# Patient Record
Sex: FEMALE | Race: WHITE | NOT HISPANIC OR LATINO | Employment: OTHER | URBAN - METROPOLITAN AREA
[De-identification: names, ages, dates, MRNs, and addresses within clinical notes are randomized per-mention and may not be internally consistent; named-entity substitution may affect disease eponyms.]

---

## 2017-01-27 PROCEDURE — 99283 EMERGENCY DEPT VISIT LOW MDM: CPT

## 2017-01-27 ASSESSMENT — PAIN SCALES - GENERAL: PAINLEVEL_OUTOF10: 4

## 2017-01-28 ENCOUNTER — HOSPITAL ENCOUNTER (OUTPATIENT)
Dept: RADIOLOGY | Facility: MEDICAL CENTER | Age: 37
End: 2017-01-28
Attending: EMERGENCY MEDICINE
Payer: MEDICARE

## 2017-01-28 ENCOUNTER — HOSPITAL ENCOUNTER (EMERGENCY)
Facility: MEDICAL CENTER | Age: 37
End: 2017-01-28
Attending: EMERGENCY MEDICINE
Payer: MEDICARE

## 2017-01-28 VITALS
TEMPERATURE: 97.8 F | RESPIRATION RATE: 18 BRPM | HEIGHT: 66 IN | SYSTOLIC BLOOD PRESSURE: 128 MMHG | HEART RATE: 98 BPM | BODY MASS INDEX: 46.84 KG/M2 | OXYGEN SATURATION: 95 % | WEIGHT: 291.45 LBS | DIASTOLIC BLOOD PRESSURE: 91 MMHG

## 2017-01-28 DIAGNOSIS — S93.601A FOOT SPRAIN, RIGHT, INITIAL ENCOUNTER: ICD-10-CM

## 2017-01-28 PROCEDURE — 73610 X-RAY EXAM OF ANKLE: CPT | Mod: RT

## 2017-01-28 PROCEDURE — 73620 X-RAY EXAM OF FOOT: CPT | Mod: RT

## 2017-01-28 NOTE — DISCHARGE INSTRUCTIONS
Foot Sprain  You have a sprained foot. When you twist your foot, the ligaments that hold the joints together are injured. This may cause pain, swelling, bruising, and difficulty walking. Proper treatment will shorten your disability and help you prevent re-injury. To treat a sprained foot you should:  · Elevate your foot for the next 2-3 days to reduce swelling.   · Apply ice packs to the foot for 20-30 minutes every 2-3 hours.   · Wrap your foot with a compression bandage as long as it is swollen or tender.   · Do not walk on your foot if it still hurts a lot.  Use crutches or a cane until weight bearing becomes painless.   · Special podiatric shoes or shoes with rigid soles may be useful in allowing earlier walking.   Only take over-the-counter or prescription medicines for pain, discomfort, or fever as directed by your caregiver. Most foot sprains will heal completely in 3-6 weeks with proper rest.  If you still have pain or swelling after 2-3 weeks, or if your pain worsens, you should see your doctor for further evaluation.  Document Released: 01/25/2006 Document Revised: 03/11/2013 Document Reviewed: 12/19/2009  [employer]® Patient Information ©2013 [employer].

## 2017-01-28 NOTE — ED NOTES
Pt to [address] bed, XR at bedside. Pt able to ambulate to room w/o any difficulty or assistance.

## 2017-01-28 NOTE — ED AVS SNAPSHOT
After Visit Summary                                                                                                                Yamila Mendoza   MRN: [id number]    Department:  [facility], Emergency Dept   Date of Visit:  1/27/2017            [facility], Emergency Dept    [address]    [place] [address]    Phone:  [phone]      You were seen by     Mane Scherer M.D.      Your Diagnosis Was     Foot sprain, right, initial encounter     S93.601A       Follow-up Information     1. Follow up with [facility] In 1 week.    Why:  As needed    Contact information    [place] [address]  [phone]        Medication Information     Review all of your home medications and newly ordered medications with your primary doctor and/or pharmacist as soon as possible. Follow medication instructions as directed by your doctor and/or pharmacist.     Please keep your complete medication list with you and share with your physician. Update the information when medications are discontinued, doses are changed, or new medications (including over-the-counter products) are added; and carry medication information at all times in the event of emergency situations.               Medication List      ASK your doctor about these medications        Instructions    amitriptyline 75 MG Tabs   Commonly known as:  ELAVIL    Take 1 Tab by mouth every evening.   Dose:  75 mg       bisacodyl 5 MG EC tablet   Commonly known as:  DULCOLAX    Take 2 Tabs by mouth 1 time daily as needed for Constipation.   Dose:  10 mg       carbamazepine 200 MG Tabs   Commonly known as:  TEGRETOL    Take 1 Tab by mouth 3 times a day.   Dose:  200 mg       cyclobenzaprine 10 MG Tabs   Commonly known as:  FLEXERIL    Take 25 mg by mouth 2 times a day as needed.   Dose:  25 mg       DEPAKOTE 250 MG Tbec   Generic drug:  divalproex    Take 250 mg by mouth 3 times a day. 2 tabs every morning, 2 tabs every  afternoon, 3 tabs at bedtime   Dose:  250 mg       * hydrocodone-acetaminophen 5-325 MG Tabs per tablet   Commonly known as:  NORCO    Take 1-2 Tabs by mouth every 6 hours as needed (severe pain).   Dose:  1-2 Tab       * hydrocodone-acetaminophen 5-325 MG Tabs per tablet   Commonly known as:  NORCO    Take 1 Tab by mouth every four hours as needed (pain).   Dose:  1 Tab       * ibuprofen 600 MG Tabs   Commonly known as:  MOTRIN    Take 1 Tab by mouth every 6 hours as needed.   Dose:  600 mg       * ibuprofen 600 MG Tabs   Commonly known as:  MOTRIN    Take 600 mg by mouth every 6 hours as needed.   Dose:  600 mg       lactobacillus granules Pack    Take 1 Packet by mouth 3 times a day, with meals.   Dose:  1 Packet       lorazepam 1 MG Tabs   Commonly known as:  ATIVAN    Take 1 mg by mouth as needed. Indications: seizures   Dose:  1 mg       nitrofurantoin monohydr macro 100 MG Caps   Commonly known as:  MACROBID    Take 1 Cap by mouth 2 times a day.   Dose:  100 mg       potassium chloride SA 20 MEQ Tbcr   Commonly known as:  Kdur    Take 1 Tab by mouth every day.   Dose:  20 mEq       * pseudoephedrine 30 MG Tabs   Commonly known as:  SUDAFED    Take 2 Tabs by mouth every four hours as needed for Congestion.   Dose:  60 mg       * pseudoephedrine 30 MG Tabs   Commonly known as:  SUDAFED    Take 2 Tabs by mouth every four hours as needed for Congestion.   Dose:  60 mg       topiramate 100 MG Tabs   Commonly known as:  TOPAMAX    Take 1 Tab by mouth 2 times a day.   Dose:  100 mg       * Notice:  This list has 6 medication(s) that are the same as other medications prescribed for you. Read the directions carefully, and ask your doctor or other care provider to review them with you.            Procedures and tests performed during your visit     DX-ANKLE 3+ VIEWS RIGHT    DX-FOOT-2- RIGHT        Discharge Instructions       Foot Sprain  You have a sprained foot. When you twist your foot, the ligaments that hold the  joints together are injured. This may cause pain, swelling, bruising, and difficulty walking. Proper treatment will shorten your disability and help you prevent re-injury. To treat a sprained foot you should:  · Elevate your foot for the next 2-3 days to reduce swelling.   · Apply ice packs to the foot for 20-30 minutes every 2-3 hours.   · Wrap your foot with a compression bandage as long as it is swollen or tender.   · Do not walk on your foot if it still hurts a lot.  Use crutches or a cane until weight bearing becomes painless.   · Special podiatric shoes or shoes with rigid soles may be useful in allowing earlier walking.   Only take over-the-counter or prescription medicines for pain, discomfort, or fever as directed by your caregiver. Most foot sprains will heal completely in 3-6 weeks with proper rest.  If you still have pain or swelling after 2-3 weeks, or if your pain worsens, you should see your doctor for further evaluation.  Document Released: 01/25/2006 Document Revised: 03/11/2013 Document Reviewed: 12/19/2009  [employer]® Patient Information ©2013 [employer].          Patient Information     Patient Information    Following emergency treatment: all patient requiring follow-up care must return either to a private physician or a clinic if your condition worsens before you are able to obtain further medical attention, please return to the emergency room.     Billing Information    At [facility], we work to make the billing process streamlined for our patients.  Our Representatives are here to answer any questions you may have regarding your hospital bill.  If you have insurance coverage and have supplied your insurance information to us, we will submit a claim to your insurer on your behalf.  Should you have any questions regarding your bill, we can be reached online or by phone as follows:  Online: You are able pay your bills online or live chat with our representatives about any billing questions  you may have. We are here to help Monday - Friday from 8:00am to 7:30pm and 9:00am - 12:00pm on Saturdays.  Please visit [email or web address]  for more information.   Phone:  [phone] or [phone]    Please note that your emergency physician, surgeon, pathologist, radiologist, anesthesiologist, and other specialists are not employed by [facility] and will therefore bill separately for their services.  Please contact them directly for any questions concerning their bills at the numbers below:     Emergency Physician Services:  [phone]  [facility]:  [phone]  [facility]:  [phone]  [facility]:  [phone]    1. Your final bill may vary from the amount quoted upon discharge if all procedures are not complete at that time, or if your doctor has additional procedures of which we are not aware. You will receive an additional bill if you return to the Emergency Department at [facility]placed by [facility] for suture removal regardless of the facility of which the sutures were placed.     2. Please arrange for settlement of this account at the emergency registration.    3. All self-pay accounts are due in full at the time of treatment.  If you are unable to meet this obligation then payment is expected within 4-5 days.     4. If you have had radiology studies (CT, X-ray, Ultrasound, MRI), you have received a preliminary result during your emergency department visit. Please contact the radiology department [phone] to receive a copy of your final result. Please discuss the Final result with your primary physician or with the follow up physician provided.     Crisis Hotline:  National Crisis Hotline:  0-793-FFQPFOT or 1-899.569.8241  [place] Crisis Hotline:    1-835.798.9326 or 652-772-9258         ED Discharge Follow Up Questions    1. In order to provide you with very good care, we would like to follow up with a phone call in the  next few days.  May we have your permission to contact you?     YES /  NO    2. What is the best phone number to call you? (       )_____-__________    3. What is the best time to call you?      Morning  /  Afternoon  /  Evening                   Patient Signature:  ____________________________________________________________    Date:  ____________________________________________________________

## 2017-01-28 NOTE — ED AVS SNAPSHOT
1/28/2017          Yamila Mendoza  [address]  [place] [address]    Dear Yamila:    [facility] wants to ensure your discharge home is safe and you or your loved ones have had all your questions answered regarding your care after you leave the hospital.    You may receive a telephone call within two days of your discharge.  This call is to make certain you understand your discharge instructions as well as ensure we provided you with the best care possible during your stay with us.     The call will only last approximately 3-5 minutes and will be done by a nurse.    Once again, we want to ensure your discharge home is safe and that you have a clear understanding of any next steps in your care.  If you have any questions or concerns, please do not hesitate to contact us, we are here for you.  Thank you for choosing [facility] for your healthcare needs.    Sincerely,    Rehan Reyes    [facility]

## 2017-01-28 NOTE — ED NOTES
Patient fell two weeks ago and hit the grass.  During the fall she states she twisted her ankle.  Pain to the foot.  No major deformity.  Able to a ambulate.

## 2017-01-28 NOTE — ED AVS SNAPSHOT
TrueAccord Access Code: [email or web address]  Expires: 2/27/2017  5:16 AM    TrueAccord  A secure, online tool to manage your health information     [employer]’s TrueAccord® is a secure, online tool that connects you to your personalized health information from the privacy of your home -- day or night - making it very easy for you to manage your healthcare. Once the activation process is completed, you can even access your medical information using the TrueAccord sami, which is available for free in the Apple Sami store or Google Play store.     TrueAccord provides the following levels of access (as shown below):   My Chart Features   [facility] Primary Care Doctor [facility]  Specialists [facility]  Urgent  Care Non-[facility]  Primary Care  Doctor   Email your healthcare team securely and privately 24/7 X X X X   Manage appointments: schedule your next appointment; view details of past/upcoming appointments X      Request prescription refills. X      View recent personal medical records, including lab and immunizations X X X X   View health record, including health history, allergies, medications X X X X   Read reports about your outpatient visits, procedures, consult and ER notes X X X X   See your discharge summary, which is a recap of your hospital and/or ER visit that includes your diagnosis, lab results, and care plan. X X       How to register for TrueAccord:  1. Go to  [email or web address].  2. Click on the Sign Up Now box, which takes you to the New Member Sign Up page. You will need to provide the following information:  a. Enter your TrueAccord Access Code exactly as it appears at the top of this page. (You will not need to use this code after you’ve completed the sign-up process. If you do not sign up before the expiration date, you must request a new code.)   b. Enter your date of birth.   c. Enter your home email address.   d. Click Submit, and follow the next screen’s instructions.  3. Create a TrueAccord ID. This will be your TrueAccord  login ID and cannot be changed, so think of one that is secure and easy to remember.  4. Create a [employer] password. You can change your password at any time.  5. Enter your Password Reset Question and Answer. This can be used at a later time if you forget your password.   6. Enter your e-mail address. This allows you to receive e-mail notifications when new information is available in [employer].  7. Click Sign Up. You can now view your health information.    For assistance activating your [employer] account, call [phone]

## 2017-01-28 NOTE — ED PROVIDER NOTES
"ED Provider Note    CHIEF COMPLAINT  Chief Complaint   Patient presents with   • Foot Pain     right        HPI  Yamila Mendoza is a 36 y.o. female who presents to the emergency department with right foot discomfort. The patient states she is walking and she slipped and hurt her right mid foot and right lateral ankle. She says the pain is worse with ambulation. She states this happened approximately 2 weeks ago. She has not been imaged. She does not have any pain proximal to the ankle.    REVIEW OF SYSTEMS  No other musculoskeletal complaints    PHYSICAL EXAM  VITAL SIGNS: /81 mmHg  Pulse 102  Temp(Src) 36.6 °C (97.8 °F)  Resp 16  Ht 1.676 m (5' 6\")  Wt 132.2 kg (291 lb 7.2 oz)  BMI 47.06 kg/m2  SpO2 98%  In general the patient does not appear toxic  Extremities patient has tenderness to the lateral aspect of the right foot. She does not have any obvious deformities. She has a normal ankle and proximal leg examination.  Skin no erythema nor induration  Neurovascular examination is grossly intact in the right lower extremity    RADIOLOGY/PROCEDURES  DX-ANKLE 3+ VIEWS RIGHT   Final Result      No fracture or dislocation of RIGHT ankle.      DX-FOOT-2- RIGHT   Final Result      1.  No fracture or dislocation of RIGHT foot.   2.  Dorsal soft tissue swelling of the forefoot.            COURSE & MEDICAL DECISION MAKING  Pertinent Labs & Imaging studies reviewed. (See chart for details)  This 36-year-old female who presents to emergency with right foot discomfort. The x-ray does not support a fracture or dislocation. The patient will be discharged instructions to wear a good supportive shoe. She will take Motrin for inflammation. She'll follow up with orthopedics if she is not better in 5-7 days.    FINAL IMPRESSION  1. Right mid foot strain      Disposition  The patient will be discharged in stable condition.      Electronically signed by: Mane Scherer, 1/28/2017 4:16 AM        "

## 2017-07-15 ENCOUNTER — HOSPITAL ENCOUNTER (EMERGENCY)
Facility: MEDICAL CENTER | Age: 37
End: 2017-07-15
Attending: EMERGENCY MEDICINE
Payer: MEDICARE

## 2017-07-15 VITALS
HEIGHT: 66 IN | BODY MASS INDEX: 47.09 KG/M2 | OXYGEN SATURATION: 97 % | RESPIRATION RATE: 18 BRPM | TEMPERATURE: 97.2 F | DIASTOLIC BLOOD PRESSURE: 78 MMHG | SYSTOLIC BLOOD PRESSURE: 135 MMHG | WEIGHT: 293 LBS | HEART RATE: 91 BPM

## 2017-07-15 DIAGNOSIS — J02.0 STREP THROAT: ICD-10-CM

## 2017-07-15 LAB
DEPRECATED S PYO AG THROAT QL EIA: ABNORMAL
SIGNIFICANT IND 70042: ABNORMAL
SITE SITE: ABNORMAL
SOURCE SOURCE: ABNORMAL

## 2017-07-15 PROCEDURE — 99284 EMERGENCY DEPT VISIT MOD MDM: CPT

## 2017-07-15 PROCEDURE — 87880 STREP A ASSAY W/OPTIC: CPT

## 2017-07-15 RX ORDER — CLINDAMYCIN HYDROCHLORIDE 300 MG/1
300 CAPSULE ORAL 4 TIMES DAILY
Qty: 40 CAP | Refills: 0 | Status: SHIPPED | OUTPATIENT
Start: 2017-07-15 | End: 2017-07-25

## 2017-07-15 ASSESSMENT — PAIN SCALES - GENERAL: PAINLEVEL_OUTOF10: 10

## 2017-07-15 NOTE — ED AVS SNAPSHOT
Home Care Instructions                                                                                                                Yamila Mendoza   MRN: [id number]    Department:  [facility], Emergency Dept   Date of Visit:  7/15/2017            [facility], Emergency Dept    [address] [place] [id number]    Phone:  [phone]      You were seen by     Caleb Abad D.O.      Your Diagnosis Was     Strep throat     J02.0       Follow-up Information     1. Follow up with Please follow up with your doctor. Call in 2 days.        2. Follow up with [facility], Emergency Dept.    Specialty:  Emergency Medicine    Why:  If symptoms worsen    Contact information    [address] [place] [id number] [phone]      Medication Information     Review all of your home medications and newly ordered medications with your primary doctor and/or pharmacist as soon as possible. Follow medication instructions as directed by your doctor and/or pharmacist.     Please keep your complete medication list with you and share with your physician. Update the information when medications are discontinued, doses are changed, or new medications (including over-the-counter products) are added; and carry medication information at all times in the event of emergency situations.               Medication List      START taking these medications        Instructions    Morning Afternoon Evening Bedtime    clindamycin 300 MG Caps   Commonly known as:  CLEOCIN        Take 1 Cap by mouth 4 times a day for 10 days.   Dose:  300 mg                          ASK your doctor about these medications        Instructions    Morning Afternoon Evening Bedtime    amitriptyline 75 MG Tabs   Commonly known as:  ELAVIL        Take 1 Tab by mouth every evening.   Dose:  75 mg                        bisacodyl 5 MG EC tablet   Commonly known as:  DULCOLAX        Take 2 Tabs by mouth 1  time daily as needed for Constipation.   Dose:  10 mg                        carbamazepine 200 MG Tabs   Commonly known as:  TEGRETOL        Take 1 Tab by mouth 3 times a day.   Dose:  200 mg                        cyclobenzaprine 10 MG Tabs   Commonly known as:  FLEXERIL        Take 25 mg by mouth 2 times a day as needed.   Dose:  25 mg                        DEPAKOTE 250 MG Tbec   Generic drug:  divalproex        Take 250 mg by mouth 3 times a day. 2 tabs every morning, 2 tabs every afternoon, 3 tabs at bedtime   Dose:  250 mg                        * hydrocodone-acetaminophen 5-325 MG Tabs per tablet   Commonly known as:  NORCO        Take 1-2 Tabs by mouth every 6 hours as needed (severe pain).   Dose:  1-2 Tab                        * hydrocodone-acetaminophen 5-325 MG Tabs per tablet   Commonly known as:  NORCO        Take 1 Tab by mouth every four hours as needed (pain).   Dose:  1 Tab                        * ibuprofen 600 MG Tabs   Commonly known as:  MOTRIN        Take 1 Tab by mouth every 6 hours as needed.   Dose:  600 mg                        * ibuprofen 600 MG Tabs   Commonly known as:  MOTRIN        Take 600 mg by mouth every 6 hours as needed.   Dose:  600 mg                        lactobacillus granules Pack        Take 1 Packet by mouth 3 times a day, with meals.   Dose:  1 Packet                        lorazepam 1 MG Tabs   Commonly known as:  ATIVAN        Take 1 mg by mouth as needed. Indications: seizures   Dose:  1 mg                        nitrofurantoin monohydr macro 100 MG Caps   Commonly known as:  MACROBID        Take 1 Cap by mouth 2 times a day.   Dose:  100 mg                        potassium chloride SA 20 MEQ Tbcr   Commonly known as:  Kdur        Take 1 Tab by mouth every day.   Dose:  20 mEq                        * pseudoephedrine 30 MG Tabs   Commonly known as:  SUDAFED        Take 2 Tabs by mouth every four hours as needed for Congestion.   Dose:  60 mg                         "* pseudoephedrine 30 MG Tabs   Commonly known as:  SUDAFED        Take 2 Tabs by mouth every four hours as needed for Congestion.   Dose:  60 mg                        topiramate 100 MG Tabs   Commonly known as:  TOPAMAX        Take 1 Tab by mouth 2 times a day.   Dose:  100 mg                        * Notice:  This list has 6 medication(s) that are the same as other medications prescribed for you. Read the directions carefully, and ask your doctor or other care provider to review them with you.         Where to Get Your Medications      You can get these medications from any pharmacy     Bring a paper prescription for each of these medications    - clindamycin 300 MG Caps            Procedures and tests performed during your visit     RAPID STREP, CULT IF INDICATED (CULTURE IF NEGATIVE)        Discharge Instructions       Salt Water Gargle  This solution will help make your mouth and throat feel better.  HOME CARE INSTRUCTIONS   · Mix 1 teaspoon of salt in 8 ounces of warm water.  · Gargle with this solution as much or often as you need or as directed. Swish and gargle gently if you have any sores or wounds in your mouth.  · Do not swallow this mixture.     This information is not intended to replace advice given to you by your health care provider. Make sure you discuss any questions you have with your health care provider.     Document Released: 09/21/2005 Document Revised: 03/11/2013 Document Reviewed: 02/12/2010  [employer] Interactive Patient Education ©2016 [employer]    Strep Throat  Strep throat is an infection of the throat caused by a bacteria named Streptococcus pyogenes. Your health care provider may call the infection streptococcal \"tonsillitis\" or \"pharyngitis\" depending on whether there are signs of inflammation in the tonsils or back of the throat. Strep throat is most common in children aged 5-15 years during the cold months of the year, but it can occur in people of any age during any season. " "This infection is spread from person to person (contagious) through coughing, sneezing, or other close contact.  SIGNS AND SYMPTOMS   · Fever or chills.  · Painful, swollen, red tonsils or throat.  · Pain or difficulty when swallowing.  · White or yellow spots on the tonsils or throat.  · Swollen, tender lymph nodes or \"glands\" of the neck or under the jaw.  · Red rash all over the body (rare).  DIAGNOSIS   Many different infections can cause the same symptoms. A test must be done to confirm the diagnosis so the right treatment can be given. A \"rapid strep test\" can help your health care provider make the diagnosis in a few minutes. If this test is not available, a light swab of the infected area can be used for a throat culture test. If a throat culture test is done, results are usually available in a day or two.  TREATMENT   Strep throat is treated with antibiotic medicine.  HOME CARE INSTRUCTIONS   · Gargle with 1 tsp of salt in 1 cup of warm water, 3-4 times per day or as needed for comfort.  · Family members who also have a sore throat or fever should be tested for strep throat and treated with antibiotics if they have the strep infection.  · Make sure everyone in your household washes their hands well.  · Do not share food, drinking cups, or personal items that could cause the infection to spread to others.  · You may need to eat a soft food diet until your sore throat gets better.  · Drink enough water and fluids to keep your urine clear or pale yellow. This will help prevent dehydration.  · Get plenty of rest.  · Stay home from school, day care, or work until you have been on antibiotics for 24 hours.  · Take medicines only as directed by your health care provider.  · Take your antibiotic medicine as directed by your health care provider. Finish it even if you start to feel better.  SEEK MEDICAL CARE IF:   · The glands in your neck continue to enlarge.  · You develop a rash, cough, or earache.  · You cough " up green, yellow-brown, or bloody sputum.  · You have pain or discomfort not controlled by medicines.  · Your problems seem to be getting worse rather than better.  · You have a fever.  SEEK IMMEDIATE MEDICAL CARE IF:   · You develop any new symptoms such as vomiting, severe headache, stiff or painful neck, chest pain, shortness of breath, or trouble swallowing.  · You develop severe throat pain, drooling, or changes in your voice.  · You develop swelling of the neck, or the skin on the neck becomes red and tender.  · You develop signs of dehydration, such as fatigue, dry mouth, and decreased urination.  · You become increasingly sleepy, or you cannot wake up completely.  MAKE SURE YOU:  · Understand these instructions.  · Will watch your condition.  · Will get help right away if you are not doing well or get worse.     This information is not intended to replace advice given to you by your health care provider. Make sure you discuss any questions you have with your health care provider.     Document Released: 12/15/2001 Document Revised: 01/08/2016 Document Reviewed: 04/11/2016  [employer] Interactive Patient Education ©2016 [employer]            Patient Information     Patient Information    Following emergency treatment: all patient requiring follow-up care must return either to a private physician or a clinic if your condition worsens before you are able to obtain further medical attention, please return to the emergency room.     Billing Information    At [facility], we work to make the billing process streamlined for our patients.  Our Representatives are here to answer any questions you may have regarding your hospital bill.  If you have insurance coverage and have supplied your insurance information to us, we will submit a claim to your insurer on your behalf.  Should you have any questions regarding your bill, we can be reached online or by phone as follows:  Online: You are able pay your bills online  or live chat with our representatives about any billing questions you may have. We are here to help Monday - Friday from 8:00am to 7:30pm and 9:00am - 12:00pm on Saturdays.  Please visit [email or web address]  for more information.   Phone:  [phone] or [phone]    Please note that your emergency physician, surgeon, pathologist, radiologist, anesthesiologist, and other specialists are not employed by [facility] and will therefore bill separately for their services.  Please contact them directly for any questions concerning their bills at the numbers below:     Emergency Physician Services:  [phone]  [facility]:  [phone]  [facility]:  [phone]  [facility]:  [phone]    1. Your final bill may vary from the amount quoted upon discharge if all procedures are not complete at that time, or if your doctor has additional procedures of which we are not aware. You will receive an additional bill if you return to the Emergency Department at [facility] for suture removal regardless of the facility of which the sutures were placed.     2. Please arrange for settlement of this account at the emergency registration.    3. All self-pay accounts are due in full at the time of treatment.  If you are unable to meet this obligation then payment is expected within 4-5 days.     4. If you have had radiology studies (CT, X-ray, Ultrasound, MRI), you have received a preliminary result during your emergency department visit. Please contact the radiology department [phone] to receive a copy of your final result. Please discuss the Final result with your primary physician or with the follow up physician provided.     Crisis Hotline:  National Crisis Hotline:  6-581-NDVLXXX or 1-653.198.7906  [place] Crisis Hotline:    1-255.718.6338 or 421-421-6074         ED Discharge Follow Up Questions    1. In order to provide you with very  good care, we would like to follow up with a phone call in the next few days.  May we have your permission to contact you?     YES /  NO    2. What is the best phone number to call you? (       )_____-__________    3. What is the best time to call you?      Morning  /  Afternoon  /  Evening                   Patient Signature:  ____________________________________________________________    Date:  ____________________________________________________________

## 2017-07-15 NOTE — ED AVS SNAPSHOT
Mobiquity Access Code: [email or web address]  Expires: 8/14/2017  9:38 PM    Mobiquity  A secure, online tool to manage your health information     [employer]’s Mobiquity® is a secure, online tool that connects you to your personalized health information from the privacy of your home -- day or night - making it very easy for you to manage your healthcare. Once the activation process is completed, you can even access your medical information using the Mobiquity sami, which is available for free in the Apple Sami store or Google Play store.     Mobiquity provides the following levels of access (as shown below):   My Chart Features   [facility] Primary Care Doctor [facility]  Specialists [facility]  Urgent  Care Non-[facility]  Primary Care  Doctor   Email your healthcare team securely and privately 24/7 X X X X   Manage appointments: schedule your next appointment; view details of past/upcoming appointments X      Request prescription refills. X      View recent personal medical records, including lab and immunizations X X X X   View health record, including health history, allergies, medications X X X X   Read reports about your outpatient visits, procedures, consult and ER notes X X X X   See your discharge summary, which is a recap of your hospital and/or ER visit that includes your diagnosis, lab results, and care plan. X X       How to register for Mobiquity:  1. Go to  [email or web address].  2. Click on the Sign Up Now box, which takes you to the New Member Sign Up page. You will need to provide the following information:  a. Enter your Mobiquity Access Code exactly as it appears at the top of this page. (You will not need to use this code after you’ve completed the sign-up process. If you do not sign up before the expiration date, you must request a new code.)   b. Enter your date of birth.   c. Enter your home email address.   d. Click Submit, and follow the next screen’s instructions.  3. Create a Mobiquity ID. This will be your Mobiquity  login ID and cannot be changed, so think of one that is secure and easy to remember.  4. Create a [employer] password. You can change your password at any time.  5. Enter your Password Reset Question and Answer. This can be used at a later time if you forget your password.   6. Enter your e-mail address. This allows you to receive e-mail notifications when new information is available in CloudPrime.  7. Click Sign Up. You can now view your health information.    For assistance activating your CloudPrime account, call [phone]

## 2017-07-15 NOTE — ED AVS SNAPSHOT
7/15/2017    Yamila Mendoza  [address]  [place] [address]    Dear Yamila:    [facility] wants to ensure your discharge home is safe and you or your loved ones have had all of your questions answered regarding your care after you leave the hospital.    Below is a list of resources and contact information should you have any questions regarding your hospital stay, follow-up instructions, or active medical symptoms.    Questions or Concerns Regarding… Contact   Medical Questions Related to Your Discharge  (7 days a week, 8am-5pm) Contact a Nurse Care Coordinator   [phone]   Medical Questions Not Related to Your Discharge  (24 hours a day / 7 days a week)  Contact the Nurse Health Line   [phone]    Medications or Discharge Instructions Refer to your discharge packet   or contact your [facility] Primary Care Provider   [phone]   Follow-up Appointment(s) Schedule your appointment via [employer]   or contact Scheduling [phone]   Billing Review your statement via [employer]  or contact Billing [phone]   Medical Records Review your records via [employer]   or contact Medical Records [phone]     You may receive a telephone call within two days of discharge. This call is to make certain you understand your discharge instructions and have the opportunity to have any questions answered. You can also easily access your medical information, test results and upcoming appointments via the [employer] free online health management tool. You can learn more and sign up at [employer]. For assistance setting up your [employer] account, please call [phone].    Once again, we want to ensure your discharge home is safe and that you have a clear understanding of any next steps in your care. If you have any questions or concerns, please do not hesitate to contact us, we are here for you. Thank you for choosing [facility] for your healthcare needs.    Sincerely,    Your [facility] Healthcare Team

## 2017-07-16 NOTE — ED NOTES
Discharge instructions received and reviewed with pt and , pt and  verbalized understanding of medication and follow up care.

## 2017-07-16 NOTE — ED NOTES
Yamila Mendoza  36 y.o.  female  Chief Complaint   Patient presents with   • Sore Throat     Present to triage c/o sore throat x 2 weeks. Pain with swallowing. + cough. Afebrile.

## 2017-07-16 NOTE — DISCHARGE INSTRUCTIONS
"Salt Water Gargle  This solution will help make your mouth and throat feel better.  HOME CARE INSTRUCTIONS   · Mix 1 teaspoon of salt in 8 ounces of warm water.  · Gargle with this solution as much or often as you need or as directed. Swish and gargle gently if you have any sores or wounds in your mouth.  · Do not swallow this mixture.     This information is not intended to replace advice given to you by your health care provider. Make sure you discuss any questions you have with your health care provider.     Document Released: 09/21/2005 Document Revised: 03/11/2013 Document Reviewed: 02/12/2010  [employer] Interactive Patient Education ©2016 [employer] Inc.    Strep Throat  Strep throat is an infection of the throat caused by a bacteria named Streptococcus pyogenes. Your health care provider may call the infection streptococcal \"tonsillitis\" or \"pharyngitis\" depending on whether there are signs of inflammation in the tonsils or back of the throat. Strep throat is most common in children aged 5-15 years during the cold months of the year, but it can occur in people of any age during any season. This infection is spread from person to person (contagious) through coughing, sneezing, or other close contact.  SIGNS AND SYMPTOMS   · Fever or chills.  · Painful, swollen, red tonsils or throat.  · Pain or difficulty when swallowing.  · White or yellow spots on the tonsils or throat.  · Swollen, tender lymph nodes or \"glands\" of the neck or under the jaw.  · Red rash all over the body (rare).  DIAGNOSIS   Many different infections can cause the same symptoms. A test must be done to confirm the diagnosis so the right treatment can be given. A \"rapid strep test\" can help your health care provider make the diagnosis in a few minutes. If this test is not available, a light swab of the infected area can be used for a throat culture test. If a throat culture test is done, results are usually available in a day or two.  TREATMENT "   Strep throat is treated with antibiotic medicine.  HOME CARE INSTRUCTIONS   · Gargle with 1 tsp of salt in 1 cup of warm water, 3-4 times per day or as needed for comfort.  · Family members who also have a sore throat or fever should be tested for strep throat and treated with antibiotics if they have the strep infection.  · Make sure everyone in your household washes their hands well.  · Do not share food, drinking cups, or personal items that could cause the infection to spread to others.  · You may need to eat a soft food diet until your sore throat gets better.  · Drink enough water and fluids to keep your urine clear or pale yellow. This will help prevent dehydration.  · Get plenty of rest.  · Stay home from school, day care, or work until you have been on antibiotics for 24 hours.  · Take medicines only as directed by your health care provider.  · Take your antibiotic medicine as directed by your health care provider. Finish it even if you start to feel better.  SEEK MEDICAL CARE IF:   · The glands in your neck continue to enlarge.  · You develop a rash, cough, or earache.  · You cough up green, yellow-brown, or bloody sputum.  · You have pain or discomfort not controlled by medicines.  · Your problems seem to be getting worse rather than better.  · You have a fever.  SEEK IMMEDIATE MEDICAL CARE IF:   · You develop any new symptoms such as vomiting, severe headache, stiff or painful neck, chest pain, shortness of breath, or trouble swallowing.  · You develop severe throat pain, drooling, or changes in your voice.  · You develop swelling of the neck, or the skin on the neck becomes red and tender.  · You develop signs of dehydration, such as fatigue, dry mouth, and decreased urination.  · You become increasingly sleepy, or you cannot wake up completely.  MAKE SURE YOU:  · Understand these instructions.  · Will watch your condition.  · Will get help right away if you are not doing well or get worse.     This  information is not intended to replace advice given to you by your health care provider. Make sure you discuss any questions you have with your health care provider.     Document Released: 12/15/2001 Document Revised: 01/08/2016 Document Reviewed: 04/11/2016  [employer] Interactive Patient Education ©2016 Elsevier Inc.

## 2017-07-16 NOTE — ED PROVIDER NOTES
ED Provider Note    Scribed for Caleb Abad D.O. by Britton Patricio. 7/15/2017  7:45 PM    History obtained from: Patient and her fiancé  History limited by: None     CHIEF COMPLAINT  Chief Complaint   Patient presents with   • Sore Throat        HPI    Yamila Mendoza is a 36 y.o. female who presents to the ED complaining of a sore throat onset two weeks ago. Her fiance states that the patient was coughed on while riding the bus prior to onset of the symptoms.The patient has an associated cough, bilateral ear drainage, sneezing, rhinorrhea, congestion in the early morning, and fever of 102 °F to 103 °F. She reports that the fever is severe enough to keep her up at night. There is no possibility that the patient is pregnant.    Patient reports that her swallow. She denies any difficulty breathing. She has been using over-the-counter medicine for her symptoms without much improvement.    REVIEW OF SYSTEMS  Please see HPI for pertinent positives/negatives.  All other systems reviewed and are negative.     PAST MEDICAL HISTORY  Past Medical History   Diagnosis Date   • Psychiatric disorder      bipolar   • Psychiatric disorder      depression   • Seizure disorder (CMS-HCC)    • ASTHMA    • Bipolar disorder (CMS-HCC)    • Chronic back pain         SURGICAL HISTORY  Past Surgical History   Procedure Laterality Date   • Other neurological surg     • Other orthopedic surgery       2 R knee surgeries   • Other abdominal surgery     • Ercp in or  9/19/2009     Performed by LOU WEBB at [facility] ORS   • Marilu by laparoscopy  9/22/2009     Performed by JUNE WAGGONER at [facility]S        SOCIAL HISTORY  Social History     Social History Main Topics   • Smoking status: Never Smoker         • Alcohol Use: No   • Drug Use: No             FAMILY HISTORY  No pertinent family history reported.    CURRENT MEDICATIONS  No current facility-administered medications on file prior to encounter.  "    Current Outpatient Prescriptions on File Prior to Encounter   Medication Sig Dispense Refill   • pseudoephedrine (SUDAFED) 30 MG Tab Take 2 Tabs by mouth every four hours as needed for Congestion. 120 Tab 6   • pseudoephedrine (SUDAFED) 30 MG Tab Take 2 Tabs by mouth every four hours as needed for Congestion. 20 Tab 0   • hydrocodone-acetaminophen (NORCO) 5-325 MG Tab per tablet Take 1 Tab by mouth every four hours as needed (pain). 10 Tab 0   • nitrofurantoin monohydr macro (MACROBID) 100 MG CAPS Take 1 Cap by mouth 2 times a day. 10 Cap 0   • ibuprofen (MOTRIN) 600 MG TABS Take 1 Tab by mouth every 6 hours as needed. 30 Tab 3   • bisacodyl EC (DULCOLAX) 5 MG EC tablet Take 2 Tabs by mouth 1 time daily as needed for Constipation. 30 Tab 3   • lactobacillus granules (LACTINEX/FLORANEX) PACK Take 1 Packet by mouth 3 times a day, with meals.     • hydrocodone-acetaminophen (NORCO) 5-325 MG TABS per tablet Take 1-2 Tabs by mouth every 6 hours as needed (severe pain). 1 Tab 0   • ibuprofen (MOTRIN) 600 MG TABS Take 600 mg by mouth every 6 hours as needed.     • lorazepam (ATIVAN) 1 MG TABS Take 1 mg by mouth as needed. Indications: seizures     • cyclobenzaprine (FLEXERIL) 10 MG TABS Take 25 mg by mouth 2 times a day as needed.     • topiramate (TOPAMAX) 100 MG TABS Take 1 Tab by mouth 2 times a day. 60 Each 0   • carbamazepine (TEGRETOL) 200 MG TABS Take 1 Tab by mouth 3 times a day. 90 Each 0   • divalproex EC (DEPAKOTE) 250 MG TBEC Take 250 mg by mouth 3 times a day. 2 tabs every morning, 2 tabs every afternoon, 3 tabs at bedtime     • potassium chloride SA (K-DUR) 20 MEQ TBCR Take 1 Tab by mouth every day. 10 Each 11   • amitriptyline (ELAVIL) 75 MG TABS Take 1 Tab by mouth every evening. 14 Each 0        ALLERGIES  Allergies   Allergen Reactions   • Bactrim Unspecified     \"I get the shakes\"    • Iron Unspecified     \"see red dots\" vision, not rash    • Penicillins Unspecified     \"makes me dizzy with my " "medicine\"    • Phenobarbital Unspecified     \"makes me dizzy\"         PHYSICAL EXAM  VITAL SIGNS: /81 mmHg  Pulse 93  Temp(Src) 36.2 °C (97.2 °F)  Resp 17  Ht 1.676 m (5' 6\")  Wt 133.2 kg (293 lb 10.4 oz)  BMI 47.42 kg/m2  SpO2 97% @MONTY[517006::@     Pulse ox interpretation: 97% I interpret this pulse ox as normal     Constitutional: Well developed, well nourished, alert in no apparent distress, nontoxic appearance    HENT: No external signs of trauma, normocephalic, bilateral external ears normal, TMs are clear bilaterally, oropharynx moist with mild posterior erythema, airway is patent without swelling/edema, uvula is midline, patient speaking without difficulty with normal voice, nose normal    Eyes: PERRL, conjunctiva without erythema, no discharge, no icterus    Neck: Soft and supple, trachea midline, no stridor, no tenderness, no LAD, no JVD, good ROM    Cardiovascular: Regular rate and rhythm, no murmurs/rubs/gallops, strong distal pulses and good perfusion    Thorax & Lungs: No respiratory distress, CTAB, no chest tenderness    Abdomen: Soft, nontender, nondistended, no guarding, no rebound, normal BS    Back: No CVAT    Extremities: No clubbing, no cyanosis, no edema, no gross deformity, good ROM, no tenderness, intact distal pulses with brisk cap refill    Skin: Warm, dry, no pallor/cyanosis, no rash noted    Lymphatic: No lymphadenopathy noted    Neuro: A/O times 3, no focal deficits noted    Psychiatric: Cooperative        DIAGNOSTIC STUDIES / PROCEDURES        LABS  All labs reviewed by me.     Results for orders placed or performed during the hospital encounter of 07/15/17   RAPID STREP, CULT IF INDICATED (CULTURE IF NEGATIVE)   Result Value Ref Range    Significant Indicator POS (POS)     Source THRT     Site THROAT     Rapid Strep Screen Positive for Group A streptococcus. (A)         RADIOLOGY  The radiologist's interpretation of all radiological studies have been reviewed by me.     No " "orders to display          COURSE & MEDICAL DECISION MAKING  Nursing notes, VS, PMSFHx reviewed in chart.     Differential diagnoses considered include but are not limited to: URI, pneumonia, bronchitis, influenza, laryngitis, pharyngitis/tonsillitis, epiglottitis, peritonsillar abscess, retropharyngeal abscess, sinusitis, mononucleosis, viral syndrome, allergic rhinitis, otitis media     7:49 PM Rapid strep, culture if indicated was ordered    8:53 PM Review of laboratory results reveal that the patient is positive for Strep.    8:53 PM I informed the patient of her lab results. Patient states that she would prefer a capsule antibiotics treatment as opposed to an injection.    9:00 PM - Re-examined; The patient is resting in bed comfortably. I discussed her above findings were overall unremarkable and plans for discharge with a prescription for Cleocin. She was given a referral to her PCP and instructed to return to the ED if her symptoms worsen. Patient understands and agrees. Her vitals prior to discharge are: /81 mmHg  Pulse 93  Temp(Src) 36.2 °C (97.2 °F)  Resp 17  Ht 1.676 m (5' 6\")  Wt 133.2 kg (293 lb 10.4 oz)  BMI 47.42 kg/m2  SpO2 97%    The patient is referred to a primary physician for blood pressure management, diabetic screening, and for all other preventative health concerns.       FINAL IMPRESSION  1. Strep throat           DISPOSITION  Patient will be discharged home in stable condition.       FOLLOW UP  Please follow up with your doctor    Call in 2 days      [facility], Emergency Dept  [address] [address] [phone]    If symptoms worsen         OUTPATIENT MEDICATIONS  New Prescriptions    CLINDAMYCIN (CLEOCIN) 300 MG CAP    Take 1 Cap by mouth 4 times a day for 10 days.           IBritton (Scribe), am scribing for, and in the presence of, Caleb Abad D.O..    Electronically signed by: Britton Patricio (Scribe), " 7/15/2017    Caleb BELTRAN D.O. personally performed the services described in this documentation, as scribed by Britton Patricio in my presence, and it is both accurate and complete.      Portions of this record were made with voice recognition software and by scribes.  Despite my review, spelling/grammar/context errors may still remain.  Interpretation of this chart should be taken in this context.

## 2017-08-15 ENCOUNTER — HOSPITAL ENCOUNTER (EMERGENCY)
Facility: MEDICAL CENTER | Age: 37
End: 2017-08-16
Attending: EMERGENCY MEDICINE
Payer: MEDICARE

## 2017-08-15 DIAGNOSIS — B37.9 YEAST INFECTION: ICD-10-CM

## 2017-08-15 LAB
APPEARANCE UR: ABNORMAL
BACTERIA #/AREA URNS HPF: ABNORMAL /HPF
BILIRUB UR QL STRIP.AUTO: NEGATIVE
COLOR UR: YELLOW
CULTURE IF INDICATED INDCX: YES UA CULTURE
EPI CELLS #/AREA URNS HPF: ABNORMAL /HPF
GLUCOSE UR STRIP.AUTO-MCNC: NEGATIVE MG/DL
HCG UR QL: NEGATIVE
KETONES UR STRIP.AUTO-MCNC: NEGATIVE MG/DL
LEUKOCYTE ESTERASE UR QL STRIP.AUTO: ABNORMAL
MICRO URNS: ABNORMAL
MUCOUS THREADS #/AREA URNS HPF: ABNORMAL /HPF
NITRITE UR QL STRIP.AUTO: NEGATIVE
PH UR STRIP.AUTO: 5.5 [PH]
PROT UR QL STRIP: NEGATIVE MG/DL
RBC # URNS HPF: ABNORMAL /HPF
RBC UR QL AUTO: ABNORMAL
SP GR UR REFRACTOMETRY: 1.03
SP GR UR STRIP.AUTO: 1.03
UROBILINOGEN UR STRIP.AUTO-MCNC: 1 MG/DL
WBC #/AREA URNS HPF: ABNORMAL /HPF

## 2017-08-15 PROCEDURE — 87591 N.GONORRHOEAE DNA AMP PROB: CPT

## 2017-08-15 PROCEDURE — 81025 URINE PREGNANCY TEST: CPT

## 2017-08-15 PROCEDURE — 99285 EMERGENCY DEPT VISIT HI MDM: CPT

## 2017-08-15 PROCEDURE — 87086 URINE CULTURE/COLONY COUNT: CPT

## 2017-08-15 PROCEDURE — 81001 URINALYSIS AUTO W/SCOPE: CPT

## 2017-08-15 PROCEDURE — 87491 CHLMYD TRACH DNA AMP PROBE: CPT

## 2017-08-15 NOTE — ED AVS SNAPSHOT
8/16/2017    Yamila Mendoza  [address]  [place] [address]    Dear Yamila:    [facility] wants to ensure your discharge home is safe and you or your loved ones have had all of your questions answered regarding your care after you leave the hospital.    Below is a list of resources and contact information should you have any questions regarding your hospital stay, follow-up instructions, or active medical symptoms.    Questions or Concerns Regarding… Contact   Medical Questions Related to Your Discharge  (7 days a week, 8am-5pm) Contact a Nurse Care Coordinator   [phone]   Medical Questions Not Related to Your Discharge  (24 hours a day / 7 days a week)  Contact the Nurse Health Line   [phone]    Medications or Discharge Instructions Refer to your discharge packet   or contact your [facility] Primary Care Provider   [phone]   Follow-up Appointment(s) Schedule your appointment via [employer]   or contact Scheduling [phone]   Billing Review your statement via [employer]  or contact Billing [phone]   Medical Records Review your records via [employer]   or contact Medical Records [phone]     You may receive a telephone call within two days of discharge. This call is to make certain you understand your discharge instructions and have the opportunity to have any questions answered. You can also easily access your medical information, test results and upcoming appointments via the [employer] free online health management tool. You can learn more and sign up at [employer]. For assistance setting up your [employer] account, please call [phone].    Once again, we want to ensure your discharge home is safe and that you have a clear understanding of any next steps in your care. If you have any questions or concerns, please do not hesitate to contact us, we are here for you. Thank you for choosing [facility] for your healthcare needs.    Sincerely,    Your [facility] Healthcare Team

## 2017-08-15 NOTE — ED AVS SNAPSHOT
[employer] Access Code: [email or web address]  Expires: 9/15/2017  1:43 AM    [employer]ills [employer]  A secure, online tool to manage your health information     [employer]® is a secure, online tool that connects you to your personalized health information from the privacy of your home -- day or night - making it very easy for you to manage your healthcare. Once the activation process is completed, you can even access your medical information using the Bills [employer]akis sami, which is available for free in the Apple Sami store or Google Play store.     [employer] provides the following levels of access (as shown below):   My Chart Features   [facility] Primary Care Doctor [facility]  Specialists [facility]  Urgent  Care Non-[facility]  Primary Care  Doctor   Email your healthcare team securely and privately 24/7 X X X X   Manage appointments: schedule your next appointment; view details of past/upcoming appointments X      Request prescription refills. X      View recent personal medical records, including lab and immunizations X X X X   View health record, including health history, allergies, medications X X X X   Read reports about your outpatient visits, procedures, consult and ER notes X X X X   See your discharge summary, which is a recap of your hospital and/or ER visit that includes your diagnosis, lab results, and care plan. X X       How to register for Bills [employer]akis:  1. Go to  [email or web address].  2. Click on the Sign Up Now box, which takes you to the New Member Sign Up page. You will need to provide the following information:  a. Enter your [employer] Access Code exactly as it appears at the top of this page. (You will not need to use this code after you’ve completed the sign-up process. If you do not sign up before the expiration date, you must request a new code.)   b. Enter your date of birth.   c. Enter your home email address.   d. Click Submit, and follow the next screen’s instructions.  3. Create a [employer] ID. This will be your [employer]  login ID and cannot be changed, so think of one that is secure and easy to remember.  4. Create a [employer] password. You can change your password at any time.  5. Enter your Password Reset Question and Answer. This can be used at a later time if you forget your password.   6. Enter your e-mail address. This allows you to receive e-mail notifications when new information is available in [employer].  7. Click Sign Up. You can now view your health information.    For assistance activating your [employer] account, call [phone]

## 2017-08-15 NOTE — ED AVS SNAPSHOT
Home Care Instructions                                                                                                                Yamila Mendoza   MRN: [id number]    Department:  [facility], Emergency Dept   Date of Visit:  8/15/2017            [facility], Emergency Dept    [address]    [place] [address]    Phone:  [phone]      You were seen by     Mandeep Ocampo M.D.      Your Diagnosis Was     Yeast infection     B37.9       These are the medications you received during your hospitalization from 08/15/2017 2224 to 08/16/2017 0143     Date/Time Order Dose Route Action    08/16/2017 0145 fluconazole (DIFLUCAN) tablet 200 mg 200 mg Oral Given      Follow-up Information     1. Schedule an appointment as soon as possible for a visit with Your primary care doctor.      Medication Information     Review all of your home medications and newly ordered medications with your primary doctor and/or pharmacist as soon as possible. Follow medication instructions as directed by your doctor and/or pharmacist.     Please keep your complete medication list with you and share with your physician. Update the information when medications are discontinued, doses are changed, or new medications (including over-the-counter products) are added; and carry medication information at all times in the event of emergency situations.               Medication List      START taking these medications        Instructions    Morning Afternoon Evening Bedtime    nystatin 057590 UNIT/GM Crea topical cream   Commonly known as:  MYCOSTATIN        Doctor's comments:  Apply to affected area   Apply 1 g to affected area(s) 2 times a day.   Dose:  1 g                          ASK your doctor about these medications        Instructions    Morning Afternoon Evening Bedtime    amitriptyline 75 MG Tabs   Commonly known as:  ELAVIL        Take 1 Tab by mouth every evening.   Dose:  75 mg                           bisacodyl 5 MG EC tablet   Commonly known as:  DULCOLAX        Take 2 Tabs by mouth 1 time daily as needed for Constipation.   Dose:  10 mg                        carbamazepine 200 MG Tabs   Commonly known as:  TEGRETOL        Take 1 Tab by mouth 3 times a day.   Dose:  200 mg                        cyclobenzaprine 10 MG Tabs   Commonly known as:  FLEXERIL        Take 25 mg by mouth 2 times a day as needed.   Dose:  25 mg                        DEPAKOTE 250 MG Tbec   Generic drug:  divalproex        Take 250 mg by mouth 3 times a day. 2 tabs every morning, 2 tabs every afternoon, 3 tabs at bedtime   Dose:  250 mg                        * hydrocodone-acetaminophen 5-325 MG Tabs per tablet   Commonly known as:  NORCO        Take 1-2 Tabs by mouth every 6 hours as needed (severe pain).   Dose:  1-2 Tab                        * hydrocodone-acetaminophen 5-325 MG Tabs per tablet   Commonly known as:  NORCO        Take 1 Tab by mouth every four hours as needed (pain).   Dose:  1 Tab                        * ibuprofen 600 MG Tabs   Commonly known as:  MOTRIN        Take 1 Tab by mouth every 6 hours as needed.   Dose:  600 mg                        * ibuprofen 600 MG Tabs   Commonly known as:  MOTRIN        Take 600 mg by mouth every 6 hours as needed.   Dose:  600 mg                        lactobacillus granules Pack        Take 1 Packet by mouth 3 times a day, with meals.   Dose:  1 Packet                        lorazepam 1 MG Tabs   Commonly known as:  ATIVAN        Take 1 mg by mouth as needed. Indications: seizures   Dose:  1 mg                        nitrofurantoin monohydr macro 100 MG Caps   Commonly known as:  MACROBID        Take 1 Cap by mouth 2 times a day.   Dose:  100 mg                        potassium chloride SA 20 MEQ Tbcr   Commonly known as:  Kdur        Take 1 Tab by mouth every day.   Dose:  20 mEq                        * pseudoephedrine 30 MG Tabs   Commonly known as:  SUDAFED        Take 2  Tabs by mouth every four hours as needed for Congestion.   Dose:  60 mg                        * pseudoephedrine 30 MG Tabs   Commonly known as:  SUDAFED        Take 2 Tabs by mouth every four hours as needed for Congestion.   Dose:  60 mg                        topiramate 100 MG Tabs   Commonly known as:  TOPAMAX        Take 1 Tab by mouth 2 times a day.   Dose:  100 mg                        * Notice:  This list has 6 medication(s) that are the same as other medications prescribed for you. Read the directions carefully, and ask your doctor or other care provider to review them with you.         Where to Get Your Medications      You can get these medications from any pharmacy     Bring a paper prescription for each of these medications    - nystatin 557866 UNIT/GM Crea topical cream            Procedures and tests performed during your visit     BETA-HCG QUALITATIVE URINE    CHLAMYDIA/GC PCR URINE OR SWAB    REFRACTOMETER SG    URINALYSIS,CULTURE IF INDICATED    URINE MICROSCOPIC (W/UA)    WET PREP        Discharge Instructions       Monilial Vaginitis  Vaginitis in a soreness, swelling and redness (inflammation) of the vagina and vulva. Monilial vaginitis is not a sexually transmitted infection.  CAUSES   Yeast vaginitis is caused by yeast (candida) that is normally found in your vagina. With a yeast infection, the candida has overgrown in number to a point that upsets the chemical balance.  SYMPTOMS   · White, thick vaginal discharge.  · Swelling, itching, redness and irritation of the vagina and possibly the lips of the vagina (vulva).  · Burning or painful urination.  · Painful intercourse.  DIAGNOSIS   Things that may contribute to monilial vaginitis are:  · Postmenopausal and virginal states.  · Pregnancy.  · Infections.  · Being tired, sick or stressed, especially if you had monilial vaginitis in the past.  · Diabetes. Good control will help lower the chance.  · Birth control pills.  · Tight fitting  garments.  · Using bubble bath, feminine sprays, douches or deodorant tampons.  · Taking certain medications that kill germs (antibiotics).  · Sporadic recurrence can occur if you become ill.  TREATMENT   Your caregiver will give you medication.  · There are several kinds of anti monilial vaginal creams and suppositories specific for monilial vaginitis. For recurrent yeast infections, use a suppository or cream in the vagina 2 times a week, or as directed.  · Anti-monilial or steroid cream for the itching or irritation of the vulva may also be used. Get your caregiver's permission.  · Painting the vagina with methylene blue solution may help if the monilial cream does not work.  · Eating yogurt may help prevent monilial vaginitis.  HOME CARE INSTRUCTIONS   · Finish all medication as prescribed.  · Do not have sex until treatment is completed or after your caregiver tells you it is okay.  · Take warm sitz baths.  · Do not douche.  · Do not use tampons, especially scented ones.  · Wear cotton underwear.  · Avoid tight pants and panty hose.  · Tell your sexual partner that you have a yeast infection. They should go to their caregiver if they have symptoms such as mild rash or itching.  · Your sexual partner should be treated as well if your infection is difficult to eliminate.  · Practice safer sex. Use condoms.  · Some vaginal medications cause latex condoms to fail. Vaginal medications that harm condoms are:  ¨ Cleocin cream.  ¨ Butoconazole (Femstat®).  ¨ Terconazole (Terazol®) vaginal suppository.  ¨ Miconazole (Monistat®) (may be purchased over the counter).  SEEK MEDICAL CARE IF:   · You have a temperature by mouth above 102° F (38.9° C).  · The infection is getting worse after 2 days of treatment.  · The infection is not getting better after 3 days of treatment.  · You develop blisters in or around your vagina.  · You develop vaginal bleeding, and it is not your menstrual period.  · You have pain when you  urinate.  · You develop intestinal problems.  · You have pain with sexual intercourse.     This information is not intended to replace advice given to you by your health care provider. Make sure you discuss any questions you have with your health care provider.     Document Released: 09/27/2006 Document Revised: 03/11/2013 Document Reviewed: 06/11/2010  "[employer] Interactive Patient Education ©2016 "[employer] Inc.            Patient Information     Patient Information    Following emergency treatment: all patient requiring follow-up care must return either to a private physician or a clinic if your condition worsens before you are able to obtain further medical attention, please return to the emergency room.     Billing Information    At [facility], we work to make the billing process streamlined for our patients.  Our Representatives are here to answer any questions you may have regarding your hospital bill.  If you have insurance coverage and have supplied your insurance information to us, we will submit a claim to your insurer on your behalf.  Should you have any questions regarding your bill, we can be reached online or by phone as follows:  Online: You are able pay your bills online or live chat with our representatives about any billing questions you may have. We are here to help Monday - Friday from 8:00am to 7:30pm and 9:00am - 12:00pm on Saturdays.  Please visit [email or web address]  for more information.   Phone:  [phone] or [phone]    Please note that your emergency physician, surgeon, pathologist, radiologist, anesthesiologist, and other specialists are not employed by [facility] and will therefore bill separately for their services.  Please contact them directly for any questions concerning their bills at the numbers below:     Emergency Physician Services:  [phone]  [facility]:  [phone]  [facility]:   [phone]  [facility]:  [phone]    1. Your final bill may vary from the amount quoted upon discharge if all procedures are not complete at that time, or if your doctor has additional procedures of which we are not aware. You will receive an additional bill if you return to the Emergency Department at [facility] for suture removal regardless of the facility of which the sutures were placed.     2. Please arrange for settlement of this account at the emergency registration.    3. All self-pay accounts are due in full at the time of treatment.  If you are unable to meet this obligation then payment is expected within 4-5 days.     4. If you have had radiology studies (CT, X-ray, Ultrasound, MRI), you have received a preliminary result during your emergency department visit. Please contact the radiology department [phone] to receive a copy of your final result. Please discuss the Final result with your primary physician or with the follow up physician provided.     Crisis Hotline:  [place] Crisis Hotline:  7-189-PQRYMFT or 1-139.348.5201  [place] Crisis Hotline:    1-954.806.9237 or 348-344-5710         ED Discharge Follow Up Questions    1. In order to provide you with very good care, we would like to follow up with a phone call in the next few days.  May we have your permission to contact you?     YES /  NO    2. What is the best phone number to call you? (       )_____-__________    3. What is the best time to call you?      Morning  /  Afternoon  /  Evening                   Patient Signature:  ____________________________________________________________    Date:  ____________________________________________________________

## 2017-08-16 VITALS
HEIGHT: 66 IN | OXYGEN SATURATION: 98 % | WEIGHT: 293 LBS | TEMPERATURE: 96.8 F | BODY MASS INDEX: 47.09 KG/M2 | DIASTOLIC BLOOD PRESSURE: 76 MMHG | RESPIRATION RATE: 18 BRPM | SYSTOLIC BLOOD PRESSURE: 121 MMHG | HEART RATE: 90 BPM

## 2017-08-16 LAB
BACTERIA GENITAL QL WET PREP: NORMAL
C TRACH DNA SPEC QL NAA+PROBE: NEGATIVE
N GONORRHOEA DNA SPEC QL NAA+PROBE: NEGATIVE
SIGNIFICANT IND 70042: NORMAL
SITE SITE: NORMAL
SOURCE SOURCE: NORMAL
SPECIMEN SOURCE: NORMAL

## 2017-08-16 PROCEDURE — 700102 HCHG RX REV CODE 250 W/ 637 OVERRIDE(OP): Performed by: EMERGENCY MEDICINE

## 2017-08-16 PROCEDURE — 99285 EMERGENCY DEPT VISIT HI MDM: CPT

## 2017-08-16 PROCEDURE — A9270 NON-COVERED ITEM OR SERVICE: HCPCS | Performed by: EMERGENCY MEDICINE

## 2017-08-16 RX ORDER — FLUCONAZOLE 100 MG/1
TABLET ORAL
Status: DISCONTINUED
Start: 2017-08-16 | End: 2017-08-16 | Stop reason: HOSPADM

## 2017-08-16 RX ORDER — NYSTATIN 100000 U/G
1 CREAM TOPICAL 2 TIMES DAILY
Qty: 1 TUBE | Refills: 0 | Status: SHIPPED | OUTPATIENT
Start: 2017-08-16 | End: 2019-01-14

## 2017-08-16 RX ORDER — FLUCONAZOLE 100 MG/1
200 TABLET ORAL ONCE
Status: COMPLETED | OUTPATIENT
Start: 2017-08-16 | End: 2017-08-16

## 2017-08-16 RX ADMIN — FLUCONAZOLE 200 MG: 100 TABLET ORAL at 01:45

## 2017-08-16 ASSESSMENT — ENCOUNTER SYMPTOMS
GASTROINTESTINAL NEGATIVE: 1
RESPIRATORY NEGATIVE: 1
CARDIOVASCULAR NEGATIVE: 1
CONSTITUTIONAL NEGATIVE: 1

## 2017-08-16 NOTE — ED NOTES
.Discharge instructions given to pt. Pt verbalized understanding. Questions answered. Pt given prescription. Ambulatory to home.      Called SW, pt educated on voucher for cab in [facility]

## 2017-08-16 NOTE — ED PROVIDER NOTES
"ED Provider Note    CHIEF COMPLAINT  Chief Complaint   Patient presents with   • Painful Urination     X 2 weeks.    • Low Back Pain     X 2 weeks. 9/10 bilat low back nonradiating \"ache\"       HPI  Yamila Mendoza is a 36 y.o. female who presents with mild dysuria, dark yellow urine, and vaginal spotting outside cycle. Patient reports that occasionally she has mild flank pain when she urinates however does not have flank pain otherwise. She denies any associated fever or constitutional symptoms. There is no vomiting. Patient reports that she is not pregnant. Patient denies any vaginal discharge or dyspareunia. Patient does report vaginal itching.    REVIEW OF SYSTEMS  Review of Systems   Constitutional: Negative.    HENT: Negative.    Respiratory: Negative.    Cardiovascular: Negative.    Gastrointestinal: Negative.    Genitourinary:        See above   Skin: Negative.      See HPI for further details. All other systems are negative.     PAST MEDICAL HISTORY   has a past medical history of Psychiatric disorder; Psychiatric disorder; Seizure disorder ([facility]); ASTHMA; Bipolar disorder ([facility]); and Chronic back pain.    SOCIAL HISTORY  Social History     Social History Main Topics   • Smoking status: Never Smoker    • Smokeless tobacco: Not on file   • Alcohol Use: No   • Drug Use: No   • Sexual Activity: Not on file       SURGICAL HISTORY   has past surgical history that includes other neurological surg; other orthopedic surgery; other abdominal surgery; ercp in or (9/19/2009); and joy by laparoscopy (9/22/2009).    CURRENT MEDICATIONS  Home Medications     Reviewed by Keira Galvan R.N. (Registered Nurse) on 08/15/17 at 2257  Med List Status: Partial    Medication Last Dose Status    amitriptyline (ELAVIL) 75 MG TABS 7/26/2015 Active    bisacodyl EC (DULCOLAX) 5 MG EC tablet  Active    carbamazepine (TEGRETOL) 200 MG TABS 7/27/2015 Active    cyclobenzaprine (FLEXERIL) 10 MG TABS TAKING Active    " "divalproex EC (DEPAKOTE) 250 MG TBEC TAKING Active    hydrocodone-acetaminophen (NORCO) 5-325 MG Tab per tablet  Active    hydrocodone-acetaminophen (NORCO) 5-325 MG TABS per tablet NOT TAKING Active    ibuprofen (MOTRIN) 600 MG TABS TAKING Active    ibuprofen (MOTRIN) 600 MG TABS  Active    lactobacillus granules (LACTINEX/FLORANEX) PACK NOT TAKING Active    lorazepam (ATIVAN) 1 MG TABS NOT TAKING Active    nitrofurantoin monohydr macro (MACROBID) 100 MG CAPS  Active    potassium chloride SA (K-DUR) 20 MEQ TBCR TAKING Active    pseudoephedrine (SUDAFED) 30 MG Tab  Active    pseudoephedrine (SUDAFED) 30 MG Tab  Active    topiramate (TOPAMAX) 100 MG TABS TAKING Active                ALLERGIES  Allergies   Allergen Reactions   • Bactrim Unspecified     \"I get the shakes\"    • Iron Unspecified     \"see red dots\" vision, not rash    • Penicillins Unspecified     \"makes me dizzy with my medicine\"    • Phenobarbital Unspecified     \"makes me dizzy\"        PHYSICAL EXAM  Physical Exam   Constitutional: She is oriented to person, place, and time. She appears well-developed and well-nourished.   HENT:   Head: Normocephalic and atraumatic.   Eyes: Pupils are equal, round, and reactive to light.   Neck: Normal range of motion. Neck supple.   Cardiovascular: Normal rate and regular rhythm.    Pulmonary/Chest: Effort normal and breath sounds normal.   Abdominal: Soft. Bowel sounds are normal. She exhibits no distension. There is no tenderness. There is no rebound.   Genitourinary:   External genitalia notable for beefy erythematous rash surrounding labia consistent with candidiasis. There are notable excoriations over this. There is no erythema surrounding the fine margins or induration or areas of fluctuance. The cervix is unremarkable. There is scant discharge in the vault. There is no CMT or mass   Neurological: She is alert and oriented to person, place, and time.   Skin: Skin is dry. No rash noted.   Psychiatric: She has a " normal mood and affect. Her behavior is normal.         COURSE & MEDICAL DECISION MAKING  Pertinent Labs & Imaging studies reviewed. (See chart for details)  Patient here with mild dysuria and vaginal itching. She has a yeast infection on exam. We'll give single dose of fluconazole and sent home with daily Cozaar cream. UA isn't consistent with urinary tract infection will send for culture and hold empiric antibiotics.   The patient will not drink alcohol nor drive with prescribed medications. The patient will return for worsening symptoms and is stable at the time of discharge. The patient verbalizes understanding and will comply.    FINAL IMPRESSION  1. Yeast infection.        Electronically signed by: Mandeep Ocampo, 8/15/2017 11:56 PM

## 2017-08-16 NOTE — DISCHARGE INSTRUCTIONS
Monilial Vaginitis  Vaginitis in a soreness, swelling and redness (inflammation) of the vagina and vulva. Monilial vaginitis is not a sexually transmitted infection.  CAUSES   Yeast vaginitis is caused by yeast (candida) that is normally found in your vagina. With a yeast infection, the candida has overgrown in number to a point that upsets the chemical balance.  SYMPTOMS   · White, thick vaginal discharge.  · Swelling, itching, redness and irritation of the vagina and possibly the lips of the vagina (vulva).  · Burning or painful urination.  · Painful intercourse.  DIAGNOSIS   Things that may contribute to monilial vaginitis are:  · Postmenopausal and virginal states.  · Pregnancy.  · Infections.  · Being tired, sick or stressed, especially if you had monilial vaginitis in the past.  · Diabetes. Good control will help lower the chance.  · Birth control pills.  · Tight fitting garments.  · Using bubble bath, feminine sprays, douches or deodorant tampons.  · Taking certain medications that kill germs (antibiotics).  · Sporadic recurrence can occur if you become ill.  TREATMENT   Your caregiver will give you medication.  · There are several kinds of anti monilial vaginal creams and suppositories specific for monilial vaginitis. For recurrent yeast infections, use a suppository or cream in the vagina 2 times a week, or as directed.  · Anti-monilial or steroid cream for the itching or irritation of the vulva may also be used. Get your caregiver's permission.  · Painting the vagina with methylene blue solution may help if the monilial cream does not work.  · Eating yogurt may help prevent monilial vaginitis.  HOME CARE INSTRUCTIONS   · Finish all medication as prescribed.  · Do not have sex until treatment is completed or after your caregiver tells you it is okay.  · Take warm sitz baths.  · Do not douche.  · Do not use tampons, especially scented ones.  · Wear cotton underwear.  · Avoid tight pants and panty  hose.  · Tell your sexual partner that you have a yeast infection. They should go to their caregiver if they have symptoms such as mild rash or itching.  · Your sexual partner should be treated as well if your infection is difficult to eliminate.  · Practice safer sex. Use condoms.  · Some vaginal medications cause latex condoms to fail. Vaginal medications that harm condoms are:  ¨ Cleocin cream.  ¨ Butoconazole (Femstat®).  ¨ Terconazole (Terazol®) vaginal suppository.  ¨ Miconazole (Monistat®) (may be purchased over the counter).  SEEK MEDICAL CARE IF:   · You have a temperature by mouth above 102° F (38.9° C).  · The infection is getting worse after 2 days of treatment.  · The infection is not getting better after 3 days of treatment.  · You develop blisters in or around your vagina.  · You develop vaginal bleeding, and it is not your menstrual period.  · You have pain when you urinate.  · You develop intestinal problems.  · You have pain with sexual intercourse.     This information is not intended to replace advice given to you by your health care provider. Make sure you discuss any questions you have with your health care provider.     Document Released: 09/27/2006 Document Revised: 03/11/2013 Document Reviewed: 06/11/2010  [employer] Interactive Patient Education ©2016 [employer]

## 2017-08-16 NOTE — DISCHARGE PLANNING
Medical Social Work    This worker received a call from bedside RN that pt is requesting a voucher to return home.  MSW reviewed pt's chart and pt has [place] Medicaid.  RN was advised that pt can go out to the ER Lobby and request an [facility] flyer to call for a ride home.      Plan: Nothing further.

## 2017-08-16 NOTE — ED NOTES
"Yamila Moses Mendoza  36 y.o. female  Chief Complaint   Patient presents with   • Painful Urination     X 2 weeks.    • Low Back Pain     X 2 weeks. 9/10 bilat low back nonradiating \"ache\"       Pt amb to triage with steady gait for above complaint. Pt states, \"When I go number one it's a dark yellow and as I'm wiping down it turns all bloody in the front. I don't know if its a yeast, kidney, or urine infection.\"  Pt also c/o of associated vaginal discharge.  Pt is alert and oriented, speaking in full sentences, follows commands and responds appropriately to questions. NAD. Resp are even and unlabored.  Pt placed in lobby. Pt educated on triage process. Pt encouraged to alert staff for any changes.    "

## 2017-08-18 LAB
BACTERIA UR CULT: NORMAL
SIGNIFICANT IND 70042: NORMAL
SITE SITE: NORMAL
SOURCE SOURCE: NORMAL

## 2017-09-26 ENCOUNTER — APPOINTMENT (OUTPATIENT)
Dept: RADIOLOGY | Facility: MEDICAL CENTER | Age: 37
End: 2017-09-26
Attending: EMERGENCY MEDICINE
Payer: MEDICARE

## 2017-09-26 ENCOUNTER — HOSPITAL ENCOUNTER (EMERGENCY)
Facility: MEDICAL CENTER | Age: 37
End: 2017-09-26
Attending: EMERGENCY MEDICINE
Payer: MEDICARE

## 2017-09-26 VITALS
DIASTOLIC BLOOD PRESSURE: 84 MMHG | SYSTOLIC BLOOD PRESSURE: 122 MMHG | RESPIRATION RATE: 16 BRPM | HEART RATE: 83 BPM | WEIGHT: 293 LBS | BODY MASS INDEX: 48.26 KG/M2 | TEMPERATURE: 96.9 F | OXYGEN SATURATION: 99 %

## 2017-09-26 DIAGNOSIS — F31.9 BIPOLAR AFFECTIVE DISORDER, REMISSION STATUS UNSPECIFIED (HCC): ICD-10-CM

## 2017-09-26 DIAGNOSIS — S90.31XA CONTUSION OF RIGHT FOOT, INITIAL ENCOUNTER: ICD-10-CM

## 2017-09-26 DIAGNOSIS — J40 BRONCHITIS: ICD-10-CM

## 2017-09-26 PROCEDURE — 99283 EMERGENCY DEPT VISIT LOW MDM: CPT

## 2017-09-26 PROCEDURE — 71020 DX-CHEST-2 VIEWS: CPT

## 2017-09-26 PROCEDURE — 73630 X-RAY EXAM OF FOOT: CPT | Mod: RT

## 2017-09-26 RX ORDER — AZITHROMYCIN 500 MG/1
500 TABLET, FILM COATED ORAL DAILY
Qty: 7 TAB | Refills: 0 | Status: SHIPPED | OUTPATIENT
Start: 2017-09-26 | End: 2017-10-01

## 2017-09-26 RX ORDER — AMITRIPTYLINE HYDROCHLORIDE 25 MG/1
75 TABLET, FILM COATED ORAL
Qty: 60 TAB | Refills: 0 | Status: SHIPPED | OUTPATIENT
Start: 2017-09-26 | End: 2019-01-14

## 2017-09-26 RX ORDER — NAPROXEN 500 MG/1
500 TABLET ORAL 2 TIMES DAILY WITH MEALS
Qty: 60 TAB | Refills: 0 | Status: SHIPPED | OUTPATIENT
Start: 2017-09-26 | End: 2019-01-14

## 2017-09-26 RX ORDER — BENZONATATE 200 MG/1
200 CAPSULE ORAL 3 TIMES DAILY PRN
Qty: 60 CAP | Refills: 0 | Status: SHIPPED | OUTPATIENT
Start: 2017-09-26 | End: 2019-01-14

## 2017-09-26 RX ORDER — DEXTROMETHORPHAN HBR 15 MG/1
1 CAPSULE, LIQUID FILLED ORAL EVERY 8 HOURS PRN
Qty: 42 CAP | Refills: 0 | Status: SHIPPED | OUTPATIENT
Start: 2017-09-26 | End: 2017-09-26

## 2017-09-26 ASSESSMENT — PAIN SCALES - GENERAL: PAINLEVEL_OUTOF10: 10

## 2017-09-26 ASSESSMENT — ENCOUNTER SYMPTOMS
CARDIOVASCULAR NEGATIVE: 1
GASTROINTESTINAL NEGATIVE: 1
HEADACHES: 1
COUGH: 1
SORE THROAT: 1
WHEEZING: 0
EYES NEGATIVE: 1
SPUTUM PRODUCTION: 1
SHORTNESS OF BREATH: 0

## 2017-09-27 ENCOUNTER — PATIENT OUTREACH (OUTPATIENT)
Dept: HEALTH INFORMATION MANAGEMENT | Facility: OTHER | Age: 37
End: 2017-09-27

## 2017-09-27 NOTE — ED PROVIDER NOTES
ED Provider Note    CHIEF COMPLAINT  Chief Complaint   Patient presents with   • Foot Pain   • Cough       HPI  Yamila Mendoza is a 36 y.o. female With history of bipolar disorder, seizure disorder who presents with 2 complaints.  Patient reports she's had around 2-1/2 weeks of nasal congestion and mild sore throat and mild ear pain was associated productive cough of white sputum.  She denies any associated chest pain or decreased exertional capacity.  She reports the cough is mildly worse at night.  Patient reports that a few days ago she felt febrile however this is some subsided and she currently denies any fever or constitutional symptoms.  Patient also reports a mechanical ground-level fall around 2 days ago.  Patient slipped on a wet floor at [facility].  She has been ambulatory since.  She reports mild pain at the 1st 2nd and 3rd digit of her right foot.  She was not evaluated after the injury.  Since the pain has not entirely resolved she presented here for an x-ray.  She denies any abdominal pain,  symptoms or joint.  Otherwise    REVIEW OF SYSTEMS  Review of Systems   HENT: Positive for congestion, ear pain and sore throat.    Eyes: Negative.    Respiratory: Positive for cough and sputum production. Negative for shortness of breath and wheezing.    Cardiovascular: Negative.    Gastrointestinal: Negative.    Genitourinary: Negative.    Musculoskeletal:        Foot pain   Neurological: Positive for headaches.   All other systems reviewed and are negative.    See HPI for further details. All other systems are negative.     PAST MEDICAL HISTORY   has a past medical history of ASTHMA; Bipolar disorder (CMS-HCC); Chronic back pain; Psychiatric disorder; Psychiatric disorder; and Seizure disorder (CMS-HCC).    SOCIAL HISTORY  Social History     Social History Main Topics   • Smoking status: Never Smoker   • Smokeless tobacco: Not on file   • Alcohol use No   • Drug use: No   • Sexual activity: Not on file  "      SURGICAL HISTORY   has a past surgical history that includes other neurological surg; other orthopedic surgery; other abdominal surgery; ercp in or (9/19/2009); and joy by laparoscopy (9/22/2009).    CURRENT MEDICATIONS  Home Medications     Reviewed by Ladonna English R.N. (Registered Nurse) on 09/26/17 at 1943  Med List Status: <None>   Medication Last Dose Status   amitriptyline (ELAVIL) 75 MG TABS 7/26/2015 Active   bisacodyl EC (DULCOLAX) 5 MG EC tablet  Active   carbamazepine (TEGRETOL) 200 MG TABS 7/27/2015 Active   cyclobenzaprine (FLEXERIL) 10 MG TABS TAKING Active   divalproex EC (DEPAKOTE) 250 MG TBEC TAKING Active   hydrocodone-acetaminophen (NORCO) 5-325 MG Tab per tablet  Active   hydrocodone-acetaminophen (NORCO) 5-325 MG TABS per tablet NOT TAKING Active   ibuprofen (MOTRIN) 600 MG TABS TAKING Active   ibuprofen (MOTRIN) 600 MG TABS  Active   lactobacillus granules (LACTINEX/FLORANEX) PACK NOT TAKING Active   lorazepam (ATIVAN) 1 MG TABS NOT TAKING Active   nitrofurantoin monohydr macro (MACROBID) 100 MG CAPS  Active   nystatin (MYCOSTATIN) 635385 UNIT/GM Cream topical cream  Active   potassium chloride SA (K-DUR) 20 MEQ TBCR TAKING Active   pseudoephedrine (SUDAFED) 30 MG Tab  Active   pseudoephedrine (SUDAFED) 30 MG Tab  Active   topiramate (TOPAMAX) 100 MG TABS TAKING Active                ALLERGIES  Allergies   Allergen Reactions   • Bactrim Unspecified     \"I get the shakes\"    • Iron Unspecified     \"see red dots\" vision, not rash    • Penicillins Unspecified     \"makes me dizzy with my medicine\"    • Phenobarbital Unspecified     \"makes me dizzy\"        PHYSICAL EXAM  Physical Exam   Constitutional: She is oriented to person, place, and time. She appears well-developed and well-nourished.   HENT:   Head: Normocephalic and atraumatic.   Right Ear: External ear normal.   Bilateral hyperemia of nares  Oropharynx with minimal erythema without any exudate.  No uvular deviation, no " pharyngeal asymmetry   Eyes: Conjunctivae are normal. Pupils are equal, round, and reactive to light. Right eye exhibits no discharge. Left eye exhibits no discharge.   Cardiovascular: Normal rate, regular rhythm and normal heart sounds.    Pulmonary/Chest: Effort normal and breath sounds normal. No respiratory distress. She has no wheezes. She has no rales.   Abdominal: Soft. Bowel sounds are normal. She exhibits no distension. There is no tenderness. There is no rebound.   Musculoskeletal:   Right foot with 2+ distal pulses.  Refill instantaneous.  Sensation intact throughout.  Mild pain on range of motion of 1st 2nd and 3rd digits without any underlying bony abnormality.  Ankle and knee with full range of motion and no bony tenderness   Neurological: She is alert and oriented to person, place, and time.   Skin: Skin is warm and dry.         COURSE & MEDICAL DECISION MAKING  Pertinent Labs & Imaging studies reviewed. (See chart for details)  Well.  Patient here with symptoms consistent with likely viral URI and possible postnasal drip.  Check chest x-ray for possible focal pneumonia.  Given the duration of symptoms patient would likely benefit from antibiotics. We'll check x-ray of foot.  Foot x-ray is negative for any traumatic pathology. X-ray is negative for any focal pneumonia. Patient requesting multiple medication refills. We'll refill. Homeless supportive care and empiric antibiotics.  The patient will not drink alcohol nor drive with prescribed medications. The patient will return for worsening symptoms and is stable at the time of discharge. The patient verbalizes understanding and will comply.    FINAL IMPRESSION  1.  URI           Electronically signed by: Mandeep Ocampo, 9/26/2017 8:02 PM

## 2017-09-27 NOTE — ED NOTES
37 y/o female ambulatory to triage with c/o pain in her right foot. Pt also mentions that she has had a cough for aprox one month.

## 2017-09-27 NOTE — DISCHARGE INSTRUCTIONS

## 2018-04-01 ENCOUNTER — APPOINTMENT (OUTPATIENT)
Dept: RADIOLOGY | Facility: MEDICAL CENTER | Age: 38
End: 2018-04-01
Attending: EMERGENCY MEDICINE
Payer: MEDICARE

## 2018-04-01 ENCOUNTER — HOSPITAL ENCOUNTER (EMERGENCY)
Facility: MEDICAL CENTER | Age: 38
End: 2018-04-02
Attending: EMERGENCY MEDICINE
Payer: MEDICARE

## 2018-04-01 DIAGNOSIS — B34.9 VIRAL SYNDROME: ICD-10-CM

## 2018-04-01 DIAGNOSIS — L73.1 INGROWN HAIR: ICD-10-CM

## 2018-04-01 PROCEDURE — 87081 CULTURE SCREEN ONLY: CPT

## 2018-04-01 PROCEDURE — 87880 STREP A ASSAY W/OPTIC: CPT

## 2018-04-01 PROCEDURE — 99284 EMERGENCY DEPT VISIT MOD MDM: CPT

## 2018-04-01 PROCEDURE — 87502 INFLUENZA DNA AMP PROBE: CPT

## 2018-04-01 ASSESSMENT — ENCOUNTER SYMPTOMS
SORE THROAT: 1
COUGH: 1

## 2018-04-02 VITALS
HEART RATE: 87 BPM | WEIGHT: 293 LBS | RESPIRATION RATE: 16 BRPM | BODY MASS INDEX: 51.7 KG/M2 | TEMPERATURE: 97.5 F | DIASTOLIC BLOOD PRESSURE: 97 MMHG | OXYGEN SATURATION: 96 % | SYSTOLIC BLOOD PRESSURE: 129 MMHG

## 2018-04-02 LAB
FLUAV RNA SPEC QL NAA+PROBE: NEGATIVE
FLUBV RNA SPEC QL NAA+PROBE: NEGATIVE
S PYO AG THROAT QL: NORMAL
SIGNIFICANT IND 70042: NORMAL
SITE SITE: NORMAL
SOURCE SOURCE: NORMAL

## 2018-04-02 PROCEDURE — 71046 X-RAY EXAM CHEST 2 VIEWS: CPT

## 2018-04-02 RX ORDER — ALBUTEROL SULFATE 90 UG/1
2 AEROSOL, METERED RESPIRATORY (INHALATION) EVERY 6 HOURS PRN
Qty: 8.5 G | Refills: 0 | Status: SHIPPED | OUTPATIENT
Start: 2018-04-02 | End: 2019-01-14

## 2018-04-02 ASSESSMENT — ENCOUNTER SYMPTOMS
VOMITING: 0
FEVER: 0
ABDOMINAL PAIN: 0
NAUSEA: 0

## 2018-04-02 NOTE — DISCHARGE INSTRUCTIONS
"For your ingrown hair please do warm compresses and follow-up with a gynecologist    Viral Syndrome  You or your child has Viral Syndrome. It is the most common infection causing \"colds\" and infections in the nose, throat, sinuses, and breathing tubes. Sometimes the infection causes nausea, vomiting, or diarrhea. The germ that causes the infection is a virus. No antibiotic or other medicine will kill it. There are medicines that you can take to make you or your child more comfortable.   HOME CARE INSTRUCTIONS   · Rest in bed until you start to feel better.   · If you have diarrhea or vomiting, eat small amounts of crackers and toast. Soup is helpful.   · Do not give aspirin or medicine that contains aspirin to children.   · Only take over-the-counter or prescription medicines for pain, discomfort, or fever as directed by your caregiver.   SEEK IMMEDIATE MEDICAL CARE IF:   · You or your child has not improved within one week.   · You or your child has pain that is not at least partially relieved by over-the-counter medicine.   · Thick, colored mucus or blood is coughed up.   · Discharge from the nose becomes thick yellow or green.   · Diarrhea or vomiting gets worse.   · There is any major change in your or your child's condition.   · You or your child develops a skin rash, stiff neck, severe headache, or are unable to hold down food or fluid.   · You or your child has an oral temperature above 102° F (38.9° C), not controlled by medicine.   · Your baby is older than 3 months with a rectal temperature of 102° F (38.9° C) or higher.   · Your baby is 3 months old or younger with a rectal temperature of 100.4° F (38° C) or higher.   Document Released: 12/03/2007 Document Revised: 03/11/2013 Document Reviewed: 12/03/2008  [employer]® Patient Information ©2013 [employer].  "

## 2018-04-02 NOTE — ED TRIAGE NOTES
"Yamila Mendoza  Chief Complaint   Patient presents with   • Cough     x 3 months,  worse over last week, productive green sputum   • Sore Throat   • Vaginal Pain     has possible cyst \"in front\",  \"small amt of blood after wiping\" x couple months     Pt ambulatory to triage with above complaint. VSS.  Pt returned to lobby, educated on triage process, and to inform staff of any changes or concerns.     "

## 2018-04-02 NOTE — ED PROVIDER NOTES
"ED Provider Note    Scribed for Kemi Adhikari M.D. by James Smith. 4/1/2018, 11:39 PM.    Means of arrival: Walk-in  History obtained from: Patient  History limited by: None    CHIEF COMPLAINT  Chief Complaint   Patient presents with   • Cough     x 3 months,  worse over last week, productive green sputum   • Sore Throat   • Vaginal Pain     has possible cyst \"in front\",  \"small amt of blood after wiping\" x couple months       HPI  Yamila Mendoza is a 37 y.o. female who presents to the Emergency Department with complaints of a cough onset three months ago. The patient reports worsening cough over the last week and is now productive of sputum. She has an associated sore throat that is scratchy and mild in severity. Patient denies chest pain and fever. There are no sick contacts around her. Patient has still been able to tolerate oral intake without difficulty.    The patient also presents complaints of vaginal pain onset three months ago. She states she has a \"vaginal lump\". The patient does not know what the lump is. Patient has noticed a small amount of blood after wiping intermittently. She does not have a regular gynecologist. However she reports that she can follow up with her PCP who will refer her to a gynecologist if needed. She denies abnormal vaginal bleeding or discharge. She is not having any abdominal pain.    REVIEW OF SYSTEMS  Review of Systems   Constitutional: Negative for fever.   HENT: Positive for sore throat.    Respiratory: Positive for cough (with productive sputum).    Cardiovascular: Negative for chest pain.   Gastrointestinal: Negative for abdominal pain, nausea and vomiting.   Genitourinary: Negative for dysuria and urgency.        Positive for vaginal lump   All other systems reviewed and are negative.    C.    PAST MEDICAL HISTORY   has a past medical history of ASTHMA; Bipolar disorder (CMS-HCC); Chronic back pain; Psychiatric disorder; Psychiatric disorder; and Seizure " "disorder (CMS-HCC).    SURGICAL HISTORY   has a past surgical history that includes other neurological surg; other orthopedic surgery; other abdominal surgery; ercp in or (9/19/2009); and joy by laparoscopy (9/22/2009).    SOCIAL HISTORY  Social History   Substance Use Topics   • Smoking status: Never Smoker   • Alcohol use No      History   Drug Use No       FAMILY HISTORY  No pertinent family history    CURRENT MEDICATIONS  Home Medications     Reviewed by Ladonna English R.N. (Registered Nurse) on 04/01/18 at 2336  Med List Status: <None>   Medication Last Dose Status   amitriptyline (ELAVIL) 25 MG Tab  Active   benzonatate (TESSALON) 200 MG capsule  Active   bisacodyl EC (DULCOLAX) 5 MG EC tablet  Active   calcium citrate 315 mg-vitamin D 200 mg (CITRACAL+D) 315-200 MG-UNIT per tablet  Active   carbamazepine (TEGRETOL) 200 MG TABS 7/27/2015 Active   cyclobenzaprine (FLEXERIL) 10 MG TABS TAKING Active   divalproex EC (DEPAKOTE) 250 MG TBEC TAKING Active   hydrocodone-acetaminophen (NORCO) 5-325 MG Tab per tablet  Active   hydrocodone-acetaminophen (NORCO) 5-325 MG TABS per tablet NOT TAKING Active   ibuprofen (MOTRIN) 600 MG TABS TAKING Active   ibuprofen (MOTRIN) 600 MG TABS  Active   lactobacillus granules (LACTINEX/FLORANEX) PACK NOT TAKING Active   lorazepam (ATIVAN) 1 MG TABS NOT TAKING Active   naproxen (NAPROSYN) 500 MG Tab  Active   nitrofurantoin monohydr macro (MACROBID) 100 MG CAPS  Active   nystatin (MYCOSTATIN) 460436 UNIT/GM Cream topical cream  Active   potassium chloride SA (K-DUR) 20 MEQ TBCR TAKING Active   pseudoephedrine (SUDAFED) 30 MG Tab  Active   pseudoephedrine (SUDAFED) 30 MG Tab  Active   topiramate (TOPAMAX) 100 MG TABS TAKING Active                ALLERGIES  Allergies   Allergen Reactions   • Bactrim Unspecified     \"I get the shakes\"    • Iron Unspecified     \"see red dots\" vision, not rash    • Penicillins Unspecified     \"makes me dizzy with my medicine\"    • Phenobarbital " "Unspecified     \"makes me dizzy\"        PHYSICAL EXAM  VITAL SIGNS: /81   Pulse 78   Temp 36.4 °C (97.5 °F)   Resp 19   Wt (!) 145.3 kg (320 lb 5.3 oz)   SpO2 98%   BMI 51.70 kg/m²   Vitals reviewed by myself.  Physical Exam  Nursing note and vitals reviewed.  Constitutional: Well-developed and well-nourished. No acute distress.   HENT: Head is normocephalic and atraumatic. Posterior oropharynx is pink and moist without exudates  Eyes: extra-ocular movements intact  Cardiovascular: Regular rate and  regular rhythm. No murmur heard.  Pulmonary/Chest: Breath sounds normal. No wheezes or rales.   Abdominal: Soft and non-tender. No distention.    Musculoskeletal: Extremities exhibit normal range of motion without edema or tenderness.   Neurological: Awake and alert  Skin: Skin is warm and dry. No rash.   Pelvic: Normal external genitalia with a small lump on the external vulva. There is no fluctuance, no erythema or purulent discharge. The lump appears to be consistent with likely ingrown hair      DIAGNOSTIC STUDIES /  LABS  Labs Reviewed   INFLUENZA A/B BY PCR   RAPID STREP,CULT IF INDICATED   BETA STREP SCREEN (GP. A)      All labs reviewed by me.      RADIOLOGY  DX-CHEST-2 VIEWS   Final Result         1.  No acute cardiopulmonary disease.        The radiologist's interpretation of all radiological studies have been reviewed by me.      REASSESSMENT    11:59 PM The patient was evaluated at bedside. We discussed ordering lab work and imaging to further evaluate patients symptoms. The patient gave consent and agrees to plan of care.    12:25 AM I preformed a pelvic exam chaperoned by another female nurse.    1:30 AM Recheck: Patient re-evaluated at beside. Patient reports feeling improved. Discussed patient's condition and treatment plan. Patient's lab and radiology results discussed. The patient understood and is in agreement. I explained that she is now stable for discharge with a prescription for " Albuterol. I advised her to follow up with [facility] and to return to the ED for new or worsening symptoms. She understands and will comply.       COURSE & MEDICAL DECISION MAKING  Nursing notes, VS, PMSFHx reviewed in chart.    Patient is a 37-year-old female who comes in for sore throat, cough, vaginal lump. For her vaginal lump on physical exam patient appears to have an ingrown hair. She is advised on symptomatic management of this with warm compresses. I advised her to follow-up with a gynecologist if this persists as she may require removal of the surrounding cysts sac. Patient is agreeable to this plan. Differential diagnosis for sore throat and cough includes upper respiratory infection, viral syndrome, pneumonia, strep throat, influenza. Diagnostic workup included influenza swab, strep swab, and chest x-ray.    Patient's initial vitals are within normal limits. Lungs are clear to auscultation. Rapid strep and influenza returned and are negative. Chest x-ray demonstrates no evidence of pneumonia. Therefore patient is reassured, advised on symptomatic management of viral illness, given strict return precautions. She is then discharged home in stable condition with vitals in normal limits.    The patient will return for new or worsening symptoms and is stable at the time of discharge.    The patient is referred to a primary physician for blood pressure management, diabetic screening, and for all other preventative health concerns.      DISPOSITION:  Patient will be discharged home in stable condition.    FOLLOW UP:  [address] [address] [phone]          OUTPATIENT MEDICATIONS:  Discharge Medication List as of 4/2/2018  1:40 AM      START taking these medications    Details   albuterol 108 (90 Base) MCG/ACT Aero Soln inhalation aerosol Inhale 2 Puffs by mouth every 6 hours as needed for Shortness of Breath., Disp-8.5 g, R-0, Print Rx  Paper                 FINAL IMPRESSION  1. Viral syndrome    2. Ingrown hair          I, James Smith (Scribe), am scribing for, and in the presence of, Kemi Adhikari M.D..    Electronically signed by: James Smith (Scribe), 4/1/2018    Kemi BELTRAN M.D. personally performed the services described in this documentation, as scribed by James Smith in my presence, and it is both accurate and complete.    The note accurately reflects work and decisions made by me.  Kemi Adhikari  4/2/2018  4:07 AM

## 2018-04-04 LAB
S PYO SPEC QL CULT: NORMAL
SIGNIFICANT IND 70042: NORMAL
SITE SITE: NORMAL
SOURCE SOURCE: NORMAL

## 2018-09-17 ENCOUNTER — HOSPITAL ENCOUNTER (EMERGENCY)
Facility: MEDICAL CENTER | Age: 38
End: 2018-09-17
Attending: EMERGENCY MEDICINE
Payer: MEDICARE

## 2018-09-17 VITALS
HEIGHT: 66 IN | BODY MASS INDEX: 47.09 KG/M2 | RESPIRATION RATE: 16 BRPM | HEART RATE: 88 BPM | WEIGHT: 293 LBS | SYSTOLIC BLOOD PRESSURE: 127 MMHG | OXYGEN SATURATION: 96 % | DIASTOLIC BLOOD PRESSURE: 87 MMHG | TEMPERATURE: 96.7 F

## 2018-09-17 DIAGNOSIS — G56.02 CARPAL TUNNEL SYNDROME OF LEFT WRIST: ICD-10-CM

## 2018-09-17 DIAGNOSIS — J02.9 VIRAL PHARYNGITIS: ICD-10-CM

## 2018-09-17 LAB
S PYO AG THROAT QL: NORMAL
SIGNIFICANT IND 70042: NORMAL
SITE SITE: NORMAL
SOURCE SOURCE: NORMAL

## 2018-09-17 PROCEDURE — 87081 CULTURE SCREEN ONLY: CPT

## 2018-09-17 PROCEDURE — 99284 EMERGENCY DEPT VISIT MOD MDM: CPT

## 2018-09-17 PROCEDURE — 87880 STREP A ASSAY W/OPTIC: CPT

## 2018-09-17 PROCEDURE — 700102 HCHG RX REV CODE 250 W/ 637 OVERRIDE(OP): Performed by: EMERGENCY MEDICINE

## 2018-09-17 PROCEDURE — A9270 NON-COVERED ITEM OR SERVICE: HCPCS | Performed by: EMERGENCY MEDICINE

## 2018-09-17 RX ORDER — IBUPROFEN 600 MG/1
600 TABLET ORAL EVERY 6 HOURS PRN
Qty: 30 TAB | Refills: 0 | Status: SHIPPED | OUTPATIENT
Start: 2018-09-17 | End: 2019-01-14

## 2018-09-17 RX ORDER — IBUPROFEN 600 MG/1
600 TABLET ORAL ONCE
Status: COMPLETED | OUTPATIENT
Start: 2018-09-17 | End: 2018-09-17

## 2018-09-17 RX ADMIN — IBUPROFEN 600 MG: 600 TABLET, FILM COATED ORAL at 22:00

## 2018-09-17 ASSESSMENT — PAIN SCALES - GENERAL: PAINLEVEL_OUTOF10: 10

## 2018-09-18 NOTE — ED NOTES
Chief Complaint   Patient presents with   • Digit Pain     left thumb pain and numbness x2 months. Denies injury.     Agree with triage rn.  Pt chart up for ERP.

## 2018-09-18 NOTE — DISCHARGE INSTRUCTIONS
Wear the splint even when you sleep. Return if you have increaisng pain, numbness, cold blue fingers, difficulty breathing, difficulty swallowing, or fever that will not go down with tylenol or Ibuprofen.     Carpal Tunnel Syndrome  Introduction  Carpal tunnel syndrome is a condition that causes pain in your hand and arm. The carpal tunnel is a narrow area that is on the palm side of your wrist. Repeated wrist motion or certain diseases may cause swelling in the tunnel. This swelling can pinch the main nerve in the wrist (median nerve).  Follow these instructions at home:  If you have a splint:  · Wear it as told by your doctor. Remove it only as told by your doctor.  · Loosen the splint if your fingers:  ¨ Become numb and tingle.  ¨ Turn blue and cold.  · Keep the splint clean and dry.  General instructions  · Take over-the-counter and prescription medicines only as told by your doctor.  · Rest your wrist from any activity that may be causing your pain. If needed, talk to your employer about changes that can be made in your work, such as getting a wrist pad to use while typing.  · If directed, apply ice to the painful area:  ¨ Put ice in a plastic bag.  ¨ Place a towel between your skin and the bag.  ¨ Leave the ice on for 20 minutes, 2-3 times per day.  · Keep all follow-up visits as told by your doctor. This is important.  · Do any exercises as told by your doctor, physical therapist, or occupational therapist.  Contact a doctor if:  · You have new symptoms.  · Medicine does not help your pain.  · Your symptoms get worse.  This information is not intended to replace advice given to you by your health care provider. Make sure you discuss any questions you have with your health care provider.  Document Released: 12/06/2012 Document Revised: 05/25/2017 Document Reviewed: 05/04/2016  © 2017 [employer]          Viral Pharyngitis  Viral pharyngitis is a viral infection that produces redness, pain, and swelling  (inflammation) of the throat. It can spread from person to person (contagious).   CAUSES  Viral pharyngitis is caused by inhaling a large amount of certain germs called viruses. Many different viruses cause viral pharyngitis.  SYMPTOMS  Symptoms of viral pharyngitis include:  · Sore throat.  · Tiredness.  · Stuffy nose.  · Low-grade fever.  · Congestion.  · Cough.  TREATMENT  Treatment includes rest, drinking plenty of fluids, and the use of over-the-counter medication (approved by your caregiver).  HOME CARE INSTRUCTIONS   · Drink enough fluids to keep your urine clear or pale yellow.  · Eat soft, cold foods such as ice cream, frozen ice pops, or gelatin dessert.  · Gargle with warm salt water (1 tsp salt per 1 qt of water).  · If over age 7, throat lozenges may be used safely.  · Only take over-the-counter or prescription medicines for pain, discomfort, or fever as directed by your caregiver. Do not take aspirin.  To help prevent spreading viral pharyngitis to others, avoid:  · Mouth-to-mouth contact with others.  · Sharing utensils for eating and drinking.  · Coughing around others.  SEEK MEDICAL CARE IF:   · You are better in a few days, then become worse.  · You have a fever or pain not helped by pain medicines.  · There are any other changes that concern you.  Document Released: 09/27/2006 Document Revised: 03/11/2013 Document Reviewed: 02/23/2012  [employer]® Patient Information ©2014 [employer].        Salt Water Gargle  This solution will help make your mouth and throat feel better.  HOME CARE INSTRUCTIONS   · Mix 1 teaspoon of salt in 8 ounces of warm water.  · Gargle with this solution as much or often as you need or as directed. Swish and gargle gently if you have any sores or wounds in your mouth.  · Do not swallow this mixture.     This information is not intended to replace advice given to you by your health care provider. Make sure you discuss any questions you have with your health care provider.      Document Released: 09/21/2005 Document Revised: 03/11/2013 Document Reviewed: 02/12/2010  Elsevier Interactive Patient Education ©2016 [employer]

## 2018-09-18 NOTE — ED PROVIDER NOTES
ED Provider Note    Scribed for Chip Rapp M.D. by Samantha Woodruff. 9/17/2018, 9:07 PM.    Primary care provider: Pcp Not In Computer  Means of arrival: walk in   History obtained from: patient   History limited by: none     CHIEF COMPLAINT  Chief Complaint   Patient presents with   • Digit Pain     left thumb pain and numbness x2 months. Denies injury.       HPI  Yamila Mendoza is a 37 y.o. female with a history of asthma, bipolar disorder, chronic back pain, and seizure disorder who presents to the Emergency Department for evaluation of left thumb pain and numbness which began 2 months ago. Patient reports her symptoms radiate throughout her left hand, and to her left elbow. She denies known injury or known trauma, but thinks she may have slept on it wrong. Patient does do a lot of crocheting and reports her symptoms are exacerbated after doing this activity.    Patient is also reporting a sore throat with associated cough with sputum, congestion, and tactile fevers. She does believe her symptoms are secondary to seasonal allergies. No complaints of nausea and vomiting.     REVIEW OF SYSTEMS  Pertinent positives include left thumb pain and numbness, left hand pain and numbness, left elbow pain, sore throat, cough with sputum, congestion, tactile fevers. Pertinent negatives include nausea, vomiting.     PAST MEDICAL HISTORY   has a past medical history of ASTHMA; Bipolar disorder (HCC); Chronic back pain; Psychiatric disorder; Psychiatric disorder; and Seizure disorder (HCC).    SURGICAL HISTORY   has a past surgical history that includes other neurological surg; other orthopedic surgery; other abdominal surgery; ercp in or (9/19/2009); and joy by laparoscopy (9/22/2009).    SOCIAL HISTORY  Social History   Substance Use Topics   • Smoking status: Never Smoker   • Smokeless tobacco: Never Used   • Alcohol use No      History   Drug Use No       FAMILY HISTORY  History reviewed. No pertinent family  "history.    CURRENT MEDICATIONS  Home Medications     Reviewed by Slava Ortiz R.N. (Registered Nurse) on 09/17/18 at 2059  Med List Status: Not Addressed   Medication Last Dose Status   albuterol 108 (90 Base) MCG/ACT Aero Soln inhalation aerosol  Active   amitriptyline (ELAVIL) 25 MG Tab  Active   benzonatate (TESSALON) 200 MG capsule  Active   bisacodyl EC (DULCOLAX) 5 MG EC tablet  Active   calcium citrate 315 mg-vitamin D 200 mg (CITRACAL+D) 315-200 MG-UNIT per tablet  Active   carbamazepine (TEGRETOL) 200 MG TABS 7/27/2015 Active   cyclobenzaprine (FLEXERIL) 10 MG TABS TAKING Active   divalproex EC (DEPAKOTE) 250 MG TBEC TAKING Active   hydrocodone-acetaminophen (NORCO) 5-325 MG Tab per tablet  Active   hydrocodone-acetaminophen (NORCO) 5-325 MG TABS per tablet NOT TAKING Active   ibuprofen (MOTRIN) 600 MG TABS TAKING Active   ibuprofen (MOTRIN) 600 MG TABS  Active   lactobacillus granules (LACTINEX/FLORANEX) PACK NOT TAKING Active   lorazepam (ATIVAN) 1 MG TABS NOT TAKING Active   naproxen (NAPROSYN) 500 MG Tab  Active   nitrofurantoin monohydr macro (MACROBID) 100 MG CAPS  Active   nystatin (MYCOSTATIN) 599711 UNIT/GM Cream topical cream  Active   potassium chloride SA (K-DUR) 20 MEQ TBCR TAKING Active   pseudoephedrine (SUDAFED) 30 MG Tab  Active   pseudoephedrine (SUDAFED) 30 MG Tab  Active   topiramate (TOPAMAX) 100 MG TABS TAKING Active                ALLERGIES  Allergies   Allergen Reactions   • Bactrim Unspecified     \"I get the shakes\"    • Iron Unspecified     \"see red dots\" vision, not rash    • Penicillins Unspecified     \"makes me dizzy with my medicine\"    • Phenobarbital Unspecified     \"makes me dizzy\"        PHYSICAL EXAM  VITAL SIGNS: /85   Pulse 85   Temp 35.9 °C (96.7 °F)   Resp 16   Ht 1.676 m (5' 6\")   Wt (!) 139.7 kg (307 lb 15.7 oz)   SpO2 97%   BMI 49.71 kg/m²     Pulse ox interpretation: I interpret this pulse ox as normal.  Constitutional: Alert in mild " distress.  HENT: Normocephalic, Atraumatic, Bilateral external ears normal. Nose normal. Posterior oropharynx is slightly erythematous with post nasal discharge. No tonsillar exudates no tonsillar enlargement.   Eyes: Pupils are equal and reactive. Conjunctiva normal, non-icteric.   Lymphatics: No lymphadenopathy noted.   Heart: Regular rate and rythm, no murmurs.    Lungs: Clear to auscultation bilaterally.  Skin: Warm, Dry, No erythema, No rash.   Musculoskeletal: Left hand with normal capillary refill in all 5 fingers, no bony tenderness, normal radial, ulnar, and medial pulses, Positive Phalen's test, positive tinel's sign. No pain in the left shoulder or left neck.   Neurologic: Alert, Grossly non-focal.   Psychiatric: Affect normal, Judgment normal, Mood normal, Appears appropriate and not intoxicated.     LABS  Results for orders placed or performed during the hospital encounter of 09/17/18   RAPID STREP, CULT IF INDICATED (CULTURE IF NEGATIVE)   Result Value Ref Range    Significant Indicator NEG     Source THRT     Site THROAT     Rapid Strep Screen       Negative for Group A streptococcus.  A negative result may be obtained if the specimen is  inadequate or antigen concentration is below the  sensitivity of the test. This negative test will be followed  up with a culture as requested.       All labs reviewed by me.    COURSE & MEDICAL DECISION MAKING  Pertinent Labs & Imaging studies reviewed. (See chart for details)    9:07 PM - Patient seen and examined at bedside. Patient will be treated with Motrin 600 mg. Ordered rapid strep to evaluate her symptoms. The differential diagnoses include but are not limited to: carpal tunnel syndrome, strep throat, viral URI. Informed patient her symptoms are likely secondary to carpal tunnel syndrome. I would like for her to wear a wrist brace at night and to take anti inflammatory medications to help with her symptoms. She understands. Her cough and congestion are  likely secondary to a viral URI, however, I ordered labs to rule out strep throat.    10:18 PM- Reviewed the patient's lab results which were negative for strep throat.     10:23 PM- Patient rechecked at bedside. The patient was updated on diagnostic test results as seen above. The patient will be discharged, status improved, with instructions regarding supportive care and with a prescription for Ibuprofen. Instructions were given for follow-up. Discussed indications for seeking immediate medical attention. Patient was given the opportunity for questions. The patient understands and agrees.     Medical Decision Making: At this point time I think the patient's left hand pain is likely secondary to carpal tunnel syndrome.  Patient does a lot of crocheting which may be exacerbating this.  I will go ahead and start conservative measures with splint.  She has normal capillary refill time normal movement in the hand with no trauma.  I do not think x-rays are warranted.  As far as the patient's sore throat I think this is likely secondary to postnasal drip or a viral upper respiratory tract infection patient's tonsil not enlarged there is no tonsillar exudates and strep is negative.  We will treat the patient's pain with ibuprofen.      The patient will return for new or worsening symptoms and is stable at the time of discharge.    DISPOSITION:  Patient will be discharged home in stable condition.    FOLLOW UP:  Your doctor     Schedule an appointment as soon as possible for a visit in 1 week      [address] [id number] [phone]    If you need a doctor    [address] [address]  [phone]    If you need a doctor    Kade Ye M.D.  [address] [facility] [address]  [phone]      If your wrist pain is not improving in a couple weeks    OUTPATIENT MEDICATIONS:  Discharge Medication List as of 9/17/2018 10:24 PM       START taking these medications    Details   !! ibuprofen (MOTRIN) 600 MG Tab Take 1 Tab by mouth every 6 hours as needed., Disp-30 Tab, R-0, Print Rx Paper       !! - Potential duplicate medications found. Please discuss with provider.        FINAL IMPRESSION  1. Carpal tunnel syndrome of left wrist    2. Viral pharyngitis        Samantha BELTRAN (Linda), am scribing for, and in the presence of, Chip Rapp M.D.    Electronically signed by: Samantha Woodruff (Linda), 9/17/2018    Chip BELTRAN M.D. personally performed the services described in this documentation, as scribed by Samantha Woodruff in my presence, and it is both accurate and complete. E.     The note accurately reflects work and decisions made by me.  Chip Rapp  9/18/2018  1:26 AM

## 2018-09-18 NOTE — ED TRIAGE NOTES
Chief Complaint   Patient presents with   • Digit Pain     left thumb pain and numbness x2 months. Denies injury.     Ambulatory to triage for above. VSS. Explained triage process, to waiting room. Asked to inform RN if questions or concerns arise.

## 2018-09-19 LAB
S PYO SPEC QL CULT: NORMAL
SIGNIFICANT IND 70042: NORMAL
SITE SITE: NORMAL
SOURCE SOURCE: NORMAL

## 2019-01-14 ENCOUNTER — HOSPITAL ENCOUNTER (INPATIENT)
Facility: MEDICAL CENTER | Age: 39
LOS: 3 days | DRG: 603 | End: 2019-01-18
Attending: EMERGENCY MEDICINE | Admitting: INTERNAL MEDICINE
Payer: MEDICARE

## 2019-01-14 ENCOUNTER — APPOINTMENT (OUTPATIENT)
Dept: RADIOLOGY | Facility: MEDICAL CENTER | Age: 39
DRG: 603 | End: 2019-01-14
Attending: EMERGENCY MEDICINE
Payer: MEDICARE

## 2019-01-14 DIAGNOSIS — L03.116 CELLULITIS OF LEFT LOWER EXTREMITY: ICD-10-CM

## 2019-01-14 DIAGNOSIS — L03.90 CELLULITIS, UNSPECIFIED CELLULITIS SITE: ICD-10-CM

## 2019-01-14 LAB
ANISOCYTOSIS BLD QL SMEAR: ABNORMAL
BASOPHILS # BLD AUTO: 0.9 % (ref 0–1.8)
BASOPHILS # BLD: 0.09 K/UL (ref 0–0.12)
EOSINOPHIL # BLD AUTO: 0.27 K/UL (ref 0–0.51)
EOSINOPHIL NFR BLD: 2.6 % (ref 0–6.9)
ERYTHROCYTE [DISTWIDTH] IN BLOOD BY AUTOMATED COUNT: 46 FL (ref 35.9–50)
HCT VFR BLD AUTO: 38.5 % (ref 37–47)
HGB BLD-MCNC: 12.5 G/DL (ref 12–16)
LACTATE BLD-SCNC: 1.5 MMOL/L (ref 0.5–2)
LACTATE BLD-SCNC: 1.9 MMOL/L (ref 0.5–2)
LYMPHOCYTES # BLD AUTO: 3.13 K/UL (ref 1–4.8)
LYMPHOCYTES NFR BLD: 29.8 % (ref 22–41)
MACROCYTES BLD QL SMEAR: ABNORMAL
MANUAL DIFF BLD: NORMAL
MCH RBC QN AUTO: 29 PG (ref 27–33)
MCHC RBC AUTO-ENTMCNC: 32.5 G/DL (ref 33.6–35)
MCV RBC AUTO: 89.3 FL (ref 81.4–97.8)
MONOCYTES # BLD AUTO: 0.74 K/UL (ref 0–0.85)
MONOCYTES NFR BLD AUTO: 7 % (ref 0–13.4)
MORPHOLOGY BLD-IMP: NORMAL
MYELOCYTES NFR BLD MANUAL: 1.8 %
NEUTROPHILS # BLD AUTO: 5.99 K/UL (ref 2–7.15)
NEUTROPHILS NFR BLD: 55.3 % (ref 44–72)
NEUTS BAND NFR BLD MANUAL: 1.7 % (ref 0–10)
NRBC # BLD AUTO: 0.03 K/UL
NRBC BLD-RTO: 0.3 /100 WBC
OVALOCYTES BLD QL SMEAR: NORMAL
PLATELET # BLD AUTO: 365 K/UL (ref 164–446)
PLATELET BLD QL SMEAR: NORMAL
PMV BLD AUTO: 9.1 FL (ref 9–12.9)
POIKILOCYTOSIS BLD QL SMEAR: NORMAL
PROMYELOCYTES NFR BLD MANUAL: 0.9 %
RBC # BLD AUTO: 4.31 M/UL (ref 4.2–5.4)
RBC BLD AUTO: PRESENT
WBC # BLD AUTO: 10.5 K/UL (ref 4.8–10.8)

## 2019-01-14 PROCEDURE — 96365 THER/PROPH/DIAG IV INF INIT: CPT

## 2019-01-14 PROCEDURE — 85027 COMPLETE CBC AUTOMATED: CPT

## 2019-01-14 PROCEDURE — 85007 BL SMEAR W/DIFF WBC COUNT: CPT

## 2019-01-14 PROCEDURE — 96375 TX/PRO/DX INJ NEW DRUG ADDON: CPT

## 2019-01-14 PROCEDURE — 87040 BLOOD CULTURE FOR BACTERIA: CPT

## 2019-01-14 PROCEDURE — 99285 EMERGENCY DEPT VISIT HI MDM: CPT

## 2019-01-14 PROCEDURE — 700111 HCHG RX REV CODE 636 W/ 250 OVERRIDE (IP): Performed by: EMERGENCY MEDICINE

## 2019-01-14 PROCEDURE — G0378 HOSPITAL OBSERVATION PER HR: HCPCS

## 2019-01-14 PROCEDURE — 700105 HCHG RX REV CODE 258: Performed by: EMERGENCY MEDICINE

## 2019-01-14 PROCEDURE — 94760 N-INVAS EAR/PLS OXIMETRY 1: CPT

## 2019-01-14 PROCEDURE — 83605 ASSAY OF LACTIC ACID: CPT

## 2019-01-14 PROCEDURE — 71045 X-RAY EXAM CHEST 1 VIEW: CPT

## 2019-01-14 PROCEDURE — 700101 HCHG RX REV CODE 250: Performed by: EMERGENCY MEDICINE

## 2019-01-14 RX ORDER — MORPHINE SULFATE 4 MG/ML
4 INJECTION, SOLUTION INTRAMUSCULAR; INTRAVENOUS ONCE
Status: COMPLETED | OUTPATIENT
Start: 2019-01-14 | End: 2019-01-14

## 2019-01-14 RX ORDER — SODIUM CHLORIDE 9 MG/ML
1000 INJECTION, SOLUTION INTRAVENOUS
Status: COMPLETED | OUTPATIENT
Start: 2019-01-14 | End: 2019-01-14

## 2019-01-14 RX ORDER — ONDANSETRON 2 MG/ML
4 INJECTION INTRAMUSCULAR; INTRAVENOUS ONCE
Status: COMPLETED | OUTPATIENT
Start: 2019-01-14 | End: 2019-01-14

## 2019-01-14 RX ORDER — CLINDAMYCIN PHOSPHATE 600 MG/50ML
600 INJECTION, SOLUTION INTRAVENOUS ONCE
Status: COMPLETED | OUTPATIENT
Start: 2019-01-14 | End: 2019-01-14

## 2019-01-14 RX ADMIN — CLINDAMYCIN IN 5 PERCENT DEXTROSE 600 MG: 12 INJECTION, SOLUTION INTRAVENOUS at 21:22

## 2019-01-14 RX ADMIN — SODIUM CHLORIDE 1000 ML: 9 INJECTION, SOLUTION INTRAVENOUS at 21:23

## 2019-01-14 RX ADMIN — MORPHINE SULFATE 4 MG: 4 INJECTION INTRAVENOUS at 21:43

## 2019-01-14 RX ADMIN — ONDANSETRON HYDROCHLORIDE 4 MG: 2 INJECTION, SOLUTION INTRAMUSCULAR; INTRAVENOUS at 21:53

## 2019-01-14 ASSESSMENT — PAIN SCALES - GENERAL
PAINLEVEL_OUTOF10: 10
PAINLEVEL_OUTOF10: 5

## 2019-01-15 ENCOUNTER — APPOINTMENT (OUTPATIENT)
Dept: RADIOLOGY | Facility: MEDICAL CENTER | Age: 39
DRG: 603 | End: 2019-01-15
Attending: INTERNAL MEDICINE
Payer: MEDICARE

## 2019-01-15 PROBLEM — F32.A ANXIETY AND DEPRESSION: Status: ACTIVE | Noted: 2019-01-15

## 2019-01-15 PROBLEM — E66.9 OBESITY: Status: ACTIVE | Noted: 2019-01-15

## 2019-01-15 PROBLEM — L03.116 CELLULITIS OF LEFT LOWER EXTREMITY: Status: ACTIVE | Noted: 2019-01-15

## 2019-01-15 PROBLEM — F41.9 ANXIETY AND DEPRESSION: Status: ACTIVE | Noted: 2019-01-15

## 2019-01-15 LAB
APTT PPP: 30.3 SEC (ref 24.7–36)
GRAM STN SPEC: NORMAL
INR PPP: 1.06 (ref 0.87–1.13)
LACTATE BLD-SCNC: 1.8 MMOL/L (ref 0.5–2)
LACTATE BLD-SCNC: 1.9 MMOL/L (ref 0.5–2)
PROCALCITONIN SERPL-MCNC: <0.05 NG/ML
PROTHROMBIN TIME: 13.9 SEC (ref 12–14.6)
SIGNIFICANT IND 70042: NORMAL
SITE SITE: NORMAL
SOURCE SOURCE: NORMAL

## 2019-01-15 PROCEDURE — A9270 NON-COVERED ITEM OR SERVICE: HCPCS | Performed by: INTERNAL MEDICINE

## 2019-01-15 PROCEDURE — 700101 HCHG RX REV CODE 250: Performed by: INTERNAL MEDICINE

## 2019-01-15 PROCEDURE — 87077 CULTURE AEROBIC IDENTIFY: CPT

## 2019-01-15 PROCEDURE — 700111 HCHG RX REV CODE 636 W/ 250 OVERRIDE (IP): Performed by: INTERNAL MEDICINE

## 2019-01-15 PROCEDURE — 97161 PT EVAL LOW COMPLEX 20 MIN: CPT

## 2019-01-15 PROCEDURE — 700105 HCHG RX REV CODE 258: Performed by: INTERNAL MEDICINE

## 2019-01-15 PROCEDURE — 87186 SC STD MICRODIL/AGAR DIL: CPT

## 2019-01-15 PROCEDURE — 97165 OT EVAL LOW COMPLEX 30 MIN: CPT

## 2019-01-15 PROCEDURE — 96367 TX/PROPH/DG ADDL SEQ IV INF: CPT

## 2019-01-15 PROCEDURE — 83605 ASSAY OF LACTIC ACID: CPT | Mod: 91

## 2019-01-15 PROCEDURE — 84145 PROCALCITONIN (PCT): CPT

## 2019-01-15 PROCEDURE — 85610 PROTHROMBIN TIME: CPT

## 2019-01-15 PROCEDURE — 87070 CULTURE OTHR SPECIMN AEROBIC: CPT

## 2019-01-15 PROCEDURE — 85730 THROMBOPLASTIN TIME PARTIAL: CPT

## 2019-01-15 PROCEDURE — 700102 HCHG RX REV CODE 250 W/ 637 OVERRIDE(OP): Performed by: INTERNAL MEDICINE

## 2019-01-15 PROCEDURE — 770006 HCHG ROOM/CARE - MED/SURG/GYN SEMI*

## 2019-01-15 PROCEDURE — 87205 SMEAR GRAM STAIN: CPT

## 2019-01-15 PROCEDURE — 99223 1ST HOSP IP/OBS HIGH 75: CPT | Mod: AI | Performed by: INTERNAL MEDICINE

## 2019-01-15 PROCEDURE — 36415 COLL VENOUS BLD VENIPUNCTURE: CPT

## 2019-01-15 PROCEDURE — 93971 EXTREMITY STUDY: CPT | Mod: LT

## 2019-01-15 RX ORDER — AMITRIPTYLINE HYDROCHLORIDE 25 MG/1
25 TABLET, FILM COATED ORAL EVERY EVENING
Status: DISCONTINUED | OUTPATIENT
Start: 2019-01-15 | End: 2019-01-18 | Stop reason: HOSPADM

## 2019-01-15 RX ORDER — ONDANSETRON 4 MG/1
4 TABLET, ORALLY DISINTEGRATING ORAL EVERY 4 HOURS PRN
Status: DISCONTINUED | OUTPATIENT
Start: 2019-01-15 | End: 2019-01-18 | Stop reason: HOSPADM

## 2019-01-15 RX ORDER — PROMETHAZINE HYDROCHLORIDE 25 MG/1
12.5-25 TABLET ORAL EVERY 4 HOURS PRN
Status: DISCONTINUED | OUTPATIENT
Start: 2019-01-15 | End: 2019-01-18 | Stop reason: HOSPADM

## 2019-01-15 RX ORDER — ACETAMINOPHEN 325 MG/1
650 TABLET ORAL EVERY 6 HOURS PRN
Status: DISCONTINUED | OUTPATIENT
Start: 2019-01-15 | End: 2019-01-18 | Stop reason: HOSPADM

## 2019-01-15 RX ORDER — SODIUM CHLORIDE 9 MG/ML
500 INJECTION, SOLUTION INTRAVENOUS
Status: DISCONTINUED | OUTPATIENT
Start: 2019-01-15 | End: 2019-01-18 | Stop reason: HOSPADM

## 2019-01-15 RX ORDER — DIVALPROEX SODIUM 125 MG/1
500 CAPSULE, COATED PELLETS ORAL EVERY 8 HOURS
Status: DISCONTINUED | OUTPATIENT
Start: 2019-01-15 | End: 2019-01-18 | Stop reason: HOSPADM

## 2019-01-15 RX ORDER — ONDANSETRON 2 MG/ML
4 INJECTION INTRAMUSCULAR; INTRAVENOUS EVERY 4 HOURS PRN
Status: DISCONTINUED | OUTPATIENT
Start: 2019-01-15 | End: 2019-01-18 | Stop reason: HOSPADM

## 2019-01-15 RX ORDER — DIVALPROEX SODIUM 125 MG/1
250 CAPSULE, COATED PELLETS ORAL
Status: DISCONTINUED | OUTPATIENT
Start: 2019-01-15 | End: 2019-01-15

## 2019-01-15 RX ORDER — PROMETHAZINE HYDROCHLORIDE 25 MG/1
12.5-25 SUPPOSITORY RECTAL EVERY 4 HOURS PRN
Status: DISCONTINUED | OUTPATIENT
Start: 2019-01-15 | End: 2019-01-18 | Stop reason: HOSPADM

## 2019-01-15 RX ORDER — CARBAMAZEPINE 200 MG/1
200 TABLET ORAL 3 TIMES DAILY
Status: DISCONTINUED | OUTPATIENT
Start: 2019-01-15 | End: 2019-01-18 | Stop reason: HOSPADM

## 2019-01-15 RX ORDER — BISACODYL 10 MG
10 SUPPOSITORY, RECTAL RECTAL
Status: DISCONTINUED | OUTPATIENT
Start: 2019-01-15 | End: 2019-01-18 | Stop reason: HOSPADM

## 2019-01-15 RX ORDER — NAPROXEN 500 MG/1
500 TABLET ORAL 2 TIMES DAILY
Status: DISCONTINUED | OUTPATIENT
Start: 2019-01-15 | End: 2019-01-15

## 2019-01-15 RX ORDER — DIVALPROEX SODIUM 125 MG/1
250 CAPSULE, COATED PELLETS ORAL
Status: DISCONTINUED | OUTPATIENT
Start: 2019-01-15 | End: 2019-01-18 | Stop reason: HOSPADM

## 2019-01-15 RX ORDER — NAPROXEN 500 MG/1
500 TABLET ORAL 2 TIMES DAILY PRN
Status: DISCONTINUED | OUTPATIENT
Start: 2019-01-15 | End: 2019-01-16

## 2019-01-15 RX ORDER — DOXYCYCLINE 100 MG/1
100 TABLET ORAL EVERY 12 HOURS
Status: DISCONTINUED | OUTPATIENT
Start: 2019-01-15 | End: 2019-01-18 | Stop reason: HOSPADM

## 2019-01-15 RX ORDER — DIVALPROEX SODIUM 125 MG/1
500 CAPSULE, COATED PELLETS ORAL EVERY 12 HOURS
Status: DISCONTINUED | OUTPATIENT
Start: 2019-01-15 | End: 2019-01-15

## 2019-01-15 RX ORDER — LORATADINE 10 MG/1
10 TABLET ORAL DAILY
Status: DISCONTINUED | OUTPATIENT
Start: 2019-01-15 | End: 2019-01-18 | Stop reason: HOSPADM

## 2019-01-15 RX ORDER — TOPIRAMATE 100 MG/1
100 TABLET, FILM COATED ORAL EVERY 12 HOURS
Status: DISCONTINUED | OUTPATIENT
Start: 2019-01-15 | End: 2019-01-18 | Stop reason: HOSPADM

## 2019-01-15 RX ORDER — HYDROXYZINE HYDROCHLORIDE 25 MG/1
25 TABLET, FILM COATED ORAL 3 TIMES DAILY PRN
Status: DISCONTINUED | OUTPATIENT
Start: 2019-01-15 | End: 2019-01-18 | Stop reason: HOSPADM

## 2019-01-15 RX ORDER — POLYETHYLENE GLYCOL 3350 17 G/17G
1 POWDER, FOR SOLUTION ORAL
Status: DISCONTINUED | OUTPATIENT
Start: 2019-01-15 | End: 2019-01-18 | Stop reason: HOSPADM

## 2019-01-15 RX ORDER — AMOXICILLIN 250 MG
2 CAPSULE ORAL 2 TIMES DAILY
Status: DISCONTINUED | OUTPATIENT
Start: 2019-01-15 | End: 2019-01-18 | Stop reason: HOSPADM

## 2019-01-15 RX ADMIN — CARBAMAZEPINE 200 MG: 200 TABLET ORAL at 05:20

## 2019-01-15 RX ADMIN — STANDARDIZED SENNA CONCENTRATE AND DOCUSATE SODIUM 2 TABLET: 8.6; 5 TABLET, FILM COATED ORAL at 05:19

## 2019-01-15 RX ADMIN — DIVALPROEX SODIUM 500 MG: 125 CAPSULE, COATED PELLETS ORAL at 05:19

## 2019-01-15 RX ADMIN — STANDARDIZED SENNA CONCENTRATE AND DOCUSATE SODIUM 2 TABLET: 8.6; 5 TABLET, FILM COATED ORAL at 16:23

## 2019-01-15 RX ADMIN — LORATADINE 10 MG: 10 TABLET ORAL at 05:19

## 2019-01-15 RX ADMIN — CARBAMAZEPINE 200 MG: 200 TABLET ORAL at 12:22

## 2019-01-15 RX ADMIN — HYDROXYZINE HYDROCHLORIDE 25 MG: 25 TABLET, FILM COATED ORAL at 22:27

## 2019-01-15 RX ADMIN — CEFTRIAXONE SODIUM 2 G: 2 INJECTION, POWDER, FOR SOLUTION INTRAMUSCULAR; INTRAVENOUS at 12:15

## 2019-01-15 RX ADMIN — DIVALPROEX SODIUM 500 MG: 125 CAPSULE, COATED PELLETS ORAL at 22:25

## 2019-01-15 RX ADMIN — TOPIRAMATE 100 MG: 100 TABLET, FILM COATED ORAL at 05:19

## 2019-01-15 RX ADMIN — DIVALPROEX SODIUM 250 MG: 125 CAPSULE, COATED PELLETS ORAL at 02:03

## 2019-01-15 RX ADMIN — TOPIRAMATE 100 MG: 100 TABLET, FILM COATED ORAL at 16:25

## 2019-01-15 RX ADMIN — ACETAMINOPHEN 650 MG: 325 TABLET, FILM COATED ORAL at 08:18

## 2019-01-15 RX ADMIN — ACETAMINOPHEN 650 MG: 325 TABLET, FILM COATED ORAL at 02:02

## 2019-01-15 RX ADMIN — ACETAMINOPHEN 650 MG: 325 TABLET, FILM COATED ORAL at 16:24

## 2019-01-15 RX ADMIN — DIVALPROEX SODIUM 250 MG: 125 CAPSULE, COATED PELLETS ORAL at 22:25

## 2019-01-15 RX ADMIN — DIVALPROEX SODIUM 500 MG: 125 CAPSULE, COATED PELLETS ORAL at 14:00

## 2019-01-15 RX ADMIN — HYDROXYZINE HYDROCHLORIDE 25 MG: 25 TABLET, FILM COATED ORAL at 16:56

## 2019-01-15 RX ADMIN — AMITRIPTYLINE HYDROCHLORIDE 25 MG: 25 TABLET, FILM COATED ORAL at 16:25

## 2019-01-15 RX ADMIN — CARBAMAZEPINE 200 MG: 200 TABLET ORAL at 16:24

## 2019-01-15 RX ADMIN — DOXYCYCLINE 100 MG: 100 TABLET ORAL at 13:03

## 2019-01-15 ASSESSMENT — COGNITIVE AND FUNCTIONAL STATUS - GENERAL
CLIMB 3 TO 5 STEPS WITH RAILING: A LITTLE
DAILY ACTIVITIY SCORE: 24
SUGGESTED CMS G CODE MODIFIER MOBILITY: CI
SUGGESTED CMS G CODE MODIFIER DAILY ACTIVITY: CI
SUGGESTED CMS G CODE MODIFIER MOBILITY: CH
SUGGESTED CMS G CODE MODIFIER DAILY ACTIVITY: CH
MOBILITY SCORE: 23
MOBILITY SCORE: 24
DAILY ACTIVITIY SCORE: 23
HELP NEEDED FOR BATHING: A LITTLE

## 2019-01-15 ASSESSMENT — ENCOUNTER SYMPTOMS
COUGH: 0
BLURRED VISION: 0
DIARRHEA: 0
DIZZINESS: 0
MYALGIAS: 0
BACK PAIN: 0
FEVER: 0
CHILLS: 0
NAUSEA: 0
NECK PAIN: 0
SHORTNESS OF BREATH: 0
ABDOMINAL PAIN: 0
HEADACHES: 0
WHEEZING: 0
PALPITATIONS: 0
SEIZURES: 0
VOMITING: 0
SORE THROAT: 0
BRUISES/BLEEDS EASILY: 0
BLOOD IN STOOL: 0
SPUTUM PRODUCTION: 0
FLANK PAIN: 0
DIAPHORESIS: 0
FOCAL WEAKNESS: 0

## 2019-01-15 ASSESSMENT — PATIENT HEALTH QUESTIONNAIRE - PHQ9
8. MOVING OR SPEAKING SO SLOWLY THAT OTHER PEOPLE COULD HAVE NOTICED. OR THE OPPOSITE, BEING SO FIGETY OR RESTLESS THAT YOU HAVE BEEN MOVING AROUND A LOT MORE THAN USUAL: NOT AT ALL
6. FEELING BAD ABOUT YOURSELF - OR THAT YOU ARE A FAILURE OR HAVE LET YOURSELF OR YOUR FAMILY DOWN: NOT AL ALL
5. POOR APPETITE OR OVEREATING: NOT AT ALL
2. FEELING DOWN, DEPRESSED, IRRITABLE, OR HOPELESS: NEARLY EVERY DAY
SUM OF ALL RESPONSES TO PHQ QUESTIONS 1-9: 5
9. THOUGHTS THAT YOU WOULD BE BETTER OFF DEAD, OR OF HURTING YOURSELF: NOT AT ALL
SUM OF ALL RESPONSES TO PHQ9 QUESTIONS 1 AND 2: 3
4. FEELING TIRED OR HAVING LITTLE ENERGY: NOT AT ALL
3. TROUBLE FALLING OR STAYING ASLEEP OR SLEEPING TOO MUCH: SEVERAL DAYS
7. TROUBLE CONCENTRATING ON THINGS, SUCH AS READING THE NEWSPAPER OR WATCHING TELEVISION: SEVERAL DAYS
1. LITTLE INTEREST OR PLEASURE IN DOING THINGS: NOT AT ALL

## 2019-01-15 ASSESSMENT — GAIT ASSESSMENTS
GAIT LEVEL OF ASSIST: SUPERVISED
ASSISTIVE DEVICE: QUAD CANE
DISTANCE (FEET): 200

## 2019-01-15 ASSESSMENT — PAIN SCALES - GENERAL
PAINLEVEL_OUTOF10: 3
PAINLEVEL_OUTOF10: 2
PAINLEVEL_OUTOF10: 5
PAINLEVEL_OUTOF10: 5

## 2019-01-15 ASSESSMENT — ACTIVITIES OF DAILY LIVING (ADL): TOILETING: INDEPENDENT

## 2019-01-15 ASSESSMENT — LIFESTYLE VARIABLES
EVER_SMOKED: NEVER
ALCOHOL_USE: NO
EVER_SMOKED: NEVER

## 2019-01-15 ASSESSMENT — COPD QUESTIONNAIRES
HAVE YOU SMOKED AT LEAST 100 CIGARETTES IN YOUR ENTIRE LIFE: NO/DON'T KNOW
DURING THE PAST 4 WEEKS HOW MUCH DID YOU FEEL SHORT OF BREATH: SOME OF THE TIME
DO YOU EVER COUGH UP ANY MUCUS OR PHLEGM?: YES, A FEW DAYS A WEEK OR MONTH
COPD SCREENING SCORE: 2
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
## 2019-01-15 NOTE — H&P
"Hospital Medicine History & Physical Note    Date of Service  1/14/2019    Primary Care Physician  Pcp Not In Computer    Consultants  ***    Code Status  ***    Chief Complaint  ***    History of Presenting Illness  38 y.o. female who presented 1/14/2019 with ***    Review of Systems  ROS    Past Medical History   has a past medical history of ASTHMA; Bipolar disorder (HCC); Chronic back pain; Psychiatric disorder; Psychiatric disorder; and Seizure disorder (HCC).    Surgical History   has a past surgical history that includes other neurological surg; other orthopedic surgery; other abdominal surgery; ercp in or (9/19/2009); and joy by laparoscopy (9/22/2009).     Family History  family history is not on file.     Social History   reports that she has never smoked. She has never used smokeless tobacco. She reports that she does not drink alcohol or use drugs.    Allergies  Allergies   Allergen Reactions   • Bactrim Unspecified     \"I get the shakes\"    • Iron Unspecified     \"see red dots\" vision, not rash    • Penicillins Unspecified     \"makes me dizzy with my medicine\"    • Phenobarbital Unspecified     \"makes me dizzy\"        Medications  None       Physical Exam  Temp:  [35.9 °C (96.7 °F)] 35.9 °C (96.7 °F)  Pulse:  [] 91  Resp:  [16-18] 16  BP: (115-146)/() 115/109    Physical Exam    Laboratory:  Recent Labs      01/14/19   2120   WBC  10.5   RBC  4.31   HEMOGLOBIN  12.5   HEMATOCRIT  38.5   MCV  89.3   MCH  29.0   MCHC  32.5*   RDW  46.0   PLATELETCT  365   MPV  9.1         No results for input(s): ALTSGPT, ASTSGOT, ALKPHOSPHAT, TBILIRUBIN, DBILIRUBIN, GAMMAGT, AMYLASE, LIPASE, ALB, PREALBUMIN, GLUCOSE in the last 72 hours.              No results for input(s): TROPONINI in the last 72 hours.    Urinalysis:    No results found     Imaging:  {Wetread or Wildcard:[id number]}      Assessment/Plan:  I anticipate this patient {Anticipated Status:02972}    No new Assessment & Plan notes have been filed " under this hospital service since the last note was generated.  Service: Hospital Medicine      VTE prophylaxis: ***
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
## 2019-01-15 NOTE — DIETARY
NUTRITION SERVICES: BMI - Pt with BMI >40 (=49.06). Weight loss counseling not appropriate in acute care setting.     RECOMMEND - Referral to outpatient nutrition services for weight management after D/C.

## 2019-01-15 NOTE — CARE PLAN
Problem: Safety  Goal: Will remain free from falls    Intervention: Implement fall precautions  Fall precautions in place, bed alarm on. Call light and personal belongings within reach.       Problem: Pain Management  Goal: Pain level will decrease to patient's comfort goal    Intervention: Follow pain managment plan developed in collaboration with patient and Interdisciplinary Team  Patient medicated for pain per MAR.
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
## 2019-01-15 NOTE — ASSESSMENT & PLAN NOTE
Body mass index is 49.03 kg/m².  Patient has been counseled on diet and lifestyle modifications  Recommended outpatient weight management program and bariatric surgery evaluation  Pt educated on the increase of morbidity and mortality associated with excess weight including DM, Heart Disease, HTN, stroke, and sleep apnea.  Pt advised weight loss of 5% through reduced calorie, low carb diet and 150 mins of exercise a week

## 2019-01-15 NOTE — THERAPY
"Physical Therapy Evaluation completed.   Bed Mobility: Minimal Assist (HHA for trunk to upright; pt reporting fiance assists with this at home)     Transfers: Supervised with Quad Cane   Gait: Supervised with Quad Cane       Plan of Care: Patient with no further skilled PT needs in the acute care setting at this time  Discharge Recommendations: Equipment: No Equipment Needed. Post-acute therapy Currently anticipate no further skilled therapy needs once patient is discharged from the inpatient setting.    Pt admitted for cellulitis of L LE and presents without gross mobility deficit. She was able to complete x210' of gait with quad cane and has been educated on appropriate hand to hold cane in. Pt reports she also has 4WW for longer distances if she prefers. At this time, pt appears very near or at her functional baseline and does not require further acute PT services.     See \"Rehab Therapy-Acute\" Patient Summary Report for complete documentation.     "

## 2019-01-15 NOTE — ASSESSMENT & PLAN NOTE
I ordered antibiotics. Penicillin allergy noted.  I ordered wound culture  On 1/16 mild leukocytosis and lactic acidosis  Continue IV antibiotics  Trend WBC  Normalized  Multifloral wound culture, follow sensitivities  Switch to PO antibiotics in the AM or at discharge  Wound team following  Recommending at least outpt wound though patient requesting skilled
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
## 2019-01-15 NOTE — CARE PLAN
Problem: Communication  Goal: The ability to communicate needs accurately and effectively will improve  Outcome: PROGRESSING AS EXPECTED  Pt uses call light appropriately to communicate needs.    Problem: Safety  Goal: Will remain free from injury  Outcome: PROGRESSING AS EXPECTED  Fall and safety precautions in place.
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
## 2019-01-15 NOTE — PROGRESS NOTES
Assumed care of pt at 0700, received report from night shift RN. Pt complaining of LLE pain, medicated per MAR. Pt updated on POC, all questions answered at this time.

## 2019-01-15 NOTE — THERAPY
"Occupational Therapy Evaluation completed.   Functional Status:  Pt presents to [facility] following LLE cellulitis. Pt performed basic self care tasks, functional mobility and t/f's with supervision with increased time, required assist to come upright in bed, reports fiance assist at home. Pt reports fiance assists with IADLs as needed and pt is I with ADLs, at times slow to transition between topics but anticipate this is baseline for pt. Pt appears to be close to her baseline function and does not appear to have any concerns regarding returning home with support from fiance. Pt reports was having difficulties with getting in/out of shower at home, recommended tub t/f bench for ease of transfer and safety; aware how to obtain it. No further acute OT indicated at this time.   Plan of Care: Patient with no further skilled OT needs in the acute care setting at this time  Discharge Recommendations:  Equipment: tub t/f bench Post-acute therapy Recommend home health or outpatient transitional care services for continued occupational therapy services, Anticipate that the patient will have no further occupational therapy needs after discharge from the hospital.         See \"Rehab Therapy-Acute\" Patient Summary Report for complete documentation.    "

## 2019-01-15 NOTE — ED NOTES
Med rec updated and complete.  Allergies reviewed.  All medications removed from med rec  As pt is not taking them.

## 2019-01-16 PROBLEM — J06.9 URI (UPPER RESPIRATORY INFECTION): Status: ACTIVE | Noted: 2019-01-16

## 2019-01-16 LAB
ANION GAP SERPL CALC-SCNC: 10 MMOL/L (ref 0–11.9)
BASOPHILS # BLD AUTO: 0.5 % (ref 0–1.8)
BASOPHILS # BLD: 0.06 K/UL (ref 0–0.12)
BUN SERPL-MCNC: 3 MG/DL (ref 8–22)
CALCIUM SERPL-MCNC: 8.6 MG/DL (ref 8.5–10.5)
CHLORIDE SERPL-SCNC: 105 MMOL/L (ref 96–112)
CO2 SERPL-SCNC: 21 MMOL/L (ref 20–33)
CREAT SERPL-MCNC: 0.48 MG/DL (ref 0.5–1.4)
EOSINOPHIL # BLD AUTO: 0.26 K/UL (ref 0–0.51)
EOSINOPHIL NFR BLD: 2.3 % (ref 0–6.9)
ERYTHROCYTE [DISTWIDTH] IN BLOOD BY AUTOMATED COUNT: 46.1 FL (ref 35.9–50)
GLUCOSE SERPL-MCNC: 112 MG/DL (ref 65–99)
HCT VFR BLD AUTO: 36 % (ref 37–47)
HGB BLD-MCNC: 11.4 G/DL (ref 12–16)
IMM GRANULOCYTES # BLD AUTO: 0.43 K/UL (ref 0–0.11)
IMM GRANULOCYTES NFR BLD AUTO: 3.8 % (ref 0–0.9)
LACTATE BLD-SCNC: 2.1 MMOL/L (ref 0.5–2)
LACTATE BLD-SCNC: 2.6 MMOL/L (ref 0.5–2)
LYMPHOCYTES # BLD AUTO: 3.49 K/UL (ref 1–4.8)
LYMPHOCYTES NFR BLD: 30.6 % (ref 22–41)
MCH RBC QN AUTO: 28.4 PG (ref 27–33)
MCHC RBC AUTO-ENTMCNC: 31.7 G/DL (ref 33.6–35)
MCV RBC AUTO: 89.6 FL (ref 81.4–97.8)
MONOCYTES # BLD AUTO: 1.46 K/UL (ref 0–0.85)
MONOCYTES NFR BLD AUTO: 12.8 % (ref 0–13.4)
NEUTROPHILS # BLD AUTO: 5.7 K/UL (ref 2–7.15)
NEUTROPHILS NFR BLD: 50 % (ref 44–72)
NRBC # BLD AUTO: 0.02 K/UL
NRBC BLD-RTO: 0.2 /100 WBC
PLATELET # BLD AUTO: 256 K/UL (ref 164–446)
PMV BLD AUTO: 9.4 FL (ref 9–12.9)
POTASSIUM SERPL-SCNC: 3.9 MMOL/L (ref 3.6–5.5)
RBC # BLD AUTO: 4.02 M/UL (ref 4.2–5.4)
SODIUM SERPL-SCNC: 136 MMOL/L (ref 135–145)
WBC # BLD AUTO: 11.4 K/UL (ref 4.8–10.8)

## 2019-01-16 PROCEDURE — 700101 HCHG RX REV CODE 250: Performed by: INTERNAL MEDICINE

## 2019-01-16 PROCEDURE — A9270 NON-COVERED ITEM OR SERVICE: HCPCS | Performed by: INTERNAL MEDICINE

## 2019-01-16 PROCEDURE — 94660 CPAP INITIATION&MGMT: CPT

## 2019-01-16 PROCEDURE — 770006 HCHG ROOM/CARE - MED/SURG/GYN SEMI*

## 2019-01-16 PROCEDURE — 700105 HCHG RX REV CODE 258: Performed by: INTERNAL MEDICINE

## 2019-01-16 PROCEDURE — 85025 COMPLETE CBC W/AUTO DIFF WBC: CPT

## 2019-01-16 PROCEDURE — 700102 HCHG RX REV CODE 250 W/ 637 OVERRIDE(OP): Performed by: INTERNAL MEDICINE

## 2019-01-16 PROCEDURE — 99233 SBSQ HOSP IP/OBS HIGH 50: CPT | Performed by: INTERNAL MEDICINE

## 2019-01-16 PROCEDURE — 700102 HCHG RX REV CODE 250 W/ 637 OVERRIDE(OP): Performed by: HOSPITALIST

## 2019-01-16 PROCEDURE — 36415 COLL VENOUS BLD VENIPUNCTURE: CPT

## 2019-01-16 PROCEDURE — A9270 NON-COVERED ITEM OR SERVICE: HCPCS | Performed by: HOSPITALIST

## 2019-01-16 PROCEDURE — 700111 HCHG RX REV CODE 636 W/ 250 OVERRIDE (IP): Performed by: INTERNAL MEDICINE

## 2019-01-16 PROCEDURE — 80048 BASIC METABOLIC PNL TOTAL CA: CPT

## 2019-01-16 PROCEDURE — 83605 ASSAY OF LACTIC ACID: CPT | Mod: 91

## 2019-01-16 RX ORDER — CYCLOBENZAPRINE HCL 10 MG
5 TABLET ORAL
COMMUNITY
End: 2023-05-09

## 2019-01-16 RX ORDER — CYCLOBENZAPRINE HCL 10 MG
25 TABLET ORAL 2 TIMES DAILY PRN
Status: DISCONTINUED | OUTPATIENT
Start: 2019-01-16 | End: 2019-01-18 | Stop reason: HOSPADM

## 2019-01-16 RX ADMIN — DIVALPROEX SODIUM 500 MG: 125 CAPSULE, COATED PELLETS ORAL at 12:22

## 2019-01-16 RX ADMIN — CARBAMAZEPINE 200 MG: 200 TABLET ORAL at 05:13

## 2019-01-16 RX ADMIN — CYCLOBENZAPRINE 25 MG: 10 TABLET, FILM COATED ORAL at 20:13

## 2019-01-16 RX ADMIN — HYDROXYZINE HYDROCHLORIDE 25 MG: 25 TABLET, FILM COATED ORAL at 12:54

## 2019-01-16 RX ADMIN — STANDARDIZED SENNA CONCENTRATE AND DOCUSATE SODIUM 2 TABLET: 8.6; 5 TABLET, FILM COATED ORAL at 05:09

## 2019-01-16 RX ADMIN — CEFTRIAXONE SODIUM 2 G: 2 INJECTION, POWDER, FOR SOLUTION INTRAMUSCULAR; INTRAVENOUS at 05:10

## 2019-01-16 RX ADMIN — LORATADINE 10 MG: 10 TABLET ORAL at 05:09

## 2019-01-16 RX ADMIN — DIVALPROEX SODIUM 500 MG: 125 CAPSULE, COATED PELLETS ORAL at 05:09

## 2019-01-16 RX ADMIN — CARBAMAZEPINE 200 MG: 200 TABLET ORAL at 12:21

## 2019-01-16 RX ADMIN — AMITRIPTYLINE HYDROCHLORIDE 25 MG: 25 TABLET, FILM COATED ORAL at 20:13

## 2019-01-16 RX ADMIN — ACETAMINOPHEN 650 MG: 325 TABLET, FILM COATED ORAL at 19:11

## 2019-01-16 RX ADMIN — TOPIRAMATE 100 MG: 100 TABLET, FILM COATED ORAL at 16:40

## 2019-01-16 RX ADMIN — CARBAMAZEPINE 200 MG: 200 TABLET ORAL at 16:39

## 2019-01-16 RX ADMIN — ACETAMINOPHEN 650 MG: 325 TABLET, FILM COATED ORAL at 12:21

## 2019-01-16 RX ADMIN — DOXYCYCLINE 100 MG: 100 TABLET ORAL at 05:09

## 2019-01-16 RX ADMIN — DIVALPROEX SODIUM 250 MG: 125 CAPSULE, COATED PELLETS ORAL at 20:12

## 2019-01-16 RX ADMIN — TOPIRAMATE 100 MG: 100 TABLET, FILM COATED ORAL at 05:09

## 2019-01-16 RX ADMIN — DOXYCYCLINE 100 MG: 100 TABLET ORAL at 16:39

## 2019-01-16 RX ADMIN — DIVALPROEX SODIUM 500 MG: 125 CAPSULE, COATED PELLETS ORAL at 20:12

## 2019-01-16 ASSESSMENT — ENCOUNTER SYMPTOMS
PALPITATIONS: 0
EYE REDNESS: 0
MYALGIAS: 0
SHORTNESS OF BREATH: 0
HEADACHES: 0
SPUTUM PRODUCTION: 1
TREMORS: 0
FEVER: 0
SEIZURES: 0
HEMOPTYSIS: 0
EYE PAIN: 0
DIZZINESS: 0
INSOMNIA: 0
VOMITING: 0
ABDOMINAL PAIN: 0
WEAKNESS: 0
CONSTIPATION: 0
NAUSEA: 0
WHEEZING: 0
NERVOUS/ANXIOUS: 0
DIARRHEA: 0
CHILLS: 0
COUGH: 1
BLOOD IN STOOL: 0
FALLS: 0
FOCAL WEAKNESS: 0
LOSS OF CONSCIOUSNESS: 0

## 2019-01-16 ASSESSMENT — PAIN SCALES - GENERAL
PAINLEVEL_OUTOF10: 0
PAINLEVEL_OUTOF10: 5
PAINLEVEL_OUTOF10: 0
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
## 2019-01-16 NOTE — PROGRESS NOTES
Pt A/Ox4. Resting quietly at this time. Plan of care discussed with patient. LLE with gauze dressing CDI. Pt up to bathroom with cane and standby assist. Hourly rounding done. Will continue to monitor.

## 2019-01-16 NOTE — CARE PLAN
Problem: Safety  Goal: Will remain free from injury  Outcome: PROGRESSING AS EXPECTED  Fall and safety precautions in place.    Problem: Pain Management  Goal: Pain level will decrease to patient's comfort goal  Outcome: PROGRESSING AS EXPECTED  Pt declines pain medication at this time. Will continue to monitor and medicate as needed.

## 2019-01-16 NOTE — CARE PLAN
Problem: Safety  Goal: Will remain free from falls    Intervention: Implement fall precautions  Fall precautions in place, bed alarm on. Call light and personal belongings within reach.       Problem: Knowledge Deficit  Goal: Knowledge of disease process/condition, treatment plan, diagnostic tests, and medications will improve    Intervention: Explain information regarding disease process/condition, treatment plan, diagnostic tests, and medications and document in education  Pt updated on POC and on current medication regimen. Pt given time to ask questions and verbalize concerns.

## 2019-01-16 NOTE — WOUND TEAM
"[facility]  Inpatient Services  Initial Wound and Skin Care Evaluation    Admission Date:  1/14/2019   HPI, PMH, SH: Reviewed  Unit where seen by Wound Team: S190/01    WOUND CONSULT RELATED TO: LLE    SUBJECTIVE:  \"I came here because of this. Do I have to stay long?\"     Self Report / Pain Level: premedicated by primary RN      OBJECTIVE: wounds MEDARDO, some drainage on pad  WOUND TYPE, LOCATION, CHARACTERISTICS (Pressure ulcers: location, stage, POA or date identified)  Wound 01/15/19 Other (comment) Leg rash/cellulitis LLE (Active)              Site Assessment Red;Drainage;Fragile;Edema    Mohan-wound Assessment Pink    Margins Undefined edges    Wound Length (cm) 21 cm 1/15/2019 10:00 AM   Wound Width (cm) 39 cm 1/15/2019 10:00 AM   Wound Surface Area (cm^2) 819 cm^2 1/15/2019 10:00 AM   Drainage Amount Small    Drainage Description Serous    Non-staged Wound Description Partial thickness    Treatments Cleansed    Cleansing Approved Wound Cleanser    Periwound Protectant Skin Moisturizer    Dressing Options Hydrofiber Silver;Absorbent Abdominal Pad;Roll Gauze    Dressing Changed New    Dressing Status Intact    Dressing Change Frequency Every 48 hrs    NEXT Weekly Photo (Inpatient Only) 01/22/19    Odor None     Pulses Not palpable r/t edema     Tissue Type and Percentage 80% red, 20% white and yellow        Vascular:  Wounds not r/t vascular problem      Lab Values:    WBC:       WBC   Date/Time Value Ref Range Status   01/14/2019 09:20 PM 10.5 4.8 - 10.8 K/uL Final     AIC:      Lab Results   Component Value Date/Time    HBA1C 6.3 04/27/2008 05:10 AM          Culture:  Completed     INTERVENTIONS BY WOUND TEAM: Cleaned wounds with wound cleanser, dried. Culture collected. Applied moisturizer to mohan-wound skin. Covered wounds with pieces of silver hydrofiber, then abd pads and wrapped with roll gauze.   Leg rash/cellulitis LLE-Dressing Options: Hydrofiber Silver, Absorbent Abdominal Pad, Roll " Gauze    Interdisciplinary consultation:   With Nursing;With Patient    EVALUATION: pt has large area with partial thickness wounds and blistered skin. Some drainage present, areas where skin is white and fluctuant. Silver hyrdofiber for antimicrobial and drainage, abd pads for drainage and need for large drsg cover.     Factors affecting wound healing: edema, infection, obesity  Goals:  Decrease in wound area and depth weekly       NURSING PLAN OF CARE ORDERS (X):    Dressing changes: See Dressing Care orders: x    Skin care: See Skin Care orders:   Rectal tube care: See Rectal Tube Care orders:   Other orders:    RSKIN: CURRENT (X) ORDERED (O)  Pt able to move self in bed  Q shift Mian:  X  Q shift pressure point assessments:  X  Pressure redistribution mattress    x        SHONDA          Bariatric SHONDA        Bariatric foam           Heel float boots         Float Heels off Bed with Pillows              Barrier wipes         Barrier Cream         Barrier paste        Sacral silicone dressing         Silicone O2 tubing         Anchorfast         Cannula fixation Device (Tender )          Gray Foam Ear protectors           Trach with split foam               Waffle cushion        Waffle Overlay         Rectal tube or BMS         Antifungal tx   Interdry         Reposition q 2 hours    pt performs   Up to chair        Ambulate      PT/OT        Dietician        Diabetes Education      PO x   TF     TPN     NPO   # days   Other     WOUND TEAM PLAN OF CARE (X):   NPWT change 3 x week:        Dressing changes by wound team:       Follow up as needed:    x   Other (explain):    Anticipated discharge plans (X):  SNF:           Home Care:           Outpatient Wound Center:            Self Care:            Other:    tbd will need follow up  outpt

## 2019-01-17 ENCOUNTER — PATIENT OUTREACH (OUTPATIENT)
Dept: HEALTH INFORMATION MANAGEMENT | Facility: OTHER | Age: 39
End: 2019-01-17

## 2019-01-17 LAB
BACTERIA WND AEROBE CULT: ABNORMAL
ERYTHROCYTE [DISTWIDTH] IN BLOOD BY AUTOMATED COUNT: 45.6 FL (ref 35.9–50)
GRAM STN SPEC: ABNORMAL
HCT VFR BLD AUTO: 34.8 % (ref 37–47)
HGB BLD-MCNC: 11.3 G/DL (ref 12–16)
MCH RBC QN AUTO: 28.9 PG (ref 27–33)
MCHC RBC AUTO-ENTMCNC: 32.5 G/DL (ref 33.6–35)
MCV RBC AUTO: 89 FL (ref 81.4–97.8)
PLATELET # BLD AUTO: 302 K/UL (ref 164–446)
PMV BLD AUTO: 9.1 FL (ref 9–12.9)
RBC # BLD AUTO: 3.91 M/UL (ref 4.2–5.4)
SIGNIFICANT IND 70042: ABNORMAL
SITE SITE: ABNORMAL
SOURCE SOURCE: ABNORMAL
WBC # BLD AUTO: 10.3 K/UL (ref 4.8–10.8)

## 2019-01-17 PROCEDURE — 85027 COMPLETE CBC AUTOMATED: CPT

## 2019-01-17 PROCEDURE — 700102 HCHG RX REV CODE 250 W/ 637 OVERRIDE(OP): Performed by: INTERNAL MEDICINE

## 2019-01-17 PROCEDURE — 99231 SBSQ HOSP IP/OBS SF/LOW 25: CPT | Performed by: INTERNAL MEDICINE

## 2019-01-17 PROCEDURE — 700105 HCHG RX REV CODE 258: Performed by: INTERNAL MEDICINE

## 2019-01-17 PROCEDURE — 700111 HCHG RX REV CODE 636 W/ 250 OVERRIDE (IP): Performed by: INTERNAL MEDICINE

## 2019-01-17 PROCEDURE — 36415 COLL VENOUS BLD VENIPUNCTURE: CPT

## 2019-01-17 PROCEDURE — A9270 NON-COVERED ITEM OR SERVICE: HCPCS | Performed by: INTERNAL MEDICINE

## 2019-01-17 PROCEDURE — 770006 HCHG ROOM/CARE - MED/SURG/GYN SEMI*

## 2019-01-17 PROCEDURE — 700101 HCHG RX REV CODE 250: Performed by: INTERNAL MEDICINE

## 2019-01-17 PROCEDURE — 94660 CPAP INITIATION&MGMT: CPT

## 2019-01-17 PROCEDURE — 96372 THER/PROPH/DIAG INJ SC/IM: CPT

## 2019-01-17 RX ADMIN — DIVALPROEX SODIUM 500 MG: 125 CAPSULE, COATED PELLETS ORAL at 13:12

## 2019-01-17 RX ADMIN — CEFTRIAXONE SODIUM 2 G: 2 INJECTION, POWDER, FOR SOLUTION INTRAMUSCULAR; INTRAVENOUS at 06:14

## 2019-01-17 RX ADMIN — DIVALPROEX SODIUM 500 MG: 125 CAPSULE, COATED PELLETS ORAL at 20:36

## 2019-01-17 RX ADMIN — ACETAMINOPHEN 650 MG: 325 TABLET, FILM COATED ORAL at 16:05

## 2019-01-17 RX ADMIN — ENOXAPARIN SODIUM 40 MG: 100 INJECTION SUBCUTANEOUS at 06:24

## 2019-01-17 RX ADMIN — AMITRIPTYLINE HYDROCHLORIDE 25 MG: 25 TABLET, FILM COATED ORAL at 20:36

## 2019-01-17 RX ADMIN — DOXYCYCLINE 100 MG: 100 TABLET ORAL at 06:17

## 2019-01-17 RX ADMIN — STANDARDIZED SENNA CONCENTRATE AND DOCUSATE SODIUM 2 TABLET: 8.6; 5 TABLET, FILM COATED ORAL at 06:18

## 2019-01-17 RX ADMIN — DIVALPROEX SODIUM 250 MG: 125 CAPSULE, COATED PELLETS ORAL at 20:35

## 2019-01-17 RX ADMIN — CARBAMAZEPINE 200 MG: 200 TABLET ORAL at 13:12

## 2019-01-17 RX ADMIN — CARBAMAZEPINE 200 MG: 200 TABLET ORAL at 06:21

## 2019-01-17 RX ADMIN — ACETAMINOPHEN 650 MG: 325 TABLET, FILM COATED ORAL at 08:34

## 2019-01-17 RX ADMIN — TOPIRAMATE 100 MG: 100 TABLET, FILM COATED ORAL at 17:41

## 2019-01-17 RX ADMIN — DIVALPROEX SODIUM 500 MG: 125 CAPSULE, COATED PELLETS ORAL at 06:18

## 2019-01-17 RX ADMIN — DOXYCYCLINE 100 MG: 100 TABLET ORAL at 17:41

## 2019-01-17 RX ADMIN — TOPIRAMATE 100 MG: 100 TABLET, FILM COATED ORAL at 06:18

## 2019-01-17 RX ADMIN — LORATADINE 10 MG: 10 TABLET ORAL at 06:17

## 2019-01-17 RX ADMIN — HYDROXYZINE HYDROCHLORIDE 25 MG: 25 TABLET, FILM COATED ORAL at 08:42

## 2019-01-17 RX ADMIN — CARBAMAZEPINE 200 MG: 200 TABLET ORAL at 17:41

## 2019-01-17 ASSESSMENT — ENCOUNTER SYMPTOMS
SPUTUM PRODUCTION: 1
SEIZURES: 0
INSOMNIA: 0
EYE PAIN: 0
HEADACHES: 0
FALLS: 0
ABDOMINAL PAIN: 0
HEMOPTYSIS: 0
CONSTIPATION: 0
NERVOUS/ANXIOUS: 0
EYE REDNESS: 0
SHORTNESS OF BREATH: 0
WEAKNESS: 0
FOCAL WEAKNESS: 0
TREMORS: 0
COUGH: 1
PALPITATIONS: 0
NAUSEA: 0
CHILLS: 0
LOSS OF CONSCIOUSNESS: 0
WHEEZING: 0
MYALGIAS: 0
DIZZINESS: 0
DIARRHEA: 0
VOMITING: 0
FEVER: 0
BLOOD IN STOOL: 0

## 2019-01-17 ASSESSMENT — PAIN SCALES - GENERAL
PAINLEVEL_OUTOF10: 10
PAINLEVEL_OUTOF10: 0

## 2019-01-17 NOTE — CARE PLAN
Problem: Safety  Goal: Will remain free from falls  Outcome: PROGRESSING AS EXPECTED  Fall precautions in place. Pt is up with SBA w/ cane for ambulation.     Problem: Infection  Goal: Will remain free from infection  Outcome: PROGRESSING AS EXPECTED  Standard precautions in place. Hand hygiene completed before and after patient interaction
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
## 2019-01-17 NOTE — PROGRESS NOTES
Pt AAOx4, SRIVASTAVA. Denies n/t. C/o of LLE pain. Medicated per MAR. Dressing in place, CDI, no drainage present. Up with SBA w/ cane. Call light in reach.

## 2019-01-17 NOTE — DISCHARGE PLANNING
Anticipated Discharge Disposition: Home with HH    Action: SW met with pt at bedside.  Pt chose [facility].  Pt's PCP is Radha Aguilera through [facility].  MEGAN faxed choice to [facility].    Barriers to Discharge: Pending HH    Plan: F/U with HH

## 2019-01-17 NOTE — DISCHARGE PLANNING
Received Choice form at [facility]  Agency/Facility Name: [id number]  Referral sent per Choice form @ 3053

## 2019-01-17 NOTE — FACE TO FACE
Face to Face Supporting Documentation - Home Health    The encounter with this patient was in whole or in part the primary reason for home health admission.    Date of encounter:   Patient:                    MRN:                       YOB: 2021  Yamila Mendoza  [id number]  1980     Home health to see patient for:  Skilled Nursing care for assessment, interventions & education    Skilled need for:  Comment: Wound Care    Skilled nursing interventions to include:  Wound Care    Homebound status evidenced by:  Needs the assistance of another person in order to leave the home. Leaving home requires a considerable and taxing effort. There is a normal inability to leave the home.    Community Physician to provide follow up care: Pcp Not In Computer     Optional Interventions? No      I certify the face to face encounter for this home health care referral meets the CMS requirements and the encounter/clinical assessment with the patient was, in whole, or in part, for the medical condition(s) listed above, which is the primary reason for home health care. Based on my clinical findings: the service(s) are medically necessary, support the need for home health care, and the homebound criteria are met.  I certify that this patient has had a face to face encounter by myself.  Brandon Shankar M.D. - NPI: [id number]

## 2019-01-17 NOTE — CARE PLAN
Problem: Knowledge Deficit  Goal: Knowledge of disease process/condition, treatment plan, diagnostic tests, and medications will improve    Intervention: Explain information regarding disease process/condition, treatment plan, diagnostic tests, and medications and document in education  Pt educated on dressing changes and necessity for antibiotics.       Problem: Discharge Barriers/Planning  Goal: Patient's continuum of care needs will be met    Intervention: Involve patient and significant other/support system in setting and prioritizing goals for hospital stay and discharge  Pt given time to voice concerns regarding potential discharge and barriers. All questions answered at this time.

## 2019-01-18 VITALS
OXYGEN SATURATION: 94 % | RESPIRATION RATE: 20 BRPM | BODY MASS INDEX: 47.09 KG/M2 | SYSTOLIC BLOOD PRESSURE: 117 MMHG | HEIGHT: 66 IN | TEMPERATURE: 97.9 F | WEIGHT: 293 LBS | DIASTOLIC BLOOD PRESSURE: 73 MMHG | HEART RATE: 79 BPM

## 2019-01-18 PROCEDURE — A9270 NON-COVERED ITEM OR SERVICE: HCPCS | Performed by: INTERNAL MEDICINE

## 2019-01-18 PROCEDURE — 99239 HOSP IP/OBS DSCHRG MGMT >30: CPT | Performed by: INTERNAL MEDICINE

## 2019-01-18 PROCEDURE — 94660 CPAP INITIATION&MGMT: CPT

## 2019-01-18 PROCEDURE — 700102 HCHG RX REV CODE 250 W/ 637 OVERRIDE(OP): Performed by: INTERNAL MEDICINE

## 2019-01-18 PROCEDURE — 700111 HCHG RX REV CODE 636 W/ 250 OVERRIDE (IP): Performed by: INTERNAL MEDICINE

## 2019-01-18 PROCEDURE — 700105 HCHG RX REV CODE 258: Performed by: INTERNAL MEDICINE

## 2019-01-18 PROCEDURE — 700101 HCHG RX REV CODE 250: Performed by: INTERNAL MEDICINE

## 2019-01-18 RX ORDER — DOXYCYCLINE 100 MG/1
100 TABLET ORAL EVERY 12 HOURS
Qty: 14 TAB | Refills: 0 | Status: SHIPPED | OUTPATIENT
Start: 2019-01-18 | End: 2019-01-25

## 2019-01-18 RX ORDER — HYDROXYZINE HYDROCHLORIDE 25 MG/1
25 TABLET, FILM COATED ORAL 3 TIMES DAILY PRN
Qty: 30 TAB | Refills: 0 | Status: SHIPPED | OUTPATIENT
Start: 2019-01-18 | End: 2019-01-18

## 2019-01-18 RX ORDER — DIVALPROEX SODIUM 125 MG/1
500 CAPSULE, COATED PELLETS ORAL EVERY 8 HOURS
Qty: 120 CAP | Refills: 0 | Status: SHIPPED | OUTPATIENT
Start: 2019-01-18 | End: 2019-02-11

## 2019-01-18 RX ORDER — DIVALPROEX SODIUM 125 MG/1
250 CAPSULE, COATED PELLETS ORAL
Qty: 120 CAP | Refills: 0 | Status: SHIPPED | OUTPATIENT
Start: 2019-01-18 | End: 2019-01-18

## 2019-01-18 RX ORDER — CEFUROXIME AXETIL 500 MG/1
500 TABLET ORAL 2 TIMES DAILY
Qty: 14 TAB | Refills: 0 | Status: SHIPPED | OUTPATIENT
Start: 2019-01-18 | End: 2019-01-18

## 2019-01-18 RX ORDER — NICOTINE 14MG/24HR
1 PATCH, TRANSDERMAL 24 HOURS TRANSDERMAL 2 TIMES DAILY WITH MEALS
Qty: 14 CAP | Refills: 0 | Status: SHIPPED | OUTPATIENT
Start: 2019-01-18 | End: 2019-01-18

## 2019-01-18 RX ORDER — AMITRIPTYLINE HYDROCHLORIDE 25 MG/1
25 TABLET, FILM COATED ORAL EVERY EVENING
Qty: 30 TAB | Refills: 0 | Status: SHIPPED | OUTPATIENT
Start: 2019-01-18 | End: 2020-01-06 | Stop reason: SDUPTHER

## 2019-01-18 RX ORDER — HYDROXYZINE HYDROCHLORIDE 25 MG/1
25 TABLET, FILM COATED ORAL 3 TIMES DAILY PRN
Qty: 30 TAB | Refills: 0 | Status: SHIPPED | OUTPATIENT
Start: 2019-01-18 | End: 2020-01-06 | Stop reason: SDUPTHER

## 2019-01-18 RX ORDER — ACETAMINOPHEN 325 MG/1
650 TABLET ORAL EVERY 6 HOURS PRN
Qty: 30 TAB | Refills: 0 | Status: SHIPPED | OUTPATIENT
Start: 2019-01-18 | End: 2019-01-18

## 2019-01-18 RX ORDER — CARBAMAZEPINE 200 MG/1
200 TABLET ORAL 3 TIMES DAILY
Qty: 90 TAB | Refills: 0 | Status: SHIPPED | OUTPATIENT
Start: 2019-01-18 | End: 2020-01-06 | Stop reason: SDUPTHER

## 2019-01-18 RX ORDER — DOXYCYCLINE 100 MG/1
100 TABLET ORAL EVERY 12 HOURS
Qty: 14 TAB | Refills: 0 | Status: SHIPPED | OUTPATIENT
Start: 2019-01-18 | End: 2019-01-18

## 2019-01-18 RX ORDER — AMOXICILLIN 250 MG
2 CAPSULE ORAL 2 TIMES DAILY
Qty: 30 TAB | Refills: 0 | Status: SHIPPED | OUTPATIENT
Start: 2019-01-18 | End: 2019-02-11

## 2019-01-18 RX ORDER — DOXYCYCLINE 100 MG/1
100 TABLET ORAL EVERY 12 HOURS
Qty: 8 TAB | Refills: 0 | Status: SHIPPED | OUTPATIENT
Start: 2019-01-18 | End: 2019-01-18

## 2019-01-18 RX ORDER — CARBAMAZEPINE 200 MG/1
200 TABLET ORAL 3 TIMES DAILY
Qty: 90 TAB | Refills: 0 | Status: SHIPPED | OUTPATIENT
Start: 2019-01-18 | End: 2019-01-18

## 2019-01-18 RX ORDER — LORATADINE 10 MG/1
10 TABLET ORAL DAILY
Qty: 30 TAB | Refills: 0 | Status: SHIPPED | OUTPATIENT
Start: 2019-01-19 | End: 2022-09-03

## 2019-01-18 RX ORDER — TOPIRAMATE 100 MG/1
100 TABLET, FILM COATED ORAL EVERY 12 HOURS
Qty: 60 TAB | Refills: 3 | Status: SHIPPED | OUTPATIENT
Start: 2019-01-18 | End: 2019-01-18

## 2019-01-18 RX ORDER — DIVALPROEX SODIUM 125 MG/1
500 CAPSULE, COATED PELLETS ORAL EVERY 8 HOURS
Qty: 120 CAP | Refills: 0 | Status: SHIPPED | OUTPATIENT
Start: 2019-01-18 | End: 2019-01-18

## 2019-01-18 RX ORDER — CEFUROXIME AXETIL 500 MG/1
500 TABLET ORAL 2 TIMES DAILY
Qty: 8 TAB | Refills: 0 | Status: SHIPPED | OUTPATIENT
Start: 2019-01-18 | End: 2019-01-18

## 2019-01-18 RX ORDER — ACETAMINOPHEN 325 MG/1
650 TABLET ORAL EVERY 6 HOURS PRN
Qty: 30 TAB | Refills: 0 | Status: SHIPPED | OUTPATIENT
Start: 2019-01-18 | End: 2019-02-11

## 2019-01-18 RX ORDER — DIVALPROEX SODIUM 125 MG/1
250 CAPSULE, COATED PELLETS ORAL
Qty: 120 CAP | Refills: 0 | Status: SHIPPED | OUTPATIENT
Start: 2019-01-18 | End: 2019-02-11

## 2019-01-18 RX ORDER — LORATADINE 10 MG/1
10 TABLET ORAL DAILY
Qty: 30 TAB | COMMUNITY
Start: 2019-01-19 | End: 2019-01-18

## 2019-01-18 RX ORDER — CEFUROXIME AXETIL 500 MG/1
500 TABLET ORAL 2 TIMES DAILY
Qty: 14 TAB | Refills: 0 | Status: SHIPPED | OUTPATIENT
Start: 2019-01-18 | End: 2019-01-25

## 2019-01-18 RX ORDER — TOPIRAMATE 100 MG/1
100 TABLET, FILM COATED ORAL EVERY 12 HOURS
Qty: 60 TAB | Refills: 3 | Status: SHIPPED | OUTPATIENT
Start: 2019-01-18 | End: 2020-01-06 | Stop reason: SDUPTHER

## 2019-01-18 RX ORDER — AMITRIPTYLINE HYDROCHLORIDE 25 MG/1
25 TABLET, FILM COATED ORAL EVERY EVENING
Qty: 30 TAB | Refills: 0 | Status: SHIPPED | OUTPATIENT
Start: 2019-01-18 | End: 2019-01-18

## 2019-01-18 RX ORDER — POLYETHYLENE GLYCOL 3350 17 G/17G
17 POWDER, FOR SOLUTION ORAL
Refills: 3 | COMMUNITY
Start: 2019-01-18 | End: 2019-02-11

## 2019-01-18 RX ORDER — NICOTINE 14MG/24HR
1 PATCH, TRANSDERMAL 24 HOURS TRANSDERMAL 2 TIMES DAILY WITH MEALS
Qty: 14 CAP | Refills: 0 | Status: SHIPPED | OUTPATIENT
Start: 2019-01-18 | End: 2019-02-11

## 2019-01-18 RX ADMIN — DOXYCYCLINE 100 MG: 100 TABLET ORAL at 06:12

## 2019-01-18 RX ADMIN — STANDARDIZED SENNA CONCENTRATE AND DOCUSATE SODIUM 2 TABLET: 8.6; 5 TABLET, FILM COATED ORAL at 06:13

## 2019-01-18 RX ADMIN — ACETAMINOPHEN 650 MG: 325 TABLET, FILM COATED ORAL at 11:32

## 2019-01-18 RX ADMIN — CARBAMAZEPINE 200 MG: 200 TABLET ORAL at 11:32

## 2019-01-18 RX ADMIN — ACETAMINOPHEN 650 MG: 325 TABLET, FILM COATED ORAL at 02:44

## 2019-01-18 RX ADMIN — DIVALPROEX SODIUM 500 MG: 125 CAPSULE, COATED PELLETS ORAL at 06:12

## 2019-01-18 RX ADMIN — CARBAMAZEPINE 200 MG: 200 TABLET ORAL at 06:13

## 2019-01-18 RX ADMIN — TOPIRAMATE 100 MG: 100 TABLET, FILM COATED ORAL at 06:13

## 2019-01-18 RX ADMIN — CEFTRIAXONE SODIUM 2 G: 2 INJECTION, POWDER, FOR SOLUTION INTRAMUSCULAR; INTRAVENOUS at 06:13

## 2019-01-18 RX ADMIN — ENOXAPARIN SODIUM 40 MG: 100 INJECTION SUBCUTANEOUS at 06:16

## 2019-01-18 RX ADMIN — LORATADINE 10 MG: 10 TABLET ORAL at 06:12

## 2019-01-18 ASSESSMENT — PATIENT HEALTH QUESTIONNAIRE - PHQ9
8. MOVING OR SPEAKING SO SLOWLY THAT OTHER PEOPLE COULD HAVE NOTICED. OR THE OPPOSITE, BEING SO FIGETY OR RESTLESS THAT YOU HAVE BEEN MOVING AROUND A LOT MORE THAN USUAL: NOT AT ALL
SUM OF ALL RESPONSES TO PHQ9 QUESTIONS 1 AND 2: 3
3. TROUBLE FALLING OR STAYING ASLEEP OR SLEEPING TOO MUCH: SEVERAL DAYS
9. THOUGHTS THAT YOU WOULD BE BETTER OFF DEAD, OR OF HURTING YOURSELF: NOT AT ALL
5. POOR APPETITE OR OVEREATING: NOT AT ALL
4. FEELING TIRED OR HAVING LITTLE ENERGY: NOT AT ALL
SUM OF ALL RESPONSES TO PHQ QUESTIONS 1-9: 5
7. TROUBLE CONCENTRATING ON THINGS, SUCH AS READING THE NEWSPAPER OR WATCHING TELEVISION: SEVERAL DAYS
1. LITTLE INTEREST OR PLEASURE IN DOING THINGS: NOT AT ALL
2. FEELING DOWN, DEPRESSED, IRRITABLE, OR HOPELESS: NEARLY EVERY DAY
6. FEELING BAD ABOUT YOURSELF - OR THAT YOU ARE A FAILURE OR HAVE LET YOURSELF OR YOUR FAMILY DOWN: NOT AL ALL

## 2019-01-18 ASSESSMENT — PAIN SCALES - GENERAL: PAINLEVEL_OUTOF10: 5

## 2019-01-18 NOTE — CARE PLAN
Problem: Safety  Goal: Will remain free from falls  Outcome: PROGRESSING AS EXPECTED  Fall precautions in place. Pt ambulatory with cane     Problem: Pain Management  Goal: Pain level will decrease to patient's comfort goal  Outcome: PROGRESSING AS EXPECTED  Pain assessed and medicated per MAR

## 2019-01-18 NOTE — DISCHARGE PLANNING
MEGAN spoke to April with [facility].  Pt has been accepted onto their service.     MEGAN notified GIRISH Bradley.

## 2019-01-18 NOTE — DISCHARGE PLANNING
Agency/Facility Name: [facility] [place]  Spoke To: Miriam  Outcome: Miriam notified of patient's discharge date.
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
## 2019-01-18 NOTE — DISCHARGE INSTRUCTIONS
Discharge Instructions    Pending home health care   Outpatient wound clinic   Assign and follow up with primary provider or discharge clinic physician in 1-2 weeks   Follow up with her neurologist in 1-2 weeks and evealuate seizure medications        Cellulitis, Adult  Introduction  Cellulitis is a skin infection. The infected area is usually red and sore. This condition occurs most often in the arms and lower legs. It is very important to get treated for this condition.  Follow these instructions at home:  · Take over-the-counter and prescription medicines only as told by your doctor.  · If you were prescribed an antibiotic medicine, take it as told by your doctor. Do not stop taking the antibiotic even if you start to feel better.  · Drink enough fluid to keep your pee (urine) clear or pale yellow.  · Do not touch or rub the infected area.  · Raise (elevate) the infected area above the level of your heart while you are sitting or lying down.  · Place warm or cold wet cloths (warm or cold compresses) on the infected area. Do this as told by your doctor.  · Keep all follow-up visits as told by your doctor. This is important. These visits let your doctor make sure your infection is not getting worse.  Contact a doctor if:  · You have a fever.  · Your symptoms do not get better after 1-2 days of treatment.  · Your bone or joint under the infected area starts to hurt after the skin has healed.  · Your infection comes back. This can happen in the same area or another area.  · You have a swollen bump in the infected area.  · You have new symptoms.  · You feel ill and also have muscle aches and pains.  Get help right away if:  · Your symptoms get worse.  · You feel very sleepy.  · You throw up (vomit) or have watery poop (diarrhea) for a long time.  · There are red streaks coming from the infected area.  · Your red area gets larger.  · Your red area turns darker.  This information is not intended to replace advice given  "to you by your health care provider. Make sure you discuss any questions you have with your health care provider.  Document Released: 06/05/2009 Document Revised: 05/25/2017 Document Reviewed: 10/26/2016  © 2017 [employer]  -------------------------------------------------------------------------------------------------------------------------------------------------------------------------------------------------------------------------------    Upper Respiratory Infection, Adult  Most upper respiratory infections (URIs) are caused by a virus. A URI affects the nose, throat, and upper air passages. The most common type of URI is often called \"the common cold.\"  Follow these instructions at home:  · Take medicines only as told by your doctor.  · Gargle warm saltwater or take cough drops to comfort your throat as told by your doctor.  · Use a warm mist humidifier or inhale steam from a shower to increase air moisture. This may make it easier to breathe.  · Drink enough fluid to keep your pee (urine) clear or pale yellow.  · Eat soups and other clear broths.  · Have a healthy diet.  · Rest as needed.  · Go back to work when your fever is gone or your doctor says it is okay.  ¨ You may need to stay home longer to avoid giving your URI to others.  ¨ You can also wear a face mask and wash your hands often to prevent spread of the virus.  · Use your inhaler more if you have asthma.  · Do not use any tobacco products, including cigarettes, chewing tobacco, or electronic cigarettes. If you need help quitting, ask your doctor.  Contact a doctor if:  · You are getting worse, not better.  · Your symptoms are not helped by medicine.  · You have chills.  · You are getting more short of breath.  · You have brown or red mucus.  · You have yellow or brown discharge from your nose.  · You have pain in your face, especially when you bend forward.  · You have a fever.  · You have puffy (swollen) neck glands.  · You have pain while " swallowing.  · You have white areas in the back of your throat.  Get help right away if:  · You have very bad or constant:  ¨ Headache.  ¨ Ear pain.  ¨ Pain in your forehead, behind your eyes, and over your cheekbones (sinus pain).  ¨ Chest pain.  · You have long-lasting (chronic) lung disease and any of the following:  ¨ Wheezing.  ¨ Long-lasting cough.  ¨ Coughing up blood.  ¨ A change in your usual mucus.  · You have a stiff neck.  · You have changes in your:  ¨ Vision.  ¨ Hearing.  ¨ Thinking.  ¨ Mood.  This information is not intended to replace advice given to you by your health care provider. Make sure you discuss any questions you have with your health care provider.  Document Released: 06/05/2009 Document Revised: 08/20/2017 Document Reviewed: 03/25/2015  [employer] Patient Education © 2017 [employer]    -------------------------------------------------------------------------------------------------------------------------------------------------------------------------------------------------------------------------------  Antibiotic Medicine  Antibiotic medicines are used to treat infections caused by bacteria. They work by injuring or killing the bacteria that is making you sick.  HOW IS AN ANTIBIOTIC CHOSEN?  An antibiotic is chosen based on many factors. To help your health care provider choose one for you, tell your health care provider if:  You have any allergies.  You are pregnant or plan to get pregnant.  You are breastfeeding.  You are taking any medicines. These include over-the-counter medicines, prescription medicines, and herbal remedies.  You have a medical condition or problem you have not already discussed.  Your health care provider will also consider:  How often the medicine has to be taken.  Common side effects of the medicine.  The cost of the medicine.  The taste of the medicine.  If you have questions about why an antibiotic was chosen, make sure to ask.  FOR HOW LONG  SHOULD I TAKE MY ANTIBIOTIC?  Continue to take your antibiotic for as long as told by your health care provider. Do not stop taking it when you feel better. If you stop taking it too soon:  You may start to feel sick again.  Your infection may become harder to treat.  Complications may develop.  WHAT IF I MISS A DOSE?  Try not to miss any doses of medicine. If you miss a dose, take it as soon as possible. However, if it is almost time for the next dose:  If you are taking 2 doses per day, take the missed dose and the next dose 5 to 6 hours apart.  If you are taking 3 or more doses per day, take the missed dose and the next dose 2 to 4 hours apart, then go back to the normal schedule.  If you cannot make up a missed dose, take the next scheduled dose on time. Then take the missed dose after you have taken all the doses as recommended by your health care provider, as if you had one more dose left.  DO ANTIBIOTICS AFFECT BIRTH CONTROL?  Birth control pills may not work while you are on antibiotics. If you are taking birth control pills, continue taking them as usual and use a second form of birth control, such as a condom, to avoid unwanted pregnancy. Continue using the second form of birth control until you are finished with your current 1 month cycle of birth control pills.  OTHER INFORMATION  If there is any medicine left over, throw it away.  Never take someone else's antibiotics.  Never take leftover antibiotics.  SEEK MEDICAL CARE IF:  You get worse.  You do not feel better within a few days of starting the antibiotic medicine.  You vomit.  White patches appear in your mouth.  You have new joint pain that begins after starting the antibiotic.  You have new muscle aches that begin after starting the antibiotic.  You had a fever before starting the antibiotic and it returns.  You have any symptoms of an allergic reaction, such as an itchy rash. If this happens, stop taking the antibiotic.  SEEK IMMEDIATE MEDICAL  CARE IF:  Your urine turns dark or becomes blood-colored.  Your skin turns yellow.  You bruise or bleed easily.  You have severe diarrhea and abdominal cramps.  You have a severe headache.  You have signs of a severe allergic reaction, such as:  Trouble breathing.  Wheezing.  Swelling of the lips, tongue, or face.  Fainting.  Blisters on the skin or in the mouth.  If you have signs of a severe allergic reaction, stop taking the antibiotic right away.  This information is not intended to replace advice given to you by your health care provider. Make sure you discuss any questions you have with your health care provider.  Document Released: 08/30/2005 Document Revised: 09/07/2016 Document Reviewed: 05/04/2016  [employer] Patient Education © 2017 [employer]    -------------------------------------------------------------------------------------------------------------------------------------------------------------------------------------------------------------------------------      Discharged to home by car with relative. Discharged via wheelchair, hospital escort: Yes.  Special equipment needed: Not Applicable    Be sure to schedule a follow-up appointment with your primary care doctor or any specialists as instructed.     Discharge Plan:   Diet Plan: Discussed  Activity Level: Discussed  Confirmed Follow up Appointment: Patient to Call and Schedule Appointment  Confirmed Symptoms Management: Discussed  Medication Reconciliation Updated: Yes  Influenza Vaccine Indication: Patient Refuses    I understand that a diet low in cholesterol, fat, and sodium is recommended for good health. Unless I have been given specific instructions below for another diet, I accept this instruction as my diet prescription.   Other diet: Cardiac - Low Carb / Low Fat    Special Instructions: None    · Is patient discharged on Warfarin / Coumadin?   No     Depression / Suicide Risk    As you are discharged from this [facility] facility, it is important to learn how to keep safe from harming yourself.    Recognize the warning signs:  · Abrupt changes in personality, positive or negative- including increase in energy   · Giving away possessions  · Change in eating patterns- significant weight changes-  positive or negative  · Change in sleeping patterns- unable to sleep or sleeping all the time   · Unwillingness or inability to communicate  · Depression  · Unusual sadness, discouragement and loneliness  · Talk of wanting to die  · Neglect of personal appearance   · Rebelliousness- reckless behavior  · Withdrawal from people/activities they love  · Confusion- inability to concentrate     If you or a loved one observes any of these behaviors or has concerns about self-harm, here's what you can do:  · Talk about it- your feelings and reasons for harming yourself  · Remove any means that you might use to hurt yourself (examples: pills, rope, extension cords, firearm)  · Get professional help from the community (Mental Health, Substance Abuse, psychological counseling)  · Do not be alone:Call your Safe Contact- someone whom you trust who will be there for you.  · Call your local CRISIS HOTLINE 119-9166 or 044-443-8503  · Call your local Children's Mobile Crisis Response Team [place] (496) 177-4770 or www.[employer]  · Call the toll free National Suicide Prevention Hotlines   · National Suicide Prevention Lifeline 835-806-NLRJ (2743)  · National Hope Line Network 800-SUICIDE (725-8143)
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
## 2019-01-18 NOTE — PROGRESS NOTES
Pt ready for discharge at this time per MD SAMUEL    Pt has been educated thoroughly on expectations while at home   - Leg dressing changed per order & gone over with pt / teach back  - Extra supplies sent with pt per MD     Pt able to ambulate self with cane, denies dizziness, denies fatigue per pt    Pt agreeable to discharge, teaching done with pt & Gloria who is active in her care     Plan for DC home with Home Health per MEGAN Castro to take home @ 1400

## 2019-01-19 LAB
BACTERIA BLD CULT: NORMAL
BACTERIA BLD CULT: NORMAL
SIGNIFICANT IND 70042: NORMAL
SIGNIFICANT IND 70042: NORMAL
SITE SITE: NORMAL
SITE SITE: NORMAL
SOURCE SOURCE: NORMAL
SOURCE SOURCE: NORMAL

## 2019-02-11 ENCOUNTER — APPOINTMENT (OUTPATIENT)
Dept: RADIOLOGY | Facility: MEDICAL CENTER | Age: 39
End: 2019-02-11
Attending: EMERGENCY MEDICINE
Payer: MEDICARE

## 2019-02-11 ENCOUNTER — HOSPITAL ENCOUNTER (EMERGENCY)
Facility: MEDICAL CENTER | Age: 39
End: 2019-02-12
Attending: EMERGENCY MEDICINE
Payer: MEDICARE

## 2019-02-11 DIAGNOSIS — Z91.148 NONCOMPLIANCE WITH MEDICATION REGIMEN: ICD-10-CM

## 2019-02-11 DIAGNOSIS — R56.9 SEIZURE (HCC): ICD-10-CM

## 2019-02-11 LAB
ALBUMIN SERPL BCP-MCNC: 3.8 G/DL (ref 3.2–4.9)
ALBUMIN/GLOB SERPL: 1.2 G/DL
ALP SERPL-CCNC: 51 U/L (ref 30–99)
ALT SERPL-CCNC: 10 U/L (ref 2–50)
ANION GAP SERPL CALC-SCNC: 9 MMOL/L (ref 0–11.9)
APPEARANCE UR: ABNORMAL
AST SERPL-CCNC: 15 U/L (ref 12–45)
BACTERIA #/AREA URNS HPF: NEGATIVE /HPF
BASOPHILS # BLD AUTO: 0.5 % (ref 0–1.8)
BASOPHILS # BLD: 0.04 K/UL (ref 0–0.12)
BILIRUB SERPL-MCNC: 0.2 MG/DL (ref 0.1–1.5)
BILIRUB UR QL STRIP.AUTO: NEGATIVE
BUN SERPL-MCNC: 7 MG/DL (ref 8–22)
CALCIUM SERPL-MCNC: 9.1 MG/DL (ref 8.5–10.5)
CARBAMAZEPINE SERPL-MCNC: 2.2 UG/ML (ref 4–12)
CHLORIDE SERPL-SCNC: 107 MMOL/L (ref 96–112)
CO2 SERPL-SCNC: 22 MMOL/L (ref 20–33)
COLOR UR: YELLOW
CREAT SERPL-MCNC: 0.52 MG/DL (ref 0.5–1.4)
EOSINOPHIL # BLD AUTO: 0.16 K/UL (ref 0–0.51)
EOSINOPHIL NFR BLD: 2.2 % (ref 0–6.9)
EPI CELLS #/AREA URNS HPF: ABNORMAL /HPF
ERYTHROCYTE [DISTWIDTH] IN BLOOD BY AUTOMATED COUNT: 44.2 FL (ref 35.9–50)
GLOBULIN SER CALC-MCNC: 3.1 G/DL (ref 1.9–3.5)
GLUCOSE SERPL-MCNC: 116 MG/DL (ref 65–99)
GLUCOSE UR STRIP.AUTO-MCNC: NEGATIVE MG/DL
HCG SERPL QL: NEGATIVE
HCT VFR BLD AUTO: 39.1 % (ref 37–47)
HGB BLD-MCNC: 12.8 G/DL (ref 12–16)
HYALINE CASTS #/AREA URNS LPF: ABNORMAL /LPF
IMM GRANULOCYTES # BLD AUTO: 0.04 K/UL (ref 0–0.11)
IMM GRANULOCYTES NFR BLD AUTO: 0.5 % (ref 0–0.9)
KETONES UR STRIP.AUTO-MCNC: NEGATIVE MG/DL
LEUKOCYTE ESTERASE UR QL STRIP.AUTO: NEGATIVE
LYMPHOCYTES # BLD AUTO: 1.94 K/UL (ref 1–4.8)
LYMPHOCYTES NFR BLD: 26.3 % (ref 22–41)
MCH RBC QN AUTO: 29.4 PG (ref 27–33)
MCHC RBC AUTO-ENTMCNC: 32.7 G/DL (ref 33.6–35)
MCV RBC AUTO: 89.9 FL (ref 81.4–97.8)
MICRO URNS: ABNORMAL
MONOCYTES # BLD AUTO: 0.79 K/UL (ref 0–0.85)
MONOCYTES NFR BLD AUTO: 10.7 % (ref 0–13.4)
NEUTROPHILS # BLD AUTO: 4.42 K/UL (ref 2–7.15)
NEUTROPHILS NFR BLD: 59.8 % (ref 44–72)
NITRITE UR QL STRIP.AUTO: NEGATIVE
NRBC # BLD AUTO: 0 K/UL
NRBC BLD-RTO: 0 /100 WBC
PH UR STRIP.AUTO: 8 [PH]
PLATELET # BLD AUTO: 212 K/UL (ref 164–446)
PMV BLD AUTO: 9.4 FL (ref 9–12.9)
POTASSIUM SERPL-SCNC: 4 MMOL/L (ref 3.6–5.5)
PROT SERPL-MCNC: 6.9 G/DL (ref 6–8.2)
PROT UR QL STRIP: 100 MG/DL
RBC # BLD AUTO: 4.35 M/UL (ref 4.2–5.4)
RBC # URNS HPF: ABNORMAL /HPF
RBC UR QL AUTO: ABNORMAL
SODIUM SERPL-SCNC: 138 MMOL/L (ref 135–145)
SP GR UR STRIP.AUTO: 1.02
TROPONIN I SERPL-MCNC: <0.01 NG/ML (ref 0–0.04)
UROBILINOGEN UR STRIP.AUTO-MCNC: 1 MG/DL
VALPROATE SERPL-MCNC: 3 UG/ML (ref 50–100)
WBC # BLD AUTO: 7.4 K/UL (ref 4.8–10.8)
WBC #/AREA URNS HPF: ABNORMAL /HPF

## 2019-02-11 PROCEDURE — 80156 ASSAY CARBAMAZEPINE TOTAL: CPT

## 2019-02-11 PROCEDURE — 80053 COMPREHEN METABOLIC PANEL: CPT

## 2019-02-11 PROCEDURE — 85025 COMPLETE CBC W/AUTO DIFF WBC: CPT

## 2019-02-11 PROCEDURE — 700105 HCHG RX REV CODE 258: Performed by: EMERGENCY MEDICINE

## 2019-02-11 PROCEDURE — 700111 HCHG RX REV CODE 636 W/ 250 OVERRIDE (IP): Performed by: EMERGENCY MEDICINE

## 2019-02-11 PROCEDURE — 84703 CHORIONIC GONADOTROPIN ASSAY: CPT

## 2019-02-11 PROCEDURE — 84484 ASSAY OF TROPONIN QUANT: CPT

## 2019-02-11 PROCEDURE — 93005 ELECTROCARDIOGRAM TRACING: CPT | Performed by: EMERGENCY MEDICINE

## 2019-02-11 PROCEDURE — 70450 CT HEAD/BRAIN W/O DYE: CPT

## 2019-02-11 PROCEDURE — 99285 EMERGENCY DEPT VISIT HI MDM: CPT

## 2019-02-11 PROCEDURE — 81001 URINALYSIS AUTO W/SCOPE: CPT

## 2019-02-11 PROCEDURE — 96365 THER/PROPH/DIAG IV INF INIT: CPT

## 2019-02-11 PROCEDURE — 71045 X-RAY EXAM CHEST 1 VIEW: CPT

## 2019-02-11 PROCEDURE — 72125 CT NECK SPINE W/O DYE: CPT

## 2019-02-11 PROCEDURE — 36415 COLL VENOUS BLD VENIPUNCTURE: CPT

## 2019-02-11 PROCEDURE — 80164 ASSAY DIPROPYLACETIC ACD TOT: CPT

## 2019-02-11 PROCEDURE — 304561 HCHG STAT O2

## 2019-02-11 RX ORDER — CARBAMAZEPINE 200 MG/1
200 TABLET ORAL ONCE
Status: COMPLETED | OUTPATIENT
Start: 2019-02-11 | End: 2019-02-12

## 2019-02-11 RX ORDER — DIVALPROEX SODIUM 250 MG/1
500-750 TABLET, DELAYED RELEASE ORAL 3 TIMES DAILY
COMMUNITY
End: 2020-01-06 | Stop reason: SDUPTHER

## 2019-02-11 RX ORDER — POTASSIUM CHLORIDE 750 MG/1
10 TABLET, EXTENDED RELEASE ORAL DAILY
COMMUNITY
End: 2020-01-06 | Stop reason: SDUPTHER

## 2019-02-11 RX ORDER — NAPROXEN 500 MG/1
500 TABLET ORAL 2 TIMES DAILY WITH MEALS
COMMUNITY
End: 2020-01-06 | Stop reason: SDUPTHER

## 2019-02-11 RX ADMIN — VALPROATE SODIUM 500 MG: 100 INJECTION, SOLUTION INTRAVENOUS at 23:50

## 2019-02-12 VITALS
HEIGHT: 66 IN | DIASTOLIC BLOOD PRESSURE: 64 MMHG | WEIGHT: 293 LBS | HEART RATE: 88 BPM | SYSTOLIC BLOOD PRESSURE: 150 MMHG | RESPIRATION RATE: 16 BRPM | BODY MASS INDEX: 47.09 KG/M2 | OXYGEN SATURATION: 98 %

## 2019-02-12 PROCEDURE — A9270 NON-COVERED ITEM OR SERVICE: HCPCS | Performed by: EMERGENCY MEDICINE

## 2019-02-12 PROCEDURE — 700102 HCHG RX REV CODE 250 W/ 637 OVERRIDE(OP): Performed by: EMERGENCY MEDICINE

## 2019-02-12 RX ADMIN — CARBAMAZEPINE 200 MG: 200 TABLET ORAL at 00:44

## 2019-02-12 NOTE — ED NOTES
Med rec updated and complete.  Allergies reviewed. Met with pt at bedside and dicussed   Current medications and last doses taken. Pt currently   Has her prescriptions at bedside. Pt is unable to locate  Someone to take her medications home.  Explained process to pt. Verbalized  Understanding.  All medications remain at bedside. No  Control substances  Noted.

## 2019-02-12 NOTE — ED NOTES
Pt transported to CT and back via ACLS RN, lab unable to draw blood, third RN consulted for blood draw
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
## 2019-02-12 NOTE — ED PROVIDER NOTES
ED Provider Note    CHIEF COMPLAINT  Chief Complaint   Patient presents with   • Seizure       HPI  Yamila Mendoza is a 38 y.o. female who presents after having what was reported as a seizure.  Patient was visiting a significant other up on the third floor of the [facility].  The patient apparently was seen exiting the room and then found down in the hallway about 30 seconds later.  She had witnessed tonic-clonic seizure-like activity with loss of bladder function for about 2 minutes.  Upon my arrival to the floor patient was no longer seizing.  She was breathing on her own.  But not responsive the painful stimulus.  Is unclear if the patient had any trauma no one on the floor saw her fall.  Further history is unobtainable at this point secondary to patient altered mental status.    According to the significant other patient does have a seizure history.  He states that she took her seizure medications this evening while in the hospital room.  She is on Depakote, carbamazepine for her seizures.    REVIEW OF SYSTEMS  Unobtainable secondary patient condition    PAST MEDICAL HISTORY   has a past medical history of ASTHMA; Bipolar disorder (HCC); Chronic back pain; Psychiatric disorder; Psychiatric disorder; and Seizure disorder ([facility]).    SOCIAL HISTORY  Social History     Social History Main Topics   • Smoking status: Never Smoker   • Smokeless tobacco: Never Used   • Alcohol use No   • Drug use: No   • Sexual activity: Not on file       SURGICAL HISTORY   has a past surgical history that includes other neurological surg; other orthopedic surgery; other abdominal surgery; ercp in or (9/19/2009); and joy by laparoscopy (9/22/2009).    CURRENT MEDICATIONS  Home Medications     Reviewed by Chiara Thomas R.N. (Registered Nurse) on 02/11/19 at 2224  Med List Status: Complete   Medication Last Dose Status   amitriptyline (ELAVIL) 25 MG Tab 2/10/2019 Active   carBAMazepine (TEGRETOL) 200 MG Tab 2/11/2019 Active  "  cyclobenzaprine (FLEXERIL) 10 MG Tab 2/10/2019 Active   divalproex (DEPAKOTE) 250 MG Tablet Delayed Response 2/11/2019 Active   hydrOXYzine HCl (ATARAX) 25 MG Tab 2/11/2019 Active   loratadine (CLARITIN) 10 MG Tab 2/11/2019 Active   naproxen (NAPROSYN) 500 MG Tab 2/11/2019 Active   potassium chloride SA (K-DUR) 10 MEQ Tab CR 2/11/2019 Active   topiramate (TOPAMAX) 100 MG Tab 2/11/2019 Active                ALLERGIES  Allergies   Allergen Reactions   • Bactrim Unspecified     \"I get the shakes\"    • Iron Unspecified     \"see red dots\" vision, not rash    • Penicillins Unspecified     \"makes me dizzy with my medicine\"    • Phenobarbital Unspecified     \"makes me dizzy\"    • Lemon Oil Itching     Skin itching per pt report to Dietary.   • Onion Itching and Swelling     Throat swelling and itching per pt report to Dietary.   • [employer] Allergy Itching and Swelling     Throat swelling and itching per pt report to Dietary.       PHYSICAL EXAM  VITAL SIGNS: /64   Pulse 88   Resp 16   Ht 1.676 m (5' 6\")   Wt (!) 136.1 kg (300 lb)   SpO2 98%   BMI 48.42 kg/m²   Constitutional: Well developed, Well nourished, moderate distress.   HENT: Normocephalic, Atraumatic, Oropharynx moist, No oral exudates.  TMs clear bilaterally no hemotympanum, no obvious hematoma.  Eyes: Conjunctiva normal, No discharge.  Pupils are 3 mm and reactive  Neck: No obvious step-offs.  Cardiovascular: Normal heart rate, Normal rhythm, No murmurs, equal pulses.   Pulmonary: Sonorous breath sounds, No respiratory distress, No wheezing, No rales, No rhonchi.  Chest: No chest wall tenderness or deformity.   Abdomen:Soft, obese, no tenderness,  Musculoskeletal: No major deformities noted, No tenderness.   Skin: Warm, Dry, No erythema, No rash.  No obvious wounds or bruising  Neurologic: Patient GCS of 9, localizes to painful stimulus and opens her eyes.  Will moan.  Normal motor function,  No focal deficits noted.       EKG  EKG done on " the floor at 1959 demonstrates a sinus tachycardia, rate of 103, normal axis, ST depression in lead II, 3, aVF with Q waves in those 3 leads.  No obvious ST elevation AR interval 132, QT prolongation at 534 interpretation possible inferior ischemia possible old inferior infarct no old EKG for comparison.    Repeat EKG done at 2324 demonstrates sinus rhythm, rate 82, normal axis, Q waves and 2, 3, aVF, ST depression improved, no ST elevation, prolonged QT is also improved at 486.  Interpretation old inferior infarct, improved QT prolongation    RADIOLOGY/PROCEDURES  DX-CHEST-PORTABLE (1 VIEW)   Final Result      No acute cardiopulmonary abnormality.      CT-HEAD W/O   Final Result      1.  No CT evidence of acute infarct, hemorrhage or mass.   2.  Chronic right temporal encephalomalacia.      CT-CSPINE WITHOUT PLUS RECONS   Final Result      No acute fracture or listhesis in the cervical spine.          Laboratory tests  Results for orders placed or performed during the hospital encounter of 02/11/19   CBC WITH DIFFERENTIAL   Result Value Ref Range    WBC 7.4 4.8 - 10.8 K/uL    RBC 4.35 4.20 - 5.40 M/uL    Hemoglobin 12.8 12.0 - 16.0 g/dL    Hematocrit 39.1 37.0 - 47.0 %    MCV 89.9 81.4 - 97.8 fL    MCH 29.4 27.0 - 33.0 pg    MCHC 32.7 (L) 33.6 - 35.0 g/dL    RDW 44.2 35.9 - 50.0 fL    Platelet Count 212 164 - 446 K/uL    MPV 9.4 9.0 - 12.9 fL    Neutrophils-Polys 59.80 44.00 - 72.00 %    Lymphocytes 26.30 22.00 - 41.00 %    Monocytes 10.70 0.00 - 13.40 %    Eosinophils 2.20 0.00 - 6.90 %    Basophils 0.50 0.00 - 1.80 %    Immature Granulocytes 0.50 0.00 - 0.90 %    Nucleated RBC 0.00 /100 WBC    Neutrophils (Absolute) 4.42 2.00 - 7.15 K/uL    Lymphs (Absolute) 1.94 1.00 - 4.80 K/uL    Monos (Absolute) 0.79 0.00 - 0.85 K/uL    Eos (Absolute) 0.16 0.00 - 0.51 K/uL    Baso (Absolute) 0.04 0.00 - 0.12 K/uL    Immature Granulocytes (abs) 0.04 0.00 - 0.11 K/uL    NRBC (Absolute) 0.00 K/uL   COMP METABOLIC PANEL   Result  Value Ref Range    Sodium 138 135 - 145 mmol/L    Potassium 4.0 3.6 - 5.5 mmol/L    Chloride 107 96 - 112 mmol/L    Co2 22 20 - 33 mmol/L    Anion Gap 9.0 0.0 - 11.9    Glucose 116 (H) 65 - 99 mg/dL    Bun 7 (L) 8 - 22 mg/dL    Creatinine 0.52 0.50 - 1.40 mg/dL    Calcium 9.1 8.5 - 10.5 mg/dL    AST(SGOT) 15 12 - 45 U/L    ALT(SGPT) 10 2 - 50 U/L    Alkaline Phosphatase 51 30 - 99 U/L    Total Bilirubin 0.2 0.1 - 1.5 mg/dL    Albumin 3.8 3.2 - 4.9 g/dL    Total Protein 6.9 6.0 - 8.2 g/dL    Globulin 3.1 1.9 - 3.5 g/dL    A-G Ratio 1.2 g/dL   TROPONIN   Result Value Ref Range    Troponin I <0.01 0.00 - 0.04 ng/mL   URINALYSIS CULTURE, IF INDICATED   Result Value Ref Range    Color Yellow     Character Cloudy (A)     Specific Gravity 1.017 <1.035    Ph 8.0 5.0 - 8.0    Glucose Negative Negative mg/dL    Ketones Negative Negative mg/dL    Protein 100 (A) Negative mg/dL    Bilirubin Negative Negative    Urobilinogen, Urine 1.0 Negative    Nitrite Negative Negative    Leukocyte Esterase Negative Negative    Occult Blood Trace (A) Negative    Micro Urine Req Microscopic    VALPROIC ACID (Depakote)   Result Value Ref Range    Valproic Acid 3.0 (L) 50.0 - 100.0 ug/mL   CARBAMAZEPINE (Tegretol)   Result Value Ref Range    Carbamazepine 2.2 (L) 4.0 - 12.0 ug/mL   HCG Qual Serum   Result Value Ref Range    Beta-Hcg Qualitative Serum Negative Negative   URINE MICROSCOPIC (W/UA)   Result Value Ref Range    WBC 0-2 /hpf    RBC 2-5 (A) /hpf    Bacteria Negative None /hpf    Epithelial Cells Few /hpf    Hyaline Cast 3-5 (A) /lpf   ESTIMATED GFR   Result Value Ref Range    GFR If African American >60 >60 mL/min/1.73 m 2    GFR If Non African American >60 >60 mL/min/1.73 m 2   EKG (NOW)   Result Value Ref Range    Report       [facility]    Test Date:  2019-02-11  Pt Name:    GULSHAN SMITH                 Department: ER  MRN:        [id number]                      Room:        12  Gender:     Female                        Technician: [id number]  :        1980                   Requested By:TRE MCDONALD  Order #:    [id number]                    Reading MD:    Measurements  Intervals                                Axis  Rate:       82                           P:          47  AZ:         144                          QRS:        37  QRSD:       102                          T:          -9  QT:         416  QTc:        486    Interpretive Statements  SINUS RHYTHM  INFERIOR INFARCT, AGE INDETERMINATE  BORDERLINE PROLONGED QT INTERVAL  Compared to ECG 2016 17:47:24  Myocardial infarct finding now present  Accelerated junctional rhythm no longer present  Inferior Q waves no longer present  Q waves no longer present           Medications given in the ER  Medications   valproate (DEPACON) 500 mg in D5W 50 mL IVPB (0 mg Intravenous Stopped 19)   carBAMazepine (TEGRETOL) tablet 200 mg (200 mg Oral Given 19)       COURSE & MEDICAL DECISION MAKING  Pertinent Labs & Imaging studies reviewed. (See chart for details)  Differential includes seizure, myocardial infarction, syncope, neck injury, head injury, infectious process    Discussed case with Dr. Velasquez neurology.  She recommended loading dose of 500 mg of Depacon and oral Tegretol.  She doubts that the patient is actually taking her seizure medications.      Patient returned to baseline.  She was answering questions correctly    Medical decision making: Patient was found down with witnessed seizure actively going on on the floor.  At this point time the patient states that she has not been taking her medications for the last 4 days since her significant other is been in the hospital.  I think this is contributed to her seizure.  Because the patient had been found down in the hallway with unknown whether or not she had trauma to her head and neck and was significantly altered secondary to being postictal CT of the head and neck were  done to rule out any injury these are negative.  Patient's electrolytes are otherwise unremarkable.  She does not appear to have any secondary infection that may have lowered her seizure threshold.  She is now returned to baseline I feel that she can be safely discharged home.  She is given loading doses of Depakote and Tegretol.     The patient will return for new or worsening symptoms and is stable at the time of discharge.    The patient is referred to a primary physician for blood pressure management, diabetic screening, and for all other preventative health concerns.      CRITICAL CARE  I provided critical care services, which included medication orders, frequent reevaluations of the patient's condition and response to treatment, ordering and reviewing test results, and discussing the case with various consultants.  The critical care time associated with the care of the patient was 35 minutes. Review chart for interventions. This time is exclusive of any other billable procedures.             DISPOSITION:  Patient will be discharged home in stable condition.    FOLLOW UP:  Your doctor    Schedule an appointment as soon as possible for a visit in 1 week      [facility]  [address] [place] [id number]  [phone]  Schedule an appointment as soon as possible for a visit   For the seizure clinic      OUTPATIENT MEDICATIONS:  Discharge Medication List as of 2/12/2019  1:03 AM          FINAL IMPRESSION  1. Seizure (HCC)    2. Noncompliance with medication regimen      Electronically signed by: Chip Rapp, 2/11/2019 8:23 PM    This record was made with a voice recognition software. The software is not perfect. I have tried to correct any grammar, spelling or context errors to the best of my ability, but errors may still remain. Interpretation of this chart should be taken in this context.

## 2019-02-12 NOTE — ED NOTES
"While attempting to dc patient, pt stated she was \"living upstairs with my fiance because he takes care of me, and the nurses up there do too because I cant walk or move alone.\" pt informed that she was ambulating when she seized, pt then stated \"oh yeah, I can walk I guess, but I need to get back upstairs and I don't have any clothes, pt given shirt, due to shirt being cut off. Pt ambulatory to wheelchair, able to dress self, assisted back up to [place] [id number] via wheelchair. Provided with discharge instructions, denies questions.   "

## 2019-02-12 NOTE — DISCHARGE INSTRUCTIONS
Take your seizure medications. Return if you have multiple seizures in a row, seizure lasting longer than 5 minutes, or prolonged confusion.

## 2019-02-12 NOTE — ED TRIAGE NOTES
"Pt brought in from upstairs, patient was visiting a friend and as she was leaving patient had a seizure with LOC,  Lasted approx. 2-2.5 minutes patient currently postictal Blood pressure 150/64, pulse (!) 106, resp. rate 19, height 1.676 m (5' 6\"), weight (!) 136.1 kg (300 lb), SpO2 100 %, not currently breastfeeding.    Sonderegger responded to the code.   "

## 2019-02-13 LAB — EKG IMPRESSION: NORMAL

## 2019-04-10 ENCOUNTER — HOSPITAL ENCOUNTER (EMERGENCY)
Facility: MEDICAL CENTER | Age: 39
End: 2019-04-11
Attending: EMERGENCY MEDICINE
Payer: MEDICARE

## 2019-04-10 DIAGNOSIS — J02.9 ACUTE VIRAL PHARYNGITIS: ICD-10-CM

## 2019-04-10 DIAGNOSIS — B34.9 ACUTE VIRAL SYNDROME: ICD-10-CM

## 2019-04-10 PROCEDURE — 99284 EMERGENCY DEPT VISIT MOD MDM: CPT

## 2019-04-11 ENCOUNTER — APPOINTMENT (OUTPATIENT)
Dept: RADIOLOGY | Facility: MEDICAL CENTER | Age: 39
End: 2019-04-11
Attending: EMERGENCY MEDICINE
Payer: MEDICARE

## 2019-04-11 VITALS
HEART RATE: 79 BPM | RESPIRATION RATE: 16 BRPM | HEIGHT: 66 IN | DIASTOLIC BLOOD PRESSURE: 68 MMHG | OXYGEN SATURATION: 96 % | WEIGHT: 293 LBS | SYSTOLIC BLOOD PRESSURE: 140 MMHG | BODY MASS INDEX: 47.09 KG/M2 | TEMPERATURE: 97.8 F

## 2019-04-11 LAB — S PYO DNA SPEC NAA+PROBE: NOT DETECTED

## 2019-04-11 PROCEDURE — 96372 THER/PROPH/DIAG INJ SC/IM: CPT

## 2019-04-11 PROCEDURE — 71046 X-RAY EXAM CHEST 2 VIEWS: CPT

## 2019-04-11 PROCEDURE — 700111 HCHG RX REV CODE 636 W/ 250 OVERRIDE (IP): Performed by: EMERGENCY MEDICINE

## 2019-04-11 PROCEDURE — 87651 STREP A DNA AMP PROBE: CPT

## 2019-04-11 RX ORDER — KETOROLAC TROMETHAMINE 30 MG/ML
30 INJECTION, SOLUTION INTRAMUSCULAR; INTRAVENOUS ONCE
Status: COMPLETED | OUTPATIENT
Start: 2019-04-11 | End: 2019-04-11

## 2019-04-11 RX ADMIN — KETOROLAC TROMETHAMINE 30 MG: 30 INJECTION, SOLUTION INTRAMUSCULAR; INTRAVENOUS at 00:57

## 2019-04-11 NOTE — ED PROVIDER NOTES
ED Provider Note    ED Provider Note      Primary care provider: Radha Hinojosa P.A.-C.    CHIEF COMPLAINT  Chief Complaint   Patient presents with   • Flu Like Symptoms     Sore throat, cough, congestion, fever, lethargy x 2 weeks       HPI  Yamila Mendoza is a 38 y.o. female who presents to the Emergency Department with chief complaint of flulike symptoms.  Patient reports sore throat cough congestion intermittent fever over the last 2 weeks.  Cough is nonproductive and intermittent headaches which she is not currently experiencing no altered mental status no difficulty breathing or swallowing she reports no vomiting she is had slight nausea no diarrhea no urinary complaints no skin changes no other acute symptoms or concerns.    REVIEW OF SYSTEMS  10 systems reviewed and otherwise negative, pertinent positives and negatives listed in the history of present illness.    PAST MEDICAL HISTORY   has a past medical history of ASTHMA; Bipolar disorder (HCC); Chronic back pain; Psychiatric disorder; Psychiatric disorder; and Seizure disorder (HCC).    SURGICAL HISTORY   has a past surgical history that includes other neurological surg; other orthopedic surgery; other abdominal surgery; ercp in or (9/19/2009); and joy by laparoscopy (9/22/2009).    SOCIAL HISTORY  Social History   Substance Use Topics   • Smoking status: Never Smoker   • Smokeless tobacco: Never Used   • Alcohol use No      History   Drug Use No       FAMILY HISTORY  Non-Contributory    CURRENT MEDICATIONS  Home Medications     Reviewed by Roque Chapa R.N. (Registered Nurse) on 04/10/19 at 8140  Med List Status: <None>   Medication Last Dose Status   amitriptyline (ELAVIL) 25 MG Tab  Active   carBAMazepine (TEGRETOL) 200 MG Tab  Active   cyclobenzaprine (FLEXERIL) 10 MG Tab  Active   divalproex (DEPAKOTE) 250 MG Tablet Delayed Response  Active   hydrOXYzine HCl (ATARAX) 25 MG Tab  Active   loratadine (CLARITIN) 10 MG Tab  Active   naproxen  "(NAPROSYN) 500 MG Tab  Active   potassium chloride SA (K-DUR) 10 MEQ Tab CR  Active   topiramate (TOPAMAX) 100 MG Tab  Active                ALLERGIES  Allergies   Allergen Reactions   • Bactrim Unspecified     \"I get the shakes\"    • Iron Unspecified     \"see red dots\" vision, not rash    • Penicillins Unspecified     \"makes me dizzy with my medicine\"    • Phenobarbital Unspecified     \"makes me dizzy\"    • Lemon Oil Itching     Skin itching per pt report to Dietary.   • Onion Itching and Swelling     Throat swelling and itching per pt report to Dietary.   • [employer] Allergy Itching and Swelling     Throat swelling and itching per pt report to Dietary.       PHYSICAL EXAM  VITAL SIGNS: /66   Pulse 81   Temp 36.6 °C (97.8 °F) (Temporal)   Resp 16   Ht 1.676 m (5' 6\")   Wt (!) 134 kg (295 lb 6.7 oz)   SpO2 97%   BMI 47.68 kg/m²   Pulse ox interpretation: I interpret this pulse ox as normal.  Constitutional: Alert and oriented x 3, no acute distress  HEENT: Atraumatic normocephalic, pupils are equal round reactive to light extraocular movements are intact. The nares is clear, external ears are normal, mouth shows moist mucous membranes  Neck: Supple, no JVD no tracheal deviation  Cardiovascular: Regular rate and rhythm no murmur rub or gallop 2+ pulses peripherally x4  Thorax & Lungs: No respiratory distress, no wheezes rales or rhonchi, No chest tenderness.   GI: Soft nontender nondistended positive bowel sounds, no peritoneal signs  Skin: Warm dry no acute rash or lesion  Musculoskeletal: Moving all extremities with full range and 5 of 5 strength, no acute  deformity  Neurologic: Cranial nerves III through XII are grossly intact, no sensory deficit, no cerebellar dysfunction   Psychiatric: Appropriate affect for situation at this time      DIAGNOSTIC STUDIES / PROCEDURES  LABS      Results for orders placed or performed during the hospital encounter of 04/10/19   Group A Strep by PCR   Result " "Value Ref Range    Group A Strep by PCR Not Detected Not Detected         All labs reviewed by me.      RADIOLOGY  DX-CHEST-2 VIEWS   Final Result      No active disease.        The radiologist's interpretation of all radiological studies have been reviewed by me.    COURSE & MEDICAL DECISION MAKING  Pertinent Labs & Imaging studies reviewed. (See chart for details)    12:10 AM - Patient seen and examined at bedside.       Patient noted to have slightly elevated blood pressure likely circumstantial secondary to presenting complaint. Referred to primary care physician for further evaluation.      Medical Decision Making: Vitals unremarkable chest x-ray and strep screen negative given instructions and symptomatic management return for worsening symptoms or concerns otherwise discharged in stable and improved condition.    /66   Pulse 81   Temp 36.6 °C (97.8 °F) (Temporal)   Resp 16   Ht 1.676 m (5' 6\")   Wt (!) 134 kg (295 lb 6.7 oz)   SpO2 97%   BMI 47.68 kg/m²     Radha Hinojosa P.A.-C.  [address] [place] [id number] [phone]    In 2 days  if symptoms persist          FINAL IMPRESSION  1. Acute viral syndrome Active   2. Acute viral pharyngitis Active          This dictation has been created using voice recognition software and/or scribes. The accuracy of the dictation is limited by the abilities of the software and the expertise of the scribes. I expect there may be some errors of grammar and possibly content. I made every attempt to manually correct the errors within my dictation. However, errors related to voice recognition software and/or scribes may still exist and should be interpreted within the appropriate context.            "

## 2019-04-11 NOTE — ED NOTES
Pt discharged home. Assessment complete. Pt ambulates self with steady gait. VS stable. Pt verbalized understanding discharge instructions; pt verbalizes teaching provided.

## 2019-04-11 NOTE — ED TRIAGE NOTES
"Yamila Mendoza  38 y.o. female  Chief Complaint   Patient presents with   • Flu Like Symptoms     Sore throat, cough, congestion, fever, lethargy x 2 weeks       Pt amb to triage with steady gait for above complaint.   Pt is alert and oriented, speaking in full sentences, follows commands and responds appropriately to questions. NAD. Resp are even and unlabored.  Pt placed in lobby. Pt educated on triage process. Pt encouraged to alert staff for any changes.  /83   Pulse 88   Temp 36.6 °C (97.8 °F) (Temporal)   Resp 18   Ht 1.676 m (5' 6\")   Wt (!) 134 kg (295 lb 6.7 oz)   SpO2 97%   BMI 47.68 kg/m²     "

## 2019-04-22 ENCOUNTER — HOSPITAL ENCOUNTER (EMERGENCY)
Facility: MEDICAL CENTER | Age: 39
End: 2019-04-22
Attending: EMERGENCY MEDICINE
Payer: MEDICARE

## 2019-04-22 VITALS
DIASTOLIC BLOOD PRESSURE: 73 MMHG | RESPIRATION RATE: 16 BRPM | SYSTOLIC BLOOD PRESSURE: 130 MMHG | BODY MASS INDEX: 47.09 KG/M2 | OXYGEN SATURATION: 98 % | HEIGHT: 66 IN | TEMPERATURE: 98 F | WEIGHT: 293 LBS | HEART RATE: 85 BPM

## 2019-04-22 DIAGNOSIS — R56.9 SEIZURE (HCC): ICD-10-CM

## 2019-04-22 LAB
ALBUMIN SERPL BCP-MCNC: 3.6 G/DL (ref 3.2–4.9)
ALBUMIN/GLOB SERPL: 1.2 G/DL
ALP SERPL-CCNC: 58 U/L (ref 30–99)
ALT SERPL-CCNC: 5 U/L (ref 2–50)
ANION GAP SERPL CALC-SCNC: 8 MMOL/L (ref 0–11.9)
AST SERPL-CCNC: 11 U/L (ref 12–45)
BASOPHILS # BLD AUTO: 0.6 % (ref 0–1.8)
BASOPHILS # BLD: 0.05 K/UL (ref 0–0.12)
BILIRUB SERPL-MCNC: 0.2 MG/DL (ref 0.1–1.5)
BUN SERPL-MCNC: 11 MG/DL (ref 8–22)
CALCIUM SERPL-MCNC: 8.7 MG/DL (ref 8.5–10.5)
CARBAMAZEPINE SERPL-MCNC: 5 UG/ML (ref 4–12)
CHLORIDE SERPL-SCNC: 102 MMOL/L (ref 96–112)
CO2 SERPL-SCNC: 24 MMOL/L (ref 20–33)
CREAT SERPL-MCNC: 0.6 MG/DL (ref 0.5–1.4)
EOSINOPHIL # BLD AUTO: 0.33 K/UL (ref 0–0.51)
EOSINOPHIL NFR BLD: 3.8 % (ref 0–6.9)
ERYTHROCYTE [DISTWIDTH] IN BLOOD BY AUTOMATED COUNT: 41.5 FL (ref 35.9–50)
GLOBULIN SER CALC-MCNC: 2.9 G/DL (ref 1.9–3.5)
GLUCOSE SERPL-MCNC: 113 MG/DL (ref 65–99)
HCT VFR BLD AUTO: 39.6 % (ref 37–47)
HGB BLD-MCNC: 12.8 G/DL (ref 12–16)
IMM GRANULOCYTES # BLD AUTO: 0.09 K/UL (ref 0–0.11)
IMM GRANULOCYTES NFR BLD AUTO: 1 % (ref 0–0.9)
LYMPHOCYTES # BLD AUTO: 2.43 K/UL (ref 1–4.8)
LYMPHOCYTES NFR BLD: 27.8 % (ref 22–41)
MCH RBC QN AUTO: 28.4 PG (ref 27–33)
MCHC RBC AUTO-ENTMCNC: 32.3 G/DL (ref 33.6–35)
MCV RBC AUTO: 87.8 FL (ref 81.4–97.8)
MONOCYTES # BLD AUTO: 0.67 K/UL (ref 0–0.85)
MONOCYTES NFR BLD AUTO: 7.7 % (ref 0–13.4)
NEUTROPHILS # BLD AUTO: 5.18 K/UL (ref 2–7.15)
NEUTROPHILS NFR BLD: 59.1 % (ref 44–72)
NRBC # BLD AUTO: 0 K/UL
NRBC BLD-RTO: 0 /100 WBC
PLATELET # BLD AUTO: 208 K/UL (ref 164–446)
PMV BLD AUTO: 9.2 FL (ref 9–12.9)
POTASSIUM SERPL-SCNC: 3.8 MMOL/L (ref 3.6–5.5)
PROT SERPL-MCNC: 6.5 G/DL (ref 6–8.2)
RBC # BLD AUTO: 4.51 M/UL (ref 4.2–5.4)
SODIUM SERPL-SCNC: 134 MMOL/L (ref 135–145)
VALPROATE SERPL-MCNC: 62.3 UG/ML (ref 50–100)
WBC # BLD AUTO: 8.8 K/UL (ref 4.8–10.8)

## 2019-04-22 PROCEDURE — 80164 ASSAY DIPROPYLACETIC ACD TOT: CPT

## 2019-04-22 PROCEDURE — 99284 EMERGENCY DEPT VISIT MOD MDM: CPT

## 2019-04-22 PROCEDURE — 80156 ASSAY CARBAMAZEPINE TOTAL: CPT

## 2019-04-22 PROCEDURE — 96374 THER/PROPH/DIAG INJ IV PUSH: CPT

## 2019-04-22 PROCEDURE — 80053 COMPREHEN METABOLIC PANEL: CPT

## 2019-04-22 PROCEDURE — 85025 COMPLETE CBC W/AUTO DIFF WBC: CPT

## 2019-04-22 PROCEDURE — 700111 HCHG RX REV CODE 636 W/ 250 OVERRIDE (IP): Performed by: EMERGENCY MEDICINE

## 2019-04-22 PROCEDURE — 96375 TX/PRO/DX INJ NEW DRUG ADDON: CPT

## 2019-04-22 RX ORDER — LORAZEPAM 2 MG/ML
0.5 INJECTION INTRAMUSCULAR ONCE
Status: COMPLETED | OUTPATIENT
Start: 2019-04-22 | End: 2019-04-22

## 2019-04-22 RX ADMIN — LORAZEPAM 0.5 MG: 2 INJECTION INTRAMUSCULAR; INTRAVENOUS at 20:06

## 2019-04-23 NOTE — ED TRIAGE NOTES
"Chief Complaint   Patient presents with   • Neurological Problem     Pt states hx of seizures, states that she feels like she is going to have a seizure, feeling since 1230.  Not taking depatoke as prescribed per EMS     Pt verbalizes shunt in head and abd.  Pt states seizure activity is \"blinking of the eyes.\"  Pt also verbalizes cough for several weeks.     "

## 2019-04-23 NOTE — ED PROVIDER NOTES
ED Provider Note    CHIEF COMPLAINT  Chief Complaint   Patient presents with   • Neurological Problem     Pt states hx of seizures, states that she feels like she is going to have a seizure, feeling since 1230.  Not taking depatoke as prescribed per EMS       HPI  Yamila Mendoza is a 38 y.o. female who presents with a chief complaint of seizures she states she had multiple seizures since 12:00 to 5:00.  There is small.  There is no tongue biting no urinary incontinence.  She states that it may be related the fact that she has not been taking her Depakote.  She states she does take Depakote and Tegretol she is taking them since she was an infant.    States she also has some chest pain related to coughing has been on and off for several days.  She was told last time was a virus she is concerned he may now be bronchitis.    She is here with 2 male friends they state that they did not witness any of his seizures.    REVIEW OF SYSTEMS  General: No fever or chills.  Eyes: No eye discharge. No eye pain.  Ear nose throat: No sore throat or  trouble swallowing.  Pulmonary: No shortness of breath or cough.  Cardiovascular: No chest pain or chest pressure.  GI: No abdominal pain nausea or vomiting.  : No dysuria or hematuria  Dermatologic: No rashes. No abrasions.  Neurologic: See above    All other systems are negative      PAST MEDICAL HISTORY  Past Medical History:   Diagnosis Date   • ASTHMA    • Bipolar disorder (HCC)    • Chronic back pain    • Psychiatric disorder     bipolar   • Psychiatric disorder     depression   • Seizure disorder (HCC)        FAMILY HISTORY  History reviewed. No pertinent family history.    SOCIAL HISTORY  Social History     Social History   • Marital status: Single     Spouse name: N/A   • Number of children: N/A   • Years of education: N/A     Social History Main Topics   • Smoking status: Never Smoker   • Smokeless tobacco: Never Used   • Alcohol use No   • Drug use: No   • Sexual  "activity: Not on file     Other Topics Concern   • Not on file     Social History Narrative    ** Merged History Encounter **            SURGICAL HISTORY  Past Surgical History:   Procedure Laterality Date   • REYES BY LAPAROSCOPY  9/22/2009    Performed by JUNE WAGGONER at SURGERY [facility]S   • ERCP IN OR  9/19/2009    Performed by LOU WEBB at SURGERY [facility] ORS   • OTHER ABDOMINAL SURGERY     • OTHER NEUROLOGICAL SURG     • OTHER ORTHOPEDIC SURGERY      2 R knee surgeries       CURRENT MEDICATIONS  No current facility-administered medications on file prior to encounter.      Current Outpatient Prescriptions on File Prior to Encounter   Medication Sig Dispense Refill   • divalproex (DEPAKOTE) 250 MG Tablet Delayed Response Take 500-750 mg by mouth 3 times a day. 500 mg in morning and evening  750 mg at bedtime     • potassium chloride SA (K-DUR) 10 MEQ Tab CR Take 10 mEq by mouth every day.     • naproxen (NAPROSYN) 500 MG Tab Take 500 mg by mouth 2 times a day, with meals.     • amitriptyline (ELAVIL) 25 MG Tab Take 1 Tab by mouth every evening. 30 Tab 0   • carBAMazepine (TEGRETOL) 200 MG Tab Take 1 Tab by mouth 3 times a day. 90 Tab 0   • hydrOXYzine HCl (ATARAX) 25 MG Tab Take 1 Tab by mouth 3 times a day as needed for Itching. 30 Tab 0   • loratadine (CLARITIN) 10 MG Tab Take 1 Tab by mouth every day. 30 Tab 0   • topiramate (TOPAMAX) 100 MG Tab Take 1 Tab by mouth every 12 hours. 60 Tab 3   • cyclobenzaprine (FLEXERIL) 10 MG Tab Take 25 mg by mouth at bedtime as needed for Muscle Spasms.         ALLERGIES  Allergies   Allergen Reactions   • Bactrim Unspecified     \"I get the shakes\"    • Iron Unspecified     \"see red dots\" vision, not rash    • Penicillins Unspecified     \"makes me dizzy with my medicine\"    • Phenobarbital Unspecified     \"makes me dizzy\"    • Lemon Oil Itching     Skin itching per pt report to Dietary.   • Onion Itching and Swelling     Throat swelling and itching per " "pt report to Dietary.   • [employer] Allergy Itching and Swelling     Throat swelling and itching per pt report to Dietary.       PHYSICAL EXAM  VITAL SIGNS: Pulse 89   Temp 36.6 °C (97.8 °F) (Temporal)   Resp 20   Ht 1.676 m (5' 6\")   Wt (!) 134 kg (295 lb 6.7 oz)   SpO2 95%   BMI 47.68 kg/m²       Constitutional: Obese, no acute distress  HENT: No tongue lacerations no dentition noted.  Negative raccoon eyes.  Negative arias sign  Eyes: Pupils equal round reactive light  Neck: Normal range of motion, No tenderness, Supple, No stridor.   Cardiovascular: Normal heart rate, Normal rhythm, No murmurs, No rubs, No gallops.   Thorax & Lungs: Normal breath sounds, No respiratory distress, No wheezing, No chest tenderness.   Abdomen: Bowel sounds normal, Soft, No tenderness, No masses, No pulsatile masses.   Skin: Warm, Dry, No erythema, No rash.   Back: No tenderness, No CVA tenderness.   Extremities: Intact distal pulses, No edema, No tenderness, No cyanosis, No clubbing.   Neurologic: Cranial nerves 2-12 grossly intact  Psychiatric: Slightly flat affect    RADIOLOGY/PROCEDURES  Results for orders placed or performed during the hospital encounter of 04/22/19   CBC WITH DIFFERENTIAL   Result Value Ref Range    WBC 8.8 4.8 - 10.8 K/uL    RBC 4.51 4.20 - 5.40 M/uL    Hemoglobin 12.8 12.0 - 16.0 g/dL    Hematocrit 39.6 37.0 - 47.0 %    MCV 87.8 81.4 - 97.8 fL    MCH 28.4 27.0 - 33.0 pg    MCHC 32.3 (L) 33.6 - 35.0 g/dL    RDW 41.5 35.9 - 50.0 fL    Platelet Count 208 164 - 446 K/uL    MPV 9.2 9.0 - 12.9 fL    Neutrophils-Polys 59.10 44.00 - 72.00 %    Lymphocytes 27.80 22.00 - 41.00 %    Monocytes 7.70 0.00 - 13.40 %    Eosinophils 3.80 0.00 - 6.90 %    Basophils 0.60 0.00 - 1.80 %    Immature Granulocytes 1.00 (H) 0.00 - 0.90 %    Nucleated RBC 0.00 /100 WBC    Neutrophils (Absolute) 5.18 2.00 - 7.15 K/uL    Lymphs (Absolute) 2.43 1.00 - 4.80 K/uL    Monos (Absolute) 0.67 0.00 - 0.85 K/uL    Eos (Absolute) " 0.33 0.00 - 0.51 K/uL    Baso (Absolute) 0.05 0.00 - 0.12 K/uL    Immature Granulocytes (abs) 0.09 0.00 - 0.11 K/uL    NRBC (Absolute) 0.00 K/uL   COMP METABOLIC PANEL   Result Value Ref Range    Sodium 134 (L) 135 - 145 mmol/L    Potassium 3.8 3.6 - 5.5 mmol/L    Chloride 102 96 - 112 mmol/L    Co2 24 20 - 33 mmol/L    Anion Gap 8.0 0.0 - 11.9    Glucose 113 (H) 65 - 99 mg/dL    Bun 11 8 - 22 mg/dL    Creatinine 0.60 0.50 - 1.40 mg/dL    Calcium 8.7 8.5 - 10.5 mg/dL    AST(SGOT) 11 (L) 12 - 45 U/L    ALT(SGPT) 5 2 - 50 U/L    Alkaline Phosphatase 58 30 - 99 U/L    Total Bilirubin 0.2 0.1 - 1.5 mg/dL    Albumin 3.6 3.2 - 4.9 g/dL    Total Protein 6.5 6.0 - 8.2 g/dL    Globulin 2.9 1.9 - 3.5 g/dL    A-G Ratio 1.2 g/dL   CARBAMAZEPINE (Tegretol)   Result Value Ref Range    Carbamazepine 5.0 4.0 - 12.0 ug/mL   VALPROIC ACID (Depakote)   Result Value Ref Range    Valproic Acid 62.3 50.0 - 100.0 ug/mL   ESTIMATED GFR   Result Value Ref Range    GFR If African American >60 >60 mL/min/1.73 m 2    GFR If Non African American >60 >60 mL/min/1.73 m 2        COURSE & MEDICAL DECISION MAKING  Pertinent Labs & Imaging studies reviewed. (See chart for details)  Here with seizures off her medications.  At this point patient not actively seizing has some chest discomfort but secondary to coughing around that she has cardiac ischemia we will get do an x-ray to make sure there is no pneumonia will also do some lab work to check her levels.  I expect him to be low Ativan will be given to prevent any seizures here.      She has been observed here without any seizure-like activity at this point her Tegretol is known to be low of normal as well as valproic but both within theoretical normal values at this point I would recommend that she double dose both her Tegretol and valproic acid tonight to increase her steady-state concentration at this point the patient is stable she had no seizure activity here.  I think may be safely discharged  home she is encouraged to continue and stay on her medications.  It appears that the patient actually is therapeutic on both values and this is changed from February in which she was below normal values.    Has seizures every month had another seizure.  Her levels are normal.  No infectious or otherwise cause noted exam.  She be discharged home told to return if she continues having seizures tomorrow.  Recommend she do follow-up with her neurologist.    FINAL IMPRESSION  1.  Seizure  2.   3.      Electronically signed by: Jose E Nieves, 4/22/2019 7:52 PM

## 2019-06-07 ENCOUNTER — HOSPITAL ENCOUNTER (EMERGENCY)
Facility: MEDICAL CENTER | Age: 39
End: 2019-06-08
Attending: EMERGENCY MEDICINE
Payer: MEDICARE

## 2019-06-07 DIAGNOSIS — L02.91 ABSCESS: ICD-10-CM

## 2019-06-07 PROCEDURE — 99283 EMERGENCY DEPT VISIT LOW MDM: CPT

## 2019-06-07 RX ORDER — DOXYCYCLINE 100 MG/1
100 CAPSULE ORAL 2 TIMES DAILY
Qty: 14 CAP | Refills: 0 | Status: SHIPPED | OUTPATIENT
Start: 2019-06-07 | End: 2019-06-14

## 2019-06-08 VITALS
TEMPERATURE: 96.8 F | RESPIRATION RATE: 16 BRPM | BODY MASS INDEX: 47.09 KG/M2 | HEIGHT: 66 IN | HEART RATE: 93 BPM | DIASTOLIC BLOOD PRESSURE: 93 MMHG | SYSTOLIC BLOOD PRESSURE: 119 MMHG | OXYGEN SATURATION: 98 % | WEIGHT: 293 LBS

## 2019-06-08 NOTE — ED TRIAGE NOTES
"Chief Complaint   Patient presents with   • Bug Bite     Pt ambulatory to triage with above complaint.  Pt states she was bit by a spider 4 days pta.  Pt has small red bump under left armpit.     Pt returned to lobby, educated on triage process, and to inform staff of any changes or concerns.    /88   Pulse 89   Temp 36 °C (96.8 °F) (Temporal)   Resp 16   Ht 1.676 m (5' 6\")   Wt (!) 138.4 kg (305 lb 1.9 oz)   SpO2 98%     "

## 2019-06-08 NOTE — ED PROVIDER NOTES
"ED Provider Note    CHIEF COMPLAINT  Chief Complaint   Patient presents with   • Bug Bite       HPI  Yamila Mendoza is a 38 y.o. female who presents with small painful lesion in her armpit.  Patient denies any fevers or constitutional symptoms.  She reports that she thinks she was bit by spider but is unsure.  Patient denies any other lesions.  Patient reports the lesion is mildly painful.    REVIEW OF SYSTEMS  ROS  See HPI for further details. All other systems are negative.     PAST MEDICAL HISTORY   has a past medical history of ASTHMA; Bipolar disorder (HCC); Chronic back pain; Psychiatric disorder; Psychiatric disorder; and Seizure disorder (HCC).    SOCIAL HISTORY  Social History     Social History Main Topics   • Smoking status: Never Smoker   • Smokeless tobacco: Never Used   • Alcohol use No   • Drug use: No   • Sexual activity: Not on file       SURGICAL HISTORY   has a past surgical history that includes other neurological surg; other orthopedic surgery; other abdominal surgery; ercp in or (9/19/2009); and joy by laparoscopy (9/22/2009).    CURRENT MEDICATIONS  Home Medications     Reviewed by Slava Ortiz R.N. (Registered Nurse) on 06/07/19 at 2243  Med List Status: Not Addressed   Medication Last Dose Status   amitriptyline (ELAVIL) 25 MG Tab  Active   carBAMazepine (TEGRETOL) 200 MG Tab  Active   cyclobenzaprine (FLEXERIL) 10 MG Tab  Active   divalproex (DEPAKOTE) 250 MG Tablet Delayed Response  Active   hydrOXYzine HCl (ATARAX) 25 MG Tab  Active   loratadine (CLARITIN) 10 MG Tab  Active   naproxen (NAPROSYN) 500 MG Tab  Active   potassium chloride SA (K-DUR) 10 MEQ Tab CR  Active   topiramate (TOPAMAX) 100 MG Tab  Active                ALLERGIES  Allergies   Allergen Reactions   • Bactrim Unspecified     \"I get the shakes\"    • Iron Unspecified     \"see red dots\" vision, not rash    • Penicillins Unspecified     \"makes me dizzy with my medicine\"    • Phenobarbital Unspecified     " "\"makes me dizzy\"    • Lemon Oil Itching     Skin itching per pt report to Dietary.   • Onion Itching and Swelling     Throat swelling and itching per pt report to Dietary.   • [employer] Allergy Itching and Swelling     Throat swelling and itching per pt report to Dietary.       PHYSICAL EXAM  Physical Exam   Constitutional: She is oriented to person, place, and time. She appears well-developed and well-nourished.   HENT:   Head: Normocephalic and atraumatic.   Eyes: Conjunctivae are normal.   Neck: Normal range of motion. Neck supple.   Pulmonary/Chest: Effort normal.   Musculoskeletal:   Left axilla with small less than 1 cm area of focal induration and erythema, no underlying fluctuant mass, no associated lymphadenopathy.   Neurological: She is alert and oriented to person, place, and time.   Skin: Skin is warm.   Psychiatric: She has a normal mood and affect. Her behavior is normal.         COURSE & MEDICAL DECISION MAKING  Pertinent Labs & Imaging studies reviewed. (See chart for details)  Patient here with very small abscess, given the diminutive size I believe that antibiotics alone should be adequate for resolution.  I have asked patient to return in 3 days if the symptoms fail to resolve and to return immediately if they worsen.  The patient will not drink alcohol nor drive with prescribed medications. The patient will return for worsening symptoms and is stable at the time of discharge. The patient verbalizes understanding and will comply.    FINAL IMPRESSION  1. Abscess      Electronically signed by: Mandeep Ocampo, 6/7/2019 11:47 PM      "

## 2020-01-06 ENCOUNTER — HOSPITAL ENCOUNTER (EMERGENCY)
Facility: MEDICAL CENTER | Age: 40
End: 2020-01-06
Attending: EMERGENCY MEDICINE
Payer: MEDICARE

## 2020-01-06 VITALS
DIASTOLIC BLOOD PRESSURE: 86 MMHG | RESPIRATION RATE: 16 BRPM | HEIGHT: 66 IN | SYSTOLIC BLOOD PRESSURE: 138 MMHG | TEMPERATURE: 98 F | WEIGHT: 293 LBS | BODY MASS INDEX: 47.09 KG/M2 | HEART RATE: 89 BPM | OXYGEN SATURATION: 94 %

## 2020-01-06 DIAGNOSIS — R56.9 SEIZURE (HCC): ICD-10-CM

## 2020-01-06 LAB
ANION GAP SERPL CALC-SCNC: 14 MMOL/L (ref 0–11.9)
BASOPHILS # BLD AUTO: 0.3 % (ref 0–1.8)
BASOPHILS # BLD: 0.03 K/UL (ref 0–0.12)
BUN SERPL-MCNC: 6 MG/DL (ref 8–22)
CALCIUM SERPL-MCNC: 8.1 MG/DL (ref 8.5–10.5)
CHLORIDE SERPL-SCNC: 105 MMOL/L (ref 96–112)
CO2 SERPL-SCNC: 20 MMOL/L (ref 20–33)
CREAT SERPL-MCNC: 0.64 MG/DL (ref 0.5–1.4)
EOSINOPHIL # BLD AUTO: 0.06 K/UL (ref 0–0.51)
EOSINOPHIL NFR BLD: 0.6 % (ref 0–6.9)
ERYTHROCYTE [DISTWIDTH] IN BLOOD BY AUTOMATED COUNT: 41.7 FL (ref 35.9–50)
GLUCOSE SERPL-MCNC: 120 MG/DL (ref 65–99)
HCG SERPL QL: NEGATIVE
HCT VFR BLD AUTO: 38.2 % (ref 37–47)
HGB BLD-MCNC: 12.8 G/DL (ref 12–16)
IMM GRANULOCYTES # BLD AUTO: 0.06 K/UL (ref 0–0.11)
IMM GRANULOCYTES NFR BLD AUTO: 0.6 % (ref 0–0.9)
LYMPHOCYTES # BLD AUTO: 1.87 K/UL (ref 1–4.8)
LYMPHOCYTES NFR BLD: 19.2 % (ref 22–41)
MCH RBC QN AUTO: 30.2 PG (ref 27–33)
MCHC RBC AUTO-ENTMCNC: 33.5 G/DL (ref 33.6–35)
MCV RBC AUTO: 90.1 FL (ref 81.4–97.8)
MONOCYTES # BLD AUTO: 0.76 K/UL (ref 0–0.85)
MONOCYTES NFR BLD AUTO: 7.8 % (ref 0–13.4)
NEUTROPHILS # BLD AUTO: 6.97 K/UL (ref 2–7.15)
NEUTROPHILS NFR BLD: 71.5 % (ref 44–72)
NRBC # BLD AUTO: 0 K/UL
NRBC BLD-RTO: 0 /100 WBC
PLATELET # BLD AUTO: 204 K/UL (ref 164–446)
PMV BLD AUTO: 9.9 FL (ref 9–12.9)
POTASSIUM SERPL-SCNC: 2.9 MMOL/L (ref 3.6–5.5)
RBC # BLD AUTO: 4.24 M/UL (ref 4.2–5.4)
SODIUM SERPL-SCNC: 139 MMOL/L (ref 135–145)
VALPROATE SERPL-MCNC: 76.7 UG/ML (ref 50–100)
WBC # BLD AUTO: 9.8 K/UL (ref 4.8–10.8)

## 2020-01-06 PROCEDURE — 700105 HCHG RX REV CODE 258: Performed by: EMERGENCY MEDICINE

## 2020-01-06 PROCEDURE — 80164 ASSAY DIPROPYLACETIC ACD TOT: CPT

## 2020-01-06 PROCEDURE — A9270 NON-COVERED ITEM OR SERVICE: HCPCS | Performed by: EMERGENCY MEDICINE

## 2020-01-06 PROCEDURE — 99285 EMERGENCY DEPT VISIT HI MDM: CPT

## 2020-01-06 PROCEDURE — 96374 THER/PROPH/DIAG INJ IV PUSH: CPT

## 2020-01-06 PROCEDURE — 700111 HCHG RX REV CODE 636 W/ 250 OVERRIDE (IP): Performed by: EMERGENCY MEDICINE

## 2020-01-06 PROCEDURE — 85025 COMPLETE CBC W/AUTO DIFF WBC: CPT

## 2020-01-06 PROCEDURE — 80048 BASIC METABOLIC PNL TOTAL CA: CPT

## 2020-01-06 PROCEDURE — 700102 HCHG RX REV CODE 250 W/ 637 OVERRIDE(OP): Performed by: EMERGENCY MEDICINE

## 2020-01-06 PROCEDURE — 84703 CHORIONIC GONADOTROPIN ASSAY: CPT

## 2020-01-06 RX ORDER — DIVALPROEX SODIUM 250 MG/1
500-750 TABLET, DELAYED RELEASE ORAL 3 TIMES DAILY
Qty: 126 TAB | Refills: 0 | Status: SHIPPED | OUTPATIENT
Start: 2020-01-06 | End: 2020-01-20

## 2020-01-06 RX ORDER — NAPROXEN 500 MG/1
500 TABLET ORAL 2 TIMES DAILY WITH MEALS
Qty: 28 TAB | Refills: 0 | Status: SHIPPED | OUTPATIENT
Start: 2020-01-06 | End: 2020-01-20

## 2020-01-06 RX ORDER — CARBAMAZEPINE 200 MG/1
200 TABLET ORAL 3 TIMES DAILY
Qty: 42 TAB | Refills: 0 | Status: SHIPPED | OUTPATIENT
Start: 2020-01-06 | End: 2020-01-20

## 2020-01-06 RX ORDER — TOPIRAMATE 100 MG/1
100 TABLET, FILM COATED ORAL EVERY 12 HOURS
Qty: 28 TAB | Refills: 0 | Status: SHIPPED | OUTPATIENT
Start: 2020-01-06 | End: 2020-01-20

## 2020-01-06 RX ORDER — KETOROLAC TROMETHAMINE 30 MG/ML
15 INJECTION, SOLUTION INTRAMUSCULAR; INTRAVENOUS ONCE
Status: COMPLETED | OUTPATIENT
Start: 2020-01-06 | End: 2020-01-06

## 2020-01-06 RX ORDER — POTASSIUM CHLORIDE 20 MEQ/1
20 TABLET, EXTENDED RELEASE ORAL ONCE
Status: COMPLETED | OUTPATIENT
Start: 2020-01-06 | End: 2020-01-06

## 2020-01-06 RX ORDER — HYDROXYZINE HYDROCHLORIDE 25 MG/1
25 TABLET, FILM COATED ORAL 3 TIMES DAILY PRN
Qty: 30 TAB | Refills: 0 | Status: SHIPPED | OUTPATIENT
Start: 2020-01-06 | End: 2020-01-20

## 2020-01-06 RX ORDER — AMITRIPTYLINE HYDROCHLORIDE 25 MG/1
25 TABLET, FILM COATED ORAL EVERY EVENING
Qty: 14 TAB | Refills: 0 | Status: SHIPPED | OUTPATIENT
Start: 2020-01-06 | End: 2020-01-20

## 2020-01-06 RX ORDER — POTASSIUM CHLORIDE 750 MG/1
10 TABLET, EXTENDED RELEASE ORAL DAILY
Qty: 14 EACH | Refills: 0 | Status: SHIPPED | OUTPATIENT
Start: 2020-01-06 | End: 2020-01-20

## 2020-01-06 RX ORDER — SODIUM CHLORIDE 9 MG/ML
1000 INJECTION, SOLUTION INTRAVENOUS ONCE
Status: COMPLETED | OUTPATIENT
Start: 2020-01-06 | End: 2020-01-06

## 2020-01-06 RX ADMIN — SODIUM CHLORIDE 1000 ML: 9 INJECTION, SOLUTION INTRAVENOUS at 19:47

## 2020-01-06 RX ADMIN — POTASSIUM CHLORIDE 20 MEQ: 20 TABLET, EXTENDED RELEASE ORAL at 21:15

## 2020-01-06 RX ADMIN — KETOROLAC TROMETHAMINE 15 MG: 30 INJECTION, SOLUTION INTRAMUSCULAR at 22:31

## 2020-01-07 NOTE — ED PROVIDER NOTES
ED Provider Note    CHIEF COMPLAINT  Chief Complaint   Patient presents with   • Seizure       HPI  Yamila Mendoza is a 39 y.o. female with a history of seizure disorder and bipolar disorder who presents with a seizure today while getting off of a bus.  She is with her boyfriend here who witnessed the episode and was able to protect her from any trauma during her seizure event.  This occurred about an hour prior to arrival.  He states that she is out of some of her seizure medications including Topamax.  She also takes valproic acid.  Currently, she is unable to provide much history due to lethargy following the seizure event.  According to EMS, she had 4 witnessed seizures and was given 5 mg of IM Versed and 3 mg of IV Versed prior to arrival.    REVIEW OF SYSTEMS  See HPI for further details.  Limited secondary to the patient's altered mental status.    PAST MEDICAL HISTORY   has a past medical history of ASTHMA, Bipolar disorder (HCC), Chronic back pain, Psychiatric disorder, Psychiatric disorder, and Seizure disorder (HCC).    SOCIAL HISTORY  Social History     Tobacco Use   • Smoking status: Never Smoker   • Smokeless tobacco: Never Used   Substance and Sexual Activity   • Alcohol use: No   • Drug use: No   • Sexual activity: Not on file       SURGICAL HISTORY   has a past surgical history that includes other neurological surg; other orthopedic surgery; other abdominal surgery; ercp in or (9/19/2009); and joy by laparoscopy (9/22/2009).    CURRENT MEDICATIONS  Home Medications     Reviewed by Slava Ortiz R.N. (Registered Nurse) on 01/06/20 at 1922  Med List Status: Unable to Obtain   Medication Last Dose Status   amitriptyline (ELAVIL) 25 MG Tab  Active   carBAMazepine (TEGRETOL) 200 MG Tab  Active   cyclobenzaprine (FLEXERIL) 10 MG Tab  Active   divalproex (DEPAKOTE) 250 MG Tablet Delayed Response  Active   hydrOXYzine HCl (ATARAX) 25 MG Tab  Active   loratadine (CLARITIN) 10 MG Tab  Active  "  naproxen (NAPROSYN) 500 MG Tab  Active   potassium chloride SA (K-DUR) 10 MEQ Tab CR  Active   topiramate (TOPAMAX) 100 MG Tab  Active                ALLERGIES  Allergies   Allergen Reactions   • Bactrim Unspecified     \"I get the shakes\"    • Iron Unspecified     \"see red dots\" vision, not rash    • Penicillins Unspecified     \"makes me dizzy with my medicine\"    • Phenobarbital Unspecified     \"makes me dizzy\"    • Lemon Oil Itching     Skin itching per pt report to Dietary.   • Onion Itching and Swelling     Throat swelling and itching per pt report to Dietary.   • [employer] Allergy Itching and Swelling     Throat swelling and itching per pt report to Dietary.       PHYSICAL EXAM  VITAL SIGNS: /80   Pulse (!) 130   Temp 36.7 °C (98 °F) (Temporal)   Resp 18   Ht 1.676 m (5' 6\")   Wt (!) 138.4 kg (305 lb 1.9 oz)   SpO2 98%   BMI 49.25 kg/m²   Pulse ox interpretation: I interpret this pulse ox as normal.  Constitutional: Somnolent, in no apparent distress.  Obese.  HENT: No signs of trauma, Bilateral external ears normal, Nose normal.   Eyes: Pupils are equal and reactive, Conjunctiva normal, Non-icteric.   Neck: Normal range of motion, No tenderness, Supple, No stridor.   Cardiovascular: Regular rate and rhythm.   Thorax & Lungs: Normal breath sounds, No respiratory distress, No wheezing, No chest tenderness.   Abdomen: Bowel sounds normal, Soft, No tenderness, No masses, No pulsatile masses. No peritoneal signs.  Skin: Warm, Dry, No erythema, No rash.   Back: No bony tenderness, No CVA tenderness.   Extremities: Intact distal pulses, No edema, No tenderness, No cyanosis  Musculoskeletal: Good range of motion in all major joints. No tenderness to palpation or major deformities noted.   Neurologic: Somnolent, arouses to painful stimulus, localizes pain and moving all extremities normally.       DIAGNOSTIC STUDIES / PROCEDURES      LABS  Labs Reviewed   CBC WITH DIFFERENTIAL - Abnormal; Notable " for the following components:       Result Value    MCHC 33.5 (*)     Lymphocytes 19.20 (*)     All other components within normal limits   BASIC METABOLIC PANEL - Abnormal; Notable for the following components:    Potassium 2.9 (*)     Glucose 120 (*)     Bun 6 (*)     Calcium 8.1 (*)     Anion Gap 14.0 (*)     All other components within normal limits   VALPROIC ACID   HCG QUAL SERUM   ESTIMATED GFR       RADIOLOGY  No orders to display       COURSE & MEDICAL DECISION MAKING    Medications   NS infusion 1,000 mL (0 mL Intravenous Stopped 1/6/20 2214)   potassium chloride SA (Kdur) tablet 20 mEq (20 mEq Oral Given 1/6/20 2115)   ketorolac (TORADOL) injection 15 mg (15 mg Intravenous Given 1/6/20 2231)       Pertinent Labs & Imaging studies reviewed. (See chart for details)  39 y.o. female presenting following multiple seizure episodes today.  Has a history of recurrent seizures.  Has run out of her recent seizure medication, Topamax.  She much of the history was obtained from the patient's significant other at bedside.  Upon my bedside evaluation initially, the patient does appear to be postictal or may be somnolent secondary to significant benzodiazepine administration by EMS.  Throughout her stay in the emergency department, had gradually improving mental status.  Prior to discharge, she was awake, alert, oriented, ambulatory.  She states that she is out of many of her medications in fact.  She was given refills of most of her medications including all of her seizure medications.  She was given supplemental potassium for hypokalemia on laboratory studies.  No obvious signs of underlying infection.  No history of trauma.  No residual neurologic deficit.  Patient has normal neurologic examination prior to discharge.  Patient is stable for discharge.    HYDRATION: Based on the patient's presentation of Tachycardia the patient was given IV fluids. IV Hydration was used because oral hydration was not as rapid as  "required. Upon recheck following hydration, the patient was improved.      The patient was instructed to follow-up with primary care physician for further management.  To return immediately for any worsening symptoms or development of any other concerning signs or symptoms. The patient verbalizes understanding in their own words.    /86   Pulse 89   Temp 36.7 °C (98 °F) (Temporal)   Resp 16   Ht 1.676 m (5' 6\")   Wt (!) 138.4 kg (305 lb 1.9 oz)   SpO2 94%   BMI 49.25 kg/m²     The patient was referred to primary care where they will receive further BP management.      TARIQ Bass-CJose Rafael  [address] [place] [id number] [phone]    Schedule an appointment as soon as possible for a visit       [facility], Emergency Dept  [address] [place] [id number] [phone]    As needed, If symptoms worsen      FINAL IMPRESSION  1. Seizure (HCC)            Electronically signed by: Constantin Germain, 1/6/2020 7:27 PM    "

## 2020-01-07 NOTE — ED TRIAGE NOTES
Pt BIB [facility] for seizures.  Pt had 4 witnessed seizure with hx of seizure.  Pt given  5 IM versed and 3 Iv versed PTA.  Pt arousable to voice and follows commands.

## 2020-04-03 ENCOUNTER — HOSPITAL ENCOUNTER (EMERGENCY)
Facility: MEDICAL CENTER | Age: 40
End: 2020-04-04
Attending: EMERGENCY MEDICINE
Payer: MEDICARE

## 2020-04-03 VITALS
SYSTOLIC BLOOD PRESSURE: 132 MMHG | RESPIRATION RATE: 19 BRPM | WEIGHT: 291.45 LBS | BODY MASS INDEX: 46.84 KG/M2 | HEIGHT: 66 IN | TEMPERATURE: 97.7 F | OXYGEN SATURATION: 97 % | DIASTOLIC BLOOD PRESSURE: 86 MMHG | HEART RATE: 82 BPM

## 2020-04-03 DIAGNOSIS — M25.561 ACUTE PAIN OF RIGHT KNEE: ICD-10-CM

## 2020-04-03 DIAGNOSIS — M25.511 ACUTE PAIN OF RIGHT SHOULDER: ICD-10-CM

## 2020-04-03 DIAGNOSIS — M79.641 HAND PAIN, RIGHT: ICD-10-CM

## 2020-04-03 PROCEDURE — 99283 EMERGENCY DEPT VISIT LOW MDM: CPT

## 2020-04-03 RX ORDER — AMITRIPTYLINE HYDROCHLORIDE 100 MG/1
25 TABLET ORAL NIGHTLY
Status: SHIPPED | COMMUNITY
End: 2022-09-03

## 2020-04-03 RX ORDER — NAPROXEN 250 MG/1
500 TABLET ORAL 3 TIMES DAILY PRN
Status: SHIPPED | COMMUNITY
End: 2022-02-22

## 2020-04-03 ASSESSMENT — FIBROSIS 4 INDEX: FIB4 SCORE: 0.99

## 2020-04-04 ENCOUNTER — APPOINTMENT (OUTPATIENT)
Dept: RADIOLOGY | Facility: MEDICAL CENTER | Age: 40
End: 2020-04-04
Attending: EMERGENCY MEDICINE
Payer: MEDICARE

## 2020-04-04 PROCEDURE — 73090 X-RAY EXAM OF FOREARM: CPT | Mod: RT

## 2020-04-04 PROCEDURE — 73030 X-RAY EXAM OF SHOULDER: CPT | Mod: RT

## 2020-04-04 PROCEDURE — 73130 X-RAY EXAM OF HAND: CPT | Mod: RT

## 2020-04-04 PROCEDURE — 73060 X-RAY EXAM OF HUMERUS: CPT | Mod: RT

## 2020-04-04 PROCEDURE — 73564 X-RAY EXAM KNEE 4 OR MORE: CPT | Mod: RT

## 2020-04-04 PROCEDURE — A9270 NON-COVERED ITEM OR SERVICE: HCPCS | Performed by: EMERGENCY MEDICINE

## 2020-04-04 PROCEDURE — 700102 HCHG RX REV CODE 250 W/ 637 OVERRIDE(OP): Performed by: EMERGENCY MEDICINE

## 2020-04-04 RX ORDER — ACETAMINOPHEN 325 MG/1
650 TABLET ORAL ONCE
Status: COMPLETED | OUTPATIENT
Start: 2020-04-04 | End: 2020-04-04

## 2020-04-04 RX ADMIN — ACETAMINOPHEN 650 MG: 325 TABLET, FILM COATED ORAL at 00:15

## 2020-04-04 NOTE — ED TRIAGE NOTES
"Chief Complaint   Patient presents with   • GLF     mechanical, tripped while walking. hit chin.    • Arm Pain     R wrist, arm, shoulder pain    • Knee Pain     R knee       Pt presents to ed ambulatory with cane and steady gait after a mechanical glf yesterday where she tripped while walking. -loc Pt reports hitting her chin, along with landing on her R knee and R arm. Pt reports pain in both extremities, CMS intact.  Home brace applied to R wrist.     Pt placed back in lobby.  Informed of triage process and wait times, thanked for patience.  Pt instructed to notify staff of any symptom changes.    /86   Pulse 82   Temp 36.5 °C (97.7 °F) (Temporal)   Resp 19   Ht 1.676 m (5' 6\")   Wt (!) 132.2 kg (291 lb 7.2 oz)   SpO2 97%   BMI 47.04 kg/m²     "

## 2020-04-04 NOTE — ED PROVIDER NOTES
"ED Provider Note    Scribed for Odette Onofre D.O. by Kevan Godinez. 4/3/2020, 11:54 PM.    Primary care provider: Radha Hinojosa P.A.-C.  Means of arrival: walk in  History obtained from: Patient  History limited by: none    CHIEF COMPLAINT  Chief Complaint   Patient presents with   • GLF     mechanical, tripped while walking. hit chin.    • Arm Pain     R wrist, arm, shoulder pain    • Knee Pain     R knee        HPI  Yamila Mendoza is a 39 y.o. female who presents to the Emergency Department for an acute GLF onset yesterday. She states that she was walking at the park, and she tripped over an elevated curb when she tried to step onto the curb. She states that she landed on her right hand first then proceeded to land on her right shoulder, arm, and knee. She also hit her chin, but she denies any loss of consciousness.  She denies any pain associated with the chin at this time.  She states that after the fall she was \"stunned\" for a little. She was able to walk after the fall, but she states that she had a difficult time doing so. She states that her knee is still sore when walking on it, and she has to walk with a limp. She denies any nausea, vomiting, cough, shortness of breath, chest pain, or fever. She has rhinorrhea, but she says this is from allergies. She took aspirin for the pain with minimal alleviation, and she denies taking blood thinners regularly. She denies any recent contact with any COVID-19 positive people or any recent travel.      REVIEW OF SYSTEMS  See HPI for further details    PAST MEDICAL HISTORY   has a past medical history of ASTHMA, Bipolar disorder (HCC), Chronic back pain, Psychiatric disorder, Psychiatric disorder, and Seizure disorder (HCC).    SURGICAL HISTORY   has a past surgical history that includes other neurological surg; other orthopedic surgery; other abdominal surgery; ercp in or (9/19/2009); and joy by laparoscopy (9/22/2009).    SOCIAL HISTORY  Social History " "    Tobacco Use   • Smoking status: Never Smoker   • Smokeless tobacco: Never Used   Substance Use Topics   • Alcohol use: No   • Drug use: No      Social History     Substance and Sexual Activity   Drug Use No       FAMILY HISTORY  History reviewed. No pertinent family history.    CURRENT MEDICATIONS  Reviewed.  See Encounter Summary.     ALLERGIES  Allergies   Allergen Reactions   • Bactrim Unspecified     \"I get the shakes\"    • Iron Unspecified     \"see red dots\" vision, not rash    • Penicillins Unspecified     \"makes me dizzy with my medicine\"    • Phenobarbital Unspecified     \"makes me dizzy\"    • Lemon Oil Itching     Skin itching per pt report to Dietary.   • Onion Itching and Swelling     Throat swelling and itching per pt report to Dietary.   • [employer] Allergy Itching and Swelling     Throat swelling and itching per pt report to Dietary.       PHYSICAL EXAM  VITAL SIGNS: /86   Pulse 82   Temp 36.5 °C (97.7 °F) (Temporal)   Resp 19   Ht 1.676 m (5' 6\")   Wt (!) 132.2 kg (291 lb 7.2 oz)   SpO2 97%   BMI 47.04 kg/m²   Constitutional: Alert and in no apparent distress. Morbidly obese female  HENT: Normocephalic atraumatic. Bilateral external ears normal. Nose normal. Mucous membranes are moist. Has a small area of ecchymosis on chin with no abrasion or laceration.   Eyes: Pupils are equal and reactive. Conjunctiva normal. Non-icteric sclera.   Neck: Normal range of motion without tenderness. Supple. No meningeal signs.  Cardiovascular: Regular rate and rhythm. No murmurs, gallops or rubs.  Thorax & Lungs: Breath sounds are clear to auscultation bilaterally. No wheezing, rhonchi or rales.  Abdomen: Soft, nontender and nondistended. No peritoneal signs noted.  Skin: Warm and dry. No rashes are noted.  Back: No bony tenderness, No CVA tenderness.   Extremities: Cap refill is less than 2 seconds. No edema, cyanosis, or clubbing.   Musculoskeletal: Good range of motion in all major joints.  " Tenderness to palpation over the right third fourth and fifth metacarpals with ecchymosis over this area and swelling. 2+ radial pulse. Able to abduct fingers of right hand and make a thumbs up.  No tenderness to palpation in the anatomical snuffbox.  No tenderness palpation throughout the forearm, humerus, or shoulder.  Exam of the right knee revealed tenderness palpation around the knee with no specific point tenderness.  She had full flexion and extension of the knee and no obvious ligamentous laxity was noted.  She had a 2+ dorsalis pedis pulse.  Neurologic: Alert and oriented ×3. The patient moves all 4 extremities and follows commands.  Psychiatric: Affect is normal. Judgment appears to be intact.    DIAGNOSTIC STUDIES / PROCEDURES     RADIOLOGY  DX-KNEE COMPLETE 4+ RIGHT   Final Result      Mild medial knee osteoarthritis      DX-HAND 3+ RIGHT   Final Result      No radiographic evidence of acute displaced fracture      Dorsal soft tissue swelling      DX-FOREARM RIGHT   Final Result      No radiographic evidence of acute traumatic injury.      DX-HUMERUS 2+ RIGHT   Final Result      No radiographic evidence of acute traumatic injury.      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    COURSE & MEDICAL DECISION MAKING  Pertinent Labs & Imaging studies reviewed. (See chart for details)    11:54 PM - Patient seen and examined at bedside. Patient will be treated with Tylenol 650 mg PO. Ordered Dx forearm right, Dx hand right, Dx knee right, Dx humerus right, and Dx shoulder right to evaluate her symptoms. I informed the patient of the need for these tests to determine if any further tests or interventions are necessary at this time.  Patient verbalizes understanding and agreement to this plan of care.         Decision Making:  This is a 39 y.o. year old female who presents for evaluation after a mechanical fall.  She had complained of right upper extremity pain and right knee pain.  On my initial evaluation, she had some swelling and ecchymosis as well as tenderness palpation over the third through fifth metacarpals on the right.  She is grossly neurovascularly intact with no tenderness in the anatomic snuffbox and I have low suspicion for scaphoid fracture.  Plain films of the hand, forearm, humerus, and shoulder were obtained and no obvious fracture fractures or dislocations were noted.  I also obtained a plain film of the right knee given her discomfort there.  No acute fracture or dislocation was noted, but some osteoarthritis was noted. The patient and was informed that an occult fracture is still possible despite initial negative x-rays.  I reviewed the xray report and images.   I recommended that the patient follow up with their primary care physician for a repeat exam if pain persist in 5 to 7 days and that repeat x-ray studies will be necessary if pain persists.  I encouraged ibuprofen and Tylenol as well as ice, rest, and elevation as needed for symptomatic relief.  She is agreeable with the plan for discharge and will follow-up.  She will return to the ED with any worsening signs or symptoms.    FINAL IMPRESSION  1. Acute pain of right knee    2. Hand pain, right    3. Acute pain of right shoulder      PRESCRIPTIONS  Discharge Medication List as of 4/4/2020  2:16 AM        FOLLOW UP  Radha Hinojosa P.A.-C.  [address] [place] [id number] [phone]    Call in 1 day  To schedule a follow up appointment    [facility], Emergency Dept  [address] [place] [id number] [phone]  Go to   As needed    -DISCHARGE-     Kevan BELTRAN (Scrguille), am scribing for, and in the presence of, Odette Onofre,  MATHEUS.    Electronically signed by: Kevan Godinez (Linda), 4/3/2020    I, Odette Onofre D.O. personally performed the services described in this documentation, as scribed by Kevan Godinez in my presence, and it is both accurate and complete. E.    The note accurately reflects work and decisions made by me.  Odette Onofre D.O.  4/4/2020  2:29 AM

## 2020-04-04 NOTE — ED NOTES
RN Assist: Pt asked for another blanket. Aware of plan of care- waiting on X-ray results. Call light within reach.

## 2020-10-23 ENCOUNTER — APPOINTMENT (OUTPATIENT)
Dept: RADIOLOGY | Facility: MEDICAL CENTER | Age: 40
End: 2020-10-23
Attending: EMERGENCY MEDICINE
Payer: MEDICARE

## 2020-10-23 ENCOUNTER — HOSPITAL ENCOUNTER (OUTPATIENT)
Facility: MEDICAL CENTER | Age: 40
End: 2020-10-24
Attending: EMERGENCY MEDICINE | Admitting: STUDENT IN AN ORGANIZED HEALTH CARE EDUCATION/TRAINING PROGRAM
Payer: MEDICARE

## 2020-10-23 DIAGNOSIS — R56.9 SEIZURE (HCC): ICD-10-CM

## 2020-10-23 DIAGNOSIS — G40.909 SEIZURE DISORDER (HCC): ICD-10-CM

## 2020-10-23 DIAGNOSIS — G40.901 STATUS EPILEPTICUS (HCC): ICD-10-CM

## 2020-10-23 PROBLEM — E87.29 METABOLIC ACIDOSIS, INCREASED ANION GAP: Status: ACTIVE | Noted: 2020-10-23

## 2020-10-23 PROBLEM — J45.909 ASTHMA: Status: ACTIVE | Noted: 2020-10-23

## 2020-10-23 LAB
ALBUMIN SERPL BCP-MCNC: 4 G/DL (ref 3.2–4.9)
ALBUMIN/GLOB SERPL: 1.7 G/DL
ALP SERPL-CCNC: 42 U/L (ref 30–99)
ALT SERPL-CCNC: 8 U/L (ref 2–50)
AMPHET UR QL SCN: NEGATIVE
ANION GAP SERPL CALC-SCNC: 17 MMOL/L (ref 7–16)
APPEARANCE UR: CLEAR
APTT PPP: 25.5 SEC (ref 24.7–36)
AST SERPL-CCNC: 10 U/L (ref 12–45)
BACTERIA #/AREA URNS HPF: NEGATIVE /HPF
BARBITURATES UR QL SCN: NEGATIVE
BASOPHILS # BLD AUTO: 0.4 % (ref 0–1.8)
BASOPHILS # BLD: 0.03 K/UL (ref 0–0.12)
BENZODIAZ UR QL SCN: NEGATIVE
BILIRUB SERPL-MCNC: 0.2 MG/DL (ref 0.1–1.5)
BILIRUB UR QL STRIP.AUTO: NEGATIVE
BUN SERPL-MCNC: 11 MG/DL (ref 8–22)
BZE UR QL SCN: NEGATIVE
CALCIUM SERPL-MCNC: 8.7 MG/DL (ref 8.5–10.5)
CANNABINOIDS UR QL SCN: NEGATIVE
CHLORIDE SERPL-SCNC: 96 MMOL/L (ref 96–112)
CO2 SERPL-SCNC: 18 MMOL/L (ref 20–33)
COLOR UR: ABNORMAL
COVID ORDER STATUS COVID19: NORMAL
CREAT SERPL-MCNC: 0.54 MG/DL (ref 0.5–1.4)
EOSINOPHIL # BLD AUTO: 0.17 K/UL (ref 0–0.51)
EOSINOPHIL NFR BLD: 2 % (ref 0–6.9)
EPI CELLS #/AREA URNS HPF: ABNORMAL /HPF
ERYTHROCYTE [DISTWIDTH] IN BLOOD BY AUTOMATED COUNT: 44.5 FL (ref 35.9–50)
ETHANOL BLD-MCNC: <10.1 MG/DL (ref 0–10)
GLOBULIN SER CALC-MCNC: 2.4 G/DL (ref 1.9–3.5)
GLUCOSE SERPL-MCNC: 106 MG/DL (ref 65–99)
GLUCOSE UR STRIP.AUTO-MCNC: NEGATIVE MG/DL
HCG SERPL QL: NEGATIVE
HCT VFR BLD AUTO: 38.9 % (ref 37–47)
HGB BLD-MCNC: 13.1 G/DL (ref 12–16)
HYALINE CASTS #/AREA URNS LPF: ABNORMAL /LPF
IMM GRANULOCYTES # BLD AUTO: 0.05 K/UL (ref 0–0.11)
IMM GRANULOCYTES NFR BLD AUTO: 0.6 % (ref 0–0.9)
INR PPP: 1.02 (ref 0.87–1.13)
KETONES UR STRIP.AUTO-MCNC: ABNORMAL MG/DL
LACTATE BLD-SCNC: 2.2 MMOL/L (ref 0.5–2)
LACTATE BLD-SCNC: 3 MMOL/L (ref 0.5–2)
LEUKOCYTE ESTERASE UR QL STRIP.AUTO: NEGATIVE
LYMPHOCYTES # BLD AUTO: 2.42 K/UL (ref 1–4.8)
LYMPHOCYTES NFR BLD: 28.9 % (ref 22–41)
MAGNESIUM SERPL-MCNC: 1.8 MG/DL (ref 1.5–2.5)
MCH RBC QN AUTO: 30.3 PG (ref 27–33)
MCHC RBC AUTO-ENTMCNC: 33.7 G/DL (ref 33.6–35)
MCV RBC AUTO: 89.8 FL (ref 81.4–97.8)
METHADONE UR QL SCN: NEGATIVE
MICRO URNS: ABNORMAL
MONOCYTES # BLD AUTO: 0.95 K/UL (ref 0–0.85)
MONOCYTES NFR BLD AUTO: 11.3 % (ref 0–13.4)
NEUTROPHILS # BLD AUTO: 4.76 K/UL (ref 2–7.15)
NEUTROPHILS NFR BLD: 56.8 % (ref 44–72)
NITRITE UR QL STRIP.AUTO: NEGATIVE
NRBC # BLD AUTO: 0 K/UL
NRBC BLD-RTO: 0 /100 WBC
OPIATES UR QL SCN: NEGATIVE
OXYCODONE UR QL SCN: NEGATIVE
PCP UR QL SCN: NEGATIVE
PH UR STRIP.AUTO: 5 [PH] (ref 5–8)
PHOSPHATE SERPL-MCNC: 2.4 MG/DL (ref 2.5–4.5)
PLATELET # BLD AUTO: 151 K/UL (ref 164–446)
PMV BLD AUTO: 9.4 FL (ref 9–12.9)
POTASSIUM SERPL-SCNC: 3.5 MMOL/L (ref 3.6–5.5)
PROPOXYPH UR QL SCN: NEGATIVE
PROT SERPL-MCNC: 6.4 G/DL (ref 6–8.2)
PROT UR QL STRIP: 100 MG/DL
PROTHROMBIN TIME: 13.7 SEC (ref 12–14.6)
RBC # BLD AUTO: 4.33 M/UL (ref 4.2–5.4)
RBC # URNS HPF: ABNORMAL /HPF
RBC UR QL AUTO: ABNORMAL
SARS-COV-2 RNA RESP QL NAA+PROBE: NOTDETECTED
SODIUM SERPL-SCNC: 131 MMOL/L (ref 135–145)
SP GR UR STRIP.AUTO: 1.03
SPECIMEN SOURCE: NORMAL
UROBILINOGEN UR STRIP.AUTO-MCNC: 1 MG/DL
VALPROATE SERPL-MCNC: 66.7 UG/ML (ref 50–100)
WBC # BLD AUTO: 8.4 K/UL (ref 4.8–10.8)
WBC #/AREA URNS HPF: ABNORMAL /HPF

## 2020-10-23 PROCEDURE — A9270 NON-COVERED ITEM OR SERVICE: HCPCS | Performed by: STUDENT IN AN ORGANIZED HEALTH CARE EDUCATION/TRAINING PROGRAM

## 2020-10-23 PROCEDURE — 80053 COMPREHEN METABOLIC PANEL: CPT

## 2020-10-23 PROCEDURE — 85025 COMPLETE CBC W/AUTO DIFF WBC: CPT

## 2020-10-23 PROCEDURE — 99285 EMERGENCY DEPT VISIT HI MDM: CPT

## 2020-10-23 PROCEDURE — 80164 ASSAY DIPROPYLACETIC ACD TOT: CPT

## 2020-10-23 PROCEDURE — 97161 PT EVAL LOW COMPLEX 20 MIN: CPT

## 2020-10-23 PROCEDURE — 95819 EEG AWAKE AND ASLEEP: CPT | Mod: 26 | Performed by: STUDENT IN AN ORGANIZED HEALTH CARE EDUCATION/TRAINING PROGRAM

## 2020-10-23 PROCEDURE — 83735 ASSAY OF MAGNESIUM: CPT

## 2020-10-23 PROCEDURE — 700111 HCHG RX REV CODE 636 W/ 250 OVERRIDE (IP)

## 2020-10-23 PROCEDURE — G0378 HOSPITAL OBSERVATION PER HR: HCPCS

## 2020-10-23 PROCEDURE — A9270 NON-COVERED ITEM OR SERVICE: HCPCS | Performed by: INTERNAL MEDICINE

## 2020-10-23 PROCEDURE — 81001 URINALYSIS AUTO W/SCOPE: CPT | Mod: XU

## 2020-10-23 PROCEDURE — 36415 COLL VENOUS BLD VENIPUNCTURE: CPT

## 2020-10-23 PROCEDURE — 96372 THER/PROPH/DIAG INJ SC/IM: CPT

## 2020-10-23 PROCEDURE — 700102 HCHG RX REV CODE 250 W/ 637 OVERRIDE(OP): Performed by: STUDENT IN AN ORGANIZED HEALTH CARE EDUCATION/TRAINING PROGRAM

## 2020-10-23 PROCEDURE — U0003 INFECTIOUS AGENT DETECTION BY NUCLEIC ACID (DNA OR RNA); SEVERE ACUTE RESPIRATORY SYNDROME CORONAVIRUS 2 (SARS-COV-2) (CORONAVIRUS DISEASE [COVID-19]), AMPLIFIED PROBE TECHNIQUE, MAKING USE OF HIGH THROUGHPUT TECHNOLOGIES AS DESCRIBED BY CMS-2020-01-R: HCPCS

## 2020-10-23 PROCEDURE — 94760 N-INVAS EAR/PLS OXIMETRY 1: CPT

## 2020-10-23 PROCEDURE — C9803 HOPD COVID-19 SPEC COLLECT: HCPCS | Performed by: EMERGENCY MEDICINE

## 2020-10-23 PROCEDURE — 83605 ASSAY OF LACTIC ACID: CPT

## 2020-10-23 PROCEDURE — 70450 CT HEAD/BRAIN W/O DYE: CPT

## 2020-10-23 PROCEDURE — 700105 HCHG RX REV CODE 258: Performed by: EMERGENCY MEDICINE

## 2020-10-23 PROCEDURE — 97165 OT EVAL LOW COMPLEX 30 MIN: CPT

## 2020-10-23 PROCEDURE — 99220 PR INITIAL OBSERVATION CARE,LEVL III: CPT | Mod: AI | Performed by: STUDENT IN AN ORGANIZED HEALTH CARE EDUCATION/TRAINING PROGRAM

## 2020-10-23 PROCEDURE — 80307 DRUG TEST PRSMV CHEM ANLYZR: CPT

## 2020-10-23 PROCEDURE — 84100 ASSAY OF PHOSPHORUS: CPT

## 2020-10-23 PROCEDURE — 84703 CHORIONIC GONADOTROPIN ASSAY: CPT

## 2020-10-23 PROCEDURE — 85730 THROMBOPLASTIN TIME PARTIAL: CPT

## 2020-10-23 PROCEDURE — 700102 HCHG RX REV CODE 250 W/ 637 OVERRIDE(OP): Performed by: INTERNAL MEDICINE

## 2020-10-23 PROCEDURE — 95819 EEG AWAKE AND ASLEEP: CPT | Performed by: STUDENT IN AN ORGANIZED HEALTH CARE EDUCATION/TRAINING PROGRAM

## 2020-10-23 PROCEDURE — 85610 PROTHROMBIN TIME: CPT

## 2020-10-23 PROCEDURE — 700111 HCHG RX REV CODE 636 W/ 250 OVERRIDE (IP): Performed by: STUDENT IN AN ORGANIZED HEALTH CARE EDUCATION/TRAINING PROGRAM

## 2020-10-23 RX ORDER — CARBAMAZEPINE 200 MG/1
200 TABLET ORAL 3 TIMES DAILY
Status: DISCONTINUED | OUTPATIENT
Start: 2020-10-23 | End: 2020-10-24 | Stop reason: HOSPADM

## 2020-10-23 RX ORDER — LABETALOL HYDROCHLORIDE 5 MG/ML
10 INJECTION, SOLUTION INTRAVENOUS EVERY 4 HOURS PRN
Status: DISCONTINUED | OUTPATIENT
Start: 2020-10-23 | End: 2020-10-24 | Stop reason: HOSPADM

## 2020-10-23 RX ORDER — DIVALPROEX SODIUM 250 MG/1
500-750 TABLET, EXTENDED RELEASE ORAL DAILY
COMMUNITY
End: 2022-06-17 | Stop reason: SDUPTHER

## 2020-10-23 RX ORDER — VITAMIN E 268 MG
400 CAPSULE ORAL DAILY
Status: ON HOLD | COMMUNITY
End: 2020-10-23

## 2020-10-23 RX ORDER — DIVALPROEX SODIUM 500 MG/1
500 TABLET, DELAYED RELEASE ORAL 2 TIMES DAILY
Status: DISCONTINUED | OUTPATIENT
Start: 2020-10-23 | End: 2020-10-24 | Stop reason: HOSPADM

## 2020-10-23 RX ORDER — ONDANSETRON 4 MG/1
4 TABLET, ORALLY DISINTEGRATING ORAL EVERY 4 HOURS PRN
Status: DISCONTINUED | OUTPATIENT
Start: 2020-10-23 | End: 2020-10-24 | Stop reason: HOSPADM

## 2020-10-23 RX ORDER — VITAMIN E 268 MG
400 CAPSULE ORAL DAILY
Status: DISCONTINUED | OUTPATIENT
Start: 2020-10-23 | End: 2020-10-23

## 2020-10-23 RX ORDER — SODIUM CHLORIDE, SODIUM LACTATE, POTASSIUM CHLORIDE, CALCIUM CHLORIDE 600; 310; 30; 20 MG/100ML; MG/100ML; MG/100ML; MG/100ML
1000 INJECTION, SOLUTION INTRAVENOUS ONCE
Status: COMPLETED | OUTPATIENT
Start: 2020-10-23 | End: 2020-10-23

## 2020-10-23 RX ORDER — PROCHLORPERAZINE EDISYLATE 5 MG/ML
5-10 INJECTION INTRAMUSCULAR; INTRAVENOUS EVERY 4 HOURS PRN
Status: DISCONTINUED | OUTPATIENT
Start: 2020-10-23 | End: 2020-10-24 | Stop reason: HOSPADM

## 2020-10-23 RX ORDER — TOPIRAMATE 100 MG/1
100 TABLET, FILM COATED ORAL 2 TIMES DAILY
Status: ON HOLD | COMMUNITY
End: 2022-09-06 | Stop reason: SDUPTHER

## 2020-10-23 RX ORDER — ALBUTEROL SULFATE 90 UG/1
2 AEROSOL, METERED RESPIRATORY (INHALATION) EVERY 6 HOURS PRN
COMMUNITY
End: 2022-06-17 | Stop reason: SDUPTHER

## 2020-10-23 RX ORDER — LORAZEPAM 2 MG/ML
4 INJECTION INTRAMUSCULAR
Status: DISCONTINUED | OUTPATIENT
Start: 2020-10-23 | End: 2020-10-24 | Stop reason: HOSPADM

## 2020-10-23 RX ORDER — HYDROXYZINE HYDROCHLORIDE 25 MG/1
25 TABLET, FILM COATED ORAL 2 TIMES DAILY PRN
COMMUNITY
End: 2022-09-03

## 2020-10-23 RX ORDER — HYDROXYZINE HYDROCHLORIDE 25 MG/1
25 TABLET, FILM COATED ORAL 2 TIMES DAILY PRN
Status: DISCONTINUED | OUTPATIENT
Start: 2020-10-23 | End: 2020-10-24 | Stop reason: HOSPADM

## 2020-10-23 RX ORDER — ONDANSETRON 2 MG/ML
4 INJECTION INTRAMUSCULAR; INTRAVENOUS EVERY 4 HOURS PRN
Status: DISCONTINUED | OUTPATIENT
Start: 2020-10-23 | End: 2020-10-24 | Stop reason: HOSPADM

## 2020-10-23 RX ORDER — VALPROIC ACID 250 MG/1
250 CAPSULE, LIQUID FILLED ORAL 2 TIMES DAILY
Status: DISCONTINUED | OUTPATIENT
Start: 2020-10-23 | End: 2020-10-23

## 2020-10-23 RX ORDER — ACETAMINOPHEN 325 MG/1
650 TABLET ORAL EVERY 6 HOURS PRN
Status: DISCONTINUED | OUTPATIENT
Start: 2020-10-23 | End: 2020-10-24 | Stop reason: HOSPADM

## 2020-10-23 RX ORDER — POLYETHYLENE GLYCOL 3350 17 G/17G
1 POWDER, FOR SOLUTION ORAL
Status: DISCONTINUED | OUTPATIENT
Start: 2020-10-23 | End: 2020-10-24 | Stop reason: HOSPADM

## 2020-10-23 RX ORDER — AMITRIPTYLINE HYDROCHLORIDE 100 MG/1
100 TABLET ORAL NIGHTLY
Status: DISCONTINUED | OUTPATIENT
Start: 2020-10-23 | End: 2020-10-23

## 2020-10-23 RX ORDER — LORAZEPAM 2 MG/ML
2 INJECTION INTRAMUSCULAR
Status: DISCONTINUED | OUTPATIENT
Start: 2020-10-23 | End: 2020-10-23

## 2020-10-23 RX ORDER — PROMETHAZINE HYDROCHLORIDE 25 MG/1
12.5-25 SUPPOSITORY RECTAL EVERY 4 HOURS PRN
Status: DISCONTINUED | OUTPATIENT
Start: 2020-10-23 | End: 2020-10-24 | Stop reason: HOSPADM

## 2020-10-23 RX ORDER — CARBAMAZEPINE 200 MG/1
200 TABLET ORAL 3 TIMES DAILY
Status: ON HOLD | COMMUNITY
End: 2022-09-06 | Stop reason: SDUPTHER

## 2020-10-23 RX ORDER — DIVALPROEX SODIUM 500 MG/1
500-750 TABLET, EXTENDED RELEASE ORAL DAILY
Status: DISCONTINUED | OUTPATIENT
Start: 2020-10-23 | End: 2020-10-23

## 2020-10-23 RX ORDER — AMITRIPTYLINE HYDROCHLORIDE 10 MG/1
25 TABLET, FILM COATED ORAL NIGHTLY
Status: DISCONTINUED | OUTPATIENT
Start: 2020-10-23 | End: 2020-10-24 | Stop reason: HOSPADM

## 2020-10-23 RX ORDER — AMOXICILLIN 250 MG
2 CAPSULE ORAL 2 TIMES DAILY
Status: DISCONTINUED | OUTPATIENT
Start: 2020-10-23 | End: 2020-10-24 | Stop reason: HOSPADM

## 2020-10-23 RX ORDER — POTASSIUM CHLORIDE 20 MEQ/1
40 TABLET, EXTENDED RELEASE ORAL DAILY
Status: DISCONTINUED | OUTPATIENT
Start: 2020-10-23 | End: 2020-10-24 | Stop reason: HOSPADM

## 2020-10-23 RX ORDER — TOPIRAMATE 25 MG/1
100 TABLET ORAL 2 TIMES DAILY
Status: DISCONTINUED | OUTPATIENT
Start: 2020-10-23 | End: 2020-10-24 | Stop reason: HOSPADM

## 2020-10-23 RX ORDER — PROMETHAZINE HYDROCHLORIDE 25 MG/1
12.5-25 TABLET ORAL EVERY 4 HOURS PRN
Status: DISCONTINUED | OUTPATIENT
Start: 2020-10-23 | End: 2020-10-24 | Stop reason: HOSPADM

## 2020-10-23 RX ORDER — POTASSIUM CHLORIDE 750 MG/1
10 TABLET, EXTENDED RELEASE ORAL DAILY
COMMUNITY
End: 2022-09-03

## 2020-10-23 RX ORDER — BISACODYL 10 MG
10 SUPPOSITORY, RECTAL RECTAL
Status: DISCONTINUED | OUTPATIENT
Start: 2020-10-23 | End: 2020-10-24 | Stop reason: HOSPADM

## 2020-10-23 RX ORDER — LORAZEPAM 2 MG/ML
INJECTION INTRAMUSCULAR
Status: COMPLETED
Start: 2020-10-23 | End: 2020-10-23

## 2020-10-23 RX ORDER — TOPIRAMATE 25 MG/1
25 TABLET ORAL EVERY 12 HOURS
Status: DISCONTINUED | OUTPATIENT
Start: 2020-10-23 | End: 2020-10-23

## 2020-10-23 RX ADMIN — CHOLECALCIFEROL (VITAMIN D3) 10 MCG (400 UNIT) TABLET 400 UNITS: at 17:44

## 2020-10-23 RX ADMIN — ENOXAPARIN SODIUM 40 MG: 40 INJECTION SUBCUTANEOUS at 06:59

## 2020-10-23 RX ADMIN — SODIUM CHLORIDE, POTASSIUM CHLORIDE, SODIUM LACTATE AND CALCIUM CHLORIDE 1000 ML: 600; 310; 30; 20 INJECTION, SOLUTION INTRAVENOUS at 04:59

## 2020-10-23 RX ADMIN — ACETAMINOPHEN 650 MG: 325 TABLET, FILM COATED ORAL at 19:49

## 2020-10-23 RX ADMIN — DIVALPROEX SODIUM 500 MG: 500 TABLET, DELAYED RELEASE ORAL at 14:09

## 2020-10-23 RX ADMIN — CARBAMAZEPINE 200 MG: 200 TABLET ORAL at 17:44

## 2020-10-23 RX ADMIN — AMITRIPTYLINE HYDROCHLORIDE 25 MG: 10 TABLET, FILM COATED ORAL at 21:41

## 2020-10-23 RX ADMIN — DIVALPROEX SODIUM 750 MG: 500 TABLET, DELAYED RELEASE ORAL at 21:43

## 2020-10-23 RX ADMIN — LORAZEPAM 2 MG: 2 INJECTION INTRAMUSCULAR; INTRAVENOUS at 02:50

## 2020-10-23 RX ADMIN — VALPROIC ACID 250 MG: 250 CAPSULE, LIQUID FILLED ORAL at 06:58

## 2020-10-23 RX ADMIN — CARBAMAZEPINE 200 MG: 200 TABLET ORAL at 14:08

## 2020-10-23 RX ADMIN — TOPIRAMATE 100 MG: 25 TABLET, FILM COATED ORAL at 17:44

## 2020-10-23 ASSESSMENT — COGNITIVE AND FUNCTIONAL STATUS - GENERAL
DAILY ACTIVITIY SCORE: 20
MOVING FROM LYING ON BACK TO SITTING ON SIDE OF FLAT BED: A LITTLE
DRESSING REGULAR UPPER BODY CLOTHING: A LITTLE
TOILETING: A LITTLE
CLIMB 3 TO 5 STEPS WITH RAILING: A LOT
WALKING IN HOSPITAL ROOM: A LITTLE
STANDING UP FROM CHAIR USING ARMS: A LITTLE
SUGGESTED CMS G CODE MODIFIER DAILY ACTIVITY: CJ
HELP NEEDED FOR BATHING: A LITTLE
SUGGESTED CMS G CODE MODIFIER MOBILITY: CK
MOBILITY SCORE: 19
DRESSING REGULAR LOWER BODY CLOTHING: A LITTLE

## 2020-10-23 ASSESSMENT — GAIT ASSESSMENTS
DEVIATION: ATAXIC;DECREASED BASE OF SUPPORT;OTHER (COMMENT)
DISTANCE (FEET): 60
GAIT LEVEL OF ASSIST: MINIMAL ASSIST
ASSISTIVE DEVICE: HAND HELD ASSIST

## 2020-10-23 ASSESSMENT — COPD QUESTIONNAIRES
DURING THE PAST 4 WEEKS HOW MUCH DID YOU FEEL SHORT OF BREATH: NONE/LITTLE OF THE TIME
HAVE YOU SMOKED AT LEAST 100 CIGARETTES IN YOUR ENTIRE LIFE: NO/DON'T KNOW
COPD SCREENING SCORE: 0
DO YOU EVER COUGH UP ANY MUCUS OR PHLEGM?: NO/ONLY WITH OCCASIONAL COLDS OR INFECTIONS

## 2020-10-23 ASSESSMENT — FIBROSIS 4 INDEX
FIB4 SCORE: 0.96
FIB4 SCORE: 0.99

## 2020-10-23 ASSESSMENT — PAIN DESCRIPTION - PAIN TYPE
TYPE: ACUTE PAIN

## 2020-10-23 ASSESSMENT — ACTIVITIES OF DAILY LIVING (ADL): TOILETING: INDEPENDENT

## 2020-10-23 NOTE — PROGRESS NOTES
2 RN Skin Check    2 RN skin check completed with GIRISH Brady.   Devices in place: Tele box.  Skin assessed under devices: yes.  Confirmed pressure ulcers found on: N/A.  New potential pressure ulcers noted on N/A. Wound consult placed N/A.  The following interventions in place Pillows.    Patient has generalized scabs, dependent edema bilaterally in the lower extremities. Brown discoloration on the left lower leg. Rash in the inner thighs but blanching. Feet are dry and calloused but skin is clean dry and intact. A brace is in place on the right hand.

## 2020-10-23 NOTE — PROGRESS NOTES
Pt's JOHN PAUL reardon said we can call her Dr. Garcia at the [facility] at [phone] and they will have the patients seizure medications on file.

## 2020-10-23 NOTE — ED NOTES
Bedside report given to GIRISH Torrez. Pt transported up to T706-01 on zoll monitor with RACHEL RN. All personal belongings taken with patient up to assigned floor.

## 2020-10-23 NOTE — ASSESSMENT & PLAN NOTE
-Long history of seizure disorder as well as multiple admissions for running out of AEDs and then subsequently having seizures.  -Patient is quite somnolent following her seizure and 2 mg Ativan administration in the ED just prior to my arrival, and is having difficulty providing full history, she is following commands, moving all extremities, arousable to stimulation.    -She did say yes when asked if she ran out of some of her medication for seizures, she was continually falling asleep when asked about what her medications are and seemed to fall asleep while thinking of the names.  -On chart review and the EMR, it indicates that she was on valproic acid and Lamictal earlier this year, had a seizure when she ran out of her medication and was seen in [facility] ED.  -Valproic acid level 66.7 on admission  -I resumed valproic acid and Lamictal  -Ordered EEG and neurology consultation  -There is no reports of trauma, CT head negative for acute intracranial trauma.  She is not having headaches, neck rigidity or stiffness, no leukocytosis or fever, no signs or indications of meningitis.  -Urine drug screen is negative, urinalysis does not indicate infection.  -Ativan ordered as needed for seizure, if she continues to have seizures she will need to be transferred to the ICU and started on a drip.

## 2020-10-23 NOTE — ED TRIAGE NOTES
Chief Complaint   Patient presents with   • Seizure     Pt found down by partner actively seizing. Seizure lasted approx 3-5 mins. Inc of urine. Pt has hx of seizures, on Depakote.        BIB EMS- EMS vitals Bp 114/93, pulse 96, RR 14, 96% on RA, GCS 11 (postictal), blood glucose 102.

## 2020-10-23 NOTE — THERAPY
Occupational Therapy   Initial Evaluation     Patient Name: Yamila Mendoza  Age:  39 y.o., Sex:  female  Medical Record #: [id number]  Today's Date: 10/23/2020     Precautions  Precautions: Fall Risk  Comments: hx of seizure disorder     Assessment  Patient is 39 y.o. female with a diagnosis of Pt found down by partner actively seizing.   Pt currently limited by decreased functional mobility, activity tolerance, cognition,  coordination, balance,  which are affecting pt's ability to complete ADLs/IADLs at baseline. Pt would benefit from OT services in the acute care setting to maximize functional recovery.       Plan    Recommend Occupational Therapy 3 times per week until therapy goals are met for the following treatments:  Self Care/Activities of Daily Living and Therapeutic Activities.       Discharge Recommendations: (P) Recommend post-acute placement for additional occupational therapy services prior to discharge home        10/23/20 1014   Prior Living Situation   Prior Services None   Housing / Facility 1 [facility]   Steps Into Home 0   Steps In Home 0   Equipment Owned Single Point Cane   Lives with - Patient's Self Care Capacity Significant Other   Prior Level of ADL Function   Self Feeding Independent   Grooming / Hygiene Independent   Bathing Independent   Dressing Independent   Toileting Independent   ADL Assessment   Grooming Supervision   Upper Body Dressing Supervision   Lower Body Dressing Minimal Assist   Toileting Minimal Assist   Functional Mobility   Sit to Stand Minimal Assist   Bed, Chair, Wheelchair Transfer Minimal Assist   Toilet Transfers Minimal Assist   Short Term Goals   Short Term Goal # 1 supervised with LB dressing   Short Term Goal # 2 supervised with ADL txfs

## 2020-10-23 NOTE — H&P
[facility] Medicine History & Physical Note    Date of Service  10/23/2020    Primary Care Physician  Radha Hinojosa P.A.-C.    Consultants  None    Code Status  Full Code    Chief Complaint  Chief Complaint   Patient presents with   • Seizure     Pt found down by partner actively seizing. Seizure lasted approx 3-5 mins. Inc of urine. Pt has hx of seizures, on Depakote.        History of Presenting Illness  39 y.o. female past medical history of seizure sorter, asthma, bipolar disorder, who presented 10/23/2020 with seizures.    Patient was brought to the emergency room by EMS after her partner called for the patient actively seizing.  Patient had an additional seizure while in the ED that resolved after being given 2 mg of Ativan.  History is limited as the patient is postictal and just been given 2 mg IV Ativan, no one at bedside the patient is quite somnolent, patient's  did not answer the phone for collateral information.      On arrival to the ED the patient had normal vitals, /72 heart rate 92, respiratory rate 16 with 99% saturations on room air and afebrile.  Lab work reveals unremarkable CBC, CHEM panel with sodium 131, potassium 3.5, metabolic acidosis bicarb 18, anion gap 17, normal renal function and LFTs.  Lactic acid 3.0.  Urine drug screen is negative, urinalysis does not indicate infection, CT head negative for acute intracranial abnormality but does reveal nonspecific white matter changes associated with small vessel ischemic disease and mild cerebral atrophy.  On chart review it seems the patient has had at least 2 admissions for seizures after either running out of her medicine or not taking her medication for epilepsy.  Valproic acid drawn in the ED noted to be therapeutic 66.7, and it does appear that at least earlier in the year she was taking Lamictal.  During my interview the patient was very somnolent repeatedly falling asleep during the exam but was arousable and did follow  "simple commands.  She indicated she was taking more than 1 antiepileptic drug but when asked the name of it she was falling asleep while thinking of the name.  She denied all questions involving pain or discomfort or tenderness during the physical exam and history.      Review of Systems  Review of Systems   Unable to perform ROS: Acuity of condition       Past Medical History   has a past medical history of ASTHMA, Bipolar disorder (HCC), Chronic back pain, Psychiatric disorder, Psychiatric disorder, and Seizure disorder (HCC).    Surgical History   has a past surgical history that includes other neurological surg; other orthopedic surgery; other abdominal surgery; ercp in or (9/19/2009); and joy by laparoscopy (9/22/2009).     Family History  family history is not on file.     Social History   reports that she has never smoked. She has never used smokeless tobacco. She reports that she does not drink alcohol or use drugs.    Allergies  Allergies   Allergen Reactions   • Bactrim Unspecified     \"I get the shakes\"    • Iron Unspecified     \"see red dots\" vision, not rash    • Penicillins Unspecified     \"makes me dizzy with my medicine\"    • Phenobarbital Unspecified     \"makes me dizzy\"    • Lemon Oil Itching     Skin itching per pt report to Dietary.   • Onion Itching and Swelling     Throat swelling and itching per pt report to Dietary.   • [employer] Allergy Itching and Swelling     Throat swelling and itching per pt report to Dietary.       Medications  Prior to Admission Medications   Prescriptions Last Dose Informant Patient Reported? Taking?   amitriptyline (ELAVIL) 100 MG Tab   Yes No   Sig: Take 100 mg by mouth every evening.   cyclobenzaprine (FLEXERIL) 10 MG Tab  Patient Yes No   Sig: Take 25 mg by mouth at bedtime as needed for Muscle Spasms.   loratadine (CLARITIN) 10 MG Tab  Patient No No   Sig: Take 1 Tab by mouth every day.   naproxen (NAPROSYN) 250 MG Tab   Yes No   Sig: Take 250 mg by " mouth 2 times a day, with meals.      Facility-Administered Medications: None       Physical Exam  Temp:  [35.6 °C (96.1 °F)-37.1 °C (98.7 °F)] 37.1 °C (98.7 °F)  Pulse:  [71-96] 71  Resp:  [16-22] 18  BP: (106-151)/(66-82) 123/70  SpO2:  [97 %-100 %] 99 %    Physical Exam  Constitutional:       General: She is not in acute distress.     Appearance: She is not toxic-appearing.      Comments: Obese female looks older than her stated age, sleeping and quite somnolent, arousable and does follow simple commands,   HENT:      Head: Normocephalic and atraumatic.      Nose: Nose normal. No congestion or rhinorrhea.      Mouth/Throat:      Mouth: Mucous membranes are moist.      Pharynx: No posterior oropharyngeal erythema.   Eyes:      General: No scleral icterus.     Extraocular Movements: Extraocular movements intact.      Pupils: Pupils are equal, round, and reactive to light.   Neck:      Musculoskeletal: Normal range of motion and neck supple. No neck rigidity.   Cardiovascular:      Rate and Rhythm: Normal rate and regular rhythm.      Heart sounds: Normal heart sounds. No murmur. No gallop.    Pulmonary:      Effort: Pulmonary effort is normal. No respiratory distress.      Breath sounds: No rhonchi.      Comments: Patient is somnolent but maintaining her airway, breathing without distress and unlabored, maintaining saturations 99% on room air.  Abdominal:      General: Bowel sounds are normal.      Palpations: Abdomen is soft.      Tenderness: There is no abdominal tenderness. There is no guarding.   Musculoskeletal:         General: No deformity or signs of injury.      Right lower leg: Edema present.      Left lower leg: Edema present.   Skin:     General: Skin is warm and dry.      Capillary Refill: Capillary refill takes less than 2 seconds.   Neurological:      Comments: Full neurological exam unable to be completed due to patient's somnolence following seizure and Ativan administration, she does wake up and  will answer simple questions when aroused quickly falls back asleep, observed to move all extremities, grossly sensation is intact in all extremities did observe her moving her eyes in all directions         Laboratory:  Recent Labs     10/23/20  0245   WBC 8.4   RBC 4.33   HEMOGLOBIN 13.1   HEMATOCRIT 38.9   MCV 89.8   MCH 30.3   MCHC 33.7   RDW 44.5   PLATELETCT 151*   MPV 9.4     Recent Labs     10/23/20  0245   SODIUM 131*   POTASSIUM 3.5*   CHLORIDE 96   CO2 18*   GLUCOSE 106*   BUN 11   CREATININE 0.54   CALCIUM 8.7     Recent Labs     10/23/20  0245   ALTSGPT 8   ASTSGOT 10*   ALKPHOSPHAT 42   TBILIRUBIN 0.2   GLUCOSE 106*     Recent Labs     10/23/20  0245   APTT 25.5   INR 1.02     No results for input(s): NTPROBNP in the last 72 hours.      No results for input(s): TROPONINT in the last 72 hours.    Imaging:  CT-HEAD W/O   Final Result         1.  No acute intracranial abnormality is identified, there are nonspecific white matter changes, commonly associated with small vessel ischemic disease.  Associated mild cerebral atrophy is noted.   2.  Scattered bilateral dural calcification, similar to prior study.            Assessment/Plan:  I anticipate this patient is appropriate for observation status at this time.    Seizure disorder (HCC)- (present on admission)  Assessment & Plan  -Long history of seizure disorder as well as multiple admissions for running out of AEDs and then subsequently having seizures.  -Patient is quite somnolent following her seizure and 2 mg Ativan administration in the ED just prior to my arrival, and is having difficulty providing full history, she is following commands, moving all extremities, arousable to stimulation.    -She did say yes when asked if she ran out of some of her medication for seizures, she was continually falling asleep when asked about what her medications are and seemed to fall asleep while thinking of the names.  -On chart review and the EMR, it indicates that  she was on valproic acid and Lamictal earlier this year, had a seizure when she ran out of her medication and was seen in [facility] ED.  -Valproic acid level 66.7 on admission  -I resumed valproic acid and Lamictal  -Ordered EEG and neurology consultation  -There is no reports of trauma, CT head negative for acute intracranial trauma.  She is not having headaches, neck rigidity or stiffness, no leukocytosis or fever, no signs or indications of meningitis.  -Urine drug screen is negative, urinalysis does not indicate infection.  -Ativan ordered as needed for seizure, if she continues to have seizures she will need to be transferred to the ICU and started on a drip.      Asthma- (present on admission)  Assessment & Plan  No respiratory distress or reports of increased cough prior to admission  RT consulted, continuous pulse ox given somnolence following seizure and obesity.    Hypokalemia- (present on admission)  Assessment & Plan  3.5 admission, will replete with K-Dur, follow-up with BMP.

## 2020-10-23 NOTE — PROCEDURES
VIDEO ELECTROENCEPHALOGRAM REPORT      Referring provider: Albino Romo M.D    DOS: 10/23/20 (0 hours and 25 minutes of total recording time).     INDICATION:  Yamila Mendoza 39 y.o. female presenting with seizures.    CURRENT ANTIEPILEPTIC REGIMEN: s/p ativan push. Depakene, Topamax    TECHNIQUE: 30 channel video electroencephalogram (EEG) was performed in accordance with the international 10-20 system. The study was reviewed in bipolar and referential montages. The recording examined the patient during wakeful and drowsy/sleep state(s).     DESCRIPTION OF THE RECORD:  During the wakefulness, the background was continuous and showed a symmetrical 7 Hz posterior dominant rhythm, with a mixture of alpha and theta vincent.  There was reactivity to eye closure/opening.  A normal anterior-posterior gradient was noted with faster beta frequencies seen anteriorly.  During drowsiness, theta/delta frequencies were seen.    Sleep was/was not captured and was characterized by diffuse background delta/theta activity with a loss of myogenic artifact.  N2 sleep transients in the form of symmetrical sleep spindles and vertex sharps were seen in the leads over the central regions.     ACTIVATION PROCEDURES:   Intermittent Photic stimulation was performed in a stepwise fashion from 1 to 30 Hz, and did not elicit any abnormal responses.    ICTAL AND INTERICTAL FINDINGS:   No focal or generalized epileptiform activity noted.     No regional slowing was seen during this routine study.      No clinical events or seizures were reported or recorded during the study.     EKG: sampling of the EKG recording did not demonstrate any abnormalities    EVENTS:  None    INTERPRETATION:  This is an abnormal video EEG recording in the awake and drowsy/sleep state(s):  Mild diffuse background slowing suggestive of diffuse/multifocal cerebral dysfunction and consistent with a mild encephalopathy.  No epileptiform discharges  No seizures.  Clinical correlation is recommended.    Note: This EEG does not rule out epilepsy.  If the clinical suspicion remains high for seizures, a prolonged recording to capture clinical or subclinical events may be helpful.        Sohail Villa MD  Epilepsy and General Neurology  Department of Neurology  Clinical  of Neurology [facility].   Office: [phone]  Fax: [phone]

## 2020-10-23 NOTE — ED PROVIDER NOTES
ED Provider Note        Primary care provider: Radha Hinojosa P.A.-C.    I verified that the patient was wearing a mask and I was wearing appropriate PPE every time I entered the room. The patient's mask was on the patient at all times during my encounter except for a brief view of the oropharynx.      CHIEF COMPLAINT  Chief Complaint   Patient presents with   • Seizure     Pt found down by partner actively seizing. Seizure lasted approx 3-5 mins. Inc of urine. Pt has hx of seizures, on Depakote.        HPI  Yamila Mendoza is a 39 y.o. female who presents to the Emergency Department with chief complaint of seizure.  Patency was found by her significant other actively seizing with tonic-clonic movements on the ground.  Estimated 3 minutes of seizure activity and then postictal following then transported here by EMS reported improving mental status but still somewhat confused and very minimal verbal responses.  I was then called to the patient's room when she was having a second tonic-clonic seizure that lasted approximately 60 seconds with tonic-clonic movements and all extremities.  Patient was given 2 mg of Ativan shortly after the seizure subsided.  Further history limited by altered mental status and postictal state    REVIEW OF SYSTEMS  As per HPI otherwise limited by altered mental status postictal state    PAST MEDICAL HISTORY   has a past medical history of ASTHMA, Bipolar disorder (HCC), Chronic back pain, Psychiatric disorder, Psychiatric disorder, and Seizure disorder (HCC).    SURGICAL HISTORY   has a past surgical history that includes other neurological surg; other orthopedic surgery; other abdominal surgery; ercp in or (9/19/2009); and joy by laparoscopy (9/22/2009).    SOCIAL HISTORY  Social History     Tobacco Use   • Smoking status: Never Smoker   • Smokeless tobacco: Never Used   Substance Use Topics   • Alcohol use: No   • Drug use: No      Social History     Substance and Sexual Activity  "  Drug Use No       FAMILY HISTORY  Non-Contributory    CURRENT MEDICATIONS  Home Medications    **Home medications have not yet been reviewed for this encounter**         ALLERGIES  Allergies   Allergen Reactions   • Bactrim Unspecified     \"I get the shakes\"    • Iron Unspecified     \"see red dots\" vision, not rash    • Penicillins Unspecified     \"makes me dizzy with my medicine\"    • Phenobarbital Unspecified     \"makes me dizzy\"    • Lemon Oil Itching     Skin itching per pt report to Dietary.   • Onion Itching and Swelling     Throat swelling and itching per pt report to Dietary.   • [employer] Allergy Itching and Swelling     Throat swelling and itching per pt report to Dietary.       PHYSICAL EXAM  VITAL SIGNS: /85   Pulse 69   Temp 36.1 °C (97 °F) (Temporal)   Resp 18   Ht 1.676 m (5' 6\")   Wt (!) 129 kg (284 lb 6.3 oz)   SpO2 95%   BMI 45.90 kg/m²   Pulse ox interpretation: I interpret this pulse ox as normal.  Constitutional: Somnolent moderate distress  HEENT: Atraumatic normocephalic, pupils are equal round reactive to light extraocular movements are intact. The nares is clear, external ears are normal, mouth shows moist mucous membranes  Neck: Supple, no JVD no tracheal deviation  Cardiovascular: Regular rate and rhythm no murmur rub or gallop 2+ pulses peripherally x4  Thorax & Lungs: No respiratory distress, no wheezes rales or rhonchi, No chest tenderness.   GI: Soft nontender nondistended positive bowel sounds, no peritoneal signs  Skin: Warm dry no acute rash or lesion  Musculoskeletal: Moving all extremities equally, no acute  deformity  Neurologic: Active seizing upon my primary evaluation postictal and secondary evaluation but moving all extremities equally and improving mental status  Psychiatric: Postictal      DIAGNOSTIC STUDIES / PROCEDURES  LABS      Results for orders placed or performed during the hospital encounter of 10/23/20   VALPROIC ACID   Result Value Ref Range "    Valproic Acid 66.7 50.0 - 100.0 ug/mL   CBC WITH DIFFERENTIAL   Result Value Ref Range    WBC 8.4 4.8 - 10.8 K/uL    RBC 4.33 4.20 - 5.40 M/uL    Hemoglobin 13.1 12.0 - 16.0 g/dL    Hematocrit 38.9 37.0 - 47.0 %    MCV 89.8 81.4 - 97.8 fL    MCH 30.3 27.0 - 33.0 pg    MCHC 33.7 33.6 - 35.0 g/dL    RDW 44.5 35.9 - 50.0 fL    Platelet Count 151 (L) 164 - 446 K/uL    MPV 9.4 9.0 - 12.9 fL    Neutrophils-Polys 56.80 44.00 - 72.00 %    Lymphocytes 28.90 22.00 - 41.00 %    Monocytes 11.30 0.00 - 13.40 %    Eosinophils 2.00 0.00 - 6.90 %    Basophils 0.40 0.00 - 1.80 %    Immature Granulocytes 0.60 0.00 - 0.90 %    Nucleated RBC 0.00 /100 WBC    Neutrophils (Absolute) 4.76 2.00 - 7.15 K/uL    Lymphs (Absolute) 2.42 1.00 - 4.80 K/uL    Monos (Absolute) 0.95 (H) 0.00 - 0.85 K/uL    Eos (Absolute) 0.17 0.00 - 0.51 K/uL    Baso (Absolute) 0.03 0.00 - 0.12 K/uL    Immature Granulocytes (abs) 0.05 0.00 - 0.11 K/uL    NRBC (Absolute) 0.00 K/uL   COMP METABOLIC PANEL   Result Value Ref Range    Sodium 131 (L) 135 - 145 mmol/L    Potassium 3.5 (L) 3.6 - 5.5 mmol/L    Chloride 96 96 - 112 mmol/L    Co2 18 (L) 20 - 33 mmol/L    Anion Gap 17.0 (H) 7.0 - 16.0    Glucose 106 (H) 65 - 99 mg/dL    Bun 11 8 - 22 mg/dL    Creatinine 0.54 0.50 - 1.40 mg/dL    Calcium 8.7 8.5 - 10.5 mg/dL    AST(SGOT) 10 (L) 12 - 45 U/L    ALT(SGPT) 8 2 - 50 U/L    Alkaline Phosphatase 42 30 - 99 U/L    Total Bilirubin 0.2 0.1 - 1.5 mg/dL    Albumin 4.0 3.2 - 4.9 g/dL    Total Protein 6.4 6.0 - 8.2 g/dL    Globulin 2.4 1.9 - 3.5 g/dL    A-G Ratio 1.7 g/dL   APTT   Result Value Ref Range    APTT 25.5 24.7 - 36.0 sec   PROTHROMBIN TIME   Result Value Ref Range    PT 13.7 12.0 - 14.6 sec    INR 1.02 0.87 - 1.13   LACTIC ACID   Result Value Ref Range    Lactic Acid 3.0 (H) 0.5 - 2.0 mmol/L   COVID/SARS CoV-2 PCR    Specimen: Nasopharyngeal; Respirate   Result Value Ref Range    COVID Order Status Received    URINALYSIS,CULTURE IF INDICATED    Specimen: Urine    Result Value Ref Range    Color DK Yellow     Character Clear     Specific Gravity 1.029 <1.035    Ph 5.0 5.0 - 8.0    Glucose Negative Negative mg/dL    Ketones Trace (A) Negative mg/dL    Protein 100 (A) Negative mg/dL    Bilirubin Negative Negative    Urobilinogen, Urine 1.0 Negative    Nitrite Negative Negative    Leukocyte Esterase Negative Negative    Occult Blood Small (A) Negative    Micro Urine Req Microscopic    URINE DRUG SCREEN   Result Value Ref Range    Amphetamines Urine Negative Negative    Barbiturates Negative Negative    Benzodiazepines Negative Negative    Cocaine Metabolite Negative Negative    Methadone Negative Negative    Opiates Negative Negative    Oxycodone Negative Negative    Phencyclidine -Pcp Negative Negative    Propoxyphene Negative Negative    Cannabinoid Metab Negative Negative   BETA-HCG QUALITATIVE SERUM   Result Value Ref Range    Beta-Hcg Qualitative Serum Negative Negative   ESTIMATED GFR   Result Value Ref Range    GFR If African American >60 >60 mL/min/1.73 m 2    GFR If Non African American >60 >60 mL/min/1.73 m 2   SARS-CoV-2, PCR (In-House)   Result Value Ref Range    SARS-CoV-2 Source NP Swab    URINE MICROSCOPIC (W/UA)   Result Value Ref Range    WBC 2-5 /hpf    RBC 10-20 (A) /hpf    Bacteria Negative None /hpf    Epithelial Cells Few /hpf    Hyaline Cast 11-20 (A) /lpf   Magnesium   Result Value Ref Range    Magnesium 1.8 1.5 - 2.5 mg/dL   PHOSPHORUS   Result Value Ref Range    Phosphorus 2.4 (L) 2.5 - 4.5 mg/dL   Diagnostic alcohol   Result Value Ref Range    Diagnostic Alcohol <10.1 0.0 - 10.0 mg/dL       All labs reviewed by me.      RADIOLOGY  CT-HEAD W/O   Final Result         1.  No acute intracranial abnormality is identified, there are nonspecific white matter changes, commonly associated with small vessel ischemic disease.  Associated mild cerebral atrophy is noted.   2.  Scattered bilateral dural calcification, similar to prior study.        The  "radiologist's interpretation of all radiological studies have been reviewed by me.    COURSE & MEDICAL DECISION MAKING  Pertinent Labs & Imaging studies reviewed. (See chart for details)    3:10 AM - Patient seen and examined at bedside.         Patient noted to have slightly elevated blood pressure likely circumstantial secondary to presenting complaint. Referred to primary care physician for further evaluation.    Medical Decision Making: Patient has seizure prior to arrival she had not returned to her baseline at the time of the second seizure in the emergency department at this point she is considered status epilepticus.  She is given 2 mg of Ativan she is had no further seizure activity in the ER.  Patient will be admitted for further evaluation and treatment probable neurology consultation and management of her antiepileptics.  Her her valproic acid is in an normal therapeutic range at this time the rest of her labs and vital signs are reassuring.  Admitted to the hospital service in guarded condition.    Ht 1.676 m (5' 6\")   Wt (!) 132 kg (291 lb)   BMI 46.97 kg/m²             FINAL IMPRESSION  1. Seizure (HCC) Active   2. Status epilepticus (HCC) Active          This dictation has been created using voice recognition software and/or scribes. The accuracy of the dictation is limited by the abilities of the software and the expertise of the scribes. I expect there may be some errors of grammar and possibly content. I made every attempt to manually correct the errors within my dictation. However, errors related to voice recognition software and/or scribes may still exist and should be interpreted within the appropriate context.            "

## 2020-10-23 NOTE — DIETARY
NUTRITION SERVICES: BMI - Pt with BMI >40 (=Body mass index is 45.9 kg/m².), morbid obesity. Weight loss counseling not appropriate in acute care setting. RECOMMEND - Referral to outpatient nutrition services for weight management after D/C as appropriate.

## 2020-10-23 NOTE — ASSESSMENT & PLAN NOTE
No respiratory distress or reports of increased cough prior to admission  RT consulted, continuous pulse ox given somnolence following seizure and obesity.

## 2020-10-23 NOTE — ED NOTES
Medication reconciliation  Completed medication list per patient pharmacy  - [facility] @ [phone]    Prescriptions all sent by mail 10/15/20      Lionel Ortega, GogoD

## 2020-10-23 NOTE — PROGRESS NOTES
Accepted patient admitted by my colleague  Patient sedated this AM, but later on was working with PT  Neurology consulted, they recommended just keeping current AED's  Problem is is that it is unclear exactly which AED's patient is on.  Continue current meds, VPA level therapeutic  IV ativan PRN  Possible medical non adherence  -Try to have SW/CM get collateral information today  -monitor closely  -tried call patient's SO, but no VM set up and he did not answer. Will try again later.

## 2020-10-23 NOTE — PROGRESS NOTES
Bedside report received by GIRISH Villareal in ED. Patient brought up from ED 4 to tele 706 with ACLS RN. Tele box put on, monitor room notified. Patient currently in Sinus Rhythm 94. Patient denies pain at this time.  Pt. needs are assessed, bed in lowest position, call light within reach. Fall precautions in place, pt. educated to call for assistance. Hourly rounding initiated.

## 2020-10-23 NOTE — PROGRESS NOTES
Brief Neurology Note    Pt presents with seizure. On chart review it is documented that the patient ran out of one of her anti-seizure medications. Seizure seemingly provoked in the setting of medication noncompliance.    Recommend continuation of the patient’s home medications at home doses, and encourage medication compliance. No further recommendations at this juncture from a neurological perspective. If there is a change in  status or additional neurological inquiry, kindly re-page.     Follow up in neurology outpatient epilepsy clinic.    MARY Canales MD  Neurology

## 2020-10-23 NOTE — THERAPY
Physical Therapy   Initial Evaluation     Patient Name: Yamila Mendoza  Age:  39 y.o., Sex:  female  Medical Record #: [id number]  Today's Date: 10/23/2020     Precautions: (P) Fall Risk    Assessment  Patient is 39 y.o. female who was recently admitted for active seizures. Pt has hx of seizure disorder, asthma, and bipolar disorder. Pt presented to PT with impaired balance, impaired coordination, impaired gait, weakness, lethargy, and dec activity tolerance. These impairments are affecting the patients ability to safely perform functional mobility at her IPLOF. Pt demonstrated with frequent veering and LOB during ambulation and required HHA and close Min A for correction to avoid falls. Pt demonstrates for step strategy for corrections in LOB, however, presents with delayed responses and reactions. Currently pt does not appear to be safe to d/c home given high risk of falls, however, if pt is able to improve functional mobility and alertness while in house pt may be able to d/c home with fiance support and recs for HH therapy services.     Plan    Recommend Physical Therapy 4 times per week until therapy goals are met for the following treatments:  Bed Mobility, Community Re-integration, Equipment, Gait Training, Manual Therapy, Neuro Re-Education / Balance, Self Care/Home Evaluation, Stair Training, Therapeutic Activities and Therapeutic Exercises    DC Equipment Recommendations: (P) Unable to determine at this time  Discharge Recommendations: (P) Recommend home health for continued physical therapy services     Objective       10/23/20 1025   Prior Living Situation   Prior Services None   Housing / Facility 1 Story House   Steps Into Home 0   Steps In Home 0   Equipment Owned Single Point Cane   Lives with - Patient's Self Care Capacity Significant Other   Comments pt states she lives with her fiance, who can assist upon d/c to home    Prior Level of Functional Mobility   Bed Mobility Independent   Transfer  Status Independent   Ambulation Independent   Distance Ambulation (Feet)   (community )   Assistive Devices Used Single Point Cane   Stairs Independent   Comments pt states she is primarily I with mobility and uses a SPC at times    Gait Analysis   Gait Level Of Assist Minimal Assist   Assistive Device Hand Held Assist   Distance (Feet) 60   # of Times Distance was Traveled 1   Deviation Ataxic;Decreased Base Of Support;Other (Comment)  (veering)   Weight Bearing Status fwb   Comments pt limited in distance due to fatigue and requested to go back to bed after ambulation to restroom; pt demonstrated with 2 maury LOB during ambulation and required Min A for correction from therapist; may benefit from FWW use    Bed Mobility    Supine to Sit Supervised   Sit to Supine Supervised   Scooting Minimal Assist   Rolling Supervised   Comments HOB elevated and rails up    Functional Mobility   Sit to Stand Minimal Assist   Bed, Chair, Wheelchair Transfer Minimal Assist   Toilet Transfers Minimal Assist   Comments cues for nagivation and saftey    Patient / Family Goals    Patient / Family Goal #1 to go home   Short Term Goals    Short Term Goal # 1 pt will go sit<>stand w/spc w/spv in 6tx for safe d/c home    Short Term Goal # 2 pt will transfer bed<>chair w/spc w/spv in 6tx for safe dc home    Short Term Goal # 3 pt will ambulate for 150ft w/spc w/spv in 6tx for safe d/c home

## 2020-10-24 ENCOUNTER — APPOINTMENT (OUTPATIENT)
Dept: RADIOLOGY | Facility: MEDICAL CENTER | Age: 40
End: 2020-10-24
Attending: INTERNAL MEDICINE
Payer: MEDICARE

## 2020-10-24 VITALS
HEART RATE: 99 BPM | DIASTOLIC BLOOD PRESSURE: 92 MMHG | OXYGEN SATURATION: 96 % | RESPIRATION RATE: 16 BRPM | BODY MASS INDEX: 45.71 KG/M2 | WEIGHT: 284.39 LBS | SYSTOLIC BLOOD PRESSURE: 131 MMHG | TEMPERATURE: 97.3 F | HEIGHT: 66 IN

## 2020-10-24 LAB
ALBUMIN SERPL BCP-MCNC: 3.2 G/DL (ref 3.2–4.9)
ALBUMIN/GLOB SERPL: 1.6 G/DL
ALP SERPL-CCNC: 33 U/L (ref 30–99)
ALT SERPL-CCNC: 5 U/L (ref 2–50)
ANION GAP SERPL CALC-SCNC: 7 MMOL/L (ref 7–16)
AST SERPL-CCNC: 7 U/L (ref 12–45)
BASOPHILS # BLD AUTO: 0.5 % (ref 0–1.8)
BASOPHILS # BLD: 0.03 K/UL (ref 0–0.12)
BILIRUB SERPL-MCNC: 0.3 MG/DL (ref 0.1–1.5)
BUN SERPL-MCNC: 7 MG/DL (ref 8–22)
CALCIUM SERPL-MCNC: 8 MG/DL (ref 8.5–10.5)
CHLORIDE SERPL-SCNC: 102 MMOL/L (ref 96–112)
CO2 SERPL-SCNC: 21 MMOL/L (ref 20–33)
CREAT SERPL-MCNC: 0.47 MG/DL (ref 0.5–1.4)
EOSINOPHIL # BLD AUTO: 0.19 K/UL (ref 0–0.51)
EOSINOPHIL NFR BLD: 3 % (ref 0–6.9)
ERYTHROCYTE [DISTWIDTH] IN BLOOD BY AUTOMATED COUNT: 47 FL (ref 35.9–50)
GLOBULIN SER CALC-MCNC: 2 G/DL (ref 1.9–3.5)
GLUCOSE SERPL-MCNC: 95 MG/DL (ref 65–99)
HCT VFR BLD AUTO: 35.6 % (ref 37–47)
HGB BLD-MCNC: 11.8 G/DL (ref 12–16)
IMM GRANULOCYTES # BLD AUTO: 0.04 K/UL (ref 0–0.11)
IMM GRANULOCYTES NFR BLD AUTO: 0.6 % (ref 0–0.9)
LYMPHOCYTES # BLD AUTO: 3.08 K/UL (ref 1–4.8)
LYMPHOCYTES NFR BLD: 48.3 % (ref 22–41)
MCH RBC QN AUTO: 30.5 PG (ref 27–33)
MCHC RBC AUTO-ENTMCNC: 33.1 G/DL (ref 33.6–35)
MCV RBC AUTO: 92 FL (ref 81.4–97.8)
MONOCYTES # BLD AUTO: 0.63 K/UL (ref 0–0.85)
MONOCYTES NFR BLD AUTO: 9.9 % (ref 0–13.4)
NEUTROPHILS # BLD AUTO: 2.41 K/UL (ref 2–7.15)
NEUTROPHILS NFR BLD: 37.7 % (ref 44–72)
NRBC # BLD AUTO: 0 K/UL
NRBC BLD-RTO: 0 /100 WBC
PLATELET # BLD AUTO: 137 K/UL (ref 164–446)
PMV BLD AUTO: 9.2 FL (ref 9–12.9)
POTASSIUM SERPL-SCNC: 3.5 MMOL/L (ref 3.6–5.5)
PROT SERPL-MCNC: 5.2 G/DL (ref 6–8.2)
RBC # BLD AUTO: 3.87 M/UL (ref 4.2–5.4)
SODIUM SERPL-SCNC: 130 MMOL/L (ref 135–145)
WBC # BLD AUTO: 6.4 K/UL (ref 4.8–10.8)

## 2020-10-24 PROCEDURE — A9270 NON-COVERED ITEM OR SERVICE: HCPCS | Performed by: STUDENT IN AN ORGANIZED HEALTH CARE EDUCATION/TRAINING PROGRAM

## 2020-10-24 PROCEDURE — 80053 COMPREHEN METABOLIC PANEL: CPT

## 2020-10-24 PROCEDURE — 97116 GAIT TRAINING THERAPY: CPT

## 2020-10-24 PROCEDURE — 96372 THER/PROPH/DIAG INJ SC/IM: CPT

## 2020-10-24 PROCEDURE — 700102 HCHG RX REV CODE 250 W/ 637 OVERRIDE(OP): Performed by: STUDENT IN AN ORGANIZED HEALTH CARE EDUCATION/TRAINING PROGRAM

## 2020-10-24 PROCEDURE — 700111 HCHG RX REV CODE 636 W/ 250 OVERRIDE (IP): Performed by: STUDENT IN AN ORGANIZED HEALTH CARE EDUCATION/TRAINING PROGRAM

## 2020-10-24 PROCEDURE — 700102 HCHG RX REV CODE 250 W/ 637 OVERRIDE(OP): Performed by: INTERNAL MEDICINE

## 2020-10-24 PROCEDURE — 36415 COLL VENOUS BLD VENIPUNCTURE: CPT

## 2020-10-24 PROCEDURE — 99217 PR OBSERVATION CARE DISCHARGE: CPT | Performed by: INTERNAL MEDICINE

## 2020-10-24 PROCEDURE — 85025 COMPLETE CBC W/AUTO DIFF WBC: CPT

## 2020-10-24 PROCEDURE — A9270 NON-COVERED ITEM OR SERVICE: HCPCS | Performed by: INTERNAL MEDICINE

## 2020-10-24 PROCEDURE — G0378 HOSPITAL OBSERVATION PER HR: HCPCS

## 2020-10-24 PROCEDURE — 73060 X-RAY EXAM OF HUMERUS: CPT | Mod: RT

## 2020-10-24 RX ADMIN — CHOLECALCIFEROL (VITAMIN D3) 10 MCG (400 UNIT) TABLET 400 UNITS: at 05:27

## 2020-10-24 RX ADMIN — CARBAMAZEPINE 200 MG: 200 TABLET ORAL at 12:54

## 2020-10-24 RX ADMIN — DIVALPROEX SODIUM 500 MG: 500 TABLET, DELAYED RELEASE ORAL at 05:27

## 2020-10-24 RX ADMIN — POTASSIUM CHLORIDE 40 MEQ: 1500 TABLET, EXTENDED RELEASE ORAL at 05:27

## 2020-10-24 RX ADMIN — CARBAMAZEPINE 200 MG: 200 TABLET ORAL at 05:27

## 2020-10-24 RX ADMIN — TOPIRAMATE 100 MG: 25 TABLET, FILM COATED ORAL at 05:50

## 2020-10-24 RX ADMIN — DIVALPROEX SODIUM 500 MG: 500 TABLET, DELAYED RELEASE ORAL at 12:54

## 2020-10-24 RX ADMIN — ENOXAPARIN SODIUM 40 MG: 40 INJECTION SUBCUTANEOUS at 05:27

## 2020-10-24 ASSESSMENT — GAIT ASSESSMENTS
ASSISTIVE DEVICE: SINGLE POINT CANE
DEVIATION: BRADYKINETIC
DISTANCE (FEET): 250
GAIT LEVEL OF ASSIST: SUPERVISED

## 2020-10-24 ASSESSMENT — COGNITIVE AND FUNCTIONAL STATUS - GENERAL
MOBILITY SCORE: 22
TOILETING: A LITTLE
PERSONAL GROOMING: A LITTLE
DAILY ACTIVITIY SCORE: 18
CLIMB 3 TO 5 STEPS WITH RAILING: A LOT
DRESSING REGULAR LOWER BODY CLOTHING: A LOT
HELP NEEDED FOR BATHING: A LITTLE
DRESSING REGULAR UPPER BODY CLOTHING: A LITTLE
SUGGESTED CMS G CODE MODIFIER MOBILITY: CJ
SUGGESTED CMS G CODE MODIFIER DAILY ACTIVITY: CK

## 2020-10-24 ASSESSMENT — PAIN DESCRIPTION - PAIN TYPE
TYPE: ACUTE PAIN
TYPE: ACUTE PAIN

## 2020-10-24 NOTE — PROGRESS NOTES
Bedside report received. Pt. Lying in bed, oriented X4,  reports pain, prn pain medication administered as -prescribed. All other needs are assessed, bed in lowest position, call light within reach. Fall precautions in place, pt. educated to call for assistance. Hourly rounding initiated.

## 2020-10-24 NOTE — THERAPY
"Occupational Therapy  Daily Treatment     Patient Name: Yamila Mendoza  Age:  39 y.o., Sex:  female  Medical Record #: [id number]  Today's Date: 10/24/2020     Precautions  Precautions: Fall Risk  Comments: Hx seizure disorder    Assessment    Pt seen at the request of physician d/t pt's postictal confusion improving today. Pt progressing towards OT goals. Pt continues to be limited by decreased safety awareness, decreased cognition, decreased balance, and decreased activity tolerance limiting pt's safety/indep with ADLs/IADLs/mobility. Pt would benefit from skilled OT services in the acute care setting to address these deficits.    Plan    Continue current treatment plan.    DC Equipment Recommendations: Tub / Shower Seat  Discharge Recommendations: Recommend home health for continued occupational therapy services(MD reporting yesica able to provide 24/7 c/g support). Pt needs 24/7 c/g support for all ADLs/IADLs/mobility d/t fall risk and cognitive deficits.     Subjective    \"I feel less confused today\"     Objective     10/24/20 1137   Cognition    Cognition / Consciousness X   Level of Consciousness Alert   Safety Awareness Impaired   Attention Impaired   Sequencing Impaired   Comments This therapist unsure what pt's cog baseline is. Pt appeared distracted by environment requiring this therpaist to close door/remove some ADL items from sink to facilitate attention to task. Pt also required min VCs for sequencing/problem solving during ADLs. Per nurse pt has a psych history, and this therapist suspects pt may have an underlying ID, unsure. Pt currently requires 24/7 support for ADLs/IADLs/mobility for safety. This therapist attempted to speak to pt's fiance twice and unable to leave a voice message. Per nurse, MD spoke with pt's fiance at length earlier this morning and apparently fiance is pepared to provide the level of support pt needs. If that's the case, pt is safe to d/c home with fiance. This therapist " unable to verify d/t not being able to speak with yesica this morning.    Balance   Sitting Balance (Static) Fair +   Sitting Balance (Dynamic) Fair -   Standing Balance (Static) Fair -   Standing Balance (Dynamic) Fair -   Weight Shift Sitting Fair   Weight Shift Standing Fair   Skilled Intervention Verbal Cuing;Tactile Cuing   Comments SPC   Bed Mobility    Scooting Supervised  (chair)   Activities of Daily Living   Grooming Minimal Assist;Standing  (oral cares, brush hair, wash hands)   Bathing   (rec shower chair, pt verbalized understanding)   Lower Body Dressing Moderate Assist  (max A don socks, min A don underwear/pants)   Toileting Minimal Assist   Skilled Intervention Verbal Cuing;Tactile Cuing;Compensatory Strategies;Sequencing   Functional Mobility   Sit to Stand Supervised  (close supervision)   Bed, Chair, Wheelchair Transfer Supervised  (close supervision)   Toilet Transfers Supervised  (grab bar assist)   Transfer Method Stand Step  (SPC)   Mobility chair>sink>toilet>sink>chair, SPC, no LOB   Skilled Intervention Verbal Cuing   Activity Tolerance   Sitting in Chair seated in chair pre/post session   Standing 10 mins total   Patient / Family Goals   Patient / Family Goal #1 to return home   Short Term Goals   Short Term Goal # 1 supervised with LB dressing   Goal Outcome # 1 Progressing as expected   Short Term Goal # 2 supervised with ADL txfs   Goal Outcome # 2 Progressing as expected   Anticipated Discharge Equipment and Recommendations   DC Equipment Recommendations Tub / Shower Seat   Discharge Recommendations Recommend home health for continued occupational therapy services  (MD reporting yesica able to provide 24/7 c/g support)

## 2020-10-24 NOTE — DISCHARGE INSTRUCTIONS
Discharge Instructions    Discharged to home by taxi with relative. Discharged via wheelchair, hospital escort: Yes.  Special equipment needed: Not Applicable    Be sure to schedule a follow-up appointment with your primary care doctor or any specialists as instructed.     Discharge Plan:   Diet Plan: Discussed  Activity Level: Discussed  Confirmed Follow up Appointment: Patient to Call and Schedule Appointment  Confirmed Symptoms Management: Discussed  Medication Reconciliation Updated: Yes    I understand that a diet low in cholesterol, fat, and sodium is recommended for good health. Unless I have been given specific instructions below for another diet, I accept this instruction as my diet prescription.   Other diet: Heart healthy    Special Instructions: None    · Is patient discharged on Warfarin / Coumadin?   No     Depression / Suicide Risk    As [facility] are [facility], it is important to learn how to keep safe from harming yourself.    Recognize the warning signs:  · Abrupt changes in personality, positive or negative- including increase in energy   · Giving away possessions  · Change in eating patterns- significant weight changes-  positive or negative  · Change in sleeping patterns- unable to sleep or sleeping all the time   · Unwillingness or inability to communicate  · Depression  · Unusual sadness, discouragement and loneliness  · Talk of wanting to die  · Neglect of personal appearance   · Rebelliousness- reckless behavior  · Withdrawal from people/activities they love  · Confusion- inability to concentrate     If you or a loved one observes any of these behaviors or has concerns about self-harm, here's what you can do:  · Talk about it- your feelings and reasons for harming yourself  · Remove any means that you might use to hurt yourself (examples: pills, rope, extension cords, firearm)  · Get professional help from the community (Mental Health, Substance Abuse,  psychological counseling)  · Do not be alone:Call your Safe Contact- someone whom you trust who will be there for you.  · Call your local CRISIS HOTLINE 156-6769 or 821-591-9108  · Call your local Children's Mobile Crisis Response Team [place] (155) 229-9419 or www.Flinja  · Call the toll free National Suicide Prevention Hotlines   · National Suicide Prevention Lifeline 715-536-MMGQ (5109)  · National Hope Line Network 800-SUICIDE (931-5729)

## 2020-10-24 NOTE — DISCHARGE PLANNING
ATTN: Case Management  RE: Referral for Home Health    Reason for referral denial: only taking SCP/HTH referrals at this time.               Unfortunately, we are not able to accept this referral for the reason listed above. If further clarity is needed, our Transitional Care Specialists are available to discuss any barriers to service at [phone].      We look forward to collaborating with you in the future,  [facility] Team

## 2020-10-24 NOTE — DISCHARGE PLANNING
Discussed patient with treatment team. Patient will need home health for discharge home. Discussed home health choice with significant other. Choice given as follows:  1. [facility]  2. [facility]  3. [facility]    Choice form faxed to [facility].

## 2020-10-24 NOTE — DISCHARGE SUMMARY
Discharge Summary    CHIEF COMPLAINT ON ADMISSION  Chief Complaint   Patient presents with   • Seizure     Pt found down by partner actively seizing. Seizure lasted approx 3-5 mins. Inc of urine. Pt has hx of seizures, on Depakote.        Reason for Admission  EMS     Admission Date  10/23/2020    CODE STATUS  Prior    HPI & HOSPITAL COURSE  This is a 39 y.o. female here with above medical issues. EEG was performed with no evidence of active seizures. She had no recurrence of her seizures and no evidence of active infection. There was issues on determining her actual outpatient dosing of AED's but this was confirmed with her outside pharmacy. Patient was non adherent to her seizure medications causing her current presentation. All labs normal. She was informed to remain adherent to her outpatient dosing of AED's and was stable for discharge.        Therefore, she is discharged in fair and stable condition to home with close outpatient follow-up.      Discharge Date  10/24/2020    FOLLOW UP ITEMS POST DISCHARGE  F/U with neurology in 1-2 weeks    DISCHARGE DIAGNOSES  Active Problems:    Seizure disorder (HCC) POA: Yes    Asthma POA: Yes  Resolved Problems:    Hypokalemia POA: Yes      FOLLOW UP  No future appointments.  Radha Hinojosa PJose RafaelAJose Rafael-C.  [address] [place] [id number] [phone]    In 2 weeks        MEDICATIONS ON DISCHARGE     Medication List      CONTINUE taking these medications      Instructions   albuterol 108 (90 Base) MCG/ACT Aers inhalation aerosol   Inhale 2 Puffs by mouth every 6 hours as needed for Shortness of Breath.  Dose: 2 Puff     amitriptyline 100 MG Tabs  Commonly known as: ELAVIL   Take 25 mg by mouth every evening.  Dose: 25 mg     carBAMazepine 200 MG Tabs  Commonly known as: TEGRETOL   Take 200 mg by mouth 3 times a day.  Dose: 200 mg     cholecalciferol 400 UNIT Tabs  Commonly known as: VITAMIN D3   Take 400 Units by mouth every day.  Dose: 400 Units     cyclobenzaprine 10 mg  "Tabs  Commonly known as: Flexeril   Take 5 mg by mouth at bedtime as needed for Muscle Spasms.  Dose: 5 mg     divalproex  MG Tb24  Commonly known as: DEPAKOTE ER   Take 500-750 mg by mouth every day. 500mg every morning, 500 mg every afternoon, 750mg every night at bedtime  Dose: 500-750 mg     hydrOXYzine HCl 25 MG Tabs  Commonly known as: ATARAX   Take 25 mg by mouth 2 times a day as needed for Anxiety.  Dose: 25 mg     loratadine 10 MG Tabs  Commonly known as: CLARITIN   Take 1 Tab by mouth every day.  Dose: 10 mg     naproxen 250 MG Tabs  Commonly known as: NAPROSYN   Take 500 mg by mouth 3 times a day as needed (pain).  Dose: 500 mg     potassium chloride SA 10 MEQ Tbcr  Commonly known as: K-DUR   Take 10 mEq by mouth every day.  Dose: 10 mEq     topiramate 100 MG Tabs  Commonly known as: TOPAMAX   Take 100 mg by mouth 2 times a day.  Dose: 100 mg            Allergies  Allergies   Allergen Reactions   • Bactrim Unspecified     \"I get the shakes\"    • Iron Unspecified     \"see red dots\" vision, not rash    • Penicillins Unspecified     \"makes me dizzy with my medicine\"    • Phenobarbital Unspecified     \"makes me dizzy\"    • Lemon Oil Itching     Skin itching per pt report to Dietary.   • Onion Itching and Swelling     Throat swelling and itching per pt report to Dietary.   • [employer] Allergy Itching and Swelling     Throat swelling and itching per pt report to Dietary.       DIET  No orders of the defined types were placed in this encounter.      ACTIVITY  As tolerated.  Weight bearing as tolerated    CONSULTATIONS  NONE    PROCEDURES  EEG    DX-HUMERUS 2+ RIGHT   Final Result      1.  No fracture of the RIGHT humerus.   2.  Findings suggest calcific tendinopathy of the rotator cuff.      CT-HEAD W/O   Final Result         1.  No acute intracranial abnormality is identified, there are nonspecific white matter changes, commonly associated with small vessel ischemic disease.  Associated mild " cerebral atrophy is noted.   2.  Scattered bilateral dural calcification, similar to prior study.            LABORATORY  Lab Results   Component Value Date    SODIUM 130 (L) 10/24/2020    POTASSIUM 3.5 (L) 10/24/2020    CHLORIDE 102 10/24/2020    CO2 21 10/24/2020    GLUCOSE 95 10/24/2020    BUN 7 (L) 10/24/2020    CREATININE 0.47 (L) 10/24/2020    CREATININE 0.6 05/15/2009        Lab Results   Component Value Date    WBC 6.4 10/24/2020    HEMOGLOBIN 11.8 (L) 10/24/2020    HEMATOCRIT 35.6 (L) 10/24/2020    PLATELETCT 137 (L) 10/24/2020        Total time of the discharge process exceeds 32 minutes.

## 2020-10-24 NOTE — THERAPY
Physical Therapy   Daily Treatment     Patient Name: Yamila Mendoza  Age:  39 y.o., Sex:  female  Medical Record #: [id number]  Today's Date: 10/24/2020     Precautions: Fall Risk    Assessment    Patient demonstrates household level functional mobility levels at this time, with mild cognitive deficits.  Recommend social work follows up to determine PLOF.  Nurse reports fiance willing to assist at any level needed at home, indicating patient has good social support.  From a strictly physical standpoint, patient is able to go home, however, defer to medical team/OT to determine in cognition at level safe to go home.  PT will not follow.      10/24/20 1120   Precautions   Precautions Fall Risk   Comments Hx seizure disorder   Non Verbal Descriptors   Non Verbal Scale  Calm   Cognition    Cognition / Consciousness X   Speech/ Communication Delayed Responses   Level of Consciousness Alert   Safety Awareness Impaired   Comments per nurse, fiance will help as needed   Balance   Sitting Balance (Static) Fair +   Sitting Balance (Dynamic) Fair   Standing Balance (Static) Fair   Standing Balance (Dynamic) Fair -   Weight Shift Sitting Fair   Weight Shift Standing Fair   Skilled Intervention Tactile Cuing   Comments SPC, refuses FWW   Gait Analysis   Gait Level Of Assist Supervised   Assistive Device Single Point Cane   Distance (Feet) 250   # of Times Distance was Traveled 1   Deviation Bradykinetic   Weight Bearing Status FWB   Comments demonstrates community level ambulatoin    Bed Mobility    Supine to Sit Supervised   Sit to Supine Supervised   Scooting Supervised   Rolling Supervised   Functional Mobility   Sit to Stand Supervised   Bed, Chair, Wheelchair Transfer Supervised   Toilet Transfers Supervised   How much difficulty does the patient currently have...   Turning over in bed (including adjusting bedclothes, sheets and blankets)? 4   Sitting down on and standing up from a chair with arms (e.g., wheelchair,  bedside commode, etc.) 4   Moving from lying on back to sitting on the side of the bed? 4   How much help from another person does the patient currently need...   Moving to and from a bed to a chair (including a wheelchair)? 4   Need to walk in a hospital room? 4   Climbing 3-5 steps with a railing? 2   6 clicks Mobility Score 22   Short Term Goals    Short Term Goal # 1 pt will go sit<>stand w/spc w/spv in 6tx for safe d/c home    Goal Outcome # 1 Goal met   Short Term Goal # 2 pt will transfer bed<>chair w/spc w/spv in 6tx for safe dc home    Goal Outcome # 2 Goal met   Short Term Goal # 3 pt will ambulate for 150ft w/spc w/spv in 6tx for safe d/c home    Goal Outcome # 3 Goal met   Education Group   Education Provided Role of Physical Therapist   Role of Physical Therapist Patient Response Patient;Acceptance;Explanation;Demonstration;Verbal Demonstration;Action Demonstration   Anticipated Discharge Equipment and Recommendations   DC Equipment Recommendations Unable to determine at this time     Plan    Discharge secondary to goals met.    DC Equipment Recommendations: Unable to determine at this time  Discharge Recommendations: Defer to medical team/OT for discharge, as patient appears to have mild cognitive deficits with unclear baseline.  Patient demonstrates household level ambulation levels with PT, and fiance can assist as needed, per nurse.  No barrier from a physical therapy standpoint, would recommend home health to improve lumbar and hip strength.

## 2020-10-24 NOTE — DISCHARGE PLANNING
Received Choice form at [phone]  Agency/Facility Name: [facility]  Referral sent per Choice form @ [phone]

## 2020-10-24 NOTE — FACE TO FACE
Face to Face Supporting Documentation - Home Health    The encounter with this patient was in whole or in part the primary reason for home health admission.    Date of encounter:   Patient:                    MRN:                       YOB: 2022  Yamila Mendoza  [id number]  1980     Home health to see patient for:  Skilled Nursing care for assessment, interventions & education, Home health aide, Physical Therapy evaluation and treatment and Occupational therapy evaluation and treatment    Skilled need for:  Exacerbation of Chronic Disease State seziure disorder    Skilled nursing interventions to include:  Comment: needs help with therapies    Homebound status evidenced by:  Need the aid of supportive devices such as crutches, canes, wheelchairs or walkers. Leaving home requires a considerable and taxing effort. There is a normal inability to leave the home.    Community Physician to provide follow up care: Radha Hinojosa P.A.-C.     Optional Interventions? No      I certify the face to face encounter for this home health care referral meets the CMS requirements and the encounter/clinical assessment with the patient was, in whole, or in part, for the medical condition(s) listed above, which is the primary reason for home health care. Based on my clinical findings: the service(s) are medically necessary, support the need for home health care, and the homebound criteria are met.  I certify that this patient has had a face to face encounter by myself.  Kwame Cortes M.D. - NPI: [id number]

## 2021-06-03 ENCOUNTER — HOME HEALTH ADMISSION (OUTPATIENT)
Dept: HOME HEALTH SERVICES | Facility: HOME HEALTHCARE | Age: 41
End: 2021-06-03
Payer: MEDICARE

## 2021-08-18 PROCEDURE — 36415 COLL VENOUS BLD VENIPUNCTURE: CPT

## 2021-08-18 PROCEDURE — 99284 EMERGENCY DEPT VISIT MOD MDM: CPT

## 2021-08-18 ASSESSMENT — FIBROSIS 4 INDEX: FIB4 SCORE: 0.96

## 2021-08-19 ENCOUNTER — APPOINTMENT (OUTPATIENT)
Dept: RADIOLOGY | Facility: MEDICAL CENTER | Age: 41
End: 2021-08-19
Attending: EMERGENCY MEDICINE
Payer: MEDICARE

## 2021-08-19 ENCOUNTER — HOSPITAL ENCOUNTER (EMERGENCY)
Facility: MEDICAL CENTER | Age: 41
End: 2021-08-19
Attending: EMERGENCY MEDICINE
Payer: MEDICARE

## 2021-08-19 VITALS
DIASTOLIC BLOOD PRESSURE: 84 MMHG | HEART RATE: 80 BPM | SYSTOLIC BLOOD PRESSURE: 124 MMHG | TEMPERATURE: 97.5 F | RESPIRATION RATE: 18 BRPM | WEIGHT: 284 LBS | OXYGEN SATURATION: 97 % | HEIGHT: 66 IN | BODY MASS INDEX: 45.64 KG/M2

## 2021-08-19 DIAGNOSIS — M54.50 LUMBAR BACK PAIN: ICD-10-CM
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
## 2021-08-19 LAB
ANION GAP SERPL CALC-SCNC: 15 MMOL/L (ref 7–16)
BUN SERPL-MCNC: 13 MG/DL (ref 8–22)
CALCIUM SERPL-MCNC: 9.2 MG/DL (ref 8.5–10.5)
CHLORIDE SERPL-SCNC: 102 MMOL/L (ref 96–112)
CO2 SERPL-SCNC: 19 MMOL/L (ref 20–33)
CREAT SERPL-MCNC: 0.58 MG/DL (ref 0.5–1.4)
ERYTHROCYTE [DISTWIDTH] IN BLOOD BY AUTOMATED COUNT: 45.4 FL (ref 35.9–50)
GLUCOSE SERPL-MCNC: 113 MG/DL (ref 65–99)
HCT VFR BLD AUTO: 41.3 % (ref 37–47)
HGB BLD-MCNC: 14 G/DL (ref 12–16)
LACTATE BLD-SCNC: 1.6 MMOL/L (ref 0.5–2)
MCH RBC QN AUTO: 30.9 PG (ref 27–33)
MCHC RBC AUTO-ENTMCNC: 33.9 G/DL (ref 33.6–35)
MCV RBC AUTO: 91.2 FL (ref 81.4–97.8)
PLATELET # BLD AUTO: 157 K/UL (ref 164–446)
PMV BLD AUTO: 9.5 FL (ref 9–12.9)
POTASSIUM SERPL-SCNC: 4 MMOL/L (ref 3.6–5.5)
RBC # BLD AUTO: 4.53 M/UL (ref 4.2–5.4)
SODIUM SERPL-SCNC: 136 MMOL/L (ref 135–145)
WBC # BLD AUTO: 7.3 K/UL (ref 4.8–10.8)

## 2021-08-19 PROCEDURE — 80048 BASIC METABOLIC PNL TOTAL CA: CPT

## 2021-08-19 PROCEDURE — 72170 X-RAY EXAM OF PELVIS: CPT

## 2021-08-19 PROCEDURE — 72100 X-RAY EXAM L-S SPINE 2/3 VWS: CPT

## 2021-08-19 PROCEDURE — 85027 COMPLETE CBC AUTOMATED: CPT

## 2021-08-19 PROCEDURE — 83605 ASSAY OF LACTIC ACID: CPT

## 2021-08-19 NOTE — ED TRIAGE NOTES
Chief Complaint   Patient presents with   • GLF     pt reports falling while walking here and states she hit her lower back and has pain in that area, -LOC     Pt ambulatory to triage with walker for above complaint. Pt reports falling but did not hit head. Pt states she may have had a seizure. States she has a history of seizures but remembers this entire event. Pt A+OX4 NAD VSS

## 2021-08-19 NOTE — ED NOTES
PIV removed, catheter intact. Discharge education provided. Discharge paperwork signed by pt.  All questions answered. All belongings with pt. Pt ambulated to lobby unassisted using FWW with slow, steady gait.    Reviewed MTM instructions with patient for transportation home.

## 2021-08-19 NOTE — ED PROVIDER NOTES
ED Provider Note    CHIEF COMPLAINT  Chief Complaint   Patient presents with   • GLF     pt reports falling while walking here and states she hit her lower back and has pain in that area, -LOC       HPI  Patient is a 40-year-old female with a past medical history of bipolar disorder, chronic back pain and psychiatric illness along with seizure disorder for which she is on multiple medications who presents emergency room for injury sustained from a ground-level fall.  Patient normally uses a walker for assistance and was coming to the emergency room to check on a friend.  Somewhere between her home and here she experienced a ground-level fall, stating that she fell backwards, landing on her buttocks and lower back.  She had some immediate pain and discomfort but was able to stand and denies any prodromal symptoms such as lightheadedness, dizziness, shortness of breath or chest pain.  She then got up and came here for further evaluation.  She is familiar with her seizure disorder and does not believe that she had 1 and is more concerned about any possible injuries to her back.  She is denying any lower extremity dysfunction, gait instability, bowel or bladder incontinence, no weakness or numbness in the lower extremities    PPE Note: I personally donned full PPE for all patient encounters during this visit, including being clean-shaven with an N95 respirator mask, gloves, and goggles.     REVIEW OF SYSTEMS  See HPI for further details. All other systems are negative.     PAST MEDICAL HISTORY   has a past medical history of ASTHMA, Bipolar disorder (HCC), Chronic back pain, Psychiatric disorder, Psychiatric disorder, and Seizure disorder ([facility]).    SOCIAL HISTORY  Social History     Tobacco Use   • Smoking status: Never Smoker   • Smokeless tobacco: Never Used   Substance and Sexual Activity   • Alcohol use: No   • Drug use: No   • Sexual activity: Not on file       SURGICAL HISTORY   has a past surgical history that  "includes other neurological surg; other orthopedic surgery; other abdominal surgery; ercp in or (9/19/2009); and joy by laparoscopy (9/22/2009).    CURRENT MEDICATIONS  Home Medications     Reviewed by Mando Durand R.N. (Registered Nurse) on 08/18/21 at 2200  Med List Status: Not Addressed   Medication Last Dose Status   albuterol 108 (90 Base) MCG/ACT Aero Soln inhalation aerosol  Active   amitriptyline (ELAVIL) 100 MG Tab  Active   carBAMazepine (TEGRETOL) 200 MG Tab  Active   cholecalciferol (VITAMIN D3) 400 UNIT Tab  Active   cyclobenzaprine (FLEXERIL) 10 MG Tab  Active   divalproex ER (DEPAKOTE ER) 250 MG TABLET SR 24 HR  Active   hydrOXYzine HCl (ATARAX) 25 MG Tab  Active   loratadine (CLARITIN) 10 MG Tab  Active   naproxen (NAPROSYN) 250 MG Tab  Active   potassium chloride SA (K-DUR) 10 MEQ Tab CR  Active   topiramate (TOPAMAX) 100 MG Tab  Active              ALLERGIES  Allergies   Allergen Reactions   • Bactrim Unspecified     \"I get the shakes\"    • Iron Unspecified     \"see red dots\" vision, not rash    • Penicillins Unspecified     \"makes me dizzy with my medicine\"    • Phenobarbital Unspecified     \"makes me dizzy\"    • Lemon Oil Itching     Skin itching per pt report to Dietary.   • Onion Itching and Swelling     Throat swelling and itching per pt report to Dietary.   • [employer] Allergy Itching and Swelling     Throat swelling and itching per pt report to Dietary.     PHYSICAL EXAM  VITAL SIGNS: /72   Pulse 87   Temp 36 °C (96.8 °F) (Temporal)   Resp 18   Ht 1.676 m (5' 6\")   Wt (!) 129 kg (284 lb)   LMP 07/17/2021   SpO2 96%   BMI 45.84 kg/m²   Pulse ox interpretation: I interpret this pulse ox as normal.  Genl: F sitting in chair comfortably, speaking clearly, appears in no acute distress   Head: NC/AT   ENT: Mucous membranes moist, posterior pharynx clear, uvula midline, nares patent bilaterally   Eyes: Normal sclera, pupils equal round reactive to light  Neck: Supple, " FROM, no LAD appreciated, no midline tenderness  Pulmonary: Lungs are clear to auscultation bilaterally  Chest: No TTP  CV:  RRR, no murmur appreciated, pulses 2+ in both upper and lower extremities  Abdomen: soft, NT/ND; no rebound/guarding  Musculoskeletal: Pain free ROM of the neck. Moving upper and lower extremities and spontaneous in coordinated fashion  Neuro: A&Ox4 (person, place, time, situation), speech fluent, gait steady with walker, no focal deficits appreciated, No cerebellar signs. Sensation is grossly intact in the distal upper and lower extremities  5/5 strength in  and dorsiflexion/plantar flexion of the ankles  Psych: Patient has an appropriate affect and behavio  Skin: No rash or lesions.  No pallor or jaundice.  No cyanosis.  Warm and dry.     DIAGNOSTIC STUDIES / PROCEDURES    LABS  Labs Reviewed   BASIC METABOLIC PANEL - Abnormal; Notable for the following components:       Result Value    Co2 19 (*)     Glucose 113 (*)     All other components within normal limits   CBC WITHOUT DIFFERENTIAL - Abnormal; Notable for the following components:    Platelet Count 157 (*)     All other components within normal limits   LACTIC ACID   ESTIMATED GFR     RADIOLOGY  DX-PELVIS-1 OR 2 VIEWS   Final Result         1. No acute osseous abnormality.      DX-LUMBAR SPINE-2 OR 3 VIEWS   Final Result         No acute osseous abnormality.   No malalignment.        COURSE & MEDICAL DECISION MAKING  Pertinent Labs & Imaging studies reviewed. (See chart for details)    DDX:  Vertebral injury, contusion, ligamentous injury, pelvic ring injury unlikely, anemia, electrolyte disturbance, medication side effect, seizure considered unlikely.    MDM  Number of Diagnoses or Management Options      Initial evaluation at 1258:  Patient presents emergency room musculoskeletal discomfort along the paraspinous musculature of the lower lumbar spine and adjacent to the sacroiliac joint but has no pelvic ring instability on  clinical exam and is able to bear weight.  There is no bruising or other soft tissue abnormalities at this time but the acuteness of this would suggest possible contusion versus ligamentous injury.  Pelvic trauma x-rays and lumbar spine x-rays are obtained due underlying concern of possible traumatic injury.  While she has a history of seizure disorder she does not describe prodromal symptomology, she does not have any postictal period and has good memory of the event.    Lab work obtained for the above differentials.  Lab work shows no elevation in lactic acid, unlikely to be the result of any seizure activity.  No concerning anemia, leukocytosis or metabolic abnormalities    Lumbar x-rays and pelvic x-rays show no acute traumatic injury or anatomic disruption.    Plan is for the patient to treat these injuries is likely ligamentous versus soft tissue contusions.  Rest, heat packs initially and range of motion exercises as tolerated.  Patient will continue with her normal medication regiment and will follow up with her outpatient provider.    FINAL IMPRESSION  Visit Diagnoses     ICD-10-CM   1. Fall, initial encounter  W19.XXXA   2. Lumbar back pain  M54.5     Electronically signed by: Yvon Cramer M.D., 8/19/2021 12:58 AM

## 2022-01-07 ENCOUNTER — APPOINTMENT (OUTPATIENT)
Dept: RADIOLOGY | Facility: MEDICAL CENTER | Age: 42
End: 2022-01-07
Attending: EMERGENCY MEDICINE
Payer: MEDICARE

## 2022-01-07 ENCOUNTER — HOSPITAL ENCOUNTER (EMERGENCY)
Facility: MEDICAL CENTER | Age: 42
End: 2022-01-07
Attending: EMERGENCY MEDICINE
Payer: MEDICARE

## 2022-01-07 VITALS
WEIGHT: 252.21 LBS | HEART RATE: 80 BPM | RESPIRATION RATE: 16 BRPM | DIASTOLIC BLOOD PRESSURE: 72 MMHG | TEMPERATURE: 97.5 F | OXYGEN SATURATION: 98 % | HEIGHT: 66 IN | SYSTOLIC BLOOD PRESSURE: 110 MMHG | BODY MASS INDEX: 40.53 KG/M2

## 2022-01-07 DIAGNOSIS — J06.9 UPPER RESPIRATORY TRACT INFECTION, UNSPECIFIED TYPE: ICD-10-CM

## 2022-01-07 LAB
SARS-COV-2 RNA RESP QL NAA+PROBE: DETECTED
SPECIMEN SOURCE: ABNORMAL

## 2022-01-07 PROCEDURE — 72100 X-RAY EXAM L-S SPINE 2/3 VWS: CPT

## 2022-01-07 PROCEDURE — 71045 X-RAY EXAM CHEST 1 VIEW: CPT

## 2022-01-07 PROCEDURE — U0005 INFEC AGEN DETEC AMPLI PROBE: HCPCS

## 2022-01-07 PROCEDURE — 99283 EMERGENCY DEPT VISIT LOW MDM: CPT | Mod: 25

## 2022-01-07 PROCEDURE — U0003 INFECTIOUS AGENT DETECTION BY NUCLEIC ACID (DNA OR RNA); SEVERE ACUTE RESPIRATORY SYNDROME CORONAVIRUS 2 (SARS-COV-2) (CORONAVIRUS DISEASE [COVID-19]), AMPLIFIED PROBE TECHNIQUE, MAKING USE OF HIGH THROUGHPUT TECHNOLOGIES AS DESCRIBED BY CMS-2020-01-R: HCPCS

## 2022-01-07 ASSESSMENT — FIBROSIS 4 INDEX: FIB4 SCORE: 0.86

## 2022-01-07 NOTE — ED TRIAGE NOTES
"Yamila Mendoza  41 y.o.  Chief Complaint   Patient presents with   • Cough   • Sore Throat   • Back Pain     Patient ambulatory to triage for above. States cough and sore throat ongoing for a couple weeks. States \"someone coughed in my face in November\". States has not recently been tested for covid and has not been vaccinated.     States back has been painful since falling several months ago. Would like an x-ray. CMS intact, denies numbness/tingling, denies loss of bowel/bladder.     Pt speaking in full sentences, respirations equal and unlabored, no coughing noted.    Triage process explained to patient, apologized for wait time, and returned to senior lounge.  Pt informed to notify staff of any change in condition. NAD at this time.  "

## 2022-01-07 NOTE — ED PROVIDER NOTES
"ED Provider Note    CHIEF COMPLAINT  Chief Complaint   Patient presents with   • Cough   • Sore Throat   • Back Pain       HPI  Yamila Mendoza is a 41 y.o. female who presents to the emergency room with cough and congestion.  Reports she has had this for several months now.  Is just not getting better.  May be getting worse.  No shortness of breath.  Has not had chest pain or pleuritic pain.  No difficulty breathing or swallowing.  She does report she has ongoing back pain and states that her back was broken around October.  Denies weakness numbness neurologic symptoms.  Worse with bending movement.  No fevers.    REVIEW OF SYSTEMS  As per HPI, all other systems reviewed and negative    PAST MEDICAL HISTORY  Past Medical History:   Diagnosis Date   • ASTHMA    • Bipolar disorder (HCC)    • Chronic back pain    • Psychiatric disorder     bipolar   • Psychiatric disorder     depression   • Seizure disorder (HCC)        SOCIAL HISTORY  Social History     Tobacco Use   • Smoking status: Never Smoker   • Smokeless tobacco: Never Used   Substance Use Topics   • Alcohol use: No   • Drug use: No       SURGICAL HISTORY  Past Surgical History:   Procedure Laterality Date   • REYES BY LAPAROSCOPY  9/22/2009    Performed by JUNE WAGGONER at [facility]   • ERCP IN OR  9/19/2009    Performed by LOU WEBB at [facility]   • OTHER ABDOMINAL SURGERY     • OTHER NEUROLOGICAL SURG     • OTHER ORTHOPEDIC SURGERY      2 R knee surgeries       ALLERGIES  Allergies   Allergen Reactions   • Bactrim Unspecified     \"I get the shakes\"    • Iron Unspecified     \"see red dots\" vision, not rash    • Penicillins Unspecified     \"makes me dizzy with my medicine\"    • Phenobarbital Unspecified     \"makes me dizzy\"    • Lemon Oil Itching     Skin itching per pt report to Dietary.   • Onion Itching and Swelling     Throat swelling and itching per pt report to Dietary.   • [employer] Allergy Itching and " "Swelling     Throat swelling and itching per pt report to Dietary.       PHYSICAL EXAM  VITAL SIGNS: /76   Pulse 88   Temp 36.4 °C (97.5 °F) (Temporal)   Resp 14   Ht 1.676 m (5' 6\")   Wt 114 kg (252 lb 3.3 oz)   SpO2 97%   BMI 40.71 kg/m²    Constitutional: Awake and alert. Nontoxic  HENT:  Grossly normal  Eyes: Grossly normal  Neck: Normal range of motion  Cardiovascular: Normal heart rate   Thorax & Lungs: No respiratory distress, lung fields clear throughout  Abdomen: Nontender  Back: Diffuse lumbar tenderness  Skin:  No pathologic rash.   Extremities: Well perfused  Psychiatric: Affect normal    RADIOLOGY/PROCEDURES  DX-LUMBAR SPINE-2 OR 3 VIEWS   Final Result         1.  No acute fracture is identified.      2.  Multilevel degenerative disc disease is similar to the prior exam.      3.  Mild facet arthropathy in the lower lumbar spine.      4.  Mild retrolisthesis at L5-S1.      DX-CHEST-PORTABLE (1 VIEW)   Final Result      No evidence of acute cardiopulmonary process.         Imaging is interpreted by radiologist    Labs:  Results for orders placed or performed during the hospital encounter of 01/07/22   SARS-CoV-2 PCR (24 hour In-House): Collect NP swab in VTM    Specimen: Nasopharyngeal; Respirate   Result Value Ref Range    SARS-CoV-2 Source NP Swab       COURSE & MEDICAL DECISION MAKING  Patient presents with viral URI symptoms.  Has had a prolonged cough.  Obtain chest x-ray without identified pathology.  She reported she broke her back and had pain.  I obtained x-ray.  No fracture was identified.  Chronic changes as noted.  At this point I advised symptomatic care.  Plenty of fluids, Tylenol as needed.  She will follow up on her Covid results.  She should return to ER for difficulty breathing, weakness in extremities, abdominal pain notices other injury or concern.  Recheck with primary in 1 week    FINAL IMPRESSION  1.  Viral upper respiratory infection  2.  Low back " pain      Disposition: home in good condition      This dictation was created using voice recognition software. The accuracy of the dictation is limited to the abilities of the software.  The nursing notes were reviewed and certain aspects of this information were incorporated into this note.      Electronically signed by: Arnaud Westbrook M.D., 1/7/2022 9:28 AM

## 2022-01-14 ENCOUNTER — HOSPITAL ENCOUNTER (EMERGENCY)
Facility: MEDICAL CENTER | Age: 42
End: 2022-01-14
Attending: EMERGENCY MEDICINE
Payer: MEDICARE

## 2022-01-14 VITALS
TEMPERATURE: 97.5 F | SYSTOLIC BLOOD PRESSURE: 123 MMHG | RESPIRATION RATE: 17 BRPM | WEIGHT: 252 LBS | DIASTOLIC BLOOD PRESSURE: 78 MMHG | BODY MASS INDEX: 40.5 KG/M2 | HEART RATE: 85 BPM | HEIGHT: 66 IN | OXYGEN SATURATION: 95 %

## 2022-01-14 DIAGNOSIS — J02.9 PHARYNGITIS, UNSPECIFIED ETIOLOGY: ICD-10-CM

## 2022-01-14 DIAGNOSIS — R05.9 COUGH: ICD-10-CM

## 2022-01-14 DIAGNOSIS — U07.1 COVID-19 VIRUS INFECTION: ICD-10-CM

## 2022-01-14 PROCEDURE — 99282 EMERGENCY DEPT VISIT SF MDM: CPT

## 2022-01-14 ASSESSMENT — FIBROSIS 4 INDEX: FIB4 SCORE: 0.86

## 2022-01-14 NOTE — ED TRIAGE NOTES
"Yamila Mendoza  41 y.o.  female  Chief Complaint   Patient presents with   • Cough     patient states \" I need medicine to stop my cough \" Dx covid positive  1/7/22. oxygen saturation 94 % RA. Afebrile.        "

## 2022-01-14 NOTE — ED NOTES
Patient educated on discharge instructions and home care. Patient verbalized understanding. Patient ambulated to [facility].

## 2022-01-14 NOTE — ED PROVIDER NOTES
"ED Provider Note    CHIEF COMPLAINT  Chief Complaint   Patient presents with   • Cough     patient states \" I need medicine to stop my cough \" Dx covid positive  1/7/22. oxygen saturation 94 % RA. Afebrile.        HPI  Yamila Mendoza is a 41 y.o. female who presents to the emergency department chief complaint of a cough.  She was recently diagnosed with COVID 1 week ago and she states she still continues to cough she has been taking over-the-counter cough and cold medications she states they sometimes help but she wants her cough to go away.  She endorses a sore throat she has not taken anything for her symptoms all day today.  She has not been taking ibuprofen or Tylenol but she was taking DayQuil which she states helped the most but she has not taken it in a while.  She denies hemoptysis chest pain difficulty breathing with ambulation nausea or vomiting    REVIEW OF SYSTEMS  Positives as above. Pertinent negatives include nausea vomiting headache neck stiffness easy bleeding or bruising chest pain dyspnea on exertion  All other review of systems are negative    PAST MEDICAL HISTORY   has a past medical history of ASTHMA, Bipolar disorder (HCC), Chronic back pain, Psychiatric disorder, Psychiatric disorder, and Seizure disorder ([facility]).    SOCIAL HISTORY  Social History     Tobacco Use   • Smoking status: Never Smoker   • Smokeless tobacco: Never Used   Vaping Use   • Vaping Use: Never used   Substance and Sexual Activity   • Alcohol use: No   • Drug use: No   • Sexual activity: Not on file       SURGICAL HISTORY   has a past surgical history that includes other neurological surg; other orthopedic surgery; other abdominal surgery; ercp in or (9/19/2009); and joy by laparoscopy (9/22/2009).    CURRENT MEDICATIONS  Home Medications     Reviewed by Marvin Garcia R.N. (Registered Nurse) on 01/14/22 at 0242  Med List Status: Partial   Medication Last Dose Status   albuterol 108 (90 Base) MCG/ACT Aero Soln inhalation " "aerosol  Active   amitriptyline (ELAVIL) 100 MG Tab  Active   carBAMazepine (TEGRETOL) 200 MG Tab  Active   cholecalciferol (VITAMIN D3) 400 UNIT Tab  Active   cyclobenzaprine (FLEXERIL) 10 MG Tab  Active   divalproex ER (DEPAKOTE ER) 250 MG TABLET SR 24 HR  Active   hydrOXYzine HCl (ATARAX) 25 MG Tab  Active   loratadine (CLARITIN) 10 MG Tab  Active   naproxen (NAPROSYN) 250 MG Tab  Active   potassium chloride SA (K-DUR) 10 MEQ Tab CR  Active   topiramate (TOPAMAX) 100 MG Tab  Active                ALLERGIES  Allergies   Allergen Reactions   • Bactrim Unspecified     \"I get the shakes\"    • Iron Unspecified     \"see red dots\" vision, not rash    • Penicillins Unspecified     \"makes me dizzy with my medicine\"    • Phenobarbital Unspecified     \"makes me dizzy\"    • Lemon Oil Itching     Skin itching per pt report to Dietary.   • Onion Itching and Swelling     Throat swelling and itching per pt report to Dietary.   • [employer] Allergy Itching and Swelling     Throat swelling and itching per pt report to Dietary.       PHYSICAL EXAM  VITAL SIGNS: /78   Pulse 93   Temp 36.3 °C (97.4 °F) (Temporal)   Resp 16   Ht 1.676 m (5' 6\")   Wt 114 kg (252 lb)   SpO2 94%   BMI 40.67 kg/m²    Pulse ox interpretation: I interpret this pulse ox as normal.  Constitutional: Alert in no apparent distress.  HENT: Normocephalic, Atraumatic, MMM oropharynx clear uvula midline no tonsillar exudates  Eyes: PERound. Conjunctiva normal, non-icteric.   Heart: Regular rate and rhythm, no murmurs.    Lungs: Clear to auscultation bilaterally. No resp distress, breath sounds equal  Abdomen: Non-tender, non-distended, normal bowel sounds  Skin: Warm, Dry, No erythema, No rash.   Neurologic: Alert and oriented, Grossly non-focal.       DIFFERENTIAL DIAGNOSIS AND WORK UP PLAN    This is a 41 y.o. female who presents with recent diagnosis of COVID infection she is not hypoxic tachypneic nor in any respiratory distress her lung " "sounds are clear oropharynx is clear I discussed with the patient she needs to continue over-the-counter cough and cold medications including Tylenol ibuprofen she does not qualify for any of the monoclonal antibodies and that that she is going to have continued cough and continue to isolate until her symptoms have resolved.  She should return to the ED for any new or worsening difficulty breathing or uncontrolled vomiting she understands feels comfortable being discharged    /78   Pulse 93   Temp 36.3 °C (97.4 °F) (Temporal)   Resp 16   Ht 1.676 m (5' 6\")   Wt 114 kg (252 lb)   SpO2 94%   BMI 40.67 kg/m²       I verified that the patient was wearing a mask and I was wearing appropriate PPE every time I entered the room. The patient's mask was on the patient at all times during my encounter except for a brief view of the oropharynx.    The patient will return for new or worsening symptoms and is stable at the time of discharge.    The patient is referred to a primary physician for blood pressure management, diabetic screening, and for all other preventative health concerns.    DISPOSITION:  Patient will be discharged home in stable condition.    FOLLOW UP:  Radha Hinojosa P.A.-C.  [address] [place] [id number]  [phone]    Schedule an appointment as soon as possible for a visit         OUTPATIENT MEDICATIONS:  New Prescriptions    No medications on file         FINAL IMPRESSION  1. COVID-19 virus infection     2. Cough     3. Pharyngitis, unspecified etiology                Electronically signed by: Henrietta Contreras M.D., 1/14/2022 3:20 AM    This dictation has been created using voice recognition software and/or scribes. The accuracy of the dictation is limited by the abilities of the software and the expertise of the scribes. I expect there may be some errors of grammar and possibly content. I made every attempt to manually correct the errors within my dictation. However, errors related " to voice recognition software and/or scribes may still exist and should be interpreted within the appropriate context.

## 2022-02-22 ENCOUNTER — APPOINTMENT (OUTPATIENT)
Dept: RADIOLOGY | Facility: MEDICAL CENTER | Age: 42
End: 2022-02-22
Attending: EMERGENCY MEDICINE
Payer: COMMERCIAL

## 2022-02-22 ENCOUNTER — HOSPITAL ENCOUNTER (EMERGENCY)
Facility: MEDICAL CENTER | Age: 42
End: 2022-02-22
Attending: EMERGENCY MEDICINE
Payer: COMMERCIAL

## 2022-02-22 VITALS
TEMPERATURE: 98.1 F | HEART RATE: 81 BPM | HEIGHT: 66 IN | SYSTOLIC BLOOD PRESSURE: 124 MMHG | BODY MASS INDEX: 40.32 KG/M2 | RESPIRATION RATE: 18 BRPM | DIASTOLIC BLOOD PRESSURE: 78 MMHG | WEIGHT: 250.88 LBS | OXYGEN SATURATION: 98 %

## 2022-02-22 DIAGNOSIS — S93.401A SPRAIN OF RIGHT ANKLE, UNSPECIFIED LIGAMENT, INITIAL ENCOUNTER: ICD-10-CM

## 2022-02-22 DIAGNOSIS — S86.912A STRAIN OF LEFT KNEE, INITIAL ENCOUNTER: ICD-10-CM

## 2022-02-22 PROCEDURE — 700102 HCHG RX REV CODE 250 W/ 637 OVERRIDE(OP): Performed by: EMERGENCY MEDICINE

## 2022-02-22 PROCEDURE — A9270 NON-COVERED ITEM OR SERVICE: HCPCS | Performed by: EMERGENCY MEDICINE

## 2022-02-22 PROCEDURE — 73562 X-RAY EXAM OF KNEE 3: CPT | Mod: LT

## 2022-02-22 PROCEDURE — 99284 EMERGENCY DEPT VISIT MOD MDM: CPT

## 2022-02-22 RX ORDER — IBUPROFEN 400 MG/1
400 TABLET ORAL EVERY 6 HOURS PRN
Qty: 30 TABLET | Refills: 0 | Status: SHIPPED | OUTPATIENT
Start: 2022-02-22 | End: 2022-06-17 | Stop reason: SDUPTHER

## 2022-02-22 RX ORDER — IBUPROFEN 600 MG/1
600 TABLET ORAL ONCE
Status: COMPLETED | OUTPATIENT
Start: 2022-02-22 | End: 2022-02-22

## 2022-02-22 RX ADMIN — IBUPROFEN 600 MG: 600 TABLET ORAL at 18:05

## 2022-02-22 ASSESSMENT — PAIN DESCRIPTION - PAIN TYPE: TYPE: ACUTE PAIN

## 2022-02-22 ASSESSMENT — FIBROSIS 4 INDEX: FIB4 SCORE: 0.86

## 2022-02-23 NOTE — ED NOTES
Discharge orders received, instructions and education given, follow-up discussed, prescription sent to pharmacy, pt verbalized understanding.

## 2022-02-23 NOTE — ED PROVIDER NOTES
"ED Provider Note    CHIEF COMPLAINT  Chief Complaint   Patient presents with   • T-5000 GLF     Fell off a curb \"a couple of weeks ago\". Hit left knee and Right ankle.   Pt complaining of left knee and right ankle/leg pain.   Pt states she went to [place] and was told she had a sprained ankle       HPI  Yamila Mendoza is a 41 y.o. female who presents to our department for ongoing ankle pain.  Patient was seen at [place] after she fell off a curb couple weeks ago, at that point she had an x-ray of her right foot and right ankle both of which were unremarkable.  Patient reports she is having ongoing pain.  This was approximately a month ago.  Patient is requesting a x-ray of her left knee as she never got this at [place].  Patient reports she remains ambulatory.  She denies any associated weakness or numbness or new lower extremity edema.  She denies any head trauma.  She denies any other complaints at this point.    REVIEW OF SYSTEMS  ROS  See HPI for further details. All other systems are negative.     PAST MEDICAL HISTORY   has a past medical history of ASTHMA, Bipolar disorder (HCC), Chronic back pain, Psychiatric disorder, Psychiatric disorder, and Seizure disorder ([facility]).    SOCIAL HISTORY  Social History     Tobacco Use   • Smoking status: Never Smoker   • Smokeless tobacco: Never Used   Vaping Use   • Vaping Use: Never used   Substance and Sexual Activity   • Alcohol use: No   • Drug use: No   • Sexual activity: Not on file       SURGICAL HISTORY   has a past surgical history that includes other neurological surg; other orthopedic surgery; other abdominal surgery; ercp in or (9/19/2009); and joy by laparoscopy (9/22/2009).    CURRENT MEDICATIONS  Home Medications     Reviewed by Jovita Alfonso R.N. (Registered Nurse) on 02/22/22 at 8059  Med List Status: <None>   Medication Last Dose Status   albuterol 108 (90 Base) MCG/ACT Aero Soln inhalation aerosol  Active   amitriptyline (ELAVIL) 100 MG " "Tab  Active   carBAMazepine (TEGRETOL) 200 MG Tab  Active   cholecalciferol (VITAMIN D3) 400 UNIT Tab  Active   cyclobenzaprine (FLEXERIL) 10 MG Tab  Active   divalproex ER (DEPAKOTE ER) 250 MG TABLET SR 24 HR  Active   hydrOXYzine HCl (ATARAX) 25 MG Tab  Active   loratadine (CLARITIN) 10 MG Tab  Active   naproxen (NAPROSYN) 250 MG Tab  Active   potassium chloride SA (K-DUR) 10 MEQ Tab CR  Active   topiramate (TOPAMAX) 100 MG Tab  Active                ALLERGIES  Allergies   Allergen Reactions   • Bactrim Unspecified     \"I get the shakes\"    • Iron Unspecified     \"see red dots\" vision, not rash    • Penicillins Unspecified     \"makes me dizzy with my medicine\"    • Phenobarbital Unspecified     \"makes me dizzy\"    • Lemon Oil Itching     Skin itching per pt report to Dietary.   • Onion Itching and Swelling     Throat swelling and itching per pt report to Dietary.   • [employer] Allergy Itching and Swelling     Throat swelling and itching per pt report to Dietary.       PHYSICAL EXAM  Vitals:    02/22/22 1640   BP: 113/82   Pulse: 82   Resp: 16   SpO2: 99%       Physical Exam  Constitutional:       Appearance: She is well-developed.   HENT:      Head: Normocephalic and atraumatic.   Eyes:      Conjunctiva/sclera: Conjunctivae normal.   Pulmonary:      Effort: Pulmonary effort is normal.   Musculoskeletal:      Cervical back: Normal range of motion and neck supple.      Comments: No tenderness palpation of bilateral ankles, bilateral malleoli are unremarkable.  Bilateral feet are unremarkable without any tenderness palpation.  Distal pulses are 2+.  Patient's right knee without any tenderness palpation.  Full range of motion.  Patient's left knee without any tenderness palpation.  Patient reports mild pain on the flexion.  Patient with full strength in knee flexion and extension.  Sensations intact bilaterally.  No associated hip pain or hip tenderness.  No pain on logroll or axial loading.   Skin:     " General: Skin is warm.   Neurological:      Mental Status: She is alert and oriented to person, place, and time.   Psychiatric:         Behavior: Behavior normal.       DX-KNEE 3 VIEWS LEFT   Final Result      Negative for left knee fracture or malalignment            COURSE & MEDICAL DECISION MAKING  Pertinent Labs & Imaging studies reviewed. (See chart for details)    Patient likely knee strain given her exam, will check x-ray for possible occult fracture.  X-rays unremarkable.  Patient is already had x-rays of her foot and ankle.  These are normal.  Patient is ambulatory.  Patient instructed to take ibuprofen for pain.      . The patient will return for worsening symptoms and is stable at the time of discharge. The patient verbalizes understanding and will comply.    FINAL IMPRESSION    1. Sprain of right ankle, unspecified ligament, initial encounter    2. Strain of left knee, initial encounter               Electronically signed by: Mandeep Ocampo M.D., 2/22/2022 5:47 PM

## 2022-02-23 NOTE — ED TRIAGE NOTES
"Chief Complaint   Patient presents with   • T-5000 GLF     Fell off a curb \"a couple of weeks ago\". Hit left knee and Right ankle.   Pt complaining of left knee and right ankle/leg pain.   Pt states she went to [place] and was told she had a sprained ankle     Pt assisted to triage with above complaint.     /82   Pulse 82   Resp 16   Ht 1.676 m (5' 6\")   Wt 114 kg (250 lb 14.1 oz)   LMP 02/22/2022   SpO2 99%   BMI 40.49 kg/m²     "

## 2022-03-04 ENCOUNTER — APPOINTMENT (OUTPATIENT)
Dept: RADIOLOGY | Facility: MEDICAL CENTER | Age: 42
End: 2022-03-04
Attending: EMERGENCY MEDICINE
Payer: MEDICARE

## 2022-03-04 ENCOUNTER — HOSPITAL ENCOUNTER (EMERGENCY)
Facility: MEDICAL CENTER | Age: 42
End: 2022-03-04
Attending: EMERGENCY MEDICINE
Payer: MEDICARE

## 2022-03-04 VITALS
RESPIRATION RATE: 18 BRPM | HEART RATE: 80 BPM | OXYGEN SATURATION: 96 % | SYSTOLIC BLOOD PRESSURE: 120 MMHG | DIASTOLIC BLOOD PRESSURE: 78 MMHG | TEMPERATURE: 97.6 F | BODY MASS INDEX: 40.99 KG/M2 | HEIGHT: 66 IN | WEIGHT: 255.07 LBS

## 2022-03-04 DIAGNOSIS — M25.552 LEFT HIP PAIN: ICD-10-CM

## 2022-03-04 DIAGNOSIS — S70.02XA CONTUSION OF LEFT HIP, INITIAL ENCOUNTER: ICD-10-CM

## 2022-03-04 PROCEDURE — 700111 HCHG RX REV CODE 636 W/ 250 OVERRIDE (IP): Performed by: EMERGENCY MEDICINE

## 2022-03-04 PROCEDURE — 73501 X-RAY EXAM HIP UNI 1 VIEW: CPT | Mod: LT

## 2022-03-04 PROCEDURE — 96372 THER/PROPH/DIAG INJ SC/IM: CPT

## 2022-03-04 PROCEDURE — 99283 EMERGENCY DEPT VISIT LOW MDM: CPT

## 2022-03-04 RX ORDER — KETOROLAC TROMETHAMINE 30 MG/ML
60 INJECTION, SOLUTION INTRAMUSCULAR; INTRAVENOUS ONCE
Status: COMPLETED | OUTPATIENT
Start: 2022-03-04 | End: 2022-03-04

## 2022-03-04 RX ORDER — IBUPROFEN 600 MG/1
600 TABLET ORAL EVERY 6 HOURS PRN
Qty: 30 TABLET | Refills: 0 | Status: SHIPPED | OUTPATIENT
Start: 2022-03-04 | End: 2022-09-03

## 2022-03-04 RX ADMIN — KETOROLAC TROMETHAMINE 60 MG: 30 INJECTION, SOLUTION INTRAMUSCULAR at 18:53

## 2022-03-04 ASSESSMENT — FIBROSIS 4 INDEX: FIB4 SCORE: 0.86

## 2022-03-05 NOTE — DISCHARGE INSTRUCTIONS
Alternate between ice and moist heat i.e. hot tub soak in Epsom salts.  Take the medication and prescribing you as directed with food  Follow-up with your primary care doctor in 1 week for recheck.

## 2022-03-05 NOTE — ED PROVIDER NOTES
ED Provider Note     Scribed for Jossy Cheney D.O. by Latrell Noe. 3/4/2022, 6:15 PM.     Primary care provider: Radha Hinojosa P.A.-C.  Means of arrival: walk in         History obtained from: patient  History limited by: none    CHIEF COMPLAINT  Chief Complaint   Patient presents with   • Hip Pain     Pt fell off of a curb & hit L hip/ankle 2 weeks ago. Had xrays of L ankle at [place], then returned to [facility] ED on 02/22 & had L knee xray. Now c/o L hip pain & requesting xray. Ambulatory with steady gait.       HPI  Yamila Mendoza is a 41 y.o. female who presents to the emergency Department for left hip pain secondary to a ground level fall occurring 2 weeks ago. The patient states she was trying to get to the bus two weeks ago when she tripped over her feet and fell off the curb. She states she was very nervous at the time. She has reportedly had left hip pain since the fall. She denies any loss of sensation in her legs.    REVIEW OF SYSTEMS  Pertinent positives include left hip pain. Pertinent negatives include no loss of sensation.   See HPI for further details.    PAST MEDICAL HISTORY  Past Medical History:   Diagnosis Date   • ASTHMA    • Bipolar disorder (HCC)    • Chronic back pain    • Psychiatric disorder     bipolar   • Psychiatric disorder     depression   • Seizure disorder (HCC)        FAMILY HISTORY  None noted    SOCIAL HISTORY  Social History     Tobacco Use   • Smoking status: Never Smoker   • Smokeless tobacco: Never Used   Vaping Use   • Vaping Use: Never used   Substance Use Topics   • Alcohol use: No   • Drug use: No      Social History     Substance and Sexual Activity   Drug Use No       SURGICAL HISTORY  Past Surgical History:   Procedure Laterality Date   • REYES BY LAPAROSCOPY  9/22/2009    Performed by JUNE WAGGONER at SURGERY [facility] ORS   • ERCP IN OR  9/19/2009    Performed by LOU WEBB at SURGERY [facility] ORS   • OTHER ABDOMINAL SURGERY     • OTHER  "NEUROLOGICAL SURG     • OTHER ORTHOPEDIC SURGERY      2 R knee surgeries       CURRENT MEDICATIONS  Current Outpatient Medications:   •  ibuprofen (MOTRIN) 400 MG Tab, Take 1 Tablet by mouth every 6 hours as needed., Disp: 30 Tablet, Rfl: 0  •  carBAMazepine (TEGRETOL) 200 MG Tab, Take 200 mg by mouth 3 times a day., Disp: , Rfl:   •  divalproex ER (DEPAKOTE ER) 250 MG TABLET SR 24 HR, Take 500-750 mg by mouth every day. 500mg every morning, 500 mg every afternoon, 750mg every night at bedtime, Disp: , Rfl:   •  hydrOXYzine HCl (ATARAX) 25 MG Tab, Take 25 mg by mouth 2 times a day as needed for Anxiety., Disp: , Rfl:   •  potassium chloride SA (K-DUR) 10 MEQ Tab CR, Take 10 mEq by mouth every day., Disp: , Rfl:   •  topiramate (TOPAMAX) 100 MG Tab, Take 100 mg by mouth 2 times a day., Disp: , Rfl:   •  albuterol 108 (90 Base) MCG/ACT Aero Soln inhalation aerosol, Inhale 2 Puffs by mouth every 6 hours as needed for Shortness of Breath., Disp: , Rfl:   •  cholecalciferol (VITAMIN D3) 400 UNIT Tab, Take 400 Units by mouth every day., Disp: , Rfl:   •  amitriptyline (ELAVIL) 100 MG Tab, Take 25 mg by mouth every evening., Disp: , Rfl:   •  loratadine (CLARITIN) 10 MG Tab, Take 1 Tab by mouth every day., Disp: 30 Tab, Rfl: 0  •  cyclobenzaprine (FLEXERIL) 10 MG Tab, Take 5 mg by mouth at bedtime as needed for Muscle Spasms., Disp: , Rfl:     ALLERGIES  Allergies   Allergen Reactions   • Bactrim Unspecified     \"I get the shakes\"    • Iron Unspecified     \"see red dots\" vision, not rash    • Penicillins Unspecified     \"makes me dizzy with my medicine\"    • Phenobarbital Unspecified     \"makes me dizzy\"    • Lemon Oil Itching     Skin itching per pt report to Dietary.   • Onion Itching and Swelling     Throat swelling and itching per pt report to Dietary.   • [employer] Allergy Itching and Swelling     Throat swelling and itching per pt report to Dietary.       PHYSICAL EXAM  VITAL SIGNS: /81   Pulse 82   " "Temp 36.4 °C (97.5 °F) (Temporal)   Resp 18   Ht 1.676 m (5' 6\")   Wt 116 kg (255 lb 1.2 oz)   LMP 02/22/2022   SpO2 94%   BMI 41.17 kg/m²     Constitutional: Patient is well developed, well nourished. Morbidly obese. Non-toxic appearing. No acute distress.   HENT: Normocephalic, atraumatic.  Eyes: PERRL, EOMI, Conjunctiva without erythema or exudates.   Neck: No tenderness along the bony prominences or paraspinal muscles.   Cardiovascular: Normal heart rate and Regular rhythm.  No murmur.  Thorax & Lungs: No respiratory distress  Skin: Warm, Dry, No erythema, No rashes.   Back: No cervical, thoracic, or lumbosacral tenderness.  Musculoskeletal: Normal range of motion in all major joints. Tenderness on left lateral hip, no contusions or abrasions. No major deformities noted. Normal gait on ambulation, full flexion, extension, and external rotatation  Neurologic: Alert & oriented x 3, Normal motor function, Normal sensory function  Psychiatric: Affect odd, slow mentation.    DIAGNOSTICS/PROCEDURES    RADIOLOGY/PROCEDURES  DX-HIP-UNILATERAL-WITH PELVIS-1 VIEW LEFT   Final Result      1.  No fracture or dislocation is seen.   2.  No significant osteoarthritis is noted.        Results and radiologist interpretation reviewed by me.     COURSE & MEDICAL DECISION MAKING  Pertinent Labs & Imaging studies reviewed. (See chart for details)    6:15 PM - Patient seen and evaluated at bedside. Ordered for DX hip unilateral w/ pelvis left. Patient will be treated with Toradol 60 mg for her symptoms. Differential diagnoses include, but are not limited to, hip fracture, hip dislocation, hip contusion.    7:09 PM - Patient seen at bedside. Discussed lab and imaging results with the patient and updated them on the plan of care, including discharge. I answered all questions regarding their care and discussed strict return precautions for new or changing symptoms. Patient verbalizes understanding and agreement to this plan of " care.   Patient is to apply moist heat to the affected area or hot tub soaks, Biofreeze, follow-up with her primary care doctor in 1 week for recheck.  She is stable upon discharge    The patient will return for new or worsening symptoms and is stable at the time of discharge.    The patient is referred to a primary physician for blood pressure management, diabetic screening, and for all other preventative health concerns.    DISPOSITION:  Patient will be discharged home in stable condition.    FOLLOW UP:  No follow-up provider specified.    OUTPATIENT MEDICATIONS:  Discharge Medication List as of 3/4/2022  8:13 PM      START taking these medications    Details   !! ibuprofen (MOTRIN) 600 MG Tab Take 1 Tablet by mouth every 6 hours as needed for Moderate Pain or Inflammation., Disp-30 Tablet, R-0, Normal       !! - Potential duplicate medications found. Please discuss with provider.            FINAL IMPRESSION  1. Left hip pain    2. Contusion of left hip, initial encounter         Latrell BELTRAN (Yanyibarmani), am scribing for, and in the presence of, Jossy Cheney D.O..    Electronically signed by: Latrell Noe (Linda), 3/4/2022    Jossy BELTRAN D.O. personally performed the services described in this documentation, as scribed by Latrell Noe in my presence, and it is both accurate and complete.    The note accurately reflects work and decisions made by me.  Jossy Cheney D.O.  3/5/2022  1:29 AM

## 2022-03-05 NOTE — ED NOTES
DC home with written and verbal instructions regarding f/u, activity and RX.  Verbalized understanding, ambulated out.

## 2022-03-05 NOTE — ED TRIAGE NOTES
Yamila Mendoza  41 y.o.  female  Chief Complaint   Patient presents with   • Hip Pain     Pt fell off of a curb & hit L hip/ankle 2 weeks ago. Had xrays of L ankle at [place], then returned to [facility] ED on 02/22 & had L knee xray. Now c/o L hip pain & requesting xray. Ambulatory with steady gait.       Patient educated on triage process, to alert staff if any changes in condition.

## 2022-03-16 ENCOUNTER — APPOINTMENT (OUTPATIENT)
Dept: RADIOLOGY | Facility: MEDICAL CENTER | Age: 42
End: 2022-03-16
Attending: EMERGENCY MEDICINE
Payer: COMMERCIAL

## 2022-03-16 ENCOUNTER — HOSPITAL ENCOUNTER (EMERGENCY)
Facility: MEDICAL CENTER | Age: 42
End: 2022-03-16
Attending: EMERGENCY MEDICINE
Payer: COMMERCIAL

## 2022-03-16 VITALS
DIASTOLIC BLOOD PRESSURE: 82 MMHG | RESPIRATION RATE: 20 BRPM | SYSTOLIC BLOOD PRESSURE: 126 MMHG | OXYGEN SATURATION: 97 % | HEIGHT: 66 IN | WEIGHT: 241.4 LBS | TEMPERATURE: 96.4 F | HEART RATE: 80 BPM | BODY MASS INDEX: 38.8 KG/M2

## 2022-03-16 DIAGNOSIS — G89.29 CHRONIC PAIN OF LEFT KNEE: ICD-10-CM

## 2022-03-16 DIAGNOSIS — M25.571 CHRONIC PAIN OF RIGHT ANKLE: ICD-10-CM

## 2022-03-16 DIAGNOSIS — G89.29 CHRONIC PAIN OF RIGHT ANKLE: ICD-10-CM

## 2022-03-16 DIAGNOSIS — M79.671 RIGHT FOOT PAIN: ICD-10-CM

## 2022-03-16 DIAGNOSIS — M25.562 CHRONIC PAIN OF LEFT KNEE: ICD-10-CM

## 2022-03-16 PROCEDURE — 99283 EMERGENCY DEPT VISIT LOW MDM: CPT

## 2022-03-16 PROCEDURE — 73610 X-RAY EXAM OF ANKLE: CPT | Mod: RT

## 2022-03-16 PROCEDURE — 73564 X-RAY EXAM KNEE 4 OR MORE: CPT | Mod: LT

## 2022-03-16 PROCEDURE — 73630 X-RAY EXAM OF FOOT: CPT | Mod: RT

## 2022-03-16 ASSESSMENT — FIBROSIS 4 INDEX: FIB4 SCORE: 0.86

## 2022-03-16 NOTE — ED PROVIDER NOTES
"ED Provider Note    CHIEF COMPLAINT  Chief Complaint   Patient presents with   • Foot Pain     R foot pain started this morning. Numbness and tingling noted. Pt had a fall back in October and injured foot per pt.        [facility]    Primary care provider: Radha Hinojosa P.A.-C.   History obtained from: Patient and spouse  History limited by: None     Yamila Mendoza is a 41 y.o. female who presents to the ED with spouse complaining of right foot pain that radiates up into her right ankle.  She states that she fell last October and has had pain since.  She states that she went to [place] but did not do any x-rays and then came to [facility] but not sure if x-rays were done.  She continues to have pain which is why she came to this ED.  She also states that she fell onto her left knee during the fall last October and it was swollen afterwards but the swelling has gone down and \"since I am here I want to get that checked out too and maybe get a brace or something.\"  Patient reports numbness and tingling in her right foot.  She denies fever/chest pain/shortness of breath or difficulty breathing.    REVIEW OF SYSTEMS  Please see HPI for pertinent positives/negatives.     PAST MEDICAL HISTORY  Past Medical History:   Diagnosis Date   • ASTHMA    • Bipolar disorder (HCC)    • Chronic back pain    • Psychiatric disorder     bipolar   • Psychiatric disorder     depression   • Seizure disorder (HCC)         SURGICAL HISTORY  Past Surgical History:   Procedure Laterality Date   • REYES BY LAPAROSCOPY  9/22/2009    Performed by JUNE WAGGONER at SURGERY [facility] ORS   • ERCP IN OR  9/19/2009    Performed by LOU WEBB at SURGERY [facility] ORS   • OTHER ABDOMINAL SURGERY     • OTHER NEUROLOGICAL SURG     • OTHER ORTHOPEDIC SURGERY      2 R knee surgeries        SOCIAL HISTORY  Social History     Tobacco Use   • Smoking status: Never Smoker   • Smokeless tobacco: Never Used   Vaping Use   • Vaping Use: Never used " "  Substance and Sexual Activity   • Alcohol use: No   • Drug use: No   • Sexual activity: Not on file        FAMILY HISTORY  History reviewed. No pertinent family history.     CURRENT MEDICATIONS  Home Medications     Reviewed by Mandeep Zapata R.N. (Registered Nurse) on 03/16/22 at 1558  Med List Status: Partial   Medication Last Dose Status   albuterol 108 (90 Base) MCG/ACT Aero Soln inhalation aerosol  Active   amitriptyline (ELAVIL) 100 MG Tab  Active   carBAMazepine (TEGRETOL) 200 MG Tab  Active   cholecalciferol (VITAMIN D3) 400 UNIT Tab  Active   cyclobenzaprine (FLEXERIL) 10 MG Tab  Active   divalproex ER (DEPAKOTE ER) 250 MG TABLET SR 24 HR  Active   hydrOXYzine HCl (ATARAX) 25 MG Tab  Active   ibuprofen (MOTRIN) 400 MG Tab  Active   ibuprofen (MOTRIN) 600 MG Tab  Active   loratadine (CLARITIN) 10 MG Tab  Active   potassium chloride SA (K-DUR) 10 MEQ Tab CR  Active   topiramate (TOPAMAX) 100 MG Tab  Active                 ALLERGIES  Allergies   Allergen Reactions   • Bactrim Unspecified     \"I get the shakes\"    • Iron Unspecified     \"see red dots\" vision, not rash    • Penicillins Unspecified     \"makes me dizzy with my medicine\"    • Phenobarbital Unspecified     \"makes me dizzy\"    • Lemon Oil Itching     Skin itching per pt report to Dietary.   • Onion Itching and Swelling     Throat swelling and itching per pt report to Dietary.   • [employer] Allergy Itching and Swelling     Throat swelling and itching per pt report to Dietary.        PHYSICAL EXAM  VITAL SIGNS: /82   Pulse 80   Temp (!) 35.8 °C (96.4 °F) (Temporal)   Resp 20   Ht 1.676 m (5' 6\")   Wt 110 kg (241 lb 6.5 oz)   LMP 02/22/2022   SpO2 97%   BMI 38.96 kg/m²  @MONTY[[id number]::@     Pulse ox interpretation: 98% I interpret this pulse ox as normal     Constitutional: Well developed, well nourished, alert in no apparent distress, nontoxic appearance   HENT: No external signs of trauma, normocephalic   Eyes: No discharge, no " "icterus   Neck: No stridor   Cardiovascular: Strong distal pulses and good perfusion   Thorax & Lungs: No respiratory distress   Extremities: No cyanosis, mild bilateral lower extremity edema without warmth/crepitus/fluctuance/erythema/streaking/drainage/tenseness/palpable cords, no gross deformity, no apparent tenderness to palpation, good ROM without apparent discomfort, sensation intact to touch throughout, distal 5/5 strength bilaterally, intact distal pulses with brisk cap refill   Skin: Warm, dry, no pallor/cyanosis, no rash noted except scattered dry skin and chronic appearing changes  Neuro: A/O times 3, no focal deficits noted   Psychiatric: Cooperative       DIAGNOSTIC STUDIES / PROCEDURES        LABS  All labs reviewed by me.     Results for orders placed or performed during the hospital encounter of 01/07/22   SARS-CoV-2 PCR (24 hour In-House): Collect NP swab in VTM    Specimen: Nasopharyngeal; Respirate   Result Value Ref Range    SARS-CoV-2 Source NP Swab     SARS-CoV-2 by PCR DETECTED (AA)         RADIOLOGY  The radiologist's interpretation of all radiological studies have been reviewed by me.     DX-KNEE COMPLETE 4+ LEFT   Final Result      No acute fracture or significant arthropathy.      DX-ANKLE 3+ VIEWS RIGHT   Final Result      1.  There is no acute or subacute fracture or malalignment of the right ankle.      DX-FOOT-COMPLETE 3+ RIGHT   Final Result      1.  No acute findings or significant arthropathy identified.      2.  Calcaneal bone spur             COURSE & MEDICAL DECISION MAKING  Nursing notes, VS, PMSFHx reviewed in chart.     Review of past medical records shows the patient was last seen in this ED March 4, 2022 complaining of left hip and ankle pain after a fall without acute findings on x-ray.  She was seen in this ED February 22, 2022 complaining of left knee and right ankle pain due to a fall \"a couple of weeks ago\" and x-rays without acute findings.  She was seen at [facility] ED January 22, 2022 complaining of right ankle pain without acute findings on x-ray.      Differential diagnoses considered include but are not limited to: Fx, contusion, strain, sprain, bursitis, tendonitis, neuropathy, neurovascular injury      History and physical exam as above.  On my initial exam, I had very low clinical suspicion for acute fracture especially given that she has been seen several times for these symptoms.  However, patient requested x-rays which were performed without evidence for acute bony injury.  I discussed the findings with the patient and spouse.  She is noted to be resting comfortably in no acute distress and nontoxic in appearance.  Clinically, I also do not suspect osteomyelitis, septic joint, necrotizing fasciitis, abscess, cellulitis, DVT/vascular occlusion, compartment syndrome.  I discussed with them possible strain/sprain and supportive home care including acetaminophen/ibuprofen as needed.  She was advised on outpatient follow-up and given return to ED precautions.  They verbalized understanding and agreed with plan of care with no further questions or concerns.      The patient is referred to a primary physician for blood pressure management, diabetic screening, and for all other preventative health concerns.       FINAL IMPRESSION  1. Chronic pain of left knee Active   2. Chronic pain of right ankle Active   3. Right foot pain Active          DISPOSITION  Patient will be discharged home in stable condition.       FOLLOW UP  Radha Hinojosa P.A.-C.  [address] [place] [id number] [phone]    Call in 1 day      [facility], Emergency Dept  [address] [facility] [address] [phone]    If symptoms worsen         OUTPATIENT MEDICATIONS  Discharge Medication List as of 3/16/2022  5:46 PM             Electronically signed by: Caleb Abad D.O., 3/16/2022 4:38 PM      Portions of this record were made with voice recognition software.  Despite  my review, spelling/grammar/context errors may still remain.  Interpretation of this chart should be taken in this context.

## 2022-03-16 NOTE — ED TRIAGE NOTES
"Chief Complaint   Patient presents with   • Foot Pain     R foot pain started this morning. Numbness and tingling noted. Pt had a fall back in October and injured foot per pt.     Pt able to bear weight on r foot.    /84   Pulse 81   Temp 35.8 °C (96.5 °F) (Temporal)   Resp 20   Ht 1.676 m (5' 6\")   Wt 110 kg (241 lb 6.5 oz)   LMP 02/22/2022   SpO2 98%   BMI 38.96 kg/m²     "

## 2022-03-17 NOTE — ED NOTES
Discharge teaching and paperwork provided regarding L knee pain and all questions/concerns answered. VSS, assessment stable. Given information regarding home care and reasons to follow up with ED or primary MD.

## 2022-03-27 ENCOUNTER — APPOINTMENT (OUTPATIENT)
Dept: RADIOLOGY | Facility: MEDICAL CENTER | Age: 42
End: 2022-03-27
Attending: EMERGENCY MEDICINE
Payer: COMMERCIAL

## 2022-03-27 ENCOUNTER — HOSPITAL ENCOUNTER (EMERGENCY)
Facility: MEDICAL CENTER | Age: 42
End: 2022-03-27
Attending: EMERGENCY MEDICINE
Payer: COMMERCIAL

## 2022-03-27 VITALS
OXYGEN SATURATION: 97 % | HEIGHT: 66 IN | RESPIRATION RATE: 16 BRPM | BODY MASS INDEX: 38.97 KG/M2 | TEMPERATURE: 97.2 F | HEART RATE: 84 BPM | WEIGHT: 242.51 LBS | SYSTOLIC BLOOD PRESSURE: 137 MMHG | DIASTOLIC BLOOD PRESSURE: 75 MMHG

## 2022-03-27 DIAGNOSIS — M25.562 ACUTE PAIN OF LEFT KNEE: ICD-10-CM

## 2022-03-27 PROCEDURE — 73562 X-RAY EXAM OF KNEE 3: CPT | Mod: LT

## 2022-03-27 PROCEDURE — 99284 EMERGENCY DEPT VISIT MOD MDM: CPT

## 2022-03-27 ASSESSMENT — FIBROSIS 4 INDEX: FIB4 SCORE: 0.86

## 2022-03-27 ASSESSMENT — LIFESTYLE VARIABLES: DO YOU DRINK ALCOHOL: NO

## 2022-03-28 NOTE — ED NOTES
LT knee and ankle ace wrapped per pt request for comfort.     Reviewed discharge instructions, pt verbalized understanding of instructions. States she will schedule follow-up appointment as needed. Denies further questions at this time. Pt leaving ER in stable condition.

## 2022-03-28 NOTE — DISCHARGE INSTRUCTIONS
Use Tylenol and Motrin as well as cool and warm packs over your knee as needed for discomfort and severe primary care doctor during the week for recheck

## 2022-03-28 NOTE — ED NOTES
Pt brought back to room, agree with triage note. Pt sitting in w/c. Reports she's been walking on her LT leg but it doesn't seem to be getting better.

## 2022-03-28 NOTE — ED TRIAGE NOTES
"Chief Complaint   Patient presents with   • Leg Pain     Pt c/o L leg pain. Pt thinks she twisted her L hip, knee and ankle and that she might've sprained all 3. No obvious deformities, CMS intact.       Wheelchair to triage for above complaint.   Educated on triage process, encourage to inform staff of any changes.     /83   Pulse 88   Temp 36.1 °C (96.9 °F) (Temporal)   Resp 18   Ht 1.676 m (5' 6\")   Wt 110 kg (242 lb 8.1 oz)   SpO2 97%   BMI 39.14 kg/m²     "

## 2022-03-28 NOTE — ED PROVIDER NOTES
ED Provider Note    CHIEF COMPLAINT  Chief Complaint   Patient presents with   • Leg Pain     Pt c/o L leg pain. Pt thinks she twisted her L hip, knee and ankle and that she might've sprained all 3. No obvious deformities, CMS intact.       HPI  Yamila Mendoza is a 41 y.o. female who presents to the emergency department complaining of left knee pain.  The patient says that 2 days ago she got up out of her wheelchair and walked a little bit she twisted her left knee and since then she has been having pain over the anterior aspect of the knee.  The triage note indicates twisting her left hip knee and ankle and feeling that she may have sprained all 3 areas but when I speak with the patient she says that it is the anterior aspect of the left knee that is problematic and says that she does not have ankle or hip pain.  She did not fall or suffer any other injury    REVIEW OF SYSTEMS no head or torso injury no hip or pelvic pain    PAST MEDICAL HISTORY  Past Medical History:   Diagnosis Date   • ASTHMA    • Bipolar disorder (HCC)    • Chronic back pain    • Psychiatric disorder     bipolar   • Psychiatric disorder     depression   • Seizure disorder (HCC)        FAMILY HISTORY  No family history on file.    SOCIAL HISTORY  Social History     Socioeconomic History   • Marital status: Single   Tobacco Use   • Smoking status: Never Smoker   • Smokeless tobacco: Never Used   Vaping Use   • Vaping Use: Never used   Substance and Sexual Activity   • Alcohol use: No   • Drug use: No   Social History Narrative    ** Merged History Encounter **            SURGICAL HISTORY  Past Surgical History:   Procedure Laterality Date   • REYES BY LAPAROSCOPY  9/22/2009    Performed by JUNE WAGGONER at SURGERY [facility] ORS   • ERCP IN OR  9/19/2009    Performed by LOU WEBB at SURGERY [facility] ORS   • OTHER ABDOMINAL SURGERY     • OTHER NEUROLOGICAL SURG     • OTHER ORTHOPEDIC SURGERY      2 R knee surgeries       CURRENT  "MEDICATIONS  Home Medications     Reviewed by Dionna Brennan R.N. (Registered Nurse) on 03/27/22 at 1708  Med List Status: <None>   Medication Last Dose Status   albuterol 108 (90 Base) MCG/ACT Aero Soln inhalation aerosol  Active   amitriptyline (ELAVIL) 100 MG Tab  Active   carBAMazepine (TEGRETOL) 200 MG Tab  Active   cholecalciferol (VITAMIN D3) 400 UNIT Tab  Active   cyclobenzaprine (FLEXERIL) 10 MG Tab  Active   divalproex ER (DEPAKOTE ER) 250 MG TABLET SR 24 HR  Active   hydrOXYzine HCl (ATARAX) 25 MG Tab  Active   ibuprofen (MOTRIN) 400 MG Tab  Active   ibuprofen (MOTRIN) 600 MG Tab  Active   loratadine (CLARITIN) 10 MG Tab  Active   potassium chloride SA (K-DUR) 10 MEQ Tab CR  Active   topiramate (TOPAMAX) 100 MG Tab  Active                ALLERGIES  Allergies   Allergen Reactions   • Bactrim Unspecified     \"I get the shakes\"    • Iron Unspecified     \"see red dots\" vision, not rash    • Penicillins Unspecified     \"makes me dizzy with my medicine\"    • Phenobarbital Unspecified     \"makes me dizzy\"    • Lemon Oil Itching     Skin itching per pt report to Dietary.   • Onion Itching and Swelling     Throat swelling and itching per pt report to Dietary.   • [employer] Allergy Itching and Swelling     Throat swelling and itching per pt report to Dietary.       PHYSICAL EXAM  VITAL SIGNS: /75   Pulse 84   Temp 36.2 °C (97.2 °F)   Resp 16   Ht 1.676 m (5' 6\")   Wt 110 kg (242 lb 8.1 oz)   SpO2 97%   BMI 39.14 kg/m²    Oxygen saturation is interpreted as adequate  Constitutional: Awake verbal disheveled but she appears to be at her baseline.  HENT: No sign of trauma to the head  Musculoskeletal: No acute bony deformity the patient can flex and extend at the hip without difficulty she also flexes and extends the knee but complains of tenderness over the anterior aspect of the knee with palpation there is no tenderness or deformity of the left ankle.  Neurologic: Awake verbal moving all " extremities    Radiology  DX-KNEE 3 VIEWS LEFT   Final Result      Negative knee series.          MEDICAL DECISION MAKING and DISPOSITION  I reviewed the x-ray findings with the patient and at this point in time the patient seems to be at her baseline she is a very frequent emergency department visitor who I am familiar with.  I think it is safe for her to go home I have advised her to call her primary care doctor and arrange office recheck during the week    IMPRESSION  1.  Left knee pain         Electronically signed by: Gilbert Benz M.D., 3/27/2022 8:05 PM

## 2022-05-01 ENCOUNTER — APPOINTMENT (OUTPATIENT)
Dept: RADIOLOGY | Facility: MEDICAL CENTER | Age: 42
End: 2022-05-01
Attending: EMERGENCY MEDICINE
Payer: COMMERCIAL

## 2022-05-01 ENCOUNTER — HOSPITAL ENCOUNTER (EMERGENCY)
Facility: MEDICAL CENTER | Age: 42
End: 2022-05-01
Attending: EMERGENCY MEDICINE
Payer: COMMERCIAL

## 2022-05-01 VITALS
BODY MASS INDEX: 35.79 KG/M2 | SYSTOLIC BLOOD PRESSURE: 140 MMHG | HEIGHT: 66 IN | DIASTOLIC BLOOD PRESSURE: 72 MMHG | RESPIRATION RATE: 17 BRPM | WEIGHT: 222.66 LBS | OXYGEN SATURATION: 94 % | HEART RATE: 74 BPM | TEMPERATURE: 97.4 F
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
## 2022-05-01 DIAGNOSIS — M79.18 MUSCULOSKELETAL PAIN: ICD-10-CM

## 2022-05-01 PROCEDURE — 99284 EMERGENCY DEPT VISIT MOD MDM: CPT | Mod: 25

## 2022-05-01 PROCEDURE — 73110 X-RAY EXAM OF WRIST: CPT | Mod: LT

## 2022-05-01 PROCEDURE — 71045 X-RAY EXAM CHEST 1 VIEW: CPT

## 2022-05-01 PROCEDURE — 73610 X-RAY EXAM OF ANKLE: CPT | Mod: RT

## 2022-05-01 PROCEDURE — 73610 X-RAY EXAM OF ANKLE: CPT | Mod: LT

## 2022-05-01 PROCEDURE — 72170 X-RAY EXAM OF PELVIS: CPT

## 2022-05-01 PROCEDURE — 73110 X-RAY EXAM OF WRIST: CPT | Mod: RT,XU

## 2022-05-01 ASSESSMENT — LIFESTYLE VARIABLES
HAVE YOU EVER FELT YOU SHOULD CUT DOWN ON YOUR DRINKING: NO
CONSUMPTION TOTAL: NEGATIVE
HOW MANY TIMES IN THE PAST YEAR HAVE YOU HAD 5 OR MORE DRINKS IN A DAY: 0
HAVE PEOPLE ANNOYED YOU BY CRITICIZING YOUR DRINKING: NO
ON A TYPICAL DAY WHEN YOU DRINK ALCOHOL HOW MANY DRINKS DO YOU HAVE: 0
AVERAGE NUMBER OF DAYS PER WEEK YOU HAVE A DRINK CONTAINING ALCOHOL: 0
EVER HAD A DRINK FIRST THING IN THE MORNING TO STEADY YOUR NERVES TO GET RID OF A HANGOVER: NO
TOTAL SCORE: 0
TOTAL SCORE: 0
EVER FELT BAD OR GUILTY ABOUT YOUR DRINKING: NO
TOTAL SCORE: 0
DO YOU DRINK ALCOHOL: NO

## 2022-05-01 ASSESSMENT — FIBROSIS 4 INDEX: FIB4 SCORE: 0.86

## 2022-05-02 NOTE — ED PROVIDER NOTES
ED Provider Note    Scribed for Constantin Germain M.D. by Shonda Bell. 5/1/2022  6:58 PM    Primary care provider: Radha Hinojosa P.A.-C.  Means of arrival: Walk in  History obtained from: Patient  History limited by: None    CHIEF COMPLAINT  Chief Complaint   Patient presents with   • T-5000 GLF     1.5 wks ago   • Wrist Pain   • Ankle Pain   • Rib Pain   • Medication Refill     Pt has run out of some of her meds, wants help figuring out why some of her meds have stopped coming from [facility]       HPI  Yamila Mendoza is a 41 y.o. female who presents to the Emergency Department for bilateral rib, ankle, and wrist pain with an onset of 1.5 weeks ago status post ground level fall. Patient states she was walking in her hallway when she tripped and fell down. She denies hitting her head or losing consciousness. She denies being seen for her injuries after her fall. She describes she feels as if she sprained or broke both her wrist and ankles. She ambulates with a walker at baseline due to chronic ankle pain. She reports associated bilateral hip pain. She denies any associated extremity numbness/tingling. Exacerbating factors include bearing weight, walking, and movement. No alleviating factors were identified. Patient states she also needs a refill of her Depakote, naprosyn, and amitriptyline. She has not been compliant with her medications due to complications with her insurance. Patient is followed by a PCP, but has not seen them for some time.     REVIEW OF SYSTEMS  Pertinent positives include bilateral rib pain, bilateral ankle pain, bilateral wrist pain, and bilateral hip pain. Pertinent negatives include no head trauma, syncope, or extremity numbness/tingling.    PAST MEDICAL HISTORY   has a past medical history of ASTHMA, Bipolar disorder (HCC), Chronic back pain, Psychiatric disorder, Psychiatric disorder, and Seizure disorder (HCC).    SURGICAL HISTORY   has a past surgical history that includes other  "neurological surg; other orthopedic surgery; other abdominal surgery; ercp in [place] (9/19/2009); and joy by laparoscopy (9/22/2009).    SOCIAL HISTORY  Social History     Tobacco Use   • Smoking status: Never Smoker   • Smokeless tobacco: Never Used   Vaping Use   • Vaping Use: Never used   Substance Use Topics   • Alcohol use: No   • Drug use: No      Social History     Substance and Sexual Activity   Drug Use No       FAMILY HISTORY  None pertinent.     CURRENT MEDICATIONS  Home Medications     Reviewed by Justin Azul R.N. (Registered Nurse) on 05/01/22 at 1805  Med List Status: Partial   Medication Last Dose Status   albuterol 108 (90 Base) MCG/ACT Aero Soln inhalation aerosol  Active   amitriptyline (ELAVIL) 100 MG Tab  Active   carBAMazepine (TEGRETOL) 200 MG Tab  Active   cholecalciferol (VITAMIN D3) 400 UNIT Tab  Active   cyclobenzaprine (FLEXERIL) 10 MG Tab  Active   divalproex ER (DEPAKOTE ER) 250 MG TABLET SR 24 HR  Active   hydrOXYzine HCl (ATARAX) 25 MG Tab  Active   ibuprofen (MOTRIN) 400 MG Tab  Active   ibuprofen (MOTRIN) 600 MG Tab  Active   loratadine (CLARITIN) 10 MG Tab  Active   potassium chloride SA (K-DUR) 10 MEQ Tab CR  Active   topiramate (TOPAMAX) 100 MG Tab  Active                ALLERGIES  Allergies   Allergen Reactions   • Bactrim Unspecified     \"I get the shakes\"    • Iron Unspecified     \"see red dots\" vision, not rash    • Penicillins Unspecified     \"makes me dizzy with my medicine\"    • Phenobarbital Unspecified     \"makes me dizzy\"    • Lemon Oil Itching     Skin itching per pt report to Dietary.   • Onion Itching and Swelling     Throat swelling and itching per pt report to Dietary.   • [employer] Allergy Itching and Swelling     Throat swelling and itching per pt report to Dietary.       PHYSICAL EXAM  VITAL SIGNS: /78   Pulse 89   Temp 36.8 °C (98.3 °F) (Temporal)   Resp 18   Ht 1.676 m (5' 6\")   Wt 101 kg (222 lb 10.6 oz)   LMP 04/01/2022   SpO2 97%   " BMI 35.94 kg/m²   Pulse ox interpretation: Normal  Constitutional: No acute distress, Non-toxic appearance.   HENT: Normocephalic, Atraumatic  Eyes: Conjunctiva normal, No discharge.   Neck: Normal range of motion, No tenderness, Supple, No stridor.   Cardiovascular: Normal heart rate, Normal rhythm  Thorax & Lungs: Normal breath sounds, No respiratory distress, No wheezing, bilateral chest tenderness.   Abdomen: Bowel sounds normal, Soft, No tenderness, No masses, No pulsatile masses.   Skin: Warm, Dry, No erythema, No rash.   Back: No tenderness, No CVA tenderness.   Extremities: Intact distal pulses, No edema, No cyanosis, No clubbing.   Musculoskeletal: Bilateral hip, chest, wrist, ankle, and pelvis tenderness. Limited range of motion in affected joints secondary to pain. No major deformities noted.   Neurologic: Alert, No focal deficits noted.     RADIOLOGY  DX-WRIST-COMPLETE 3+ LEFT   Final Result      No radiographic evidence of acute traumatic injury.      DX-WRIST-COMPLETE 3+ RIGHT   Final Result      No radiographic evidence of acute traumatic injury.      DX-ANKLE 3+ VIEWS RIGHT   Final Result      No radiographic evidence of acute traumatic injury.      DX-CHEST-PORTABLE (1 VIEW)   Final Result      No acute cardiac or pulmonary abnormalities are identified.      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        The radiologist's interpretation of all radiological studies have been reviewed by me.    COURSE & MEDICAL DECISION MAKING  Pertinent Labs & Imaging studies reviewed. (See chart for details)    6:58 PM - Patient seen and examined at bedside. Ordered DX Chest, DX Ankle right, DX Ankle left, DX Pelvis, DX Wrist left, and DX Wrist right to evaluate her symptoms. Discussed plan of care with patient. I informed them  that imaging will be ordered to evaluate symptoms. Patient is understanding and agreeable with plan.      8:42 PM - Patient was reevaluated at bedside. Discussed radiology results with the patient and informed them that there was no evidence for fracture or dislocation. The patient will return for new or worsening symptoms and is stable at the time of discharge. Patient verbalizes understanding and agreement to this plan of care.      Decision Making:  This is a 41 y.o. year old female who presents with bilateral musculoskeletal chest pain, bilateral ankle pain, bilateral hip pain, bilateral wrist pain following a trip and fall a week and a half ago.  Has not seen any physician regarding the symptoms since then.  Has significant pain with range of motion.  No obvious bony deformities or open wounds or even bruising.  She does have diffuse tenderness in bilateral ankles and wrists and hips and chest wall.  X-rays were performed and there was no underlying evidence of fracture/bony abnormality.  Patient likely has musculoskeletal pain.  Recommending acetaminophen and or ibuprofen as needed for pain management and to follow-up with a primary care physician for refills of her chronic medications.  Patient is hemodynamically stable and appears stable for discharge at this time.       DISPOSITION:  Patient will be discharged home in stable condition.    FOLLOW UP:  [facility], Emergency Dept  [address] [facility] [address] [phone]    As needed, If symptoms worsen    Primary care doctor    Schedule an appointment as soon as possible for a visit          The patient is referred to a primary physician for blood pressure management, diabetic screening, and for all other preventative health concerns.    FINAL IMPRESSION  1. Fall, initial encounter    2. Musculoskeletal pain         This dictation has been created using voice recognition software and/or scribes. The accuracy of the dictation  is limited by the abilities of the software and the expertise of the scribes. I expect there may be some errors of grammar and possibly content. I made every attempt to manually correct the errors within my dictation. However, errors related to voice recognition software and/or scribes may still exist and should be interpreted within the appropriate context.    E    The note accurately reflects work and decisions made by me.  Constantin Germain M.D.  5/1/2022  11:32 PM

## 2022-05-02 NOTE — ED NOTES
Pt ambulated with friend back to [facility]. Pt reports she had a fall a week ago and injured BL ankles, BL wrist and BL rib cage with deep inhalation.

## 2022-05-02 NOTE — ED TRIAGE NOTES
Pt amb to triage w/ SO.  Chief Complaint   Patient presents with   • T-5000 GLF     1.5 wks ago   • Wrist Pain   • Ankle Pain   • Rib Pain   • Medication Refill     Pt has run out of some of her meds, wants help figuring out why some of her meds have stopped coming from [facility]     Pt states she fell at home over a week ago but rt sided rib pain, bilat ankle and wrist pain persists.

## 2022-05-02 NOTE — ED NOTES
"Pt discharged home with friend. Gait steady   pt in possession of belongings. Pt provided discharge education and information pertaining to medications and follow up appointments. Pt received copy of discharge instructions and verbalized understanding. /72   Pulse 74   Temp 36.3 °C (97.4 °F) (Temporal)   Resp 17   Ht 1.676 m (5' 6\")   Wt 101 kg (222 lb 10.6 oz)   LMP 04/01/2022   SpO2 94%   BMI 35.94 kg/m²   "

## 2022-05-11 ENCOUNTER — HOSPITAL ENCOUNTER (EMERGENCY)
Facility: MEDICAL CENTER | Age: 42
End: 2022-05-12
Attending: EMERGENCY MEDICINE
Payer: COMMERCIAL

## 2022-05-11 DIAGNOSIS — R25.1 TREMORS OF NERVOUS SYSTEM: ICD-10-CM

## 2022-05-11 LAB
ALBUMIN SERPL BCP-MCNC: 4 G/DL (ref 3.2–4.9)
ALBUMIN/GLOB SERPL: 1.6 G/DL
ALP SERPL-CCNC: 33 U/L (ref 30–99)
ALT SERPL-CCNC: 6 U/L (ref 2–50)
AMMONIA PLAS-SCNC: 27 UMOL/L (ref 11–45)
ANION GAP SERPL CALC-SCNC: 14 MMOL/L (ref 7–16)
AST SERPL-CCNC: 20 U/L (ref 12–45)
BASOPHILS # BLD AUTO: 0.3 % (ref 0–1.8)
BASOPHILS # BLD: 0.02 K/UL (ref 0–0.12)
BILIRUB SERPL-MCNC: 0.4 MG/DL (ref 0.1–1.5)
BUN SERPL-MCNC: 9 MG/DL (ref 8–22)
CALCIUM SERPL-MCNC: 9.2 MG/DL (ref 8.5–10.5)
CHLORIDE SERPL-SCNC: 100 MMOL/L (ref 96–112)
CO2 SERPL-SCNC: 24 MMOL/L (ref 20–33)
CREAT SERPL-MCNC: 0.51 MG/DL (ref 0.5–1.4)
EOSINOPHIL # BLD AUTO: 0.13 K/UL (ref 0–0.51)
EOSINOPHIL NFR BLD: 2 % (ref 0–6.9)
ERYTHROCYTE [DISTWIDTH] IN BLOOD BY AUTOMATED COUNT: 47.2 FL (ref 35.9–50)
GFR SERPLBLD CREATININE-BSD FMLA CKD-EPI: 118 ML/MIN/1.73 M 2
GLOBULIN SER CALC-MCNC: 2.5 G/DL (ref 1.9–3.5)
GLUCOSE SERPL-MCNC: 80 MG/DL (ref 65–99)
HCT VFR BLD AUTO: 38.7 % (ref 37–47)
HGB BLD-MCNC: 13.4 G/DL (ref 12–16)
IMM GRANULOCYTES # BLD AUTO: 0.04 K/UL (ref 0–0.11)
IMM GRANULOCYTES NFR BLD AUTO: 0.6 % (ref 0–0.9)
LYMPHOCYTES # BLD AUTO: 2.49 K/UL (ref 1–4.8)
LYMPHOCYTES NFR BLD: 38.4 % (ref 22–41)
MCH RBC QN AUTO: 32.4 PG (ref 27–33)
MCHC RBC AUTO-ENTMCNC: 34.6 G/DL (ref 33.6–35)
MCV RBC AUTO: 93.7 FL (ref 81.4–97.8)
MONOCYTES # BLD AUTO: 0.6 K/UL (ref 0–0.85)
MONOCYTES NFR BLD AUTO: 9.3 % (ref 0–13.4)
NEUTROPHILS # BLD AUTO: 3.2 K/UL (ref 2–7.15)
NEUTROPHILS NFR BLD: 49.4 % (ref 44–72)
NRBC # BLD AUTO: 0 K/UL
NRBC BLD-RTO: 0 /100 WBC
PLATELET # BLD AUTO: 80 K/UL (ref 164–446)
PMV BLD AUTO: 11.1 FL (ref 9–12.9)
POTASSIUM SERPL-SCNC: 3.2 MMOL/L (ref 3.6–5.5)
PROT SERPL-MCNC: 6.5 G/DL (ref 6–8.2)
RBC # BLD AUTO: 4.13 M/UL (ref 4.2–5.4)
SODIUM SERPL-SCNC: 138 MMOL/L (ref 135–145)
VALPROATE SERPL-MCNC: 122 UG/ML (ref 50–100)
WBC # BLD AUTO: 6.5 K/UL (ref 4.8–10.8)

## 2022-05-11 PROCEDURE — 700111 HCHG RX REV CODE 636 W/ 250 OVERRIDE (IP): Performed by: EMERGENCY MEDICINE

## 2022-05-11 PROCEDURE — 80053 COMPREHEN METABOLIC PANEL: CPT

## 2022-05-11 PROCEDURE — 96374 THER/PROPH/DIAG INJ IV PUSH: CPT

## 2022-05-11 PROCEDURE — 700105 HCHG RX REV CODE 258: Performed by: EMERGENCY MEDICINE

## 2022-05-11 PROCEDURE — 82140 ASSAY OF AMMONIA: CPT

## 2022-05-11 PROCEDURE — 36415 COLL VENOUS BLD VENIPUNCTURE: CPT

## 2022-05-11 PROCEDURE — 85025 COMPLETE CBC W/AUTO DIFF WBC: CPT

## 2022-05-11 PROCEDURE — 99284 EMERGENCY DEPT VISIT MOD MDM: CPT

## 2022-05-11 PROCEDURE — 80164 ASSAY DIPROPYLACETIC ACD TOT: CPT

## 2022-05-11 RX ORDER — SODIUM CHLORIDE 9 MG/ML
1000 INJECTION, SOLUTION INTRAVENOUS ONCE
Status: COMPLETED | OUTPATIENT
Start: 2022-05-11 | End: 2022-05-12

## 2022-05-11 RX ORDER — LORAZEPAM 2 MG/ML
1 INJECTION INTRAMUSCULAR ONCE
Status: COMPLETED | OUTPATIENT
Start: 2022-05-11 | End: 2022-05-11

## 2022-05-11 RX ADMIN — SODIUM CHLORIDE 1000 ML: 9 INJECTION, SOLUTION INTRAVENOUS at 21:49

## 2022-05-11 RX ADMIN — LORAZEPAM 1 MG: 2 INJECTION INTRAMUSCULAR; INTRAVENOUS at 21:50

## 2022-05-11 ASSESSMENT — FIBROSIS 4 INDEX: FIB4 SCORE: 0.86

## 2022-05-12 VITALS
HEART RATE: 70 BPM | DIASTOLIC BLOOD PRESSURE: 53 MMHG | OXYGEN SATURATION: 90 % | TEMPERATURE: 97.3 F | WEIGHT: 219.8 LBS | HEIGHT: 66 IN | SYSTOLIC BLOOD PRESSURE: 90 MMHG | RESPIRATION RATE: 18 BRPM | BODY MASS INDEX: 35.32 KG/M2

## 2022-05-12 RX ORDER — SODIUM CHLORIDE 9 MG/ML
1000 INJECTION, SOLUTION INTRAVENOUS ONCE
Status: DISCONTINUED | OUTPATIENT
Start: 2022-05-12 | End: 2022-05-12

## 2022-05-12 NOTE — ED PROVIDER NOTES
"ED Provider Note    CHIEF COMPLAINT  Chief Complaint   Patient presents with   • Seizure     C/o witnessed seizure by boyfriend today and had 2 episodes lasting for few minutes. Boyfriend describes her seizure as \"both hands are shaking\", no post-ictal episodes reported. Pt taking Divalproex for her seizure. Pt asking to get levels checked out.    • Muscle Spasms       HPI  Yamila Mendoza is a 41 y.o. female who presents with seizure-like activity.  The patient presents with tremors to her upper extremities.  She does not lose consciousness.  She did have a spell when I was bedside this does not appear to be from a seizure.  She states she is currently out of her Topamax and like to have her valproic acid level checked.  She is also out of her naproxen and amitriptyline.  She has not been able to see her primary care doctor.  She has not any recent vomiting or diarrhea.  She has not had any fevers.  She does not have a headache.    REVIEW OF SYSTEMS  See HPI for further details. All other systems are negative.     PAST MEDICAL HISTORY  Past Medical History:   Diagnosis Date   • ASTHMA    • Bipolar disorder (HCC)    • Chronic back pain    • Psychiatric disorder     bipolar   • Psychiatric disorder     depression   • Seizure disorder (HCC)        FAMILY HISTORY  [unfilled]    SOCIAL HISTORY  Social History     Socioeconomic History   • Marital status: Single   Tobacco Use   • Smoking status: Never Smoker   • Smokeless tobacco: Never Used   Vaping Use   • Vaping Use: Never used   Substance and Sexual Activity   • Alcohol use: No   • Drug use: No   Social History Narrative    ** Merged History Encounter **            SURGICAL HISTORY  Past Surgical History:   Procedure Laterality Date   • REYES BY LAPAROSCOPY  9/22/2009    Performed by JUNE WAGGONER at SURGERY [facility] ORS   • ERCP IN OR  9/19/2009    Performed by LOU WEBB at SURGERY [facility] ORS   • OTHER ABDOMINAL SURGERY     • OTHER NEUROLOGICAL " "SURG     • OTHER ORTHOPEDIC SURGERY      2 R knee surgeries       CURRENT MEDICATIONS  Home Medications     Reviewed by Geena Marquez R.N. (Registered Nurse) on 05/11/22 at 1837  Med List Status: Partial   Medication Last Dose Status   albuterol 108 (90 Base) MCG/ACT Aero Soln inhalation aerosol  Active   amitriptyline (ELAVIL) 100 MG Tab  Active   carBAMazepine (TEGRETOL) 200 MG Tab  Active   cholecalciferol (VITAMIN D3) 400 UNIT Tab  Active   cyclobenzaprine (FLEXERIL) 10 MG Tab  Active   divalproex ER (DEPAKOTE ER) 250 MG TABLET SR 24 HR  Active   hydrOXYzine HCl (ATARAX) 25 MG Tab  Active   ibuprofen (MOTRIN) 400 MG Tab  Active   ibuprofen (MOTRIN) 600 MG Tab  Active   loratadine (CLARITIN) 10 MG Tab  Active   potassium chloride SA (K-DUR) 10 MEQ Tab CR  Active   topiramate (TOPAMAX) 100 MG Tab  Active                ALLERGIES  Allergies   Allergen Reactions   • Bactrim Unspecified     \"I get the shakes\"    • Iron Unspecified     \"see red dots\" vision, not rash    • Penicillins Unspecified     \"makes me dizzy with my medicine\"    • Phenobarbital Unspecified     \"makes me dizzy\"    • Lemon Oil Itching     Skin itching per pt report to Dietary.   • Onion Itching and Swelling     Throat swelling and itching per pt report to Dietary.   • [employer] Allergy Itching and Swelling     Throat swelling and itching per pt report to Dietary.       PHYSICAL EXAM  VITAL SIGNS: /87   Pulse 83   Temp 36 °C (96.8 °F) (Temporal)   Resp 18   Ht 1.676 m (5' 6\")   Wt 99.7 kg (219 lb 12.8 oz)   LMP 03/07/2022   SpO2 100%   BMI 35.48 kg/m²       Constitutional: Anxious but nontoxic  HENT: Normocephalic, Atraumatic, Bilateral external ears normal, Oropharynx moist, No oral exudates, Nose normal.   Eyes: PERRLA, EOMI, Conjunctiva normal, No discharge.   Neck: Normal range of motion, No tenderness, Supple, No stridor.   Lymphatic: No lymphadenopathy noted.   Cardiovascular: Normal heart rate, Normal rhythm, No " murmurs, No rubs, No gallops.   Thorax & Lungs: Normal breath sounds, No respiratory distress, No wheezing, No chest tenderness.   Abdomen: Bowel sounds normal, Soft, No tenderness, No masses, No pulsatile masses.   Skin: Warm, Dry, No erythema, No rash.   Back: No tenderness, No CVA tenderness.   Genitalia: External genitalia appear normal, No masses or lesions. No discharge.   Rectal: Normal appearance, Normal sphincter tone. No external or internal lesions noted. Stool is normal color and heme negative.   Extremities: Intact distal pulses, No edema, No tenderness, No cyanosis, No clubbing.   Neurologic: Diffuse tremors to the upper extremities neurologic exam otherwise alert & oriented x 3, Normal motor function, Normal sensory function, No focal deficits noted.   Psychiatric: Affect normal, Judgment normal, Mood normal.     Results for orders placed or performed during the hospital encounter of 05/11/22   CBC WITH DIFFERENTIAL   Result Value Ref Range    WBC 6.5 4.8 - 10.8 K/uL    RBC 4.13 (L) 4.20 - 5.40 M/uL    Hemoglobin 13.4 12.0 - 16.0 g/dL    Hematocrit 38.7 37.0 - 47.0 %    MCV 93.7 81.4 - 97.8 fL    MCH 32.4 27.0 - 33.0 pg    MCHC 34.6 33.6 - 35.0 g/dL    RDW 47.2 35.9 - 50.0 fL    Platelet Count 80 (L) 164 - 446 K/uL    MPV 11.1 9.0 - 12.9 fL    Neutrophils-Polys 49.40 44.00 - 72.00 %    Lymphocytes 38.40 22.00 - 41.00 %    Monocytes 9.30 0.00 - 13.40 %    Eosinophils 2.00 0.00 - 6.90 %    Basophils 0.30 0.00 - 1.80 %    Immature Granulocytes 0.60 0.00 - 0.90 %    Nucleated RBC 0.00 /100 WBC    Neutrophils (Absolute) 3.20 2.00 - 7.15 K/uL    Lymphs (Absolute) 2.49 1.00 - 4.80 K/uL    Monos (Absolute) 0.60 0.00 - 0.85 K/uL    Eos (Absolute) 0.13 0.00 - 0.51 K/uL    Baso (Absolute) 0.02 0.00 - 0.12 K/uL    Immature Granulocytes (abs) 0.04 0.00 - 0.11 K/uL    NRBC (Absolute) 0.00 K/uL   COMP METABOLIC PANEL   Result Value Ref Range    Sodium 138 135 - 145 mmol/L    Potassium 3.2 (L) 3.6 - 5.5 mmol/L     Chloride 100 96 - 112 mmol/L    Co2 24 20 - 33 mmol/L    Anion Gap 14.0 7.0 - 16.0    Glucose 80 65 - 99 mg/dL    Bun 9 8 - 22 mg/dL    Creatinine 0.51 0.50 - 1.40 mg/dL    Calcium 9.2 8.5 - 10.5 mg/dL    AST(SGOT) 20 12 - 45 U/L    ALT(SGPT) 6 2 - 50 U/L    Alkaline Phosphatase 33 30 - 99 U/L    Total Bilirubin 0.4 0.1 - 1.5 mg/dL    Albumin 4.0 3.2 - 4.9 g/dL    Total Protein 6.5 6.0 - 8.2 g/dL    Globulin 2.5 1.9 - 3.5 g/dL    A-G Ratio 1.6 g/dL   VALPROIC ACID (Depakote)   Result Value Ref Range    Valproic Acid 122.0 (HH) 50.0 - 100.0 ug/mL   ESTIMATED GFR   Result Value Ref Range    GFR (CKD-EPI) 118 >60 mL/min/1.73 m 2   AMMONIA   Result Value Ref Range    Ammonia 27 11 - 45 umol/L       COURSE & MEDICAL DECISION MAKING  Pertinent Labs & Imaging studies reviewed. (See chart for details)  This a 41-year-old female who presents the emerge department with tremors to the upper extremities.  My suspicion is this is psychogenic.  This does not seem like a seizure.  Her valproic acid level is significantly elevated and the patient is aware this needs to be rechecked and followed by her doctor.  We will follow-up with her doctor on Friday.  They can also refill her Topamax if needed as well as her naproxen.  I did check an ammonia level after speaking with pharmacy who noted that ammonia levels can be elevated with Depakote toxicity.  This was negative.  The patient did respond to Ativan and she is been resting comfortably.  My suspicion is this is psychogenic.    FINAL IMPRESSION  1.  Upper extremity tremors    Disposition  Patient will be discharged in stable condition         Electronically signed by: Mane Scherer M.D., 5/11/2022 8:47 PM    Of note the patient's valproic acid level is just slightly elevated at 120 but in reading about valproic acid toxicity this can cause tremors and it may be the source.  As mentioned above the tremors have completely resolved with Ativan I will give the patient a liter of  fluid help dilute down her valproic acid and she will hold off on her dose tomorrow.

## 2022-05-12 NOTE — ED NOTES
"Chief Complaint   Patient presents with   • Seizure     C/o witnessed seizure by boyfriend today and had 2 episodes lasting for few minutes. Boyfriend describes her seizure as \"both hands are shaking\", no post-ictal episodes reported. Pt taking Divalproex for her seizure. Pt asking to get levels checked out.    • Muscle Spasms     Educated on triage process. Instructed to notify staff for any worsening symptoms. Denies any recent travel. Denies exposure to known covid positive patients. Denies any respiratory symptoms.   "

## 2022-05-27 NOTE — ED NOTES
Pt. Provided DC instructions and understood all MD recommendations. Pt. Was ambulatory with steady gait upon DC.    no

## 2022-06-14 ENCOUNTER — APPOINTMENT (OUTPATIENT)
Dept: RADIOLOGY | Facility: MEDICAL CENTER | Age: 42
End: 2022-06-14
Attending: EMERGENCY MEDICINE
Payer: COMMERCIAL

## 2022-06-14 ENCOUNTER — HOSPITAL ENCOUNTER (EMERGENCY)
Facility: MEDICAL CENTER | Age: 42
End: 2022-06-14
Attending: EMERGENCY MEDICINE
Payer: COMMERCIAL

## 2022-06-14 VITALS
WEIGHT: 219 LBS | TEMPERATURE: 97.6 F | BODY MASS INDEX: 35.2 KG/M2 | HEART RATE: 72 BPM | DIASTOLIC BLOOD PRESSURE: 71 MMHG | OXYGEN SATURATION: 97 % | SYSTOLIC BLOOD PRESSURE: 115 MMHG | HEIGHT: 66 IN | RESPIRATION RATE: 16 BRPM

## 2022-06-14 DIAGNOSIS — J22 ACUTE LOWER RESPIRATORY INFECTION: ICD-10-CM

## 2022-06-14 DIAGNOSIS — R07.81 PLEURITIC CHEST PAIN: ICD-10-CM

## 2022-06-14 DIAGNOSIS — M25.473 ANKLE SWELLING, UNSPECIFIED LATERALITY: ICD-10-CM

## 2022-06-14 LAB
ALBUMIN SERPL BCP-MCNC: 4 G/DL (ref 3.2–4.9)
ALBUMIN/GLOB SERPL: 1.7 G/DL
ALP SERPL-CCNC: 54 U/L (ref 30–99)
ALT SERPL-CCNC: 5 U/L (ref 2–50)
ANION GAP SERPL CALC-SCNC: 15 MMOL/L (ref 7–16)
AST SERPL-CCNC: 13 U/L (ref 12–45)
BASOPHILS # BLD AUTO: 0.3 % (ref 0–1.8)
BASOPHILS # BLD: 0.01 K/UL (ref 0–0.12)
BILIRUB SERPL-MCNC: 0.4 MG/DL (ref 0.1–1.5)
BUN SERPL-MCNC: 5 MG/DL (ref 8–22)
CALCIUM SERPL-MCNC: 9.1 MG/DL (ref 8.5–10.5)
CHLORIDE SERPL-SCNC: 105 MMOL/L (ref 96–112)
CO2 SERPL-SCNC: 17 MMOL/L (ref 20–33)
CREAT SERPL-MCNC: 0.51 MG/DL (ref 0.5–1.4)
EKG IMPRESSION: NORMAL
EOSINOPHIL # BLD AUTO: 0.01 K/UL (ref 0–0.51)
EOSINOPHIL NFR BLD: 0.3 % (ref 0–6.9)
ERYTHROCYTE [DISTWIDTH] IN BLOOD BY AUTOMATED COUNT: 59.8 FL (ref 35.9–50)
GFR SERPLBLD CREATININE-BSD FMLA CKD-EPI: 118 ML/MIN/1.73 M 2
GLOBULIN SER CALC-MCNC: 2.4 G/DL (ref 1.9–3.5)
GLUCOSE SERPL-MCNC: 97 MG/DL (ref 65–99)
HCT VFR BLD AUTO: 38.3 % (ref 37–47)
HGB BLD-MCNC: 12.4 G/DL (ref 12–16)
IMM GRANULOCYTES # BLD AUTO: 0.02 K/UL (ref 0–0.11)
IMM GRANULOCYTES NFR BLD AUTO: 0.6 % (ref 0–0.9)
LYMPHOCYTES # BLD AUTO: 1.8 K/UL (ref 1–4.8)
LYMPHOCYTES NFR BLD: 53.7 % (ref 22–41)
MCH RBC QN AUTO: 32.9 PG (ref 27–33)
MCHC RBC AUTO-ENTMCNC: 32.4 G/DL (ref 33.6–35)
MCV RBC AUTO: 101.6 FL (ref 81.4–97.8)
MONOCYTES # BLD AUTO: 0.22 K/UL (ref 0–0.85)
MONOCYTES NFR BLD AUTO: 6.6 % (ref 0–13.4)
NEUTROPHILS # BLD AUTO: 1.29 K/UL (ref 2–7.15)
NEUTROPHILS NFR BLD: 38.5 % (ref 44–72)
NRBC # BLD AUTO: 0 K/UL
NRBC BLD-RTO: 0 /100 WBC
PLATELET # BLD AUTO: 212 K/UL (ref 164–446)
PMV BLD AUTO: 10.1 FL (ref 9–12.9)
POTASSIUM SERPL-SCNC: 3.3 MMOL/L (ref 3.6–5.5)
PROT SERPL-MCNC: 6.4 G/DL (ref 6–8.2)
RBC # BLD AUTO: 3.77 M/UL (ref 4.2–5.4)
SODIUM SERPL-SCNC: 137 MMOL/L (ref 135–145)
TROPONIN T SERPL-MCNC: <6 NG/L (ref 6–19)
WBC # BLD AUTO: 3.4 K/UL (ref 4.8–10.8)

## 2022-06-14 PROCEDURE — 93005 ELECTROCARDIOGRAM TRACING: CPT

## 2022-06-14 PROCEDURE — 93005 ELECTROCARDIOGRAM TRACING: CPT | Performed by: EMERGENCY MEDICINE

## 2022-06-14 PROCEDURE — 36415 COLL VENOUS BLD VENIPUNCTURE: CPT

## 2022-06-14 PROCEDURE — 73610 X-RAY EXAM OF ANKLE: CPT | Mod: RT

## 2022-06-14 PROCEDURE — 99284 EMERGENCY DEPT VISIT MOD MDM: CPT

## 2022-06-14 PROCEDURE — 700102 HCHG RX REV CODE 250 W/ 637 OVERRIDE(OP): Performed by: EMERGENCY MEDICINE

## 2022-06-14 PROCEDURE — 71045 X-RAY EXAM CHEST 1 VIEW: CPT

## 2022-06-14 PROCEDURE — 73610 X-RAY EXAM OF ANKLE: CPT | Mod: LT

## 2022-06-14 PROCEDURE — 84484 ASSAY OF TROPONIN QUANT: CPT

## 2022-06-14 PROCEDURE — A9270 NON-COVERED ITEM OR SERVICE: HCPCS | Performed by: EMERGENCY MEDICINE

## 2022-06-14 PROCEDURE — 85025 COMPLETE CBC W/AUTO DIFF WBC: CPT

## 2022-06-14 PROCEDURE — 80053 COMPREHEN METABOLIC PANEL: CPT

## 2022-06-14 RX ORDER — BENZONATATE 100 MG/1
100 CAPSULE ORAL ONCE
Status: COMPLETED | OUTPATIENT
Start: 2022-06-14 | End: 2022-06-14

## 2022-06-14 RX ORDER — POTASSIUM CHLORIDE 20 MEQ/1
20 TABLET, EXTENDED RELEASE ORAL ONCE
Status: COMPLETED | OUTPATIENT
Start: 2022-06-14 | End: 2022-06-14

## 2022-06-14 RX ORDER — DOXYCYCLINE 100 MG/1
100 CAPSULE ORAL 2 TIMES DAILY
Qty: 10 CAPSULE | Refills: 0 | Status: SHIPPED | OUTPATIENT
Start: 2022-06-14 | End: 2022-06-19

## 2022-06-14 RX ORDER — DOXYCYCLINE 100 MG/1
100 TABLET ORAL ONCE
Status: COMPLETED | OUTPATIENT
Start: 2022-06-14 | End: 2022-06-14

## 2022-06-14 RX ADMIN — POTASSIUM CHLORIDE 20 MEQ: 1500 TABLET, EXTENDED RELEASE ORAL at 19:56

## 2022-06-14 RX ADMIN — DOXYCYCLINE 100 MG: 100 TABLET, FILM COATED ORAL at 21:05

## 2022-06-14 RX ADMIN — BENZONATATE 100 MG: 100 CAPSULE ORAL at 19:56

## 2022-06-14 ASSESSMENT — FIBROSIS 4 INDEX: FIB4 SCORE: 4.39

## 2022-06-14 NOTE — ED TRIAGE NOTES
Chief Complaint   Patient presents with   • Cough     x2wks   • Chest Pain       Pt ambulated to triage c/o chest pain x4days and cough x2wks.   Nad. ekg completed

## 2022-06-15 NOTE — ED NOTES
Discharge education provided. Patient verbalizes understanding. Patient A/0x4 and ambulatory to the lobby with a steady gait. Patient discharged home to self care.

## 2022-06-15 NOTE — ED PROVIDER NOTES
ED Provider Note    ED Provider Note    Scribed for Shreya Perez MD by Shreya Perez M.D.. 6/14/2022, 7:23 PM.    Primary care provider: Radha Hinojosa P.A.-C.  Means of arrival: Private  History obtained from: Patient  History limited by: None    CHIEF COMPLAINT  Chief Complaint   Patient presents with   • Cough     x2wks   • Chest Pain       HPI  Yamila Mendoza is a 41 y.o. female who presents to the Emergency Department for evaluation of cough and discomfort to the chest.  Patient has a history of epilepsy as well as asthma.  She notes she has been ill for the last 2 days, cough is productive of green sputum.  She has had preceding more mild cough for over a week but the sputum production and pleuritic discomfort are more acute.  Pain is worse with coughing and localized to the center of the chest.  She has sensation of swelling to her legs as well without significant trauma.  Describes subjective fever but is afebrile, no vomiting, history of asthma but does not appear to be acutely dyspneic.  She has no ill contacts with similar symptoms.  Patient does endorse having a seizure today, history of the same for she was 3 years old, she notes no recent changes in her antiepileptics.    REVIEW OF SYSTEMS  Pertinent positives include cough, sputum production, chest discomfort, ankle swelling. Pertinent negatives include no documented fever, no vomiting, no chest trauma.  All other systems reviewed and negative.    PAST MEDICAL HISTORY   has a past medical history of ASTHMA, Bipolar disorder (HCC), Chronic back pain, Psychiatric disorder, Psychiatric disorder, and Seizure disorder (HCC).    SURGICAL HISTORY   has a past surgical history that includes other neurological surg; other orthopedic surgery; other abdominal surgery; ercp in or (9/19/2009); and joy by laparoscopy (9/22/2009).    SOCIAL HISTORY  Social History     Tobacco Use   • Smoking status: Never Smoker   • Smokeless tobacco:  "Never Used   Vaping Use   • Vaping Use: Never used   Substance Use Topics   • Alcohol use: No   • Drug use: No      Social History     Substance and Sexual Activity   Drug Use No       FAMILY HISTORY  No family history on file.    CURRENT MEDICATIONS  Home Medications    **Home medications have not yet been reviewed for this encounter**         ALLERGIES  Allergies   Allergen Reactions   • Bactrim Unspecified     \"I get the shakes\"    • Iron Unspecified     \"see red dots\" vision, not rash    • Penicillins Unspecified     \"makes me dizzy with my medicine\"    • Phenobarbital Unspecified     \"makes me dizzy\"    • Lemon Oil Itching     Skin itching per pt report to Dietary.   • Onion Itching and Swelling     Throat swelling and itching per pt report to Dietary.   • [employer] Allergy Itching and Swelling     Throat swelling and itching per pt report to Dietary.       PHYSICAL EXAM  VITAL SIGNS: /71   Pulse 72   Temp 36.4 °C (97.6 °F)   Resp 16   Ht 1.676 m (5' 6\")   Wt 99.3 kg (219 lb)   SpO2 97%   BMI 35.35 kg/m²     General: Alert, no acute distress  Skin: Warm, dry, normal for ethnicity  Head: Normocephalic, atraumatic  Neck: Trachea midline, no tenderness  Eye: PERRL, normal conjunctiva  ENMT: Oral mucosa moist, no pharyngeal erythema or exudate  Cardiovascular: Regular rate and rhythm, No murmur, Normal peripheral perfusion  Respiratory: Lungs fairly diminished at the right lower and right mid lobe, coarseness on expiration, respirations are non-labored, chest rise is equal.  No rales, no rhonchi, no stridor.  Gastrointestinal: Soft, nontender, non distended  Musculoskeletal: No no pitting edema noted on exam of the ankles of the wrist in general tenderness more so of the soft tissues on the left.  No step-off, no proximal streaking.  Strong distal pulses.  Neurological: Alert and oriented to person, place, time, and situation  Lymphatics: No lymphadenopathy  Psychiatric: Cooperative, appropriate " mood & affect      DIAGNOSTIC STUDIES/PROCEDURES    LABS  Results for orders placed or performed during the hospital encounter of 06/14/22   CBC with Differential   Result Value Ref Range    WBC 3.4 (L) 4.8 - 10.8 K/uL    RBC 3.77 (L) 4.20 - 5.40 M/uL    Hemoglobin 12.4 12.0 - 16.0 g/dL    Hematocrit 38.3 37.0 - 47.0 %    .6 (H) 81.4 - 97.8 fL    MCH 32.9 27.0 - 33.0 pg    MCHC 32.4 (L) 33.6 - 35.0 g/dL    RDW 59.8 (H) 35.9 - 50.0 fL    Platelet Count 212 164 - 446 K/uL    MPV 10.1 9.0 - 12.9 fL    Neutrophils-Polys 38.50 (L) 44.00 - 72.00 %    Lymphocytes 53.70 (H) 22.00 - 41.00 %    Monocytes 6.60 0.00 - 13.40 %    Eosinophils 0.30 0.00 - 6.90 %    Basophils 0.30 0.00 - 1.80 %    Immature Granulocytes 0.60 0.00 - 0.90 %    Nucleated RBC 0.00 /100 WBC    Neutrophils (Absolute) 1.29 (L) 2.00 - 7.15 K/uL    Lymphs (Absolute) 1.80 1.00 - 4.80 K/uL    Monos (Absolute) 0.22 0.00 - 0.85 K/uL    Eos (Absolute) 0.01 0.00 - 0.51 K/uL    Baso (Absolute) 0.01 0.00 - 0.12 K/uL    Immature Granulocytes (abs) 0.02 0.00 - 0.11 K/uL    NRBC (Absolute) 0.00 K/uL   Complete Metabolic Panel (CMP)   Result Value Ref Range    Sodium 137 135 - 145 mmol/L    Potassium 3.3 (L) 3.6 - 5.5 mmol/L    Chloride 105 96 - 112 mmol/L    Co2 17 (L) 20 - 33 mmol/L    Anion Gap 15.0 7.0 - 16.0    Glucose 97 65 - 99 mg/dL    Bun 5 (L) 8 - 22 mg/dL    Creatinine 0.51 0.50 - 1.40 mg/dL    Calcium 9.1 8.5 - 10.5 mg/dL    AST(SGOT) 13 12 - 45 U/L    ALT(SGPT) 5 2 - 50 U/L    Alkaline Phosphatase 54 30 - 99 U/L    Total Bilirubin 0.4 0.1 - 1.5 mg/dL    Albumin 4.0 3.2 - 4.9 g/dL    Total Protein 6.4 6.0 - 8.2 g/dL    Globulin 2.4 1.9 - 3.5 g/dL    A-G Ratio 1.7 g/dL   Troponin   Result Value Ref Range    Troponin T <6 6 - 19 ng/L   ESTIMATED GFR   Result Value Ref Range    GFR (CKD-EPI) 118 >60 mL/min/1.73 m 2   EKG   Result Value Ref Range    Report       [facility] Emergency Dept.    Test Date:  2022-06-14  Pt Name:    GULSHAN  LUIS                 Department: ER  MRN:        [id number]                      Room:  Gender:     Female                       Technician: [id number]  :        1980                   Requested By:ER TRIAGE PROTOCOL  Order #:    [id number]                    Reading MD: CARSON ATKINS MD    Measurements  Intervals                                Axis  Rate:       71                           P:  FL:                                      QRS:        33  QRSD:       114                          T:          30  QT:         448  QTc:        487    Interpretive Statements  Normal sinus rhythm with motion artifact  Q waves noted at inferior leads unchanged from previous  Compared to ECG 2019 23:24:40  Sinus rhythm no longer present  No new ischemic change  Electronically Signed On 2022 22:13:38 PDT by CARSON ATKINS MD       All labs reviewed by me, .    EKG  12 Lead EKG obtained at 1920 and interpreted by me to show:  Rhythm: Normal sinus rhythm   Rate: 71  Axis: Normal  Intervals: Normal  Q Waves: Normal  No diagnostic ST segment elevation    Clinical Impression: Normal EKG  Compared to 2019, no significant change    RADIOLOGY  DX-ANKLE 3+ VIEWS RIGHT   Final Result      No acute osseous abnormality.         DX-ANKLE 3+ VIEWS LEFT   Final Result      No acute osseous abnormality.         DX-CHEST-PORTABLE (1 VIEW)   Final Result      No acute cardiopulmonary abnormality.        The radiologist's interpretation of all radiological studies have been reviewed by me.    COURSE & MEDICAL DECISION MAKING  Pertinent Labs & Imaging studies reviewed. (See chart for details)    7:23 PM - Patient seen and examined at bedside. Ordered cardiac work-up to evaluate her symptoms. The differential diagnoses include but are not limited to: Pneumonia, bronchitis, pleurisy, viral syndrome    Patient Vitals for the past 24 hrs:   BP Temp Temp src Pulse Resp SpO2 Height Weight   22 2100 115/71  "36.4 °C (97.6 °F) -- 72 16 97 % -- --   06/14/22 2000 118/78 -- -- -- -- -- -- --   06/14/22 1900 127/76 -- -- -- -- -- -- --   06/14/22 1853 115/69 -- -- -- -- -- -- --   06/14/22 1642 -- -- -- -- -- -- 1.676 m (5' 6\") 99.3 kg (219 lb)   06/14/22 1638 138/85 36.1 °C (96.9 °F) Temporal 80 16 99 % -- --         Decision Making:  This is a 41 y.o. year old female who presents with for evaluation of cough for over a week and sputum production with pleuritic chest discomfort for the last 2 days.  EKG is nonischemic and otherwise unchanged from previous.  Troponin is unremarkable.  Heart score is 2, low risk ratification, doubt ACS.  Patient has no tachycardia, no tachypnea, no hypoxia, no unilateral limb swelling nor hemoptysis nor recent surgeries or hospitalizations; this would not be consistent with clinically significant pulmonary embolism according to PERC criteria.  Patient does note swelling to both ankles but exam is reassuring and x-rays are unremarkable.  She remains neurovascular intact.  Patient does have abnormal lung sounds, given green sputum production and low leukocytes and concern for infectious process.  Clinically community-acquired pneumonia does remain on the differential, x-ray thankfully does not demonstrate a lobar pneumonia.  Given her risk factors and abnormal lung sounds I do think is reasonable to cover with appropriate antibiotics for lower respiratory infection.    .  The patient will return for new or worsening symptoms and is stable at the time of discharge.    Patient has had high blood pressure while in the emergency department, felt likely secondary to medical condition. Counseled patient to monitor blood pressure at home and follow up with primary care physician.     DISPOSITION:  Patient will be discharged home in stable condition.    FOLLOW UP:  Radha Hinojosa P.A.-C.  [address] [place] [id number] [phone]    Schedule an appointment as soon as possible for a visit in " 2 days        OUTPATIENT MEDICATIONS:  Discharge Medication List as of 6/14/2022  9:18 PM      START taking these medications    Details   doxycycline (MONODOX) 100 MG capsule Take 1 Capsule by mouth 2 times a day for 5 days., Disp-10 Capsule, R-0, Normal               FINAL IMPRESSION  1. Acute lower respiratory infection    2. Ankle swelling, unspecified laterality    3. Pleuritic chest pain          Shreya BELTRAN M.D. (Scribe), am scribing for, and in the presence of, Shreya Perez MD.    Electronically signed by: Shreya Perez M.D. (Scribe), 6/14/2022    IShreya MD personally performed the services described in this documentation, as scribed by Shreya Perez M.D. in my presence, and it is both accurate and complete    The note accurately reflects work and decisions made by me.  Shreya Perez M.D.  6/14/2022  10:16 PM

## 2022-06-16 LAB
BASOPHILS # BLD AUTO: 0.2 % (ref 0–1.8)
BASOPHILS # BLD: 0.01 K/UL (ref 0–0.12)
EKG IMPRESSION: NORMAL
EKG IMPRESSION: NORMAL
EOSINOPHIL # BLD AUTO: 0.01 K/UL (ref 0–0.51)
EOSINOPHIL NFR BLD: 0.2 % (ref 0–6.9)
ERYTHROCYTE [DISTWIDTH] IN BLOOD BY AUTOMATED COUNT: 57.4 FL (ref 35.9–50)
HCT VFR BLD AUTO: 36.3 % (ref 37–47)
HGB BLD-MCNC: 12.2 G/DL (ref 12–16)
IMM GRANULOCYTES # BLD AUTO: 0.02 K/UL (ref 0–0.11)
IMM GRANULOCYTES NFR BLD AUTO: 0.4 % (ref 0–0.9)
LYMPHOCYTES # BLD AUTO: 2.42 K/UL (ref 1–4.8)
LYMPHOCYTES NFR BLD: 47.5 % (ref 22–41)
MCH RBC QN AUTO: 32.9 PG (ref 27–33)
MCHC RBC AUTO-ENTMCNC: 33.6 G/DL (ref 33.6–35)
MCV RBC AUTO: 97.8 FL (ref 81.4–97.8)
MONOCYTES # BLD AUTO: 0.35 K/UL (ref 0–0.85)
MONOCYTES NFR BLD AUTO: 6.9 % (ref 0–13.4)
NEUTROPHILS # BLD AUTO: 2.28 K/UL (ref 2–7.15)
NEUTROPHILS NFR BLD: 44.8 % (ref 44–72)
NRBC # BLD AUTO: 0 K/UL
NRBC BLD-RTO: 0 /100 WBC
PLATELET # BLD AUTO: 189 K/UL (ref 164–446)
PMV BLD AUTO: 10 FL (ref 9–12.9)
RBC # BLD AUTO: 3.71 M/UL (ref 4.2–5.4)
WBC # BLD AUTO: 5.1 K/UL (ref 4.8–10.8)

## 2022-06-16 PROCEDURE — 84484 ASSAY OF TROPONIN QUANT: CPT

## 2022-06-16 PROCEDURE — 93005 ELECTROCARDIOGRAM TRACING: CPT

## 2022-06-16 PROCEDURE — 93005 ELECTROCARDIOGRAM TRACING: CPT | Performed by: EMERGENCY MEDICINE

## 2022-06-16 PROCEDURE — 80053 COMPREHEN METABOLIC PANEL: CPT

## 2022-06-16 PROCEDURE — 85025 COMPLETE CBC W/AUTO DIFF WBC: CPT

## 2022-06-16 PROCEDURE — 99284 EMERGENCY DEPT VISIT MOD MDM: CPT

## 2022-06-16 ASSESSMENT — FIBROSIS 4 INDEX: FIB4 SCORE: 1.18

## 2022-06-17 ENCOUNTER — APPOINTMENT (OUTPATIENT)
Dept: RADIOLOGY | Facility: MEDICAL CENTER | Age: 42
End: 2022-06-17
Attending: EMERGENCY MEDICINE
Payer: COMMERCIAL

## 2022-06-17 ENCOUNTER — HOSPITAL ENCOUNTER (EMERGENCY)
Facility: MEDICAL CENTER | Age: 42
End: 2022-06-17
Attending: EMERGENCY MEDICINE
Payer: COMMERCIAL

## 2022-06-17 VITALS
HEART RATE: 80 BPM | DIASTOLIC BLOOD PRESSURE: 88 MMHG | OXYGEN SATURATION: 98 % | WEIGHT: 219 LBS | TEMPERATURE: 98 F | BODY MASS INDEX: 35.2 KG/M2 | RESPIRATION RATE: 16 BRPM | HEIGHT: 66 IN | SYSTOLIC BLOOD PRESSURE: 115 MMHG

## 2022-06-17 DIAGNOSIS — G40.909 SEIZURE DISORDER (HCC): ICD-10-CM

## 2022-06-17 DIAGNOSIS — S86.912A STRAIN OF LEFT KNEE, INITIAL ENCOUNTER: ICD-10-CM
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
## 2022-06-17 DIAGNOSIS — J06.9 UPPER RESPIRATORY TRACT INFECTION, UNSPECIFIED TYPE: ICD-10-CM

## 2022-06-17 DIAGNOSIS — J45.909 ASTHMA, UNSPECIFIED ASTHMA SEVERITY, UNSPECIFIED WHETHER COMPLICATED, UNSPECIFIED WHETHER PERSISTENT: ICD-10-CM

## 2022-06-17 DIAGNOSIS — R07.9 CHEST PAIN, UNSPECIFIED TYPE: ICD-10-CM

## 2022-06-17 DIAGNOSIS — S93.401A SPRAIN OF RIGHT ANKLE, UNSPECIFIED LIGAMENT, INITIAL ENCOUNTER: ICD-10-CM

## 2022-06-17 LAB
ALBUMIN SERPL BCP-MCNC: 4.3 G/DL (ref 3.2–4.9)
ALBUMIN/GLOB SERPL: 1.9 G/DL
ALP SERPL-CCNC: 51 U/L (ref 30–99)
ALT SERPL-CCNC: 6 U/L (ref 2–50)
ANION GAP SERPL CALC-SCNC: 14 MMOL/L (ref 7–16)
AST SERPL-CCNC: 14 U/L (ref 12–45)
BILIRUB SERPL-MCNC: 0.4 MG/DL (ref 0.1–1.5)
BUN SERPL-MCNC: 8 MG/DL (ref 8–22)
CALCIUM SERPL-MCNC: 9.3 MG/DL (ref 8.5–10.5)
CHLORIDE SERPL-SCNC: 101 MMOL/L (ref 96–112)
CO2 SERPL-SCNC: 20 MMOL/L (ref 20–33)
CREAT SERPL-MCNC: 0.57 MG/DL (ref 0.5–1.4)
FLUAV RNA SPEC QL NAA+PROBE: NEGATIVE
FLUBV RNA SPEC QL NAA+PROBE: NEGATIVE
GFR SERPLBLD CREATININE-BSD FMLA CKD-EPI: 115 ML/MIN/1.73 M 2
GLOBULIN SER CALC-MCNC: 2.3 G/DL (ref 1.9–3.5)
GLUCOSE SERPL-MCNC: 88 MG/DL (ref 65–99)
POTASSIUM SERPL-SCNC: 3.8 MMOL/L (ref 3.6–5.5)
PROT SERPL-MCNC: 6.6 G/DL (ref 6–8.2)
SARS-COV-2 RNA RESP QL NAA+PROBE: NOTDETECTED
SODIUM SERPL-SCNC: 135 MMOL/L (ref 135–145)
SPECIMEN SOURCE: NORMAL
TROPONIN T SERPL-MCNC: <6 NG/L (ref 6–19)

## 2022-06-17 PROCEDURE — C9803 HOPD COVID-19 SPEC COLLECT: HCPCS | Performed by: EMERGENCY MEDICINE

## 2022-06-17 PROCEDURE — 0240U HCHG SARS-COV-2 COVID-19 NFCT DS RESP RNA 3 TRGT MIC: CPT

## 2022-06-17 PROCEDURE — 71045 X-RAY EXAM CHEST 1 VIEW: CPT

## 2022-06-17 RX ORDER — ALBUTEROL SULFATE 90 UG/1
2 AEROSOL, METERED RESPIRATORY (INHALATION) EVERY 6 HOURS PRN
Qty: 8.5 G | Refills: 0 | Status: SHIPPED | OUTPATIENT
Start: 2022-06-17 | End: 2022-09-03

## 2022-06-17 RX ORDER — IBUPROFEN 400 MG/1
400 TABLET ORAL EVERY 6 HOURS PRN
Qty: 30 TABLET | Refills: 0 | Status: SHIPPED | OUTPATIENT
Start: 2022-06-17 | End: 2022-09-03

## 2022-06-17 RX ORDER — DIVALPROEX SODIUM 250 MG/1
500-750 TABLET, EXTENDED RELEASE ORAL DAILY
Qty: 30 TABLET | Refills: 0 | Status: SHIPPED | OUTPATIENT
Start: 2022-06-17 | End: 2022-09-03
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
## 2022-06-17 NOTE — ED NOTES
Rounded with patient in waiting room. Vital signs rechecked. All questions answered. Apologized for wait time. No further needs at this time.
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
## 2022-06-17 NOTE — ED NOTES
DC instructions reviewed with pt. Pt verbalized understanding. Pt to [facility]. Taxi voucher provided for transportation home.

## 2022-07-19 ENCOUNTER — APPOINTMENT (OUTPATIENT)
Dept: RADIOLOGY | Facility: MEDICAL CENTER | Age: 42
End: 2022-07-19
Attending: EMERGENCY MEDICINE
Payer: COMMERCIAL

## 2022-07-19 ENCOUNTER — HOSPITAL ENCOUNTER (EMERGENCY)
Facility: MEDICAL CENTER | Age: 42
End: 2022-07-19
Attending: EMERGENCY MEDICINE
Payer: COMMERCIAL

## 2022-07-19 VITALS
HEIGHT: 66 IN | WEIGHT: 217.81 LBS | TEMPERATURE: 98.1 F | OXYGEN SATURATION: 99 % | RESPIRATION RATE: 20 BRPM | HEART RATE: 71 BPM | SYSTOLIC BLOOD PRESSURE: 112 MMHG | DIASTOLIC BLOOD PRESSURE: 65 MMHG | BODY MASS INDEX: 35.01 KG/M2

## 2022-07-19 DIAGNOSIS — S09.90XA CLOSED HEAD INJURY, INITIAL ENCOUNTER: ICD-10-CM
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
## 2022-07-19 DIAGNOSIS — S16.1XXA STRAIN OF NECK MUSCLE, INITIAL ENCOUNTER: ICD-10-CM

## 2022-07-19 DIAGNOSIS — S00.83XA CONTUSION OF FACE, INITIAL ENCOUNTER: ICD-10-CM

## 2022-07-19 LAB — HCG UR QL: NEGATIVE

## 2022-07-19 PROCEDURE — 72125 CT NECK SPINE W/O DYE: CPT | Mod: ME

## 2022-07-19 PROCEDURE — 81025 URINE PREGNANCY TEST: CPT

## 2022-07-19 PROCEDURE — 70450 CT HEAD/BRAIN W/O DYE: CPT | Mod: ME

## 2022-07-19 PROCEDURE — 70486 CT MAXILLOFACIAL W/O DYE: CPT | Mod: ME

## 2022-07-19 PROCEDURE — 99283 EMERGENCY DEPT VISIT LOW MDM: CPT | Mod: EDC,25

## 2022-07-19 ASSESSMENT — FIBROSIS 4 INDEX: FIB4 SCORE: 1.3
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
## 2022-07-20 NOTE — DISCHARGE INSTRUCTIONS
Return to the ER for any worsening jaw pain, headache, dizziness or lightheadedness, worsening neck pain, nausea or vomiting, visual changes, or for any concerns.    Take over-the-counter Advil and Tylenol to help with the pain.    Use ice to help with the pain.    Follow-up with your primary care nurse practitioner within the next 1 to 2 days.  Please call for appointment.

## 2022-07-20 NOTE — ED NOTES
Discharge and follow-up instructions given to pt, who verbalized understanding. SKYLAR SAMUEL. Ambulated out of room with steady gait using walker. States she will be going to [id number] since her fiance is still in ER.
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
## 2022-09-03 ENCOUNTER — APPOINTMENT (OUTPATIENT)
Dept: RADIOLOGY | Facility: MEDICAL CENTER | Age: 42
DRG: 101 | End: 2022-09-03
Payer: COMMERCIAL

## 2022-09-03 ENCOUNTER — APPOINTMENT (OUTPATIENT)
Dept: RADIOLOGY | Facility: MEDICAL CENTER | Age: 42
DRG: 101 | End: 2022-09-03
Attending: EMERGENCY MEDICINE
Payer: COMMERCIAL

## 2022-09-03 ENCOUNTER — HOSPITAL ENCOUNTER (INPATIENT)
Facility: MEDICAL CENTER | Age: 42
LOS: 3 days | DRG: 101 | End: 2022-09-06
Attending: EMERGENCY MEDICINE | Admitting: INTERNAL MEDICINE
Payer: COMMERCIAL

## 2022-09-03 DIAGNOSIS — R40.2431 GLASGOW COMA SCALE TOTAL SCORE 3-8, IN THE FIELD (EMT OR AMBULANCE) (HCC): ICD-10-CM

## 2022-09-03 DIAGNOSIS — R56.9 SEIZURE (HCC): ICD-10-CM

## 2022-09-03 DIAGNOSIS — R09.02 HYPOXIA: ICD-10-CM

## 2022-09-03 DIAGNOSIS — G40.901 STATUS EPILEPTICUS (HCC): Primary | ICD-10-CM

## 2022-09-03 DIAGNOSIS — R56.9 SEIZURES (HCC): ICD-10-CM

## 2022-09-03 LAB
ALBUMIN SERPL BCP-MCNC: 3.7 G/DL (ref 3.2–4.9)
ALBUMIN/GLOB SERPL: 1.8 G/DL
ALP SERPL-CCNC: 68 U/L (ref 30–99)
ALT SERPL-CCNC: 11 U/L (ref 2–50)
AMPHET UR QL SCN: NEGATIVE
ANION GAP SERPL CALC-SCNC: 16 MMOL/L (ref 7–16)
AST SERPL-CCNC: 32 U/L (ref 12–45)
BARBITURATES UR QL SCN: NEGATIVE
BASE EXCESS BLDA CALC-SCNC: -4 MMOL/L (ref -4–3)
BASOPHILS # BLD AUTO: 0.4 % (ref 0–1.8)
BASOPHILS # BLD: 0.03 K/UL (ref 0–0.12)
BENZODIAZ UR QL SCN: POSITIVE
BILIRUB SERPL-MCNC: <0.2 MG/DL (ref 0.1–1.5)
BODY TEMPERATURE: ABNORMAL DEGREES
BREATHS SETTING VENT: 20
BUN SERPL-MCNC: 10 MG/DL (ref 8–22)
BZE UR QL SCN: NEGATIVE
CALCIUM SERPL-MCNC: 8.2 MG/DL (ref 8.5–10.5)
CANNABINOIDS UR QL SCN: NEGATIVE
CHLORIDE SERPL-SCNC: 98 MMOL/L (ref 96–112)
CO2 BLDA-SCNC: 25 MMOL/L (ref 20–33)
CO2 SERPL-SCNC: 18 MMOL/L (ref 20–33)
CREAT SERPL-MCNC: 0.63 MG/DL (ref 0.5–1.4)
DELSYS IDSYS: ABNORMAL
EOSINOPHIL # BLD AUTO: 0.04 K/UL (ref 0–0.51)
EOSINOPHIL NFR BLD: 0.6 % (ref 0–6.9)
ERYTHROCYTE [DISTWIDTH] IN BLOOD BY AUTOMATED COUNT: 45.1 FL (ref 35.9–50)
ETHANOL BLD-MCNC: <10.1 MG/DL
GFR SERPLBLD CREATININE-BSD FMLA CKD-EPI: 112 ML/MIN/1.73 M 2
GLOBULIN SER CALC-MCNC: 2.1 G/DL (ref 1.9–3.5)
GLUCOSE SERPL-MCNC: 172 MG/DL (ref 65–99)
HCG SERPL QL: NEGATIVE
HCO3 BLDA-SCNC: 23.6 MMOL/L (ref 17–25)
HCT VFR BLD AUTO: 35.6 % (ref 37–47)
HGB BLD-MCNC: 11.5 G/DL (ref 12–16)
HOROWITZ INDEX BLDA+IHG-RTO: 180 MM[HG]
IMM GRANULOCYTES # BLD AUTO: 0.06 K/UL (ref 0–0.11)
IMM GRANULOCYTES NFR BLD AUTO: 0.9 % (ref 0–0.9)
LYMPHOCYTES # BLD AUTO: 2.7 K/UL (ref 1–4.8)
LYMPHOCYTES NFR BLD: 39.8 % (ref 22–41)
MCH RBC QN AUTO: 31.7 PG (ref 27–33)
MCHC RBC AUTO-ENTMCNC: 32.3 G/DL (ref 33.6–35)
MCV RBC AUTO: 98.1 FL (ref 81.4–97.8)
METHADONE UR QL SCN: NEGATIVE
MODE IMODE: ABNORMAL
MONOCYTES # BLD AUTO: 0.48 K/UL (ref 0–0.85)
MONOCYTES NFR BLD AUTO: 7.1 % (ref 0–13.4)
NEUTROPHILS # BLD AUTO: 3.48 K/UL (ref 2–7.15)
NEUTROPHILS NFR BLD: 51.2 % (ref 44–72)
NRBC # BLD AUTO: 0 K/UL
NRBC BLD-RTO: 0 /100 WBC
O2/TOTAL GAS SETTING VFR VENT: 80 %
OPIATES UR QL SCN: NEGATIVE
OXYCODONE UR QL SCN: NEGATIVE
PCO2 BLDA: 53.2 MMHG (ref 26–37)
PCO2 TEMP ADJ BLDA: 51.1 MMHG (ref 26–37)
PCP UR QL SCN: NEGATIVE
PEEP END EXPIRATORY PRESSURE IPEEP: 8 CMH20
PH BLDA: 7.25 [PH] (ref 7.4–7.5)
PH TEMP ADJ BLDA: 7.27 [PH] (ref 7.4–7.5)
PLATELET # BLD AUTO: 209 K/UL (ref 164–446)
PMV BLD AUTO: 10.6 FL (ref 9–12.9)
PO2 BLDA: 144 MMHG (ref 64–87)
PO2 TEMP ADJ BLDA: 139 MMHG (ref 64–87)
POTASSIUM SERPL-SCNC: 3.7 MMOL/L (ref 3.6–5.5)
PROPOXYPH UR QL SCN: NEGATIVE
PROT SERPL-MCNC: 5.8 G/DL (ref 6–8.2)
RBC # BLD AUTO: 3.63 M/UL (ref 4.2–5.4)
SAO2 % BLDA: 99 % (ref 93–99)
SODIUM SERPL-SCNC: 132 MMOL/L (ref 135–145)
SPECIMEN DRAWN FROM PATIENT: ABNORMAL
TIDAL VOLUME IVT: 400 ML
WBC # BLD AUTO: 6.8 K/UL (ref 4.8–10.8)

## 2022-09-03 PROCEDURE — 303105 HCHG CATHETER EXTRA

## 2022-09-03 PROCEDURE — 80307 DRUG TEST PRSMV CHEM ANLYZR: CPT

## 2022-09-03 PROCEDURE — 99291 CRITICAL CARE FIRST HOUR: CPT

## 2022-09-03 PROCEDURE — 92950 HEART/LUNG RESUSCITATION CPR: CPT

## 2022-09-03 PROCEDURE — 5A1935Z RESPIRATORY VENTILATION, LESS THAN 24 CONSECUTIVE HOURS: ICD-10-PCS | Performed by: STUDENT IN AN ORGANIZED HEALTH CARE EDUCATION/TRAINING PROGRAM

## 2022-09-03 PROCEDURE — 36415 COLL VENOUS BLD VENIPUNCTURE: CPT

## 2022-09-03 PROCEDURE — 0BH17EZ INSERTION OF ENDOTRACHEAL AIRWAY INTO TRACHEA, VIA NATURAL OR ARTIFICIAL OPENING: ICD-10-PCS | Performed by: STUDENT IN AN ORGANIZED HEALTH CARE EDUCATION/TRAINING PROGRAM

## 2022-09-03 PROCEDURE — 700111 HCHG RX REV CODE 636 W/ 250 OVERRIDE (IP): Performed by: STUDENT IN AN ORGANIZED HEALTH CARE EDUCATION/TRAINING PROGRAM

## 2022-09-03 PROCEDURE — 700111 HCHG RX REV CODE 636 W/ 250 OVERRIDE (IP)

## 2022-09-03 PROCEDURE — 31500 INSERT EMERGENCY AIRWAY: CPT

## 2022-09-03 PROCEDURE — 36600 WITHDRAWAL OF ARTERIAL BLOOD: CPT

## 2022-09-03 PROCEDURE — 99291 CRITICAL CARE FIRST HOUR: CPT | Performed by: INTERNAL MEDICINE

## 2022-09-03 PROCEDURE — 770022 HCHG ROOM/CARE - ICU (200)

## 2022-09-03 PROCEDURE — 99221 1ST HOSP IP/OBS SF/LOW 40: CPT | Performed by: PSYCHIATRY & NEUROLOGY

## 2022-09-03 PROCEDURE — 700101 HCHG RX REV CODE 250: Performed by: STUDENT IN AN ORGANIZED HEALTH CARE EDUCATION/TRAINING PROGRAM

## 2022-09-03 PROCEDURE — 82803 BLOOD GASES ANY COMBINATION: CPT

## 2022-09-03 PROCEDURE — 71045 X-RAY EXAM CHEST 1 VIEW: CPT

## 2022-09-03 PROCEDURE — 4A00X4Z MEASUREMENT OF CENTRAL NERVOUS ELECTRICAL ACTIVITY, EXTERNAL APPROACH: ICD-10-PCS | Performed by: PSYCHIATRY & NEUROLOGY

## 2022-09-03 PROCEDURE — 84703 CHORIONIC GONADOTROPIN ASSAY: CPT

## 2022-09-03 PROCEDURE — 96365 THER/PROPH/DIAG IV INF INIT: CPT

## 2022-09-03 PROCEDURE — 94002 VENT MGMT INPAT INIT DAY: CPT

## 2022-09-03 PROCEDURE — 85025 COMPLETE CBC W/AUTO DIFF WBC: CPT

## 2022-09-03 PROCEDURE — 51702 INSERT TEMP BLADDER CATH: CPT

## 2022-09-03 PROCEDURE — 96375 TX/PRO/DX INJ NEW DRUG ADDON: CPT

## 2022-09-03 PROCEDURE — 302214 INTUBATION BOX: Performed by: STUDENT IN AN ORGANIZED HEALTH CARE EDUCATION/TRAINING PROGRAM

## 2022-09-03 PROCEDURE — 80053 COMPREHEN METABOLIC PANEL: CPT

## 2022-09-03 PROCEDURE — 82077 ASSAY SPEC XCP UR&BREATH IA: CPT

## 2022-09-03 PROCEDURE — 70450 CT HEAD/BRAIN W/O DYE: CPT

## 2022-09-03 PROCEDURE — 700105 HCHG RX REV CODE 258: Performed by: STUDENT IN AN ORGANIZED HEALTH CARE EDUCATION/TRAINING PROGRAM

## 2022-09-03 RX ORDER — AMOXICILLIN 250 MG
2 CAPSULE ORAL 2 TIMES DAILY
Status: DISCONTINUED | OUTPATIENT
Start: 2022-09-04 | End: 2022-09-03

## 2022-09-03 RX ORDER — ROCURONIUM BROMIDE 10 MG/ML
100 INJECTION, SOLUTION INTRAVENOUS ONCE
Status: COMPLETED | OUTPATIENT
Start: 2022-09-03 | End: 2022-09-03

## 2022-09-03 RX ORDER — PROPOFOL 10 MG/ML
140 INJECTION, EMULSION INTRAVENOUS ONCE
Status: COMPLETED | OUTPATIENT
Start: 2022-09-03 | End: 2022-09-03

## 2022-09-03 RX ORDER — AMOXICILLIN 250 MG
2 CAPSULE ORAL 2 TIMES DAILY
Status: DISCONTINUED | OUTPATIENT
Start: 2022-09-04 | End: 2022-09-04

## 2022-09-03 RX ORDER — MIDAZOLAM HYDROCHLORIDE 1 MG/ML
15 INJECTION INTRAMUSCULAR; INTRAVENOUS ONCE
Status: COMPLETED | OUTPATIENT
Start: 2022-09-03 | End: 2022-09-03

## 2022-09-03 RX ORDER — BISACODYL 10 MG
10 SUPPOSITORY, RECTAL RECTAL
Status: DISCONTINUED | OUTPATIENT
Start: 2022-09-03 | End: 2022-09-03

## 2022-09-03 RX ORDER — BISACODYL 10 MG
10 SUPPOSITORY, RECTAL RECTAL
Status: DISCONTINUED | OUTPATIENT
Start: 2022-09-03 | End: 2022-09-04

## 2022-09-03 RX ORDER — DIVALPROEX SODIUM 250 MG/1
500 TABLET, EXTENDED RELEASE ORAL 2 TIMES DAILY
Status: ON HOLD | COMMUNITY
End: 2022-09-06 | Stop reason: SDUPTHER

## 2022-09-03 RX ORDER — FAMOTIDINE 20 MG/1
20 TABLET, FILM COATED ORAL EVERY 12 HOURS
Status: DISCONTINUED | OUTPATIENT
Start: 2022-09-04 | End: 2022-09-04

## 2022-09-03 RX ORDER — POLYETHYLENE GLYCOL 3350 17 G/17G
1 POWDER, FOR SOLUTION ORAL
Status: DISCONTINUED | OUTPATIENT
Start: 2022-09-03 | End: 2022-09-04

## 2022-09-03 RX ORDER — SODIUM CHLORIDE, SODIUM LACTATE, POTASSIUM CHLORIDE, CALCIUM CHLORIDE 600; 310; 30; 20 MG/100ML; MG/100ML; MG/100ML; MG/100ML
INJECTION, SOLUTION INTRAVENOUS CONTINUOUS
Status: DISCONTINUED | OUTPATIENT
Start: 2022-09-04 | End: 2022-09-05

## 2022-09-03 RX ORDER — POLYETHYLENE GLYCOL 3350 17 G/17G
1 POWDER, FOR SOLUTION ORAL
Status: DISCONTINUED | OUTPATIENT
Start: 2022-09-03 | End: 2022-09-03

## 2022-09-03 RX ORDER — SODIUM CHLORIDE, SODIUM LACTATE, POTASSIUM CHLORIDE, CALCIUM CHLORIDE 600; 310; 30; 20 MG/100ML; MG/100ML; MG/100ML; MG/100ML
1000 INJECTION, SOLUTION INTRAVENOUS ONCE
Status: COMPLETED | OUTPATIENT
Start: 2022-09-03 | End: 2022-09-03

## 2022-09-03 RX ORDER — LEVETIRACETAM 500 MG/5ML
1500 INJECTION, SOLUTION, CONCENTRATE INTRAVENOUS EVERY 12 HOURS
Status: DISCONTINUED | OUTPATIENT
Start: 2022-09-04 | End: 2022-09-04

## 2022-09-03 RX ORDER — MIDAZOLAM HYDROCHLORIDE 1 MG/ML
INJECTION INTRAMUSCULAR; INTRAVENOUS
Status: DISPENSED
Start: 2022-09-03 | End: 2022-09-04

## 2022-09-03 RX ADMIN — PROPOFOL 20 MCG/KG/MIN: 10 INJECTION, EMULSION INTRAVENOUS at 22:32

## 2022-09-03 RX ADMIN — PROPOFOL 140 MG: 10 INJECTION, EMULSION INTRAVENOUS at 22:21

## 2022-09-03 RX ADMIN — SODIUM CHLORIDE, POTASSIUM CHLORIDE, SODIUM LACTATE AND CALCIUM CHLORIDE 1000 ML: 600; 310; 30; 20 INJECTION, SOLUTION INTRAVENOUS at 22:12

## 2022-09-03 RX ADMIN — MIDAZOLAM HYDROCHLORIDE 15 MG: 1 INJECTION, SOLUTION INTRAMUSCULAR; INTRAVENOUS at 22:06

## 2022-09-03 RX ADMIN — ROCURONIUM BROMIDE 100 MG: 10 INJECTION, SOLUTION INTRAVENOUS at 22:22

## 2022-09-03 ASSESSMENT — FIBROSIS 4 INDEX: FIB4 SCORE: 1.3

## 2022-09-04 ENCOUNTER — APPOINTMENT (OUTPATIENT)
Dept: RADIOLOGY | Facility: MEDICAL CENTER | Age: 42
DRG: 101 | End: 2022-09-04
Attending: INTERNAL MEDICINE
Payer: COMMERCIAL

## 2022-09-04 PROBLEM — J96.00 ACUTE RESPIRATORY FAILURE (HCC): Status: ACTIVE | Noted: 2022-09-04

## 2022-09-04 PROBLEM — R56.9 SEIZURE (HCC): Status: ACTIVE | Noted: 2022-09-04

## 2022-09-04 LAB
ANION GAP SERPL CALC-SCNC: 9 MMOL/L (ref 7–16)
BASE EXCESS BLDA CALC-SCNC: 1 MMOL/L (ref -4–3)
BASOPHILS # BLD AUTO: 0.2 % (ref 0–1.8)
BASOPHILS # BLD: 0.01 K/UL (ref 0–0.12)
BODY TEMPERATURE: ABNORMAL DEGREES
BREATHS SETTING VENT: 22
BUN SERPL-MCNC: 10 MG/DL (ref 8–22)
CALCIUM SERPL-MCNC: 8.3 MG/DL (ref 8.5–10.5)
CARBAMAZEPINE SERPL-MCNC: 10 UG/ML (ref 4–12)
CHLORIDE SERPL-SCNC: 101 MMOL/L (ref 96–112)
CO2 BLDA-SCNC: 27 MMOL/L (ref 20–33)
CO2 SERPL-SCNC: 23 MMOL/L (ref 20–33)
CREAT SERPL-MCNC: 0.56 MG/DL (ref 0.5–1.4)
DELSYS IDSYS: ABNORMAL
END TIDAL CARBON DIOXIDE IECO2: 27 MMHG
EOSINOPHIL # BLD AUTO: 0 K/UL (ref 0–0.51)
EOSINOPHIL NFR BLD: 0 % (ref 0–6.9)
ERYTHROCYTE [DISTWIDTH] IN BLOOD BY AUTOMATED COUNT: 43.5 FL (ref 35.9–50)
GFR SERPLBLD CREATININE-BSD FMLA CKD-EPI: 115 ML/MIN/1.73 M 2
GLUCOSE SERPL-MCNC: 95 MG/DL (ref 65–99)
HCO3 BLDA-SCNC: 25.4 MMOL/L (ref 17–25)
HCT VFR BLD AUTO: 30.3 % (ref 37–47)
HGB BLD-MCNC: 10.4 G/DL (ref 12–16)
HOROWITZ INDEX BLDA+IHG-RTO: 90 MM[HG]
IMM GRANULOCYTES # BLD AUTO: 0.04 K/UL (ref 0–0.11)
IMM GRANULOCYTES NFR BLD AUTO: 0.6 % (ref 0–0.9)
LYMPHOCYTES # BLD AUTO: 1.25 K/UL (ref 1–4.8)
LYMPHOCYTES NFR BLD: 19.3 % (ref 22–41)
MAGNESIUM SERPL-MCNC: 1.6 MG/DL (ref 1.5–2.5)
MCH RBC QN AUTO: 32.5 PG (ref 27–33)
MCHC RBC AUTO-ENTMCNC: 34.3 G/DL (ref 33.6–35)
MCV RBC AUTO: 94.7 FL (ref 81.4–97.8)
MODE IMODE: ABNORMAL
MONOCYTES # BLD AUTO: 0.53 K/UL (ref 0–0.85)
MONOCYTES NFR BLD AUTO: 8.2 % (ref 0–13.4)
NEUTROPHILS # BLD AUTO: 4.66 K/UL (ref 2–7.15)
NEUTROPHILS NFR BLD: 71.7 % (ref 44–72)
NRBC # BLD AUTO: 0 K/UL
NRBC BLD-RTO: 0 /100 WBC
O2/TOTAL GAS SETTING VFR VENT: 30 %
PCO2 BLDA: 38.2 MMHG (ref 26–37)
PCO2 TEMP ADJ BLDA: 36.6 MMHG (ref 26–37)
PEEP END EXPIRATORY PRESSURE IPEEP: 8 CMH20
PH BLDA: 7.43 [PH] (ref 7.4–7.5)
PH TEMP ADJ BLDA: 7.45 [PH] (ref 7.4–7.5)
PHOSPHATE SERPL-MCNC: 3.4 MG/DL (ref 2.5–4.5)
PLATELET # BLD AUTO: 118 K/UL (ref 164–446)
PMV BLD AUTO: 11.2 FL (ref 9–12.9)
PO2 BLDA: 27 MMHG (ref 64–87)
PO2 TEMP ADJ BLDA: 25 MMHG (ref 64–87)
POTASSIUM SERPL-SCNC: 4.2 MMOL/L (ref 3.6–5.5)
RBC # BLD AUTO: 3.2 M/UL (ref 4.2–5.4)
SAO2 % BLDA: 53 % (ref 93–99)
SODIUM SERPL-SCNC: 133 MMOL/L (ref 135–145)
SPECIMEN DRAWN FROM PATIENT: ABNORMAL
TIDAL VOLUME IVT: 360 ML
VALPROATE SERPL-MCNC: 49.4 UG/ML (ref 50–100)
WBC # BLD AUTO: 6.5 K/UL (ref 4.8–10.8)

## 2022-09-04 PROCEDURE — 99222 1ST HOSP IP/OBS MODERATE 55: CPT | Performed by: HOSPITALIST

## 2022-09-04 PROCEDURE — 85025 COMPLETE CBC W/AUTO DIFF WBC: CPT

## 2022-09-04 PROCEDURE — 700101 HCHG RX REV CODE 250: Performed by: HOSPITALIST

## 2022-09-04 PROCEDURE — 94799 UNLISTED PULMONARY SVC/PX: CPT

## 2022-09-04 PROCEDURE — 36600 WITHDRAWAL OF ARTERIAL BLOOD: CPT

## 2022-09-04 PROCEDURE — 80156 ASSAY CARBAMAZEPINE TOTAL: CPT

## 2022-09-04 PROCEDURE — 96375 TX/PRO/DX INJ NEW DRUG ADDON: CPT

## 2022-09-04 PROCEDURE — 700102 HCHG RX REV CODE 250 W/ 637 OVERRIDE(OP): Performed by: HOSPITALIST

## 2022-09-04 PROCEDURE — 99233 SBSQ HOSP IP/OBS HIGH 50: CPT | Mod: 25 | Performed by: PSYCHIATRY & NEUROLOGY

## 2022-09-04 PROCEDURE — 94150 VITAL CAPACITY TEST: CPT

## 2022-09-04 PROCEDURE — 95700 EEG CONT REC W/VID EEG TECH: CPT | Performed by: PSYCHIATRY & NEUROLOGY

## 2022-09-04 PROCEDURE — 700111 HCHG RX REV CODE 636 W/ 250 OVERRIDE (IP): Performed by: INTERNAL MEDICINE

## 2022-09-04 PROCEDURE — 80164 ASSAY DIPROPYLACETIC ACD TOT: CPT

## 2022-09-04 PROCEDURE — 94003 VENT MGMT INPAT SUBQ DAY: CPT

## 2022-09-04 PROCEDURE — 94760 N-INVAS EAR/PLS OXIMETRY 1: CPT

## 2022-09-04 PROCEDURE — 71045 X-RAY EXAM CHEST 1 VIEW: CPT

## 2022-09-04 PROCEDURE — 99291 CRITICAL CARE FIRST HOUR: CPT | Performed by: INTERNAL MEDICINE

## 2022-09-04 PROCEDURE — 80048 BASIC METABOLIC PNL TOTAL CA: CPT

## 2022-09-04 PROCEDURE — 82803 BLOOD GASES ANY COMBINATION: CPT

## 2022-09-04 PROCEDURE — 83735 ASSAY OF MAGNESIUM: CPT

## 2022-09-04 PROCEDURE — 95720 EEG PHY/QHP EA INCR W/VEEG: CPT | Performed by: STUDENT IN AN ORGANIZED HEALTH CARE EDUCATION/TRAINING PROGRAM

## 2022-09-04 PROCEDURE — 84100 ASSAY OF PHOSPHORUS: CPT

## 2022-09-04 PROCEDURE — A9270 NON-COVERED ITEM OR SERVICE: HCPCS | Performed by: PSYCHIATRY & NEUROLOGY

## 2022-09-04 PROCEDURE — A9270 NON-COVERED ITEM OR SERVICE: HCPCS | Performed by: HOSPITALIST

## 2022-09-04 PROCEDURE — 700111 HCHG RX REV CODE 636 W/ 250 OVERRIDE (IP): Performed by: PSYCHIATRY & NEUROLOGY

## 2022-09-04 PROCEDURE — 770001 HCHG ROOM/CARE - MED/SURG/GYN PRIV*

## 2022-09-04 PROCEDURE — 700105 HCHG RX REV CODE 258: Performed by: INTERNAL MEDICINE

## 2022-09-04 PROCEDURE — 700102 HCHG RX REV CODE 250 W/ 637 OVERRIDE(OP): Performed by: PSYCHIATRY & NEUROLOGY

## 2022-09-04 PROCEDURE — 95714 VEEG EA 12-26 HR UNMNTR: CPT | Performed by: PSYCHIATRY & NEUROLOGY

## 2022-09-04 PROCEDURE — 96366 THER/PROPH/DIAG IV INF ADDON: CPT

## 2022-09-04 RX ORDER — ENOXAPARIN SODIUM 100 MG/ML
40 INJECTION SUBCUTANEOUS 2 TIMES DAILY
Status: DISCONTINUED | OUTPATIENT
Start: 2022-09-04 | End: 2022-09-06 | Stop reason: HOSPADM

## 2022-09-04 RX ORDER — BISACODYL 10 MG
10 SUPPOSITORY, RECTAL RECTAL
Status: DISCONTINUED | OUTPATIENT
Start: 2022-09-04 | End: 2022-09-06 | Stop reason: HOSPADM

## 2022-09-04 RX ORDER — DIVALPROEX SODIUM 125 MG/1
500 CAPSULE, COATED PELLETS ORAL EVERY 12 HOURS
Status: DISCONTINUED | OUTPATIENT
Start: 2022-09-04 | End: 2022-09-06 | Stop reason: HOSPADM

## 2022-09-04 RX ORDER — AMOXICILLIN 250 MG
2 CAPSULE ORAL 2 TIMES DAILY
Status: DISCONTINUED | OUTPATIENT
Start: 2022-09-04 | End: 2022-09-06 | Stop reason: HOSPADM

## 2022-09-04 RX ORDER — DIVALPROEX SODIUM 125 MG/1
500 CAPSULE, COATED PELLETS ORAL EVERY 12 HOURS
Status: DISCONTINUED | OUTPATIENT
Start: 2022-09-04 | End: 2022-09-04

## 2022-09-04 RX ORDER — MAGNESIUM SULFATE HEPTAHYDRATE 40 MG/ML
2 INJECTION, SOLUTION INTRAVENOUS ONCE
Status: COMPLETED | OUTPATIENT
Start: 2022-09-04 | End: 2022-09-04

## 2022-09-04 RX ORDER — CARBAMAZEPINE 200 MG/1
200 TABLET ORAL 3 TIMES DAILY
Status: DISCONTINUED | OUTPATIENT
Start: 2022-09-04 | End: 2022-09-06 | Stop reason: HOSPADM

## 2022-09-04 RX ORDER — CARBAMAZEPINE 200 MG/1
200 TABLET ORAL 3 TIMES DAILY
Status: DISCONTINUED | OUTPATIENT
Start: 2022-09-04 | End: 2022-09-04

## 2022-09-04 RX ORDER — POLYETHYLENE GLYCOL 3350 17 G/17G
1 POWDER, FOR SOLUTION ORAL
Status: DISCONTINUED | OUTPATIENT
Start: 2022-09-04 | End: 2022-09-06 | Stop reason: HOSPADM

## 2022-09-04 RX ORDER — VALPROATE SODIUM 100 MG/ML
1000 INJECTION INTRAVENOUS ONCE
Status: COMPLETED | OUTPATIENT
Start: 2022-09-04 | End: 2022-09-04

## 2022-09-04 RX ORDER — SODIUM CHLORIDE, SODIUM LACTATE, POTASSIUM CHLORIDE, AND CALCIUM CHLORIDE .6; .31; .03; .02 G/100ML; G/100ML; G/100ML; G/100ML
1000 INJECTION, SOLUTION INTRAVENOUS ONCE
Status: COMPLETED | OUTPATIENT
Start: 2022-09-04 | End: 2022-09-04

## 2022-09-04 RX ORDER — TOPIRAMATE 100 MG/1
100 TABLET, FILM COATED ORAL EVERY 12 HOURS
Status: DISCONTINUED | OUTPATIENT
Start: 2022-09-04 | End: 2022-09-06 | Stop reason: HOSPADM

## 2022-09-04 RX ADMIN — SODIUM CHLORIDE, POTASSIUM CHLORIDE, SODIUM LACTATE AND CALCIUM CHLORIDE 1000 ML: 600; 310; 30; 20 INJECTION, SOLUTION INTRAVENOUS at 04:05

## 2022-09-04 RX ADMIN — SODIUM CHLORIDE, POTASSIUM CHLORIDE, SODIUM LACTATE AND CALCIUM CHLORIDE: 600; 310; 30; 20 INJECTION, SOLUTION INTRAVENOUS at 00:00

## 2022-09-04 RX ADMIN — SODIUM CHLORIDE, POTASSIUM CHLORIDE, SODIUM LACTATE AND CALCIUM CHLORIDE: 600; 310; 30; 20 INJECTION, SOLUTION INTRAVENOUS at 13:36

## 2022-09-04 RX ADMIN — PROPOFOL 40 MCG/KG/MIN: 10 INJECTION, EMULSION INTRAVENOUS at 00:00

## 2022-09-04 RX ADMIN — LEVETIRACETAM 1500 MG: 100 INJECTION, SOLUTION INTRAVENOUS at 00:00

## 2022-09-04 RX ADMIN — DIVALPROEX SODIUM 500 MG: 125 CAPSULE, COATED PELLETS ORAL at 17:05

## 2022-09-04 RX ADMIN — CARBAMAZEPINE 200 MG: 200 TABLET ORAL at 17:00

## 2022-09-04 RX ADMIN — FAMOTIDINE 20 MG: 10 INJECTION, SOLUTION INTRAVENOUS at 05:47

## 2022-09-04 RX ADMIN — TOPIRAMATE 100 MG: 100 TABLET, FILM COATED ORAL at 17:04

## 2022-09-04 RX ADMIN — CARBAMAZEPINE 200 MG: 200 TABLET ORAL at 11:12

## 2022-09-04 RX ADMIN — MAGNESIUM SULFATE HEPTAHYDRATE 2 G: 40 INJECTION, SOLUTION INTRAVENOUS at 08:08

## 2022-09-04 RX ADMIN — PROPOFOL 40 MCG/KG/MIN: 10 INJECTION, EMULSION INTRAVENOUS at 02:35

## 2022-09-04 RX ADMIN — ENOXAPARIN SODIUM 40 MG: 40 INJECTION SUBCUTANEOUS at 17:05

## 2022-09-04 RX ADMIN — PROPOFOL 40 MCG/KG/MIN: 10 INJECTION, EMULSION INTRAVENOUS at 06:47

## 2022-09-04 RX ADMIN — VALPROATE SODIUM 1000 MG: 100 INJECTION INTRAVENOUS at 07:55

## 2022-09-04 RX ADMIN — DOCUSATE SODIUM 50 MG AND SENNOSIDES 8.6 MG 2 TABLET: 8.6; 5 TABLET, FILM COATED ORAL at 17:04

## 2022-09-04 RX ADMIN — ENOXAPARIN SODIUM 40 MG: 40 INJECTION SUBCUTANEOUS at 07:55

## 2022-09-04 ASSESSMENT — ENCOUNTER SYMPTOMS
NAUSEA: 0
SHORTNESS OF BREATH: 0
DOUBLE VISION: 0
CHILLS: 0
SPUTUM PRODUCTION: 0
COUGH: 0
FALLS: 0
DIZZINESS: 1
HEARTBURN: 0
ABDOMINAL PAIN: 0
LOSS OF CONSCIOUSNESS: 0
BLURRED VISION: 0
PALPITATIONS: 0
FEVER: 0
HEADACHES: 1
BACK PAIN: 0
SORE THROAT: 0

## 2022-09-04 ASSESSMENT — PAIN DESCRIPTION - PAIN TYPE
TYPE: CHRONIC PAIN
TYPE: CHRONIC PAIN
TYPE: ACUTE PAIN

## 2022-09-04 ASSESSMENT — FIBROSIS 4 INDEX
FIB4 SCORE: 3.52
FIB4 SCORE: 1.99

## 2022-09-04 ASSESSMENT — PULMONARY FUNCTION TESTS: FVC: 1.4

## 2022-09-04 NOTE — PROCEDURES
VIDEO ELECTROENCEPHALOGRAM  REPORT      Referring provider: Dr. Bhagat    DOS:   9/4/2022 from 1:15 am to 2:10 pm      INDICATION:  Yamila Mendoza 41 y.o. female presenting with status epilepticus     CURRENT ANTIEPILEPTIC REGIMEN:   LEV  PRO 40> off   Versed x 1      TECHNIQUE: A 30-channel,  hrs video electroencephalogram (VEEG) was performed in accordance with the international 10-20 system. This digital study was reviewed in bipolar and referential montages. The recording examined the patient during sedated state and awake and sleep states after off sedation.     The EEG was set up and taken down by a Neurodiagnostic technologist who performed education to patient and staff.     A minimum but not limited to 23 electrodes and 23 channel recording was setup and performed by Neurodiagnostic technologist.    Impedances, electrode integrity, and technical impressions were documented a minimum of every 2-24 hour period by a Neurodiagnostic Technologist and reviewed by Interpreting physician.     There was supervised withdrawal of the following medications: n/a    ACTIVATION PROCEDURES:   None     DESCRIPTION OF THE RECORD:  The background consisted of diffuse,asymmetric mixed theta and fast frequencies. There was loss of fast frequencies and slight background attenuation over the right hemisphere. Reactivity as noted.   Once off sedation, the asymmetry is less pronounced with diffuse theta range slowing at 7-8 Hz better seen on the left hemisphere. Rudimentary sleep pattern seen.     ICTAL AND/OR INTERICTAL FINDINGS:   No focal or generalized epileptiform activity was noted. Right hemisphere background attenuation and loss of fast frequencies were seen during this study.  No seizures were recorded during the study.     EVENT(S):  none     EKG: sampling review of EKG recording demonstrated sinus rhythm.       INTERPRETATION:   This is an abnormal video EEG recording in the sedated state and later awake and sleep  states.   1) The diffuse background slowing with excessive fast frequencies is consistent with encephalopathy and sedation effect.  2) The presence of right hemisphere background attenuation and loss of fast frequencies suggests focal structural lesion or postictal suppression.   3)No epileptiform discharges or seizures were seen.  No events were captured during the study. Clinical correlation is recommended.      Juan F Abad MD  Diplomate, American Board of Psychiatry and Neurology   Diplomate, American Board of Psychiatry and Neurology in Epilepsy

## 2022-09-04 NOTE — CARE PLAN
Pt was extubated this morning at 0751. Pt is on room air. No respiratory distress. No stridor present.      Problem: Ventilation  Goal: Ability to achieve and maintain unassisted ventilation or tolerate decreased levels of ventilator support  Description: Target End Date:  4 days     Document on Vent flowsheet    1.  Support and monitor invasive and noninvasive mechanical ventilation  2.  Monitor ventilator weaning response  3.  Perform ventilator associated pneumonia prevention interventions  4.  Manage ventilation therapy by monitoring diagnostic test results  Outcome: Met

## 2022-09-04 NOTE — PROGRESS NOTES
Neurology Progress Note  Neurohospitalist Service, [facility]    Referring Physician: Talon Sol M.D.    Chief Complaint   Patient presents with    Seizure       HPI: Refer to initial documented Neurology H&P, as detailed in the patient's chart.    Interval History 9/4: No seizures on EEG overnight.    Review of systems: In addition to what is detailed in the HPI and/or updated in the interval history, all other systems reviewed and are negative.    Past Medical History:    has a past medical history of ASTHMA, Bipolar disorder (HCC), Chronic back pain, Psychiatric disorder, Psychiatric disorder, and Seizure disorder (HCC).    FHx:  family history is not on file.    SHx:   reports that she has never smoked. She has never used smokeless tobacco. She reports that she does not drink alcohol and does not use drugs.    Medications:    Current Facility-Administered Medications:     valproic acid 100 mg/mL injection - migraine 1,000 mg, 1,000 mg, Intravenous, Once, Latrell Thomas M.D.    carBAMazepine (TEGRETOL) tablet 200 mg, 200 mg, Enteral Tube, TID, Latrell Thomas M.D.    divalproex (DEPAKOTE SPRINKLE) capsule 500 mg, 500 mg, Enteral Tube, Q12HRS, Latrell Thomas M.D.    enoxaparin (Lovenox) inj 40 mg, 40 mg, Subcutaneous, BID, Talon Sol M.D.    magnesium sulfate IVPB premix 2 g, 2 g, Intravenous, Once, Talon Sol M.D.    MIDAZOLAM HCL 5 MG/5ML INJ SOLN (WRAPPER), , , ,     Respiratory Therapy Consult, , Nebulization, Continuous RT, Reno Bhagat M.D.    famotidine (PEPCID) tablet 20 mg, 20 mg, Enteral Tube, Q12HRS **OR** famotidine (PEPCID) injection 20 mg, 20 mg, Intravenous, Q12HRS, Reno Bhagat M.D., 20 mg at 09/04/22 0547    senna-docusate (PERICOLACE or SENOKOT S) 8.6-50 MG per tablet 2 Tablet, 2 Tablet, Enteral Tube, BID **AND** polyethylene glycol/lytes (MIRALAX) PACKET 1 Packet, 1 Packet, Enteral Tube, QDAY PRN **AND** magnesium hydroxide (MILK OF  MAGNESIA) suspension 30 mL, 30 mL, Enteral Tube, QDAY PRN **AND** bisacodyl (DULCOLAX) suppository 10 mg, 10 mg, Rectal, QDAY PRN, Reno Bhagat M.D.    MD Alert...ICU Electrolyte Replacement per Pharmacy, , Other, PHARMACY TO DOSE, Reno Bhagat M.D.    lidocaine (XYLOCAINE) 1 % injection 2 mL, 2 mL, Tracheal Tube, Q30 MIN PRN, Reno Bhagat M.D.    lactated ringers infusion, , Intravenous, Continuous, Reno Bhagat M.D., Last Rate: 75 mL/hr at 09/04/22 0000, New Bag at 09/04/22 0000    fentaNYL (SUBLIMAZE) injection 100 mcg, 100 mcg, Intravenous, Q15 MIN PRN **AND** fentaNYL (SUBLIMAZE) injection 200 mcg, 200 mcg, Intravenous, Q15 MIN PRN **AND** fentaNYL (SUBLIMAZE) 50 mcg/mL in 50mL (Continuous Infusion), , Intravenous, Continuous, Dose not Required at 09/04/22 0000 **AND** propofol (DIPRIVAN) injection, 0-80 mcg/kg/min, Intravenous, Continuous, Last Rate: 10.9 mL/hr at 09/04/22 0651, 20 mcg/kg/min at 09/04/22 0651 **AND** Triglyceride, , , Every 3 Days (0300), Reno Bhagat M.D.    Physical Examination:     Vitals:    09/04/22 0400 09/04/22 0500 09/04/22 0600 09/04/22 0636   BP: (!) 85/51 (!) 85/55 (!) 88/51    Pulse: 60 (!) 54 (!) 55 (!) 55   Resp: (!) 26 (!) 22 (!) 22 (!) 22   Temp:       TempSrc: Bladder      SpO2: 99% 100% 100% 100%   Weight:       Height:               NEUROLOGICAL EXAM:     Patient is intubated and sedated.  Not following.  Cranial nerves the pupils are small but minimally reactive midline.  No gaze preference.  No nystagmus.  Positive gag.  Follows corneal reflex.  No response to noxious stimuli in all 4.  Reflexes are mute with difficult toes.    Objective Data:    Labs:  Lab Results   Component Value Date/Time    PROTHROMBTM 13.7 10/23/2020 02:45 AM    INR 1.02 10/23/2020 02:45 AM      Lab Results   Component Value Date/Time    WBC 6.5 09/04/2022 02:23 AM    RBC 3.20 (L) 09/04/2022 02:23 AM    HEMOGLOBIN 10.4 (L) 09/04/2022 02:23 AM    HEMATOCRIT 30.3 (L) 09/04/2022 02:23 AM     MCV 94.7 09/04/2022 02:23 AM    MCH 32.5 09/04/2022 02:23 AM    MCHC 34.3 09/04/2022 02:23 AM    MPV 11.2 09/04/2022 02:23 AM    NEUTSPOLYS 71.70 09/04/2022 02:23 AM    LYMPHOCYTES 19.30 (L) 09/04/2022 02:23 AM    MONOCYTES 8.20 09/04/2022 02:23 AM    EOSINOPHILS 0.00 09/04/2022 02:23 AM    BASOPHILS 0.20 09/04/2022 02:23 AM    HYPOCHROMIA 1+ 02/11/2012 12:04 AM    ANISOCYTOSIS 1+ 01/14/2019 09:20 PM      Lab Results   Component Value Date/Time    SODIUM 133 (L) 09/04/2022 02:23 AM    POTASSIUM 4.2 09/04/2022 02:23 AM    CHLORIDE 101 09/04/2022 02:23 AM    CO2 23 09/04/2022 02:23 AM    GLUCOSE 95 09/04/2022 02:23 AM    BUN 10 09/04/2022 02:23 AM    CREATININE 0.56 09/04/2022 02:23 AM    CREATININE 0.6 05/15/2009 01:00 PM      Lab Results   Component Value Date/Time    CHOLSTRLTOT 173 07/07/2015 12:03 AM    LDL 86 07/07/2015 12:03 AM    HDL 55 07/07/2015 12:03 AM    TRIGLYCERIDE 158 (H) 07/07/2015 12:03 AM       Lab Results   Component Value Date/Time    ALKPHOSPHAT 68 09/03/2022 10:00 PM    ASTSGOT 32 09/03/2022 10:00 PM    ALTSGPT 11 09/03/2022 10:00 PM    TBILIRUBIN <0.2 09/03/2022 10:00 PM        Imaging/Testing:  DX-CHEST-PORTABLE (1 VIEW)   Final Result      1.  Lines and tubes appear appropriately located      2.  Left basilar atelectasis      CT-HEAD W/O   Final Result      1.  No acute intracranial hemorrhage.   2.  Mild atrophy again seen.         DX-CHEST-PORTABLE (1 VIEW)   Final Result      1.  There is linear bibasilar opacity possibly due to atelectasis or edema.   2.  ET tube projects within 1 cm above the alise.   3.  NG tube extends into the upper abdomen.      4.  Findings were discussed with OMAR FERREIRA on 9/3/2022 ta11:24 PM.      DX-ABDOMEN FOR TUBE PLACEMENT   Final Result      1.  NG tube tip projects over the distal stomach.            Assessment and Plan:    40-year-old female with a past medical history of MR/bipolar/epilepsy.  Details of her history are scarce.  Does not  follow with neurology here at [facility].  Had multiple EEGs last 1 was 2020 and a few 10 years ago all of which were unremarkable.  Continuous EEG overnight was unremarkable for seizure activity.  Does show attenuation of the right side.  Could be a seizure focus or her baseline.  Unclear.  Depakote level was 49 Tegretol level was 10 therefore patient most likely is taking as an outpatient.  I will load the patient with Depakote with 1000 mg now.  Followed by maintenance dose of 500 g twice daily.  Restarted the Tegretol 200 mg 3 times daily.  Still need to call the pharmacy to see if she is still taking Topamax.  This is listed as a medication back in 2020.  Not clear if she is still taking it.  Today we will plan on weaning the propofol and continue monitor for seizures on EEG.    Plan:  1.  Depakote 1000 mg now followed by maintenance dose of 500 g twice daily  2.  Restart Tegretol 200 mg 3 times daily- will need confirmation if this is the correct home dose.  3.  Confirm with her pharmacist if she is taking Topamax or not.  If so,restart at home dose.  4.  Continuous EEG  5.  Wean off propofol today      Spent 40 minutes of critical care time in this patient suffering status epilepticus managing the AEDs directly.  ICU team is managing the vent settings and propofol infusion.      This chart was partially generated using voice recognition technology and sound alike word replacement may be present, best efforts were made to make the chart accurate.    Latrell Thomas MD  Board Certified Neurology, ABPN  t) [phone]

## 2022-09-04 NOTE — ASSESSMENT & PLAN NOTE
Pt given multiple doses of versed prior to ICU admission, requiring intubation for airway protection   - extubated 9/4 AM, doing well, no respiratory distress, o2 sat >90%

## 2022-09-04 NOTE — DISCHARGE PLANNING
Medical Social Work     Acute medical patient    The pt was VIDA OKEEFE. The pt name is Yamila Mendoza (: 76). The pt S/O Ilonel also came with the pt. Lionel reports the pt has a history of seizures and he had to call 911 because the pt had about 6 seizures. The pt S/O was moved to the pt room once the pt was situated.    Lionel (s/o) [phone]    Plan: SW will remain available for pt and family support.

## 2022-09-04 NOTE — ED NOTES
Patient minimal improvement with versed by EMS and ED staff, not protecting airway, NRB@15. MD at bedside for intubation, 140mg propofol and 100 geraldine for intubation. RT, pharmacy and RNs at bedside also.

## 2022-09-04 NOTE — ED TRIAGE NOTES
BIB EMS for multiple seizures x6 per EMS And , recent med change, no other complaints noted by family. Arrives post ictal, GCS 3, unresponsive on NRB 15L 96%

## 2022-09-04 NOTE — ED NOTES
Med rec updated and complete. Per interview with family ( ). Confirmed name and date of birth. Family denies that pt has been on antibiotics in the last 30 days.  Pt unable to partipcate in an interview at this time.        [place] [facility] [phone]

## 2022-09-04 NOTE — PROCEDURES
VIDEO ELECTROENCEPHALOGRAM  REPORT      Referring provider: Dr. Bhagat    DOS:   from 9/4/2022 at 2:10 pm to 9/5/2022 at 1231 PM (22 hours, 7 minutes total running time)      INDICATION:  Yamila Mendoza 41 y.o. female presenting with status epilepticus     CURRENT ANTIEPILEPTIC REGIMEN:   topamax       TECHNIQUE: A 30-channel,  hrs video electroencephalogram (VEEG) was performed in accordance with the international 10-20 system. This digital study was reviewed in bipolar and referential montages. The recording examined the patient during sedated state and awake and sleep states after off sedation.   The EEG was set up and taken down by a Neurodiagnostic technologist who performed education to patient and staff.   A minimum but not limited to 23 electrodes and 23 channel recording was setup and performed by Neurodiagnostic technologist.  Impedances, electrode integrity, and technical impressions were documented a minimum of every 2-24 hour period by a Neurodiagnostic Technologist and reviewed by Interpreting physician.     ACTIVATION PROCEDURES:   None     DESCRIPTION OF THE RECORD:  The background consisted of diffuse mixed theta and fast frequencies.  Reactivity as noted. Diffuse theta range slowing at 7-8 Hz better seen on the left hemisphere. Rudimentary sleep pattern seen.     ICTAL AND/OR INTERICTAL FINDINGS:   No focal or generalized epileptiform activity was noted.  No seizures were recorded during the study.     Rare intermittent right hemispheric slowing.    EVENT(S):  none     EKG: sampling review of EKG recording demonstrated sinus rhythm.       INTERPRETATION:   This is an abnormal video EEG recording in the sedated state and later awake and sleep states.   1) The mild diffuse background slowing with excessive fast frequencies is consistent with encephalopathy and sedation effect.  2)No epileptiform discharges or seizures were seen.  No events were captured during the study. Clinical correlation is  recommended.  3) Compared to the prior study, right hemisphere background attenuation and loss of fast frequencies is much less apparent.     Sohail Villa MD  Department of Neurology at [facility]  General Neurologist and Epileptologist  Director of [facility] Level III Comprehensive Epilepsy Program  Professor of Clinical Neurology, [facility].   Phone: [phone]  Fax: [phone]  E-mail: [email or web address]

## 2022-09-04 NOTE — CONSULTS
Critical Care Consultation    Date of consult: 9/3/2022    Referring Physician  NICOLE Bourne*    Reason for Consultation  42 yo W with ASE     History of Presenting Illness  41 y.o. female who presented 9/3/2022 with h/o seizure disorder, developmental delay, and frequent falls presented with multiple tonic clonic seizures reported by her . Seizures were persisted despite multiple doses of Versed. She was intubated for airway protection.     Code Status  Full Code    Review of Systems  Review of Systems   Unable to perform ROS: Intubated     Past Medical History   has a past medical history of ASTHMA, Bipolar disorder (HCC), Chronic back pain, Psychiatric disorder, Psychiatric disorder, and Seizure disorder (HCC).    Surgical History   has a past surgical history that includes other neurological surg; other orthopedic surgery; other abdominal surgery; ercp in or (9/19/2009); and joy by laparoscopy (9/22/2009).    Family History  family history is not on file.    Social History   reports that she has never smoked. She has never used smokeless tobacco. She reports that she does not drink alcohol and does not use drugs.    Medications  Home Medications       Reviewed by Alejandra Hoang (Pharmacy Tech) on 09/03/22 at 2230  Med List Status: Complete     Medication Last Dose Status   carBAMazepine (TEGRETOL) 200 MG Tab 9/3/2022 Active   cholecalciferol (VITAMIN D3) 400 UNIT Tab 9/3/2022 Active   cyclobenzaprine (FLEXERIL) 10 MG Tab 9/2/2022 Active   divalproex ER (DEPAKOTE ER) 250 MG TABLET SR 24 HR 9/3/2022 Active   topiramate (TOPAMAX) 100 MG Tab 9/3/2022 Active                  Current Facility-Administered Medications   Medication Dose Route Frequency Provider Last Rate Last Admin    MIDAZOLAM HCL 5 MG/5ML INJ SOLN (WRAPPER)             Respiratory Therapy Consult   Nebulization Continuous RT Reno Bhagat M.D.        famotidine (PEPCID) tablet 20 mg  20 mg Enteral Tube Q12HRS Reno Bhagat  "M.D.        Or    famotidine (PEPCID) injection 20 mg  20 mg Intravenous Q12HRS Reno Bhagat M.D.        senna-docusate (PERICOLACE or SENOKOT S) 8.6-50 MG per tablet 2 Tablet  2 Tablet Enteral Tube BID Reno Bhagat M.D.        And    polyethylene glycol/lytes (MIRALAX) PACKET 1 Packet  1 Packet Enteral Tube QDAY PRN Reno Bhagat M.D.        And    magnesium hydroxide (MILK OF MAGNESIA) suspension 30 mL  30 mL Enteral Tube QDAY PRN Reno Bhagat M.D.        And    bisacodyl (DULCOLAX) suppository 10 mg  10 mg Rectal QDAY PRN Reno Bhagat M.D. MD Alert...ICU Electrolyte Replacement per Pharmacy   Other PHARMACY TO DOSE Reno Bhagat M.D.        lidocaine (XYLOCAINE) 1 % injection 2 mL  2 mL Tracheal Tube Q30 MIN PRN Reno Bhagat M.D.        lactated ringers infusion   Intravenous Continuous Reno Bhagat M.D. 75 mL/hr at 09/04/22 0000 New Bag at 09/04/22 0000    fentaNYL (SUBLIMAZE) injection 100 mcg  100 mcg Intravenous Q15 MIN PRN Reno Bhagat M.D.        And    fentaNYL (SUBLIMAZE) injection 200 mcg  200 mcg Intravenous Q15 MIN PRN Reno Bhagat M.D.        And    fentaNYL (SUBLIMAZE) 50 mcg/mL in 50mL (Continuous Infusion)   Intravenous Continuous Reno Bhagat M.D.   Dose not Required at 09/04/22 0000    And    propofol (DIPRIVAN) injection  0-80 mcg/kg/min Intravenous Continuous Reno Bhagat M.D. 21.8 mL/hr at 09/04/22 0235 40 mcg/kg/min at 09/04/22 0235    levETIRAcetam (Keppra) injection 1,500 mg  1,500 mg Intravenous Q12HRS Reno Bhagat M.D.   1,500 mg at 09/04/22 0000       Allergies  Allergies   Allergen Reactions    Bactrim Unspecified     \"I get the shakes\"     Iron Unspecified     \"see red dots\" vision, not rash     Penicillins Unspecified     \"makes me dizzy with my medicine\"     Phenobarbital Unspecified     \"makes me dizzy\"     Lemon Oil Itching     Skin itching per pt report to Dietary.    Onion Itching and Swelling     Throat swelling and itching per pt " report to Dietary.    [employer] Allergy Itching and Swelling     Throat swelling and itching per pt report to Dietary.       Vital Signs last 24 hours  Temp:  [36.7 °C (98.1 °F)] 36.7 °C (98.1 °F)  Pulse:  [] 65  Resp:  [21-59] 26  BP: ()/(50-66) 83/51  SpO2:  [93 %-100 %] 99 %    Physical Exam  Physical Exam  Constitutional:       Comments: Intubated   HENT:      Mouth/Throat:      Mouth: Mucous membranes are moist.   Eyes:      Pupils: Pupils are equal, round, and reactive to light.   Cardiovascular:      Rate and Rhythm: Normal rate and regular rhythm.   Pulmonary:      Effort: No respiratory distress.   Abdominal:      General: There is no distension.      Palpations: Abdomen is soft. There is no mass.   Musculoskeletal:      Cervical back: Neck supple.      Right lower leg: No edema.      Left lower leg: No edema.   Skin:     General: Skin is warm and dry.   Neurological:      Comments: Unconscious unresponsive on Propofol.   Psychiatric:      Comments: ALEJANDRO       Fluids    Intake/Output Summary (Last 24 hours) at 9/4/2022 0420  Last data filed at 9/4/2022 0200  Gross per 24 hour   Intake 1192.6 ml   Output 45 ml   Net 1147.6 ml       Laboratory  Recent Results (from the past 48 hour(s))   CBC WITH DIFFERENTIAL    Collection Time: 09/03/22 10:00 PM   Result Value Ref Range    WBC 6.8 4.8 - 10.8 K/uL    RBC 3.63 (L) 4.20 - 5.40 M/uL    Hemoglobin 11.5 (L) 12.0 - 16.0 g/dL    Hematocrit 35.6 (L) 37.0 - 47.0 %    MCV 98.1 (H) 81.4 - 97.8 fL    MCH 31.7 27.0 - 33.0 pg    MCHC 32.3 (L) 33.6 - 35.0 g/dL    RDW 45.1 35.9 - 50.0 fL    Platelet Count 209 164 - 446 K/uL    MPV 10.6 9.0 - 12.9 fL    Neutrophils-Polys 51.20 44.00 - 72.00 %    Lymphocytes 39.80 22.00 - 41.00 %    Monocytes 7.10 0.00 - 13.40 %    Eosinophils 0.60 0.00 - 6.90 %    Basophils 0.40 0.00 - 1.80 %    Immature Granulocytes 0.90 0.00 - 0.90 %    Nucleated RBC 0.00 /100 WBC    Neutrophils (Absolute) 3.48 2.00 - 7.15 K/uL    Lymphs  (Absolute) 2.70 1.00 - 4.80 K/uL    Monos (Absolute) 0.48 0.00 - 0.85 K/uL    Eos (Absolute) 0.04 0.00 - 0.51 K/uL    Baso (Absolute) 0.03 0.00 - 0.12 K/uL    Immature Granulocytes (abs) 0.06 0.00 - 0.11 K/uL    NRBC (Absolute) 0.00 K/uL   COMP METABOLIC PANEL    Collection Time: 09/03/22 10:00 PM   Result Value Ref Range    Sodium 132 (L) 135 - 145 mmol/L    Potassium 3.7 3.6 - 5.5 mmol/L    Chloride 98 96 - 112 mmol/L    Co2 18 (L) 20 - 33 mmol/L    Anion Gap 16.0 7.0 - 16.0    Glucose 172 (H) 65 - 99 mg/dL    Bun 10 8 - 22 mg/dL    Creatinine 0.63 0.50 - 1.40 mg/dL    Calcium 8.2 (L) 8.5 - 10.5 mg/dL    AST(SGOT) 32 12 - 45 U/L    ALT(SGPT) 11 2 - 50 U/L    Alkaline Phosphatase 68 30 - 99 U/L    Total Bilirubin <0.2 0.1 - 1.5 mg/dL    Albumin 3.7 3.2 - 4.9 g/dL    Total Protein 5.8 (L) 6.0 - 8.2 g/dL    Globulin 2.1 1.9 - 3.5 g/dL    A-G Ratio 1.8 g/dL   HCG QUAL SERUM    Collection Time: 09/03/22 10:00 PM   Result Value Ref Range    Beta-Hcg Qualitative Serum Negative Negative   DIAGNOSTIC ALCOHOL    Collection Time: 09/03/22 10:00 PM   Result Value Ref Range    Diagnostic Alcohol <10.1 <10.1 mg/dL   ESTIMATED GFR    Collection Time: 09/03/22 10:00 PM   Result Value Ref Range    GFR (CKD-EPI) 112 >60 mL/min/1.73 m 2   URINE DRUG SCREEN (TRIAGE)    Collection Time: 09/03/22 10:41 PM   Result Value Ref Range    Amphetamines Urine Negative Negative    Barbiturates Negative Negative    Benzodiazepines Positive (A) Negative    Cocaine Metabolite Negative Negative    Methadone Negative Negative    Opiates Negative Negative    Oxycodone Negative Negative    Phencyclidine -Pcp Negative Negative    Propoxyphene Negative Negative    Cannabinoid Metab Negative Negative   POCT arterial blood gas device results    Collection Time: 09/03/22 10:48 PM   Result Value Ref Range    Ph 7.255 (LL) 7.400 - 7.500    Pco2 53.2 (HH) 26.0 - 37.0 mmHg    Po2 144 (H) 64 - 87 mmHg    Tco2 25 20 - 33 mmol/L    S02 99 93 - 99 %    Hco3 23.6  17.0 - 25.0 mmol/L    BE -4 -4 - 3 mmol/L    Body Temp 97.0 F degrees    O2 Therapy 80 %    iPF Ratio 180     Ph Temp Danyell 7.267 (LL) 7.400 - 7.500    Pco2 Temp Co 51.1 (HH) 26.0 - 37.0 mmHg    Po2 Temp Cor 139 (H) 64 - 87 mmHg    Specimen Arterial     DelSys Vent     Tidal Volume 400 mL    Peep End Expiratory Pressure 8 cmh20    Set Rate 20     Mode APV-CMV    Basic Metabolic Panel (BMP)    Collection Time: 09/04/22  2:23 AM   Result Value Ref Range    Sodium 133 (L) 135 - 145 mmol/L    Potassium 4.2 3.6 - 5.5 mmol/L    Chloride 101 96 - 112 mmol/L    Co2 23 20 - 33 mmol/L    Glucose 95 65 - 99 mg/dL    Bun 10 8 - 22 mg/dL    Creatinine 0.56 0.50 - 1.40 mg/dL    Calcium 8.3 (L) 8.5 - 10.5 mg/dL    Anion Gap 9.0 7.0 - 16.0   CARBAMAZEPINE    Collection Time: 09/04/22  2:23 AM   Result Value Ref Range    Carbamazepine 10.0 4.0 - 12.0 ug/mL   VALPROIC ACID    Collection Time: 09/04/22  2:23 AM   Result Value Ref Range    Valproic Acid 49.4 (L) 50.0 - 100.0 ug/mL   CBC WITH DIFFERENTIAL    Collection Time: 09/04/22  2:23 AM   Result Value Ref Range    WBC 6.5 4.8 - 10.8 K/uL    RBC 3.20 (L) 4.20 - 5.40 M/uL    Hemoglobin 10.4 (L) 12.0 - 16.0 g/dL    Hematocrit 30.3 (L) 37.0 - 47.0 %    MCV 94.7 81.4 - 97.8 fL    MCH 32.5 27.0 - 33.0 pg    MCHC 34.3 33.6 - 35.0 g/dL    RDW 43.5 35.9 - 50.0 fL    Platelet Count 118 (L) 164 - 446 K/uL    MPV 11.2 9.0 - 12.9 fL    Neutrophils-Polys 71.70 44.00 - 72.00 %    Lymphocytes 19.30 (L) 22.00 - 41.00 %    Monocytes 8.20 0.00 - 13.40 %    Eosinophils 0.00 0.00 - 6.90 %    Basophils 0.20 0.00 - 1.80 %    Immature Granulocytes 0.60 0.00 - 0.90 %    Nucleated RBC 0.00 /100 WBC    Neutrophils (Absolute) 4.66 2.00 - 7.15 K/uL    Lymphs (Absolute) 1.25 1.00 - 4.80 K/uL    Monos (Absolute) 0.53 0.00 - 0.85 K/uL    Eos (Absolute) 0.00 0.00 - 0.51 K/uL    Baso (Absolute) 0.01 0.00 - 0.12 K/uL    Immature Granulocytes (abs) 0.04 0.00 - 0.11 K/uL    NRBC (Absolute) 0.00 K/uL   MAGNESIUM     Collection Time: 09/04/22  2:23 AM   Result Value Ref Range    Magnesium 1.6 1.5 - 2.5 mg/dL   PHOSPHORUS    Collection Time: 09/04/22  2:23 AM   Result Value Ref Range    Phosphorus 3.4 2.5 - 4.5 mg/dL   ESTIMATED GFR    Collection Time: 09/04/22  2:23 AM   Result Value Ref Range    GFR (CKD-EPI) 115 >60 mL/min/1.73 m 2       Imaging  CT-HEAD W/O   Final Result      1.  No acute intracranial hemorrhage.   2.  Mild atrophy again seen.         DX-CHEST-PORTABLE (1 VIEW)   Final Result      1.  There is linear bibasilar opacity possibly due to atelectasis or edema.   2.  ET tube projects within 1 cm above the alise.   3.  NG tube extends into the upper abdomen.      4.  Findings were discussed with OMAR FERREIRA on 9/3/2022 ta11:24 PM.      DX-ABDOMEN FOR TUBE PLACEMENT   Final Result      1.  NG tube tip projects over the distal stomach.      DX-CHEST-PORTABLE (1 VIEW)    (Results Pending)       Assessment/Plan  * Seizures (HCC)- (present on admission)  Assessment & Plan  cEEG  Keppra 1500mg BID  Propofol infusion  Neurology consult    Acute respiratory failure (HCC)  Assessment & Plan  Lung protective ventilation strategies  Titrate ventilator prescription to optimize oxygenation, ventilation, and acid base balance.  Hold ABC trials until seizures suppressed for at least 24 hours.      Discussed patient condition and risk of morbidity and/or mortality with RN, RT, and ERP .        The patient remains critically ill.  I have assessed and reassessed the respiratory status and made ventilator adjustments based upon arterial blood gas analysis, ventilator waveforms and airway mechanics.  I have assessed and reassessed the blood pressure, hemodynamics, cardiovascular and neurologic status. This patient remains at high risk for worsening cardiopulmonary dysfunction and death without the above critical care interventions.    Critical care time = 60  minutes in directly providing and coordinating critical care and  extensive data review.  No time overlap and excludes procedures.

## 2022-09-04 NOTE — ASSESSMENT & PLAN NOTE
"no seizure activity noted overnight.   9/3: \"diffuse mixed activities with mild attenuation and loss of fat frequency on the right hemisphere which suggests focal structural lesion or postictal finding. No epileptiform discharge or seizure seen.\"    Continue cEEG  Neuro on board, keppra discontinued  Depakote level 49.4, Carbamezapine level 10  Started Depakote and Carbamezapine. (unsure if pt on Topamax as medication listed back to 2020, will need to check with pharmacy)  Off propofol, pt extubated   Patient passed bedside swallow, will initiate diet  "

## 2022-09-04 NOTE — PROGRESS NOTES
[facility] Internal Medicine Daily Progress Note    Date of Service  9/4/2022    [facility] Team: BECCA   Attending: Talon Sol MD  Resident: Sonja Campos DO, PGY-2  Contact Number: [phone]    Chief Complaint  Yamila Mendoza is a 41 y.o. female with past medical history of seizure disorder (prior EEGs have not captured seizure activity), developmental delay, bipolar disorder, asthma, chronic pain admitted 9/3/2022 with several episodes of witnessed seizure activity at home requiring multiple doses of versed and subsequent intubation for airway protection.    Hospital Course  9/3- admitted to ICU, given multiple doses of versed, somnolent, requiring intubation for airway protection. Started on keppra  9/4- extubated, no seizures on EEG overnight. Off propofol. Keppra discontinued, added carbamazepine and depakote. Passed bedside swallow.     Interval Problem Update  No acute events overnight   Extubated this morning.   O2 sat >90%, on room air   SBP 80s-100s, not on pressors, MAP >65  Afebrile   Following commands, PERRL, AxO x 3, slow to respond at times  Pt complains of mild headache, no focal motor deficits.    Code Status  Full Code    Review of Systems  Review of Systems   Unable to perform ROS: Acuity of condition      Physical Exam  Temp:  [36.7 °C (98.1 °F)] 36.7 °C (98.1 °F)  Pulse:  [] 61  Resp:  [11-59] 15  BP: ()/(50-66) 95/57  SpO2:  [92 %-100 %] 100 %    Physical Exam  Constitutional:       General: She is not in acute distress.     Appearance: She is obese.   HENT:      Head: Normocephalic and atraumatic.      Right Ear: External ear normal.      Left Ear: External ear normal.      Nose: Nose normal.   Eyes:      General: No scleral icterus.     Extraocular Movements: Extraocular movements intact.      Conjunctiva/sclera: Conjunctivae normal.      Pupils: Pupils are equal, round, and reactive to light.   Cardiovascular:      Rate and Rhythm: Normal rate and regular rhythm.      Heart sounds:  Normal heart sounds. No murmur heard.  Pulmonary:      Effort: No respiratory distress.      Breath sounds: Normal breath sounds.   Abdominal:      General: There is no distension.      Palpations: Abdomen is soft.      Tenderness: There is no abdominal tenderness.   Musculoskeletal:      Right lower leg: Edema (trace) present.      Left lower leg: Edema (trace) present.   Skin:     General: Skin is warm and dry.   Neurological:      General: No focal deficit present.      Mental Status: She is alert and oriented to person, place, and time.      Sensory: Sensory deficit (decreased sensation to BLE, withdraws to pain in BLE) present.      Comments: Moving 4 extremities spontaneously       Fluids    Intake/Output Summary (Last 24 hours) at 9/4/2022 1118  Last data filed at 9/4/2022 1000  Gross per 24 hour   Intake 2579.8 ml   Output 470 ml   Net 2109.8 ml       Laboratory  Recent Labs     09/03/22 2200 09/04/22 0223   WBC 6.8 6.5   RBC 3.63* 3.20*   HEMOGLOBIN 11.5* 10.4*   HEMATOCRIT 35.6* 30.3*   MCV 98.1* 94.7   MCH 31.7 32.5   MCHC 32.3* 34.3   RDW 45.1 43.5   PLATELETCT 209 118*   MPV 10.6 11.2     Recent Labs     09/03/22 2200 09/04/22 0223   SODIUM 132* 133*   POTASSIUM 3.7 4.2   CHLORIDE 98 101   CO2 18* 23   GLUCOSE 172* 95   BUN 10 10   CREATININE 0.63 0.56   CALCIUM 8.2* 8.3*                   Imaging  DX-CHEST-PORTABLE (1 VIEW)   Final Result      1.  Lines and tubes appear appropriately located      2.  Left basilar atelectasis      CT-HEAD W/O   Final Result      1.  No acute intracranial hemorrhage.   2.  Mild atrophy again seen.         DX-CHEST-PORTABLE (1 VIEW)   Final Result      1.  There is linear bibasilar opacity possibly due to atelectasis or edema.   2.  ET tube projects within 1 cm above the alise.   3.  NG tube extends into the upper abdomen.      4.  Findings were discussed with OMAR FERREIRA on 9/3/2022 ta11:24 PM.      DX-ABDOMEN FOR TUBE PLACEMENT   Final Result      1.  NG  "tube tip projects over the distal stomach.           Assessment/Plan  Problem Representation:    * Seizures (HCC)- (present on admission)  Assessment & Plan  no seizure activity noted overnight.   9/3: \"diffuse mixed activities with mild attenuation and loss of fat frequency on the right hemisphere which suggests focal structural lesion or postictal finding. No epileptiform discharge or seizure seen.\"    Continue cEEG  Neuro on board, keppra discontinued  Depakote level 49.4, Carbamezapine level 10  Started Depakote and Carbamezapine. (unsure if pt on Topamax as medication listed back to 2020, will need to check with pharmacy)  Off propofol, pt extubated   Patient passed bedside swallow, will initiate diet    Acute respiratory failure (HCC)  Assessment & Plan  Pt given multiple doses of versed prior to ICU admission, requiring intubation for airway protection   - extubated 9/4 AM, doing well, no respiratory distress, o2 sat >90%       Patient no longer requiring ICU care, will transfer out.     VTE prophylaxis: enoxaparin ppx    I have performed a physical exam and reviewed and updated ROS and Plan today (9/4/2022). In review of yesterday's note (9/3/2022), there are no changes except as documented above.          "

## 2022-09-04 NOTE — ASSESSMENT & PLAN NOTE
D/t versed  Extubated 9/4  Wean oxygen as tolerated  Maintain sats >88%    Resolved, now on room air

## 2022-09-04 NOTE — ED NOTES
Large amount of urine on bed noticed when sliding to CT and back, mostlikely incontinent prior to arrival and molina placement. Assumed around 200-300cc urine

## 2022-09-04 NOTE — ED NOTES
"140 prop given @ 2221.  100 geraldine given @ 2222.    2224 intubated with 7.5ET 23\" , confirmed with CO2 and auscultation.   "

## 2022-09-04 NOTE — CARE PLAN
Problem: Ventilation  Goal: Ability to achieve and maintain unassisted ventilation or tolerate decreased levels of ventilator support  Description: Target End Date:  4 days     Document on Vent flowsheet    1.  Support and monitor invasive and noninvasive mechanical ventilation  2.  Monitor ventilator weaning response  3.  Perform ventilator associated pneumonia prevention interventions  4.  Manage ventilation therapy by monitoring diagnostic test results  Outcome: Progressing      Ventilator Daily Summary    Vent Day #2    Ventilator settings changed this shift:Titrated per ABG,       Weaning trials:no    Respiratory Procedures: patient placed on vent this shift.    Plan: Continue current ventilator settings and wean mechanical ventilation as tolerated per physician orders.

## 2022-09-04 NOTE — CONSULTS
Hospital Medicine Consultation    Date of Service  9/4/2022    Referring Physician  Dr. Sol    Consulting Physician  Rambo Gauthier M.D.    Reason for Consultation  Seizure activity     History of Presenting Illness  41-year-old female with past medical history of bipolar disorder who presented with seizure-like activity.  She required intubation while in ER as she was given multiple doses of versed and she was unable to protect her airways.  Neurology was consulted and patient underwent EEG which did not show any seizure-like activity.  She has been continued on her home medications of Depakote and Tegretol.  She was weaned off of propofol and extubated on 9/4 morning.    This morning she is complaining of some dizziness and headache. She endorses her compliance with topamax twice a day.     Review of Systems  Review of Systems   Constitutional:  Negative for chills and fever.   HENT:  Negative for congestion, hearing loss and sore throat.    Eyes:  Negative for blurred vision and double vision.   Respiratory:  Negative for cough, sputum production and shortness of breath.    Cardiovascular:  Negative for chest pain and palpitations.   Gastrointestinal:  Negative for abdominal pain, heartburn and nausea.   Genitourinary:  Negative for dysuria and urgency.   Musculoskeletal:  Negative for back pain and falls.   Skin:  Negative for itching and rash.   Neurological:  Positive for dizziness and headaches. Negative for loss of consciousness.   All other systems reviewed and are negative.    Past Medical History   has a past medical history of ASTHMA, Bipolar disorder (HCC), Chronic back pain, Psychiatric disorder, Psychiatric disorder, and Seizure disorder (HCC).    Surgical History   has a past surgical history that includes other neurological surg; other orthopedic surgery; other abdominal surgery; ercp in or (9/19/2009); and joy by laparoscopy (9/22/2009).    Family History  family history is not on  "file.    Social History   reports that she has never smoked. She has never used smokeless tobacco. She reports that she does not drink alcohol and does not use drugs.    Medications  Prior to Admission Medications   Prescriptions Last Dose Informant Patient Reported? Taking?   carBAMazepine (TEGRETOL) 200 MG Tab 9/3/2022 at 1800 Patient Yes No   Sig: Take 200 mg by mouth 3 times a day.   cholecalciferol (VITAMIN D3) 400 UNIT Tab 9/3/2022 at 0800 Patient Yes No   Sig: Take 400 Units by mouth every day.   cyclobenzaprine (FLEXERIL) 10 MG Tab 9/2/2022 at 2200 Patient Yes No   Sig: Take 5 mg by mouth at bedtime as needed for Muscle Spasms.   divalproex ER (DEPAKOTE ER) 250 MG TABLET SR 24 HR 9/3/2022 at 1800 Patient Yes Yes   Sig: Take 500 mg by mouth 2 times a day.   topiramate (TOPAMAX) 100 MG Tab 9/3/2022 at 0800 Patient Yes No   Sig: Take 100 mg by mouth 2 times a day.      Facility-Administered Medications: None       Allergies  Allergies   Allergen Reactions    Bactrim Unspecified     \"I get the shakes\"     Iron Unspecified     \"see red dots\" vision, not rash     Penicillins Unspecified     \"makes me dizzy with my medicine\"     Phenobarbital Unspecified     \"makes me dizzy\"     Lemon Oil Itching     Skin itching per pt report to Dietary.    Onion Itching and Swelling     Throat swelling and itching per pt report to Dietary.    [employer] Allergy Itching and Swelling     Throat swelling and itching per pt report to Dietary.       Physical Exam  Temp:  [36.7 °C (98.1 °F)] 36.7 °C (98.1 °F)  Pulse:  [] 59  Resp:  [11-59] 16  BP: ()/(50-66) 103/59  SpO2:  [92 %-100 %] 99 %    Physical Exam  Vitals and nursing note reviewed.   Constitutional:       Appearance: Normal appearance.   HENT:      Head: Normocephalic and atraumatic.      Comments: EEG leads +     Right Ear: External ear normal.      Left Ear: External ear normal.      Nose: Nose normal.      Mouth/Throat:      Mouth: Mucous membranes are " moist.      Pharynx: Oropharynx is clear.   Eyes:      Extraocular Movements: Extraocular movements intact.      Pupils: Pupils are equal, round, and reactive to light.   Cardiovascular:      Rate and Rhythm: Normal rate and regular rhythm.   Pulmonary:      Effort: Pulmonary effort is normal.      Breath sounds: Normal breath sounds.   Abdominal:      General: Abdomen is flat. Bowel sounds are normal. There is no distension.      Palpations: Abdomen is soft.      Tenderness: There is no abdominal tenderness.   Musculoskeletal:      Cervical back: Normal range of motion and neck supple.      Right lower leg: No edema.      Left lower leg: No edema.   Skin:     General: Skin is warm and dry.   Neurological:      General: No focal deficit present.      Mental Status: She is alert and oriented to person, place, and time.   Psychiatric:         Mood and Affect: Mood normal.      Comments: Odd affect         Fluids  Date 09/04/22 0700 - 09/05/22 0659   Shift 6112-5056 2484-8102 1962-6402 24 Hour Total   INTAKE   Shift Total       OUTPUT   Urine 325   325   Shift Total 325   325   Weight (kg) 109.8 109.8 109.8 109.8       Laboratory  Recent Labs     09/03/22 2200 09/04/22 0223   WBC 6.8 6.5   RBC 3.63* 3.20*   HEMOGLOBIN 11.5* 10.4*   HEMATOCRIT 35.6* 30.3*   MCV 98.1* 94.7   MCH 31.7 32.5   MCHC 32.3* 34.3   RDW 45.1 43.5   PLATELETCT 209 118*   MPV 10.6 11.2     Recent Labs     09/03/22 2200 09/04/22 0223   SODIUM 132* 133*   POTASSIUM 3.7 4.2   CHLORIDE 98 101   CO2 18* 23   GLUCOSE 172* 95   BUN 10 10   CREATININE 0.63 0.56   CALCIUM 8.2* 8.3*                     Imaging  DX-CHEST-PORTABLE (1 VIEW)   Final Result      1.  Lines and tubes appear appropriately located      2.  Left basilar atelectasis      CT-HEAD W/O   Final Result      1.  No acute intracranial hemorrhage.   2.  Mild atrophy again seen.         DX-CHEST-PORTABLE (1 VIEW)   Final Result      1.  There is linear bibasilar opacity possibly due to  atelectasis or edema.   2.  ET tube projects within 1 cm above the alise.   3.  NG tube extends into the upper abdomen.      4.  Findings were discussed with OMAR FERREIRA on 9/3/2022 ta11:24 PM.      DX-ABDOMEN FOR TUBE PLACEMENT   Final Result      1.  NG tube tip projects over the distal stomach.          Assessment/Plan  * Seizures (HCC)- (present on admission)  Assessment & Plan  vEEG with no seziure activity  Neuro following  Cont depakote, tegretol, and topamax    Acute respiratory failure (HCC)  Assessment & Plan  D/t versed  Extubated 9/4  Wean oxygen as tolerated  Maintain sats >88%

## 2022-09-04 NOTE — CONSULTS
"Neurology Initial Consult H&P  Neurohospitalist Service, [facility]    Referring Physician: MAYLIN Bourne.*    Chief Complaint   Patient presents with    Seizure       HPI: History obtained from chart only.  Patient is now intubated and sedated.  Patient was brought in by EMS after having 6 seizures at home.  Was given 6 mg of Versed by EMS.  Was given another additional 15 here.  Patient was then intubated.  Patient has underlying of sleep disorder.  Unclear what her home medications are listed as.   mention she is on Depakote, Tegretol, Topamax.  However this is unclear.  Per the chart she was on Depakote for multiple ER visits this year.  The Topamax and Tegretol was mentioned back in 2020.  She is on multiple ER visits for seizures past 2 years.  Last admission was back in 2020.  Multiple EEGs have been performed last 10 years all of which have been unremarkable normal.  However none of them are long-term.    Patient's other medical problems include bipolar neurologic mild MR.  Patient follows with an outside neurologist.      Review of systems: In addition to what is detailed in the HPI above, (and scanned into the chart if and when applicable), all other systems reviewed and are negative.    Past Medical History:    has a past medical history of ASTHMA, Bipolar disorder (HCC), Chronic back pain, Psychiatric disorder, Psychiatric disorder, and Seizure disorder (HCC).    FHx:  Unknown  SHx:   reports that she has never smoked. She has never used smokeless tobacco. She reports that she does not drink alcohol and does not use drugs.    Allergies:  Allergies   Allergen Reactions    Bactrim Unspecified     \"I get the shakes\"     Iron Unspecified     \"see red dots\" vision, not rash     Penicillins Unspecified     \"makes me dizzy with my medicine\"     Phenobarbital Unspecified     \"makes me dizzy\"     Lemon Oil Itching     Skin itching per pt report to Dietary.    Onion Itching " and Swelling     Throat swelling and itching per pt report to Dietary.    Pe[employer] Allergy Itching and Swelling     Throat swelling and itching per pt report to Dietary.       Medications:    Current Facility-Administered Medications:     MIDAZOLAM HCL 5 MG/5ML INJ SOLN (WRAPPER), , , ,     propofol (DIPRIVAN) injection, 0-80 mcg/kg/min, Intravenous, Continuous, Last Rate: 21.8 mL/hr at 09/03/22 2303, 40 mcg/kg/min at 09/03/22 2303 **AND** Triglycerides Starting now and then Every 3 Days, , , Every 3 Days (0300), Rizwan Mckoy M.D.    Current Outpatient Medications:     divalproex ER (DEPAKOTE ER) 250 MG TABLET SR 24 HR, Take 500 mg by mouth 2 times a day., Disp: , Rfl:     carBAMazepine (TEGRETOL) 200 MG Tab, Take 200 mg by mouth 3 times a day., Disp: , Rfl:     topiramate (TOPAMAX) 100 MG Tab, Take 100 mg by mouth 2 times a day., Disp: , Rfl:     cholecalciferol (VITAMIN D3) 400 UNIT Tab, Take 400 Units by mouth every day., Disp: , Rfl:     cyclobenzaprine (FLEXERIL) 10 MG Tab, Take 5 mg by mouth at bedtime as needed for Muscle Spasms., Disp: , Rfl:     Physical Examination:     Vitals:    09/03/22 2300 09/03/22 2301 09/03/22 2306 09/03/22 2311   BP:  (!) 87/53 (!) 87/52 (!) 90/50   Pulse: 90 90 88 86   Resp: (!) 26 (!) 26 (!) 26 (!) 25   Temp:       TempSrc:       SpO2: 93% 93% 94% 95%   Weight:           General: intubated and sedated  Eyes: unremarkable  CV: RRR    NEUROLOGICAL EXAM:     Mental status: intubated sedated not following  Speech and language: intubated not following  Cranial nerve exam: Pupils small min reactive.  Face symmetrical.  Positive corneal reflex.  Positive gag reflex.  No nystagmus.  No gaze preference.  Cannot assess palate, shoulder shrug, hearing due to intubation and sedation  Motor exam: Will slightly withdraw to pain in all 4 stimuli.  Sensory exam: Response to noxious stimuli in all 4  Deep tendon reflexes: Mute reflexes with equivocal toes  Coordination: no ataxia    Gait: deferred due to intubated and sedated    Objective Data:    Labs:  Lab Results   Component Value Date/Time    PROTHROMBTM 13.7 10/23/2020 02:45 AM    INR 1.02 10/23/2020 02:45 AM      Lab Results   Component Value Date/Time    WBC 6.8 09/03/2022 10:00 PM    RBC 3.63 (L) 09/03/2022 10:00 PM    HEMOGLOBIN 11.5 (L) 09/03/2022 10:00 PM    HEMATOCRIT 35.6 (L) 09/03/2022 10:00 PM    MCV 98.1 (H) 09/03/2022 10:00 PM    MCH 31.7 09/03/2022 10:00 PM    MCHC 32.3 (L) 09/03/2022 10:00 PM    MPV 10.6 09/03/2022 10:00 PM    NEUTSPOLYS 51.20 09/03/2022 10:00 PM    LYMPHOCYTES 39.80 09/03/2022 10:00 PM    MONOCYTES 7.10 09/03/2022 10:00 PM    EOSINOPHILS 0.60 09/03/2022 10:00 PM    BASOPHILS 0.40 09/03/2022 10:00 PM    HYPOCHROMIA 1+ 02/11/2012 12:04 AM    ANISOCYTOSIS 1+ 01/14/2019 09:20 PM      Lab Results   Component Value Date/Time    SODIUM 132 (L) 09/03/2022 10:00 PM    POTASSIUM 3.7 09/03/2022 10:00 PM    CHLORIDE 98 09/03/2022 10:00 PM    CO2 18 (L) 09/03/2022 10:00 PM    GLUCOSE 172 (H) 09/03/2022 10:00 PM    BUN 10 09/03/2022 10:00 PM    CREATININE 0.63 09/03/2022 10:00 PM    CREATININE 0.6 05/15/2009 01:00 PM      Lab Results   Component Value Date/Time    CHOLSTRLTOT 173 07/07/2015 12:03 AM    LDL 86 07/07/2015 12:03 AM    HDL 55 07/07/2015 12:03 AM    TRIGLYCERIDE 158 (H) 07/07/2015 12:03 AM       Lab Results   Component Value Date/Time    ALKPHOSPHAT 68 09/03/2022 10:00 PM    ASTSGOT 32 09/03/2022 10:00 PM    ALTSGPT 11 09/03/2022 10:00 PM    TBILIRUBIN <0.2 09/03/2022 10:00 PM        Imaging/Testing:  DX-CHEST-PORTABLE (1 VIEW)   Final Result      1.  There is linear bibasilar opacity possibly due to atelectasis or edema.   2.  ET tube projects within 1 cm above the alise.   3.  NG tube extends into the upper abdomen.      4.  Findings were discussed with OMAR FERREIRA on 9/3/2022 ta11:24 PM.      DX-ABDOMEN FOR TUBE PLACEMENT   Final Result      1.  NG tube tip projects over the distal stomach.       CT-HEAD W/O    (Results Pending)         Assessment and Plan:    41-year-old female carries a diagnosis of epilepsy, MR, bipolar disease presents with multiple seizures.  Received multiple doses of Versed.  Status post intubation in ER.  Currently on propofol.  At this time recommend continue the propofol at 40 an hour.  We will obtain continuous EEG.  If there is any seizure activity on EEG will make additional adjustments to the propofol infusion.  For the AEDs recommend getting a Depakote and Tegretol level.  In the morning we will need to confirm with her pharmacist with what AEDs she is taking and the doses.  Also if there is no further seizures on EEG overnight we will plan on weaning of propofol tomorrow morning.    Plan:  1.  Continue propofol infusion at 40 an hour.  2.  Stat continuous EEG  3.  We will need to confirm home medications particularly the Tegretol, Topamax and Depakote  4.  Tegretol and Depakote levels        The evaluation of the patient, and recommended management, was discussed with the resident staff.       This chart was partially generated using voice recognition technology and sound alike word replacement may be present, best efforts were made to make the chart accurate.    Latrell Thomas MD  Board Certified Neurology, ABPN  (t) [phone]

## 2022-09-04 NOTE — ED PROVIDER NOTES
"Chief Complaint:   Seizure    HPI:  Yamila Mendoza is a very pleasant 41-year-old female with past medical history of seizures with a recent change in her antiepileptic medication who presents with 7 seizures back-to-back without a return to baseline.  The patient was GCS 3 and comatose upon arrival.  Patient had received 5 mg IM midazolam with EMS without any improvement.  Patient was hypoxic.  Patient's  reports that the patient had significant tonic-clonic seizures.  Patient reportedly was abnormal since the seizures started this evening.    Review of Systems:  Review of Systems   Unable to perform ROS: Patient unresponsive     Family History:  No family history on file.    Past Medical History:   has a past medical history of ASTHMA, Bipolar disorder (HCC), Chronic back pain, Psychiatric disorder, Psychiatric disorder, and Seizure disorder (HCC).    Social History:  Social History     Tobacco Use    Smoking status: Never    Smokeless tobacco: Never   Vaping Use    Vaping Use: Never used   Substance and Sexual Activity    Alcohol use: No    Drug use: No    Sexual activity: Not on file       Surgical History:   has a past surgical history that includes other neurological surg; other orthopedic surgery; other abdominal surgery; ercp in or (9/19/2009); and joy by laparoscopy (9/22/2009).    Allergies:  Allergies   Allergen Reactions    Bactrim Unspecified     \"I get the shakes\"     Iron Unspecified     \"see red dots\" vision, not rash     Penicillins Unspecified     \"makes me dizzy with my medicine\"     Phenobarbital Unspecified     \"makes me dizzy\"     Lemon Oil Itching     Skin itching per pt report to Dietary.    Onion Itching and Swelling     Throat swelling and itching per pt report to Dietary.    Pe[employer] Allergy Itching and Swelling     Throat swelling and itching per pt report to Dietary.       Physical Exam:  Vital Signs: BP (!) 92/53   Pulse 74   Temp 36.7 °C (98.1 °F) (Temporal)   " Resp (!) 26   Wt 90.7 kg (200 lb)   SpO2 100%   BMI 32.28 kg/m²   Physical Exam  Vitals and nursing note reviewed.   Constitutional:       Comments: GCS 3, unresponsive   HENT:      Head: Normocephalic and atraumatic.   Eyes:      Conjunctiva/sclera: Conjunctivae normal.      Pupils: Pupils are equal, round, and reactive to light.   Cardiovascular:      Rate and Rhythm: Normal rate.      Pulses: Normal pulses.   Pulmonary:      Comments: Coarse breath sounds throughout all lung fields  Musculoskeletal:         General: No swelling or deformity.      Cervical back: Neck supple. No rigidity.   Skin:     General: Skin is warm and dry.   Neurological:      Comments: GCS 3, no response to painful stimuli       Medical records reviewed for continuity of care.     Results for orders placed or performed during the hospital encounter of 09/03/22   CBC WITH DIFFERENTIAL   Result Value Ref Range    WBC 6.8 4.8 - 10.8 K/uL    RBC 3.63 (L) 4.20 - 5.40 M/uL    Hemoglobin 11.5 (L) 12.0 - 16.0 g/dL    Hematocrit 35.6 (L) 37.0 - 47.0 %    MCV 98.1 (H) 81.4 - 97.8 fL    MCH 31.7 27.0 - 33.0 pg    MCHC 32.3 (L) 33.6 - 35.0 g/dL    RDW 45.1 35.9 - 50.0 fL    Platelet Count 209 164 - 446 K/uL    MPV 10.6 9.0 - 12.9 fL    Neutrophils-Polys 51.20 44.00 - 72.00 %    Lymphocytes 39.80 22.00 - 41.00 %    Monocytes 7.10 0.00 - 13.40 %    Eosinophils 0.60 0.00 - 6.90 %    Basophils 0.40 0.00 - 1.80 %    Immature Granulocytes 0.90 0.00 - 0.90 %    Nucleated RBC 0.00 /100 WBC    Neutrophils (Absolute) 3.48 2.00 - 7.15 K/uL    Lymphs (Absolute) 2.70 1.00 - 4.80 K/uL    Monos (Absolute) 0.48 0.00 - 0.85 K/uL    Eos (Absolute) 0.04 0.00 - 0.51 K/uL    Baso (Absolute) 0.03 0.00 - 0.12 K/uL    Immature Granulocytes (abs) 0.06 0.00 - 0.11 K/uL    NRBC (Absolute) 0.00 K/uL   COMP METABOLIC PANEL   Result Value Ref Range    Sodium 132 (L) 135 - 145 mmol/L    Potassium 3.7 3.6 - 5.5 mmol/L    Chloride 98 96 - 112 mmol/L    Co2 18 (L) 20 - 33 mmol/L     Anion Gap 16.0 7.0 - 16.0    Glucose 172 (H) 65 - 99 mg/dL    Bun 10 8 - 22 mg/dL    Creatinine 0.63 0.50 - 1.40 mg/dL    Calcium 8.2 (L) 8.5 - 10.5 mg/dL    AST(SGOT) 32 12 - 45 U/L    ALT(SGPT) 11 2 - 50 U/L    Alkaline Phosphatase 68 30 - 99 U/L    Total Bilirubin <0.2 0.1 - 1.5 mg/dL    Albumin 3.7 3.2 - 4.9 g/dL    Total Protein 5.8 (L) 6.0 - 8.2 g/dL    Globulin 2.1 1.9 - 3.5 g/dL    A-G Ratio 1.8 g/dL   HCG QUAL SERUM   Result Value Ref Range    Beta-Hcg Qualitative Serum Negative Negative   DIAGNOSTIC ALCOHOL   Result Value Ref Range    Diagnostic Alcohol <10.1 <10.1 mg/dL   URINE DRUG SCREEN (TRIAGE)   Result Value Ref Range    Amphetamines Urine Negative Negative    Barbiturates Negative Negative    Benzodiazepines Positive (A) Negative    Cocaine Metabolite Negative Negative    Methadone Negative Negative    Opiates Negative Negative    Oxycodone Negative Negative    Phencyclidine -Pcp Negative Negative    Propoxyphene Negative Negative    Cannabinoid Metab Negative Negative   ESTIMATED GFR   Result Value Ref Range    GFR (CKD-EPI) 112 >60 mL/min/1.73 m 2   POCT arterial blood gas device results   Result Value Ref Range    Ph 7.255 (LL) 7.400 - 7.500    Pco2 53.2 (HH) 26.0 - 37.0 mmHg    Po2 144 (H) 64 - 87 mmHg    Tco2 25 20 - 33 mmol/L    S02 99 93 - 99 %    Hco3 23.6 17.0 - 25.0 mmol/L    BE -4 -4 - 3 mmol/L    Body Temp 97.0 F degrees    O2 Therapy 80 %    iPF Ratio 180     Ph Temp Danyell 7.267 (LL) 7.400 - 7.500    Pco2 Temp Co 51.1 (HH) 26.0 - 37.0 mmHg    Po2 Temp Cor 139 (H) 64 - 87 mmHg    Specimen Arterial     DelSys Vent     Tidal Volume 400 mL    Peep End Expiratory Pressure 8 cmh20    Set Rate 20     Mode APV-CMV        Radiology:  CT-HEAD W/O   Final Result      1.  No acute intracranial hemorrhage.   2.  Mild atrophy again seen.         DX-CHEST-PORTABLE (1 VIEW)   Final Result      1.  There is linear bibasilar opacity possibly due to atelectasis or edema.   2.  ET tube projects within 1 cm  above the alise.   3.  NG tube extends into the upper abdomen.      4.  Findings were discussed with OMAR MCKOY on 9/3/2022 ta11:24 PM.      DX-ABDOMEN FOR TUBE PLACEMENT   Final Result      1.  NG tube tip projects over the distal stomach.      DX-CHEST-PORTABLE (1 VIEW)    (Results Pending)        MDM:  Since the patient had not returned to baseline when she initially presented we proceeded with providing a 0.2 mg per kilogram dose of midazolam for status epilepticus.  Patient received 20 mg total, 15 mg iv from our facility and 5 from EMS.  Patient did not have return to baseline following this medication administration.  Patient's breathing improved slightly.  At that time was decided to proceed with endotracheal intubation.  Endotracheal intubation was carried out without complication.  IV fluids were started.  Patient has mild anemia on lab work.  CMP demonstrates low bicarb potentially secondary to dehydration.  Chest x-ray demonstrates atelectasis versus edema.  CT brain demonstrates no evidence of acute intracranial abnormality.  Patient was intubated without complication. Dr Thomas of neurology recommends continuous propofol infusion at 40 an hour, continuous EEG, Tegretol and Depakote levels.  These have been ordered.  Dr. Castelan of ICU has been consulted for admission and has graciously accepted the patient to his service.    This dictation has been created using voice recognition software. I am continuously working with the software to minimize the number of voice recognition errors and I have made every attempt to manually correct the errors within my dictation. However errors  related to this voice recognition software may still exist and should be interpreted within the appropriate context.     Electronically signed by: Omar Mckoy M.D., 9/4/2022 1:14 AM    Critical Care    I spent 41 minutes of critical care time with this patient. This does not include time spent on separately  reported billable procedures.   This includes pulse ox interpretation, medical record review when available, interpretation of labs and imaging, specialist consultation, and hemodynamic monitoring.    Intubation    Date/Time: 9/4/2022 1:14 AM  Performed by: Rizwan Mckoy M.D.  Authorized by: Rizwan Mckoy M.D.     Consent:     Consent obtained:  Emergent situation  Pre-procedure details:     Patient status:  Unresponsive    Mallampati score:  II    Paralytics:  Rocuronium  Procedure details:     Preoxygenation:  Bag valve mask    CPR in progress: no      Intubation method:  Oral    Laryngoscope blade:  Mac 3    Tube size (mm): 7.5.    Tube type:  Cuffed    Number of attempts:  1    Tube visualized through cords: yes    Placement assessment:     ETT at teeth/gumline (cm):  24    Tube secured with:  ETT canales    Breath sounds:  Equal and absent over the epigastrium    Placement verification: chest rise, CXR verification, direct visualization and equal breath sounds      CXR findings:  ETT in proper place  Post-procedure details:     Procedure completion:  Tolerated well, no immediate complications          Disposition:  ICU    Final Impression:  1. Status epilepticus (HCC)    2. Tony coma scale total score 3-8, in the field (EMT or ambulance) (HCC)    3. Hypoxia        Electronically signed by: Rizwan Mckoy M.D., 9/4/2022 1:15 AM

## 2022-09-04 NOTE — HOSPITAL COURSE
41-year-old female with past medical history of bipolar disorder who presented with seizure-like activity.  She required intubation and she was unable to protect her airways after receiving Versed.  Neurology was consulted and patient underwent EEG which did not show any seizure-like activity.  She has been continued on her home medications of Depakote and Tegretol.  She was weaned off of propofol and extubated on 9/4 morning.

## 2022-09-04 NOTE — PROGRESS NOTES
Critical Care Medicine Faculty Progress Note    Brief HPI/problem list:  41y F with hx of seizure d/o however all EEG with no seizure captured, developmental delay, bipolar, URIEL, asthma, obesity, chronic pain that gets majority of her care through the ER with frequent joint pain and fall almost 2/month. That presented with multiple seizure and multiple dose of versed requiring intubation and admission to ICU.     Daily exam:patient extubated this am, she awake follows commands in all ext, speech mild hoarse with recent extubation, EEG leads on, lungs cta, heart rrr, abdomen soft, ext with edema, pale skin warm dry intact.     Daily Multi discipline Rounding Report:  Neuro: does follow commands all ext, AOX3, pupils reactive and equal  HR: 50-60's  SBP: 's  Tmax: afebrile  GI: NPO, BM 9/3  UOP: adequate  Lines: peripheral, molina  Resp: VD 2  360 22 8 30% -> extubated to room air  Vte: lovenox vte  PPI/H2:pepcid -> d/c  Antibx: none    Plan of care:  Wean propofol and extubate patient -> room air  Get medication list  Change medication to oral after bedside swallow eval and start regular diet  Mag replace    If stable mid day can be transferred out of ICU    Patient remains in critical condition from titration off propofol and extubation. Critical care time provided was 37 minutes. This excludes all separate billable procedures.     Please see [facility]/NP notes for additional documentation    Talon Sol MD  Critical Care Medicine

## 2022-09-04 NOTE — CARE PLAN
The patient is Stable - Low risk of patient condition declining or worsening    Shift Goals  Clinical Goals: Extubate. Off EEG  Patient Goals: Improved mobility  Family Goals: ALEJANDRO    Progress made toward(s) clinical / shift goals:  .    Patient is not progressing towards the following goals:

## 2022-09-04 NOTE — PROGRESS NOTES
STAT CEEG update    On propofol, diffuse mixed activities with mild attenuation and loss of fat frequency on the right hemisphere which suggests focal structural lesion or postictal finding.     No epileptiform discharge or seizure seen.     Neurology consult team is updated.    Juan F Abad MD

## 2022-09-05 ENCOUNTER — APPOINTMENT (OUTPATIENT)
Dept: RADIOLOGY | Facility: MEDICAL CENTER | Age: 42
DRG: 101 | End: 2022-09-05
Attending: INTERNAL MEDICINE
Payer: COMMERCIAL

## 2022-09-05 LAB
ALBUMIN SERPL BCP-MCNC: 2.8 G/DL (ref 3.2–4.9)
ALBUMIN/GLOB SERPL: 1.5 G/DL
ALP SERPL-CCNC: 37 U/L (ref 30–99)
ALT SERPL-CCNC: 11 U/L (ref 2–50)
ANION GAP SERPL CALC-SCNC: 8 MMOL/L (ref 7–16)
AST SERPL-CCNC: 22 U/L (ref 12–45)
BASOPHILS # BLD AUTO: 0.4 % (ref 0–1.8)
BASOPHILS # BLD: 0.02 K/UL (ref 0–0.12)
BILIRUB SERPL-MCNC: <0.2 MG/DL (ref 0.1–1.5)
BUN SERPL-MCNC: 9 MG/DL (ref 8–22)
CALCIUM SERPL-MCNC: 8.2 MG/DL (ref 8.5–10.5)
CHLORIDE SERPL-SCNC: 105 MMOL/L (ref 96–112)
CO2 SERPL-SCNC: 22 MMOL/L (ref 20–33)
CREAT SERPL-MCNC: 0.4 MG/DL (ref 0.5–1.4)
EOSINOPHIL # BLD AUTO: 0.05 K/UL (ref 0–0.51)
EOSINOPHIL NFR BLD: 0.9 % (ref 0–6.9)
ERYTHROCYTE [DISTWIDTH] IN BLOOD BY AUTOMATED COUNT: 45.3 FL (ref 35.9–50)
GFR SERPLBLD CREATININE-BSD FMLA CKD-EPI: 125 ML/MIN/1.73 M 2
GLOBULIN SER CALC-MCNC: 1.9 G/DL (ref 1.9–3.5)
GLUCOSE SERPL-MCNC: 84 MG/DL (ref 65–99)
HCT VFR BLD AUTO: 30.8 % (ref 37–47)
HGB BLD-MCNC: 10.1 G/DL (ref 12–16)
IMM GRANULOCYTES # BLD AUTO: 0.03 K/UL (ref 0–0.11)
IMM GRANULOCYTES NFR BLD AUTO: 0.6 % (ref 0–0.9)
LYMPHOCYTES # BLD AUTO: 2.16 K/UL (ref 1–4.8)
LYMPHOCYTES NFR BLD: 40.7 % (ref 22–41)
MAGNESIUM SERPL-MCNC: 1.7 MG/DL (ref 1.5–2.5)
MCH RBC QN AUTO: 31.3 PG (ref 27–33)
MCHC RBC AUTO-ENTMCNC: 32.8 G/DL (ref 33.6–35)
MCV RBC AUTO: 95.4 FL (ref 81.4–97.8)
MONOCYTES # BLD AUTO: 0.52 K/UL (ref 0–0.85)
MONOCYTES NFR BLD AUTO: 9.8 % (ref 0–13.4)
NEUTROPHILS # BLD AUTO: 2.53 K/UL (ref 2–7.15)
NEUTROPHILS NFR BLD: 47.6 % (ref 44–72)
NRBC # BLD AUTO: 0 K/UL
NRBC BLD-RTO: 0 /100 WBC
PHOSPHATE SERPL-MCNC: 3.5 MG/DL (ref 2.5–4.5)
PLATELET # BLD AUTO: 131 K/UL (ref 164–446)
PMV BLD AUTO: 10.7 FL (ref 9–12.9)
POTASSIUM SERPL-SCNC: 3.9 MMOL/L (ref 3.6–5.5)
PROT SERPL-MCNC: 4.7 G/DL (ref 6–8.2)
RBC # BLD AUTO: 3.23 M/UL (ref 4.2–5.4)
SODIUM SERPL-SCNC: 135 MMOL/L (ref 135–145)
WBC # BLD AUTO: 5.3 K/UL (ref 4.8–10.8)

## 2022-09-05 PROCEDURE — 99232 SBSQ HOSP IP/OBS MODERATE 35: CPT | Performed by: PSYCHIATRY & NEUROLOGY

## 2022-09-05 PROCEDURE — 85025 COMPLETE CBC W/AUTO DIFF WBC: CPT

## 2022-09-05 PROCEDURE — 71045 X-RAY EXAM CHEST 1 VIEW: CPT

## 2022-09-05 PROCEDURE — 97165 OT EVAL LOW COMPLEX 30 MIN: CPT

## 2022-09-05 PROCEDURE — 700101 HCHG RX REV CODE 250: Performed by: HOSPITALIST

## 2022-09-05 PROCEDURE — 700102 HCHG RX REV CODE 250 W/ 637 OVERRIDE(OP): Performed by: HOSPITALIST

## 2022-09-05 PROCEDURE — 95720 EEG PHY/QHP EA INCR W/VEEG: CPT | Performed by: STUDENT IN AN ORGANIZED HEALTH CARE EDUCATION/TRAINING PROGRAM

## 2022-09-05 PROCEDURE — 770001 HCHG ROOM/CARE - MED/SURG/GYN PRIV*

## 2022-09-05 PROCEDURE — 700111 HCHG RX REV CODE 636 W/ 250 OVERRIDE (IP): Performed by: INTERNAL MEDICINE

## 2022-09-05 PROCEDURE — A9270 NON-COVERED ITEM OR SERVICE: HCPCS | Performed by: HOSPITALIST

## 2022-09-05 PROCEDURE — 84100 ASSAY OF PHOSPHORUS: CPT

## 2022-09-05 PROCEDURE — 83735 ASSAY OF MAGNESIUM: CPT

## 2022-09-05 PROCEDURE — 99232 SBSQ HOSP IP/OBS MODERATE 35: CPT | Performed by: STUDENT IN AN ORGANIZED HEALTH CARE EDUCATION/TRAINING PROGRAM

## 2022-09-05 PROCEDURE — 80053 COMPREHEN METABOLIC PANEL: CPT

## 2022-09-05 PROCEDURE — 95714 VEEG EA 12-26 HR UNMNTR: CPT | Performed by: STUDENT IN AN ORGANIZED HEALTH CARE EDUCATION/TRAINING PROGRAM

## 2022-09-05 PROCEDURE — 97535 SELF CARE MNGMENT TRAINING: CPT

## 2022-09-05 RX ADMIN — DIVALPROEX SODIUM 500 MG: 125 CAPSULE, COATED PELLETS ORAL at 17:29

## 2022-09-05 RX ADMIN — TOPIRAMATE 100 MG: 100 TABLET, FILM COATED ORAL at 17:30

## 2022-09-05 RX ADMIN — TOPIRAMATE 100 MG: 100 TABLET, FILM COATED ORAL at 05:27

## 2022-09-05 RX ADMIN — CARBAMAZEPINE 200 MG: 200 TABLET ORAL at 17:30

## 2022-09-05 RX ADMIN — DIVALPROEX SODIUM 500 MG: 125 CAPSULE, COATED PELLETS ORAL at 05:27

## 2022-09-05 RX ADMIN — ENOXAPARIN SODIUM 40 MG: 40 INJECTION SUBCUTANEOUS at 05:27

## 2022-09-05 RX ADMIN — CARBAMAZEPINE 200 MG: 200 TABLET ORAL at 05:27

## 2022-09-05 RX ADMIN — ENOXAPARIN SODIUM 40 MG: 40 INJECTION SUBCUTANEOUS at 17:30

## 2022-09-05 RX ADMIN — CARBAMAZEPINE 200 MG: 200 TABLET ORAL at 11:50

## 2022-09-05 RX ADMIN — DOCUSATE SODIUM 50 MG AND SENNOSIDES 8.6 MG 2 TABLET: 8.6; 5 TABLET, FILM COATED ORAL at 05:27

## 2022-09-05 ASSESSMENT — PAIN DESCRIPTION - PAIN TYPE
TYPE: ACUTE PAIN
TYPE: ACUTE PAIN

## 2022-09-05 ASSESSMENT — ENCOUNTER SYMPTOMS
MYALGIAS: 0
HEADACHES: 1
WHEEZING: 0
CHILLS: 0
EYE PAIN: 0
DEPRESSION: 0
VOMITING: 0
FEVER: 0
SHORTNESS OF BREATH: 0
ABDOMINAL PAIN: 0
NAUSEA: 0

## 2022-09-05 ASSESSMENT — COGNITIVE AND FUNCTIONAL STATUS - GENERAL
SUGGESTED CMS G CODE MODIFIER MOBILITY: CK
WALKING IN HOSPITAL ROOM: A LITTLE
DAILY ACTIVITIY SCORE: 15
DRESSING REGULAR UPPER BODY CLOTHING: A LITTLE
TURNING FROM BACK TO SIDE WHILE IN FLAT BAD: A LITTLE
SUGGESTED CMS G CODE MODIFIER DAILY ACTIVITY: CK
DRESSING REGULAR LOWER BODY CLOTHING: A LITTLE
MOVING TO AND FROM BED TO CHAIR: A LOT
PERSONAL GROOMING: A LITTLE
DRESSING REGULAR LOWER BODY CLOTHING: A LOT
SUGGESTED CMS G CODE MODIFIER DAILY ACTIVITY: CJ
HELP NEEDED FOR BATHING: A LITTLE
EATING MEALS: A LITTLE
CLIMB 3 TO 5 STEPS WITH RAILING: A LOT
MOBILITY SCORE: 16
DAILY ACTIVITIY SCORE: 22
STANDING UP FROM CHAIR USING ARMS: A LITTLE
TOILETING: A LOT
HELP NEEDED FOR BATHING: A LOT
MOVING FROM LYING ON BACK TO SITTING ON SIDE OF FLAT BED: A LITTLE

## 2022-09-05 ASSESSMENT — ACTIVITIES OF DAILY LIVING (ADL): TOILETING: INDEPENDENT

## 2022-09-05 NOTE — CARE PLAN
The patient is Stable - Low risk of patient condition declining or worsening    Shift Goals  Clinical Goals: Continuous EEG, Monitor for seizures  Patient Goals: rest  Family Goals: ALEJANDRO    Progress made toward(s) clinical / shift goals:    Problem: Skin Integrity  Goal: Skin integrity is maintained or improved  Outcome: Progressing     Problem: Fall Risk  Goal: Patient will remain free from falls  Outcome: Progressing     Problem: Pain - Standard  Goal: Alleviation of pain or a reduction in pain to the patient’s comfort goal  Outcome: Progressing       Patient is not progressing towards the following goals:

## 2022-09-05 NOTE — PROGRESS NOTES
Pt. Is neurologically intact. On continuous EEG. Hemodynamically stable. Pt. Extubated this morning and on room air with O2 sats >95%. Pt. Passed her bedside swallow eval and on a regular diet.  Tolerating PO well. Pt. States her hands and feet are slightly numb.  Pt. Has strong strength to BUE and BLE. No deficits noted. Pt. With transfer orders. Bed pending. Pt. Stable at this time and will continue to monitor.

## 2022-09-05 NOTE — PROGRESS NOTES
"Neurology Progress Note  Neurohospitalist Service, [facility]    Referring Physician: Charlene Ji M.D.      Interval History: No acute events overnight. cEEG without seizures.    Review of systems: In addition to what is detailed in the HPI and/or updated in the interval history, all other systems reviewed and are negative.    Past Medical History, Past Surgical History and Social History reviewed and unchanged from prior    Medications:    Current Facility-Administered Medications:     enoxaparin (Lovenox) inj 40 mg, 40 mg, Subcutaneous, BID, Talon Sol M.D., 40 mg at 09/05/22 0527    topiramate (TOPAMAX) tablet 100 mg, 100 mg, Oral, Q12HRS, Rambo Gauthier M.D., 100 mg at 09/05/22 0527    carBAMazepine (TEGRETOL) tablet 200 mg, 200 mg, Oral, TID, Rambo Gauthier M.D., 200 mg at 09/05/22 0527    divalproex (DEPAKOTE SPRINKLE) capsule 500 mg, 500 mg, Oral, Q12HRS, Rambo Gauthier M.D., 500 mg at 09/05/22 0527    senna-docusate (PERICOLACE or SENOKOT S) 8.6-50 MG per tablet 2 Tablet, 2 Tablet, Oral, BID, 2 Tablet at 09/05/22 0527 **AND** polyethylene glycol/lytes (MIRALAX) PACKET 1 Packet, 1 Packet, Oral, QDAY PRN **AND** magnesium hydroxide (MILK OF MAGNESIA) suspension 30 mL, 30 mL, Oral, QDAY PRN **AND** bisacodyl (DULCOLAX) suppository 10 mg, 10 mg, Rectal, QDAY PRN, Rambo Gauthier M.D.    Respiratory Therapy Consult, , Nebulization, Continuous RT, Reno Bhagat M.D.    MD Alert...ICU Electrolyte Replacement per Pharmacy, , Other, PHARMACY TO DOSE, Reno Bhagat M.D.    lactated ringers infusion, , Intravenous, Continuous, Reno Bhagat M.D., Last Rate: 75 mL/hr at 09/04/22 1600, Rate Change at 09/04/22 1600    Physical Examination:   /67   Pulse 72   Temp 36.4 °C (97.5 °F) (Temporal)   Resp 20   Ht 1.651 m (5' 5\")   Wt 115 kg (254 lb 3.1 oz)   SpO2 96%   BMI 42.30 kg/m²       General: Patient is asleep, easily arousable  Neck: There is normal range of motion  CV: " Regular rate   Extremities:  Warm, dry, and intact, without peripheral lower extremity edema    NEUROLOGICAL EXAM:     Mental status: Asleep, arousable to voice  Speech and language: Speech is clear and fluent. The patient is able to name and repeat.  Cranial nerve exam: Visual fields are full. There is no nystagmus. Extraocular muscles are intact. Face is symmetric.   Motor exam: There is sustained antigravity with no downward drift in bilateral arms and legs.  There is no pronator drift .  Tone is normal. No abnormal movements were seen on exam.  Sensory exam:  Reacts to tactile in all 4 extremities, there is no neglect to double stim  Coordination: No ataxia on bilateral FTN testing      Objective Data:    Labs:  Lab Results   Component Value Date/Time    PROTHROMBTM 13.7 10/23/2020 02:45 AM    INR 1.02 10/23/2020 02:45 AM      Lab Results   Component Value Date/Time    WBC 5.3 09/05/2022 04:25 AM    RBC 3.23 (L) 09/05/2022 04:25 AM    HEMOGLOBIN 10.1 (L) 09/05/2022 04:25 AM    HEMATOCRIT 30.8 (L) 09/05/2022 04:25 AM    MCV 95.4 09/05/2022 04:25 AM    MCH 31.3 09/05/2022 04:25 AM    MCHC 32.8 (L) 09/05/2022 04:25 AM    MPV 10.7 09/05/2022 04:25 AM    NEUTSPOLYS 47.60 09/05/2022 04:25 AM    LYMPHOCYTES 40.70 09/05/2022 04:25 AM    MONOCYTES 9.80 09/05/2022 04:25 AM    EOSINOPHILS 0.90 09/05/2022 04:25 AM    BASOPHILS 0.40 09/05/2022 04:25 AM    HYPOCHROMIA 1+ 02/11/2012 12:04 AM    ANISOCYTOSIS 1+ 01/14/2019 09:20 PM      Lab Results   Component Value Date/Time    SODIUM 135 09/05/2022 04:25 AM    POTASSIUM 3.9 09/05/2022 04:25 AM    CHLORIDE 105 09/05/2022 04:25 AM    CO2 22 09/05/2022 04:25 AM    GLUCOSE 84 09/05/2022 04:25 AM    BUN 9 09/05/2022 04:25 AM    CREATININE 0.40 (L) 09/05/2022 04:25 AM    CREATININE 0.6 05/15/2009 01:00 PM      Lab Results   Component Value Date/Time    CHOLSTRLTOT 173 07/07/2015 12:03 AM    LDL 86 07/07/2015 12:03 AM    HDL 55 07/07/2015 12:03 AM    TRIGLYCERIDE 158 (H) 07/07/2015  12:03 AM       Lab Results   Component Value Date/Time    ALKPHOSPHAT 37 09/05/2022 04:25 AM    ASTSGOT 22 09/05/2022 04:25 AM    ALTSGPT 11 09/05/2022 04:25 AM    TBILIRUBIN <0.2 09/05/2022 04:25 AM        Imaging/Testing:    I interpreted and/or reviewed the patient's neuroimaging    DX-CHEST-PORTABLE (1 VIEW)   Final Result         Interval extubation and removal of gastric drainage tube. Resolution of left basilar atelectasis.      DX-CHEST-PORTABLE (1 VIEW)   Final Result      1.  Lines and tubes appear appropriately located      2.  Left basilar atelectasis      CT-HEAD W/O   Final Result      1.  No acute intracranial hemorrhage.   2.  Mild atrophy again seen.         DX-CHEST-PORTABLE (1 VIEW)   Final Result      1.  There is linear bibasilar opacity possibly due to atelectasis or edema.   2.  ET tube projects within 1 cm above the alise.   3.  NG tube extends into the upper abdomen.      4.  Findings were discussed with OMAR FERREIRA on 9/3/2022 ta11:24 PM.      DX-ABDOMEN FOR TUBE PLACEMENT   Final Result      1.  NG tube tip projects over the distal stomach.          Assessment and Plan:  Yamila Mendoza is a 41 year old woman with epilepsy presenting with seizures.  She is now seizure-free on enteral anti-epileptic therapy.  Will continue current therapy, outpatient follow up with her Neurologist.  Head CT was a normal study.  No clinical history to suggest alternative etiology for her seizures such as an intracranial infection    Problem list:   Epilepsy disorder  Breakthrough seizures    Plan:   - q4h neurochecks ok   - may discontinue cEEG monitoring   - continue current antiepileptic regimen:    Depakote 500mg q12h    Topiramate 100mg q12h    Tegretol 200mg q8h   - PT/OT/SLP   - outpatient Neurology follow up with her Epilepsy doctor   - please reconsult Neurology with any additional questions or concerns.      The evaluation of the patient, and recommended management, was discussed with  the attending hospitalist. I have performed a physical exam and reviewed and updated ROS and Plan today (9/5/2022).     Latrell Jaimes MD  Neurohospitalist, Acute Care Services

## 2022-09-05 NOTE — PROGRESS NOTES
Hospital Medicine Daily Progress Note    Date of Service  9/5/2022    Chief Complaint  Yamila Mendoza is a 41 y.o. female admitted 9/3/2022 with seizure like activity    Hospital Course  41-year-old female with past medical history of bipolar disorder who presented with seizure-like activity.  She required intubation and she was unable to protect her airways after receiving Versed.  Neurology was consulted and patient underwent EEG which did not show any seizure-like activity.  She has been continued on her home medications of Depakote and Tegretol.  She was weaned off of propofol and extubated on 9/4 morning.  Patient was transferred out from ICU 9/4/2020    Interval Problem Update  Patient reported generalized weakness, some headache    No active seizure on cEEG  Neurology following, continue current seizure meds    PT OT eval    I have discussed this patient's plan of care and discharge plan at IDT rounds today with Case Management, Nursing, Nursing leadership, and other members of the IDT team.    Consultants/Specialty  critical care and neurology    Code Status  Full Code    Disposition  Patient is not medically cleared for discharge.   Anticipate discharge to to home with close outpatient follow-up.  I have placed the appropriate orders for post-discharge needs.    Review of Systems  Review of Systems   Constitutional:  Positive for malaise/fatigue. Negative for chills and fever.   HENT:  Negative for ear pain.    Eyes:  Negative for pain.   Respiratory:  Negative for shortness of breath and wheezing.    Cardiovascular:  Negative for chest pain and leg swelling.   Gastrointestinal:  Negative for abdominal pain, nausea and vomiting.   Genitourinary:  Negative for dysuria, frequency and urgency.   Musculoskeletal:  Negative for myalgias.   Neurological:  Positive for headaches.   Psychiatric/Behavioral:  Negative for depression.       Physical Exam  Temp:  [36.4 °C (97.5 °F)-37.2 °C (99 °F)] 36.5 °C (97.7  °F)  Pulse:  [66-90] 73  Resp:  [17-62] 18  BP: ()/(51-86) 127/86  SpO2:  [92 %-100 %] 99 %    Physical Exam  Constitutional:       Appearance: She is obese. She is ill-appearing.   HENT:      Head: Normocephalic.      Nose: Nose normal.   Eyes:      Extraocular Movements: Extraocular movements intact.      Conjunctiva/sclera: Conjunctivae normal.   Cardiovascular:      Rate and Rhythm: Normal rate and regular rhythm.      Pulses: Normal pulses.      Heart sounds: Normal heart sounds.   Pulmonary:      Effort: Pulmonary effort is normal.      Breath sounds: No wheezing or rales.   Abdominal:      General: Bowel sounds are normal.      Palpations: Abdomen is soft.      Tenderness: There is no abdominal tenderness. There is no guarding.   Musculoskeletal:         General: No swelling or tenderness. Normal range of motion.      Cervical back: Normal range of motion.   Skin:     General: Skin is warm.   Neurological:      Mental Status: She is alert and oriented to person, place, and time. Mental status is at baseline.   Psychiatric:         Mood and Affect: Mood normal.       Fluids    Intake/Output Summary (Last 24 hours) at 9/5/2022 1300  Last data filed at 9/4/2022 2000  Gross per 24 hour   Intake 250 ml   Output 335 ml   Net -85 ml       Laboratory  Recent Labs     09/03/22 2200 09/04/22 0223 09/05/22 0425   WBC 6.8 6.5 5.3   RBC 3.63* 3.20* 3.23*   HEMOGLOBIN 11.5* 10.4* 10.1*   HEMATOCRIT 35.6* 30.3* 30.8*   MCV 98.1* 94.7 95.4   MCH 31.7 32.5 31.3   MCHC 32.3* 34.3 32.8*   RDW 45.1 43.5 45.3   PLATELETCT 209 118* 131*   MPV 10.6 11.2 10.7     Recent Labs     09/03/22 2200 09/04/22 0223 09/05/22 0425   SODIUM 132* 133* 135   POTASSIUM 3.7 4.2 3.9   CHLORIDE 98 101 105   CO2 18* 23 22   GLUCOSE 172* 95 84   BUN 10 10 9   CREATININE 0.63 0.56 0.40*   CALCIUM 8.2* 8.3* 8.2*                   Imaging  DX-CHEST-PORTABLE (1 VIEW)   Final Result         Interval extubation and removal of gastric drainage  tube. Resolution of left basilar atelectasis.      DX-CHEST-PORTABLE (1 VIEW)   Final Result      1.  Lines and tubes appear appropriately located      2.  Left basilar atelectasis      CT-HEAD W/O   Final Result      1.  No acute intracranial hemorrhage.   2.  Mild atrophy again seen.         DX-CHEST-PORTABLE (1 VIEW)   Final Result      1.  There is linear bibasilar opacity possibly due to atelectasis or edema.   2.  ET tube projects within 1 cm above the alise.   3.  NG tube extends into the upper abdomen.      4.  Findings were discussed with OMAR FERREIRA on 9/3/2022 ta11:24 PM.      DX-ABDOMEN FOR TUBE PLACEMENT   Final Result      1.  NG tube tip projects over the distal stomach.           Assessment/Plan  * Seizures (HCC)- (present on admission)  Assessment & Plan  vEEG with no seziure activity  Neuro following  Cont depakote, tegretol, and topamax    Acute respiratory failure (HCC)  Assessment & Plan  D/t versed  Extubated 9/4  Wean oxygen as tolerated  Maintain sats >88%    Resolved, now on room air       VTE prophylaxis: enoxaparin ppx    I have performed a physical exam and reviewed and updated ROS and Plan today (9/5/2022). In review of yesterday's note (9/4/2022), there are no changes except as documented above.

## 2022-09-05 NOTE — CARE PLAN
The patient is Stable - Low risk of patient condition declining or worsening    Shift Goals  Clinical Goals: monitoring seizure, free from injuries  Patient Goals: comfort  Family Goals: francoise    Progress made toward(s) clinical / shift goals:    Problem: Fall Risk  Goal: Patient will remain free from falls  Outcome: Progressing   Assisted pt up to BSC, up to bathroom, tolerated well. Worked with OT today. Free from falls    Problem: Pain - Standard  Goal: Alleviation of pain or a reduction in pain to the patient’s comfort goal  Outcome: Progressing   Declined pain      Patient is not progressing towards the following goals:

## 2022-09-05 NOTE — THERAPY
Occupational Therapy   Initial Evaluation     Patient Name: Yamila Mendoza  Age:  41 y.o., Sex:  female  Medical Record #: [id number]  Today's Date: 9/5/2022     Precautions  Precautions: Fall Risk  Comments: use of FWW for evaluation    Assessment  Patient is 41 y.o. female admitted with seizure like activity on 9/3/22. Pt arrived at a [facility] 3 and required intubation after being admitted. Pt has PMH of seizure disorder, developmental delay, and frequent falls. Pt's  states she had multiple tonic clonic seizures at home. Pt is cooperative and pleasnt for OT evaluation on this date. Pt reports she normally uses a 4WW for functional mobility. Pt presents with supervision for grooming in standing at the edge of sink with the use of a FWW instead of 4WW as well as supervision for toileting. Pt needs Min A for lower body dressing at this time. Pt exhibits poor FWW safety throughout evaluation as well as lacking attention to FWW use. Pt needing verbal and tactile cues to maintain safety with FWW use. Pt reports she is independent with ADL and IADL prior to admit to hospital. Pt would benefit from further skilled acute OT services while admitted for further safety training as well as increased activity tolerance during standing ADL and functional ADL transfers into shower prior to discharge. Recommending discharge home at this time with outpatient occupational therapy services, pending pt progress throughout her admit to the hospital.    Plan    Recommend Occupational Therapy 2 times per week until therapy goals are met for the following treatments:  Adaptive Equipment, Manual Therapy Techniques, Neuro Re-Education / Balance, Self Care/Activities of Daily Living, Therapeutic Activities, and Therapeutic Exercises.    DC Equipment Recommendations: Grab Bar(s) by Toilet, Grab Bar(s) in Tub / Shower, Tub Transfer Bench, Tub / Shower Seat  Discharge Recommendations: Other - (Recommending home with outpatient occupational  "therapy services at this time, pending pt progress throughout her admit to hospital.)      Objective       09/05/22 1325   Initial Contact Note    Initial Contact Note Order Received and Verified, Occupational Therapy Evaluation in Progress with Full Report to Follow.   Prior Living Situation   Prior Services Unable To Determine At This Time   Housing / Facility 1 Story Apartment / Condo   Steps Into Home 2   Bathroom Set up Bathtub / Shower Combination   Equipment Owned None   Lives with - Patient's Self Care Capacity Spouse;Significant Other  (Unclear whether pt's living partner is her spouse or fiance as she referred to him with both terms.)   Comments per pt report   Prior Level of ADL Function   Self Feeding Independent   Grooming / Hygiene Independent   Bathing Independent   Dressing Independent   Toileting Independent   Comments per pt report   History of Falls   History of Falls Yes   Precautions   Precautions Fall Risk   Comments use of FWW for evaluation   Cognition    Cognition / Consciousness X   Level of Consciousness Alert   Safety Awareness Impulsive  (Poor FWW safety throughout evaluation; pt needing verbal and tactile cues maintaining FWW safety)   Attention Other (See Comments)  (Pt needing re-direction to maintain FWW use and safety)   Comments AOx4   Active ROM Upper Body   Active ROM Upper Body  WDL   Strength Upper Body   Upper Body Strength  WDL   Sensation Upper Body   Upper Extremity Sensation  WDL   Upper Body Muscle Tone   Upper Body Muscle Tone  WDL   Coordination Upper Body   Coordination X   Fine Motor Coordination Challenges with digit opposition to thumb on bilateral hands   Gross Motor Coordination Challenges with forearm rotation presenting with signs of dysdiadokinesia   Comments Pt reports having to \"focus really hard\" during coordination testing   Balance Assessment   Sitting Balance (Static) Good   Sitting Balance (Dynamic) Fair +   Standing Balance (Static) Fair   Standing " Balance (Dynamic) Fair -   Weight Shift Sitting Fair   Weight Shift Standing Fair   Comments Use of FWW   Bed Mobility    Supine to Sit Supervised   Sit to Supine Supervised   ADL Assessment   Grooming Supervision;Standing  (use of FWW)   Lower Body Dressing Minimal Assist   Toileting Supervision   How much help from another person does the patient currently need...   Putting on and taking off regular lower body clothing? 3   Bathing (including washing, rinsing, and drying)? 3   Toileting, which includes using a toilet, bedpan, or urinal? 4   Putting on and taking off regular upper body clothing? 4   Taking care of personal grooming such as brushing teeth? 4   Eating meals? 4   6 Clicks Daily Activity Score 22   Modified Spalding (mRS)   Modified Spalding Score 1   Functional Mobility   Sit to Stand Supervised   Bed, Chair, Wheelchair Transfer Supervised   Toilet Transfers Supervised   Transfer Method Stand Step   Mobility Pt mobilized from bed to EOB and throughout room and into bathroom, as well as back to bed   Comments Use of FWW   Visual Perception   Visual Perception  WDL   Comments Pt able to track with eyes during visual screening while seated, however, pt reports bouts of dizziness as she is tracking.   Activity Tolerance   Sitting Edge of Bed 7 mins   Standing 5 mins   Comments Use of FWW   Short Term Goals   Short Term Goal # 1 Pt to complete grooming tasks while standing at the edge of the sink for greater than 8 mins   Short Term Goal # 2 Pt to complete lower body dressing with supervision   Short Term Goal # 3 Pt to complete tub transfer with supervision and use of transfer bench/shower seat PRN   Education Group   Education Provided Energy Conservation;Home Safety;Role of Occupational Therapist;Activities of Daily Living;Adaptive Equipment   Role of Occupational Therapist Patient Response Patient;Acceptance;Explanation;Verbal Demonstration   Energy Conservation Patient Response  Patient;Acceptance;Explanation;Verbal Demonstration;Reinforcement Needed   Home Safety Patient Response Patient;Acceptance;Explanation;Verbal Demonstration;Reinforcement Needed   ADL Patient Response Patient;Acceptance;Explanation;Verbal Demonstration   Adaptive Equipment Patient Response Patient;Acceptance;Explanation;Verbal Demonstration;Reinforcement Needed   Problem List   Problem List Decreased Active Daily Living Skills;Decreased Homemaking Skills;Decreased Functional Mobility;Decreased Activity Tolerance;Safety Awareness Deficits / Cognition   Anticipated Discharge Equipment and Recommendations   DC Equipment Recommendations Grab Bar(s) by Toilet;Grab Bar(s) in Tub / Shower;Tub Transfer Bench;Tub / Shower Seat   Discharge Recommendations Other -  (Recommending home with outpatient occupational therapy services at this time, pending pt progress throughout her admit to hospital.)   Interdisciplinary Plan of Care Collaboration   IDT Collaboration with  Nursing   Patient Position at End of Therapy In Bed;Bed Alarm On;Call Light within Reach;Tray Table within Reach;Phone within Reach   Collaboration Comments RN updated

## 2022-09-05 NOTE — PROGRESS NOTES
4 Eyes Skin Assessment Completed by GIRISH Carrasquillo and GIRISH Kc.    Head WDL EEG leads in place  Nose WDL  Mouth WDL  Neck WDL  Breast/Chest WDL  Shoulder Blades WDL  Spine WDL  (R) Arm/Elbow/Hand WDL  (L) Arm/Elbow/Hand WDL  Abdomen WDL  Groin WDL  Scrotum/Coccyx/Buttocks WDL  (R) Leg WDL  (L) Leg WDL  (R) Heel/Foot/Toe Redness aound ankle area, dryness/ flaky foot  (L) Heel/Foot/Toe Redness around ankle area, dyness/flaky foot          Devices In Places Pulse Ox, SCD's, EEG, and Nasal Cannula      Interventions In Place Pressure Redistribution Mattress    Possible Skin Injury No    Pictures Uploaded Into Epic N/A  Wound Consult Placed N/A  RN Wound Prevention Protocol Ordered No

## 2022-09-05 NOTE — PROGRESS NOTES
Report given to Neida STOUT    [id number]: Patient transported to [facility] with all personal belongings including FWW with patient label. On 2L oxygen nasal cannula.

## 2022-09-06 ENCOUNTER — APPOINTMENT (OUTPATIENT)
Dept: RADIOLOGY | Facility: MEDICAL CENTER | Age: 42
DRG: 101 | End: 2022-09-06
Attending: STUDENT IN AN ORGANIZED HEALTH CARE EDUCATION/TRAINING PROGRAM
Payer: COMMERCIAL

## 2022-09-06 ENCOUNTER — PHARMACY VISIT (OUTPATIENT)
Dept: PHARMACY | Facility: MEDICAL CENTER | Age: 42
End: 2022-09-06
Payer: COMMERCIAL

## 2022-09-06 VITALS
WEIGHT: 254.19 LBS | OXYGEN SATURATION: 96 % | DIASTOLIC BLOOD PRESSURE: 82 MMHG | HEART RATE: 71 BPM | BODY MASS INDEX: 42.35 KG/M2 | TEMPERATURE: 97.9 F | SYSTOLIC BLOOD PRESSURE: 127 MMHG | HEIGHT: 65 IN | RESPIRATION RATE: 18 BRPM

## 2022-09-06 LAB
ANION GAP SERPL CALC-SCNC: 11 MMOL/L (ref 7–16)
BASOPHILS # BLD AUTO: 0.4 % (ref 0–1.8)
BASOPHILS # BLD: 0.02 K/UL (ref 0–0.12)
BUN SERPL-MCNC: 9 MG/DL (ref 8–22)
CALCIUM SERPL-MCNC: 9 MG/DL (ref 8.5–10.5)
CHLORIDE SERPL-SCNC: 105 MMOL/L (ref 96–112)
CO2 SERPL-SCNC: 22 MMOL/L (ref 20–33)
CREAT SERPL-MCNC: 0.44 MG/DL (ref 0.5–1.4)
EOSINOPHIL # BLD AUTO: 0.11 K/UL (ref 0–0.51)
EOSINOPHIL NFR BLD: 2 % (ref 0–6.9)
ERYTHROCYTE [DISTWIDTH] IN BLOOD BY AUTOMATED COUNT: 43.9 FL (ref 35.9–50)
GFR SERPLBLD CREATININE-BSD FMLA CKD-EPI: 122 ML/MIN/1.73 M 2
GLUCOSE SERPL-MCNC: 90 MG/DL (ref 65–99)
HCT VFR BLD AUTO: 32.3 % (ref 37–47)
HGB BLD-MCNC: 11 G/DL (ref 12–16)
IMM GRANULOCYTES # BLD AUTO: 0.02 K/UL (ref 0–0.11)
IMM GRANULOCYTES NFR BLD AUTO: 0.4 % (ref 0–0.9)
LYMPHOCYTES # BLD AUTO: 2.04 K/UL (ref 1–4.8)
LYMPHOCYTES NFR BLD: 37.8 % (ref 22–41)
MAGNESIUM SERPL-MCNC: 1.6 MG/DL (ref 1.5–2.5)
MCH RBC QN AUTO: 31.9 PG (ref 27–33)
MCHC RBC AUTO-ENTMCNC: 34.1 G/DL (ref 33.6–35)
MCV RBC AUTO: 93.6 FL (ref 81.4–97.8)
MONOCYTES # BLD AUTO: 0.62 K/UL (ref 0–0.85)
MONOCYTES NFR BLD AUTO: 11.5 % (ref 0–13.4)
NEUTROPHILS # BLD AUTO: 2.58 K/UL (ref 2–7.15)
NEUTROPHILS NFR BLD: 47.9 % (ref 44–72)
NRBC # BLD AUTO: 0 K/UL
NRBC BLD-RTO: 0 /100 WBC
PHOSPHATE SERPL-MCNC: 4.2 MG/DL (ref 2.5–4.5)
PLATELET # BLD AUTO: 148 K/UL (ref 164–446)
PMV BLD AUTO: 11 FL (ref 9–12.9)
POTASSIUM SERPL-SCNC: 3.9 MMOL/L (ref 3.6–5.5)
RBC # BLD AUTO: 3.45 M/UL (ref 4.2–5.4)
SODIUM SERPL-SCNC: 138 MMOL/L (ref 135–145)
WBC # BLD AUTO: 5.4 K/UL (ref 4.8–10.8)

## 2022-09-06 PROCEDURE — 97161 PT EVAL LOW COMPLEX 20 MIN: CPT

## 2022-09-06 PROCEDURE — 84100 ASSAY OF PHOSPHORUS: CPT

## 2022-09-06 PROCEDURE — 700101 HCHG RX REV CODE 250: Performed by: HOSPITALIST

## 2022-09-06 PROCEDURE — 700111 HCHG RX REV CODE 636 W/ 250 OVERRIDE (IP): Performed by: INTERNAL MEDICINE

## 2022-09-06 PROCEDURE — 85025 COMPLETE CBC W/AUTO DIFF WBC: CPT

## 2022-09-06 PROCEDURE — 73610 X-RAY EXAM OF ANKLE: CPT | Mod: LT

## 2022-09-06 PROCEDURE — 80048 BASIC METABOLIC PNL TOTAL CA: CPT

## 2022-09-06 PROCEDURE — 700102 HCHG RX REV CODE 250 W/ 637 OVERRIDE(OP): Performed by: HOSPITALIST

## 2022-09-06 PROCEDURE — A9270 NON-COVERED ITEM OR SERVICE: HCPCS | Performed by: HOSPITALIST

## 2022-09-06 PROCEDURE — 99239 HOSP IP/OBS DSCHRG MGMT >30: CPT | Performed by: STUDENT IN AN ORGANIZED HEALTH CARE EDUCATION/TRAINING PROGRAM

## 2022-09-06 PROCEDURE — 83735 ASSAY OF MAGNESIUM: CPT

## 2022-09-06 PROCEDURE — RXMED WILLOW AMBULATORY MEDICATION CHARGE: Performed by: STUDENT IN AN ORGANIZED HEALTH CARE EDUCATION/TRAINING PROGRAM

## 2022-09-06 RX ORDER — TOPIRAMATE 100 MG/1
100 TABLET, FILM COATED ORAL 2 TIMES DAILY
Qty: 60 TABLET | Refills: 0 | Status: SHIPPED | OUTPATIENT
Start: 2022-09-06 | End: 2022-10-06

## 2022-09-06 RX ORDER — CARBAMAZEPINE 200 MG/1
200 TABLET ORAL 3 TIMES DAILY
Qty: 90 TABLET | Refills: 0 | Status: SHIPPED | OUTPATIENT
Start: 2022-09-06 | End: 2022-10-06

## 2022-09-06 RX ORDER — DIVALPROEX SODIUM 250 MG/1
500 TABLET, EXTENDED RELEASE ORAL 2 TIMES DAILY
Qty: 120 TABLET | Refills: 0 | Status: SHIPPED | OUTPATIENT
Start: 2022-09-06 | End: 2022-10-06

## 2022-09-06 RX ADMIN — CARBAMAZEPINE 200 MG: 200 TABLET ORAL at 05:39

## 2022-09-06 RX ADMIN — ENOXAPARIN SODIUM 40 MG: 40 INJECTION SUBCUTANEOUS at 05:38

## 2022-09-06 RX ADMIN — CARBAMAZEPINE 200 MG: 200 TABLET ORAL at 12:39

## 2022-09-06 RX ADMIN — DIVALPROEX SODIUM 500 MG: 125 CAPSULE, COATED PELLETS ORAL at 05:38

## 2022-09-06 RX ADMIN — DOCUSATE SODIUM 50 MG AND SENNOSIDES 8.6 MG 2 TABLET: 8.6; 5 TABLET, FILM COATED ORAL at 05:39

## 2022-09-06 RX ADMIN — TOPIRAMATE 100 MG: 100 TABLET, FILM COATED ORAL at 05:39

## 2022-09-06 ASSESSMENT — GAIT ASSESSMENTS
GAIT LEVEL OF ASSIST: SUPERVISED
DISTANCE (FEET): 300
ASSISTIVE DEVICE: 4 WHEEL WALKER

## 2022-09-06 ASSESSMENT — COGNITIVE AND FUNCTIONAL STATUS - GENERAL
SUGGESTED CMS G CODE MODIFIER MOBILITY: CK
STANDING UP FROM CHAIR USING ARMS: A LITTLE
MOVING FROM LYING ON BACK TO SITTING ON SIDE OF FLAT BED: A LITTLE
MOVING TO AND FROM BED TO CHAIR: A LITTLE
TURNING FROM BACK TO SIDE WHILE IN FLAT BAD: A LITTLE
MOBILITY SCORE: 18
WALKING IN HOSPITAL ROOM: A LITTLE
CLIMB 3 TO 5 STEPS WITH RAILING: A LITTLE

## 2022-09-06 NOTE — PROGRESS NOTES
D/c instructions given, educated on worsening s/s. Pt understands instructions and questions answered. M2b given

## 2022-09-06 NOTE — DISCHARGE PLANNING
Meds-to-Beds: Discharge prescription orders listed below delivered to patient in discharge lounge. RN notified. Patient counseled. Carbamazepine too soon through insurance, patient states she has at home. Patient elected to have co-payment billed to patient account.      Current Outpatient Medications   Medication Sig Dispense Refill    divalproex ER (DEPAKOTE ER) 250 MG TABLET SR 24 HR Take 2 Tablets by mouth 2 times a day for 30 days. 120 Tablet 0    topiramate (TOPAMAX) 100 MG Tab Take 1 Tablet by mouth 2 times a day for 30 days. 60 Tablet 0      Lorenza Monreal, PharmD

## 2022-09-06 NOTE — THERAPY
Physical Therapy   Initial Evaluation     Patient Name: Yamila Mendoza  Age:  41 y.o., Sex:  female  Medical Record #: [id number]  Today's Date: 9/6/2022     Precautions  Precautions: Fall Risk    Assessment  Patient is 41 y.o. female admitted after having 6 seizures at home, emergently intubated, now extubated.  PMH includes bipolar disorder, epilepsy, and developmental delay.  Today patient seen for PT evaluation.  She mobilized as detailed below, required extended time & cues due to delayed responses.  Patient initially had difficulty explaining home setup, PLOF, and who she lives with (later reported her spouse).  She often trailed off and did not finish sentences if she answered questions at all.  Patient demonstrated all functional mobility with SBA-SPV, anticipate she is at/near her functional baseline.  Patient will not be actively followed for physical therapy services at this time, however may be seen if requested by physician for 1 more visit within 30 days to address any discharge or equipment needs.  Plan    Recommend Physical Therapy for Evaluation only.    DC Equipment Recommendations: None  Discharge Recommendations: Anticipate that the patient will have no further physical therapy needs after discharge from the hospital     Objective     09/06/22 0851   Precautions   Precautions Fall Risk   Prior Living Situation   Housing / Facility 1 Story Apartment / Condo   Steps Into Home 2   Equipment Owned 4-Wheel Walker   Lives with - Patient's Self Care Capacity Significant Other   Comments Pt had difficulty explaining home setup and who she lives with, often trailing off without finishing sentences   Prior Level of Functional Mobility   Bed Mobility Independent   Transfer Status Independent   Ambulation Independent   Assistive Devices Used 4-Wheel Walker   Stairs Independent   Comments Pt reported significant other able to help as needed   Cognition    Cognition / Consciousness X   Level of Consciousness  Alert   Attention (frequent redirection, often trailed off without finishing sentences)   Comments Pleasant & cooperative, very delayed responses, often did not respond until max cues provided   Active ROM Lower Body    Active ROM Lower Body  WDL   Strength Lower Body   Lower Body Strength  WDL   Comments BLE grossly 4+/5   Sensation Lower Body   Lower Extremity Sensation   X   Comments Reported LLE tingling   Balance Assessment   Sitting Balance (Static) Good   Sitting Balance (Dynamic) Fair +   Standing Balance (Static) Fair   Standing Balance (Dynamic) Fair   Weight Shift Sitting Fair   Weight Shift Standing Fair   Gait Analysis   Gait Level Of Assist Supervised   Assistive Device 4 Wheel Walker   Distance (Feet) 300   # of Times Distance was Traveled 1   Deviation (very slow pace, easily distracted)   # of Stairs Climbed 3   Level of Assist with Stairs Standby Assist   Weight Bearing Status No restrictions   Comments B rail support on stairs   Bed Mobility    Supine to Sit Supervised   Sit to Supine (NT, left seated EOB)   Scooting Supervised   Functional Mobility   Sit to Stand Supervised   Bed, Chair, Wheelchair Transfer Supervised   Transfer Method Stand Step   Mobility bed mobility, ambulation, stairs   Activity Tolerance   Sitting Edge of Bed Post session   Standing 10 min   Anticipated Discharge Equipment and Recommendations   DC Equipment Recommendations None   Discharge Recommendations Anticipate that the patient will have no further physical therapy needs after discharge from the hospital

## 2022-09-06 NOTE — DISCHARGE PLANNING
Case Management Discharge Planning    Admission Date: 9/3/2022  GMLOS: 4.3  ALOS: 3    6-Clicks ADL Score: 22  6-Clicks Mobility Score: 16  PT and/or OT Eval ordered: Yes  Post-acute Referrals Ordered: No  Post-acute Choice Obtained: No  Has referral(s) been sent to post-acute provider:  No      Anticipated Discharge Dispo:  Home with home health and DME    DME Needed: Yes    DME Ordered: Yes     Action(s) Taken: DME Face to Face Requested ftf from MD, and DPA facilitate ordering DME per OT note to include Grab Bar(s) by Toilet;Grab Bar(s) in Tub / Shower;Tub Transfer Bench;Tub / Shower Seat    Escalations Completed: None    Medically Clear: No, PT evaluation and recs pending    Next Steps: Choice, and referrals to be sent, assessment of home situation.    Barriers to Discharge: Pending PT Evaluation

## 2022-09-06 NOTE — FACE TO FACE
Face to Face Note  -  Durable Medical Equipment    Charlene Ji M.D. - NPI: [id number]  I certify that this patient is under my care and that they had a durable medical equipment(DME)face to face encounter by myself that meets the physician DME face-to-face encounter requirements with this patient on:    Date of encounter:   Patient:                    MRN:                       YOB: 2024  Yamila Mendoza  [id number]  1980     The encounter with the patient was in whole, or in part, for the following medical condition, which is the primary reason for durable medical equipment:  Other - seizure    I certify that, based on my findings, the following durable medical equipment is medically necessary:    Other DME Equipment - Grab Bar(s) by Toilet, Grab Bar(s) in Tub / Shower, Tub Transfer Bench, Tub / Shower Seat .    My Clinical findings support the need for the above equipment due to:  Other - seizure

## 2022-09-06 NOTE — DISCHARGE PLANNING
Case Management Discharge Planning    Admission Date: 9/3/2022  GMLOS: 4.3  ALOS: 3    6-Clicks ADL Score: 22  6-Clicks Mobility Score: 18      Anticipated Discharge Dispo:  Home with outpatient OT    DME Needed: Yes, In Bed;Bed Alarm On;Call Light within Reach;Tray Table within Reach;Phone within Reach    Action(s) Taken: Choice obtained, 1) [employer], 2) [employer], 3) [employer].  Forwarded to [facility] for referral to DME company as DME may be covered by Medicaid plan. Patient transitioned to discharge lounge accompanied by her significant other.    Escalations Completed: None    Medically Clear: Yes    Next Steps: Met with the patient and her significant other Landon at bedside, completed the choice and IMM letter.  MD to complete Rx for outpatient OT per recommendations.  PT signed off for not further PT needs.      Barriers to Discharge: None

## 2022-09-06 NOTE — DISCHARGE SUMMARY
Discharge Summary    CHIEF COMPLAINT ON ADMISSION  Chief Complaint   Patient presents with    Seizure       Reason for Admission  EMS     Admission Date  9/3/2022    CODE STATUS  Full Code    HPI & HOSPITAL COURSE  41-year-old female with past medical history of bipolar disorder who presented with seizure-like activity.  She required intubation and she was unable to protect her airways after receiving Versed.  Neurology was consulted and patient underwent cEEG which did not show any seizure-like activity.   She was weaned off of propofol and extubated on 9/4 morning. Patient was transferred out from ICU 9/4/2020.  Neurology recommended the to continue home seizure meds of Depakote and Tegretol and Topamax.  New prescription delivered at bedside.    Patient reported generalized weakness.  PT OT consulted.  Recommended outpatient OT and DME, ordered.     Therefore, she is discharged in good and stable condition to home with close outpatient follow-up.    The patient met 2-midnight criteria for an inpatient stay at the time of discharge.    Discharge Date  9/6/2022    FOLLOW UP ITEMS POST DISCHARGE  - Follow up with primary care physician in 1 week.   - Follow up neurology as instructed  - Please take the medications as instructed    - Go to the local Emergency Department if you have any worsening condition.       DISCHARGE DIAGNOSES  Principal Problem:    Seizures (HCC) POA: Yes  Active Problems:    Acute respiratory failure (HCC) POA: Unknown    Seizure (HCC) POA: Yes  Resolved Problems:    * No resolved hospital problems. *      FOLLOW UP    Radha Hinojosa P.A.-C.  [address] [facility] [id number] [phone]    Follow up      Radha Hinojosa P.A.-C.  [address] [facility] [id number] [phone]    Follow up in 1 week(s)  Please call your primary care provider to schedule, recent hospitalization follow up    MEDICATIONS ON DISCHARGE     Medication List        CONTINUE taking these medications         "Instructions   carBAMazepine 200 MG Tabs  Commonly known as: TEGRETOL   Take 1 Tablet by mouth 3 times a day for 30 days.  Dose: 200 mg     cyclobenzaprine 10 mg Tabs  Commonly known as: Flexeril   Take 5 mg by mouth at bedtime as needed for Muscle Spasms.  Dose: 5 mg     divalproex  MG Tb24  Commonly known as: DEPAKOTE ER   Take 2 Tablets by mouth 2 times a day for 30 days.  Dose: 500 mg     topiramate 100 MG Tabs  Commonly known as: TOPAMAX   Take 1 Tablet by mouth 2 times a day for 30 days.  Dose: 100 mg     vitamin D3 400 UNIT Tabs  Commonly known as: cholecalciferol   Take 400 Units by mouth every day.  Dose: 400 Units              Allergies  Allergies   Allergen Reactions    Bactrim Unspecified     \"I get the shakes\"     Iron Unspecified     \"see red dots\" vision, not rash     Penicillins Unspecified     \"makes me dizzy with my medicine\"     Phenobarbital Unspecified     \"makes me dizzy\"     Lemon Oil Itching     Skin itching per pt report to Dietary.    Onion Itching and Swelling     Throat swelling and itching per pt report to Dietary.    Pe[employer] Allergy Itching and Swelling     Throat swelling and itching per pt report to Dietary.       DIET  Orders Placed This Encounter   Procedures    Diet Order Diet: Regular     Standing Status:   Standing     Number of Occurrences:   1     Order Specific Question:   Diet:     Answer:   Regular [1]       ACTIVITY  As tolerated.  Weight bearing as tolerated    CONSULTATIONS  Neurology    PROCEDURES  EEG    LABORATORY  Lab Results   Component Value Date    SODIUM 138 09/06/2022    POTASSIUM 3.9 09/06/2022    CHLORIDE 105 09/06/2022    CO2 22 09/06/2022    GLUCOSE 90 09/06/2022    BUN 9 09/06/2022    CREATININE 0.44 (L) 09/06/2022    CREATININE 0.6 05/15/2009        Lab Results   Component Value Date    WBC 5.4 09/06/2022    HEMOGLOBIN 11.0 (L) 09/06/2022    HEMATOCRIT 32.3 (L) 09/06/2022    PLATELETCT 148 (L) 09/06/2022        Total time of the discharge " process exceeds 35 minutes.

## 2022-09-06 NOTE — CARE PLAN
The patient is Stable - Low risk of patient condition declining or worsening    Shift Goals  Clinical Goals: Monitor for seizures, safety  Patient Goals: Shower, sleep  Family Goals: Sleep, discharge planning    Progress made toward(s) clinical / shift goals: Patient is AAOx4, she participates in her care and she able to perform ADLs on her own. She remains free from falls, she using a FWW to mobilizes. Educated patient on safety and using the call light for assistance. Also discussed discharge plan. Patient demonstrates understand of education and POC. Bed low, locked and call light in place.    Problem: Knowledge Deficit - Standard  Goal: Patient and family/care givers will demonstrate understanding of plan of care, disease process/condition, diagnostic tests and medications  Outcome: Progressing   Patient understands POC  Problem: Skin Integrity  Goal: Skin integrity is maintained or improved  Outcome: Progressing  Skin remains intact, patient can mobilize and turn in bed   Problem: Fall Risk  Goal: Patient will remain free from falls  Outcome: Progressing   She uses a FWW to mobilize, remains free from falls  Problem: Pain - Standard  Goal: Alleviation of pain or a reduction in pain to the patient’s comfort goal  Outcome: Progressing   No complaints of pain throughout shift    Patient is not progressing towards the following goals:

## 2022-09-06 NOTE — CARE PLAN
The patient is Stable - Low risk of patient condition declining or worsening    Shift Goals  Clinical Goals: safety  Patient Goals: discharge  Family Goals: discharge    Progress made toward(s) clinical / shift goals:  Pt. Is A&Ox4 and resting comfortably in bed with fall, seizure, and aspiration precautions in place. Pt. Is expected to discharge later today.    Patient is not progressing towards the following goals:n/a

## 2022-11-28 ENCOUNTER — HOSPITAL ENCOUNTER (EMERGENCY)
Facility: MEDICAL CENTER | Age: 42
End: 2022-11-28
Attending: EMERGENCY MEDICINE
Payer: COMMERCIAL

## 2022-11-28 VITALS
HEIGHT: 65 IN | RESPIRATION RATE: 16 BRPM | OXYGEN SATURATION: 99 % | DIASTOLIC BLOOD PRESSURE: 72 MMHG | SYSTOLIC BLOOD PRESSURE: 116 MMHG | HEART RATE: 75 BPM | TEMPERATURE: 97 F | WEIGHT: 255 LBS | BODY MASS INDEX: 42.49 KG/M2

## 2022-11-28 DIAGNOSIS — Z01.89 ROUTINE LAB DRAW: ICD-10-CM

## 2022-11-28 DIAGNOSIS — M77.8 RIGHT WRIST TENDINITIS: ICD-10-CM

## 2022-11-28 LAB — TSH SERPL DL<=0.005 MIU/L-ACNC: 2.58 UIU/ML (ref 0.38–5.33)

## 2022-11-28 PROCEDURE — 99284 EMERGENCY DEPT VISIT MOD MDM: CPT

## 2022-11-28 PROCEDURE — 96372 THER/PROPH/DIAG INJ SC/IM: CPT

## 2022-11-28 PROCEDURE — 84443 ASSAY THYROID STIM HORMONE: CPT

## 2022-11-28 PROCEDURE — 700111 HCHG RX REV CODE 636 W/ 250 OVERRIDE (IP): Performed by: EMERGENCY MEDICINE

## 2022-11-28 RX ORDER — KETOROLAC TROMETHAMINE 30 MG/ML
30 INJECTION, SOLUTION INTRAMUSCULAR; INTRAVENOUS ONCE
Status: COMPLETED | OUTPATIENT
Start: 2022-11-28 | End: 2022-11-28

## 2022-11-28 RX ORDER — IBUPROFEN 800 MG/1
800 TABLET ORAL EVERY 8 HOURS PRN
Qty: 30 TABLET | Refills: 0 | Status: SHIPPED | OUTPATIENT
Start: 2022-11-28 | End: 2023-07-08

## 2022-11-28 RX ADMIN — KETOROLAC TROMETHAMINE 30 MG: 30 INJECTION, SOLUTION INTRAMUSCULAR; INTRAVENOUS at 20:09

## 2022-11-28 ASSESSMENT — FIBROSIS 4 INDEX: FIB4 SCORE: 1.97

## 2022-11-29 NOTE — ED PROVIDER NOTES
ED Provider Note     Scribed for Jossy Cheney D.O. by Slava Garay. 11/28/2022, 7:26 PM.     Primary care provider: Radha Hinojosa P.A.-C.  Means of arrival: walk in         History obtained from: patient  History limited by: none    CHIEF COMPLAINT  Chief Complaint   Patient presents with    Wrist Pain     Two years ago, patient had a fall on her right wrist. She reports that she is more sore today.        HPI  Yamila Mendoza is a 42 y.o. female who presents to the emergency Department for right wrist pain. She is complaining of limited mobility to the wrist and hand. Currently she states that she can only move her right index finger. She states that she has been trying to use her walker but that it causes immense pain when she attempts to use it due to her hand pain. Two years ago she had a fall on this hand which she was seen in the ED for. She also states that she was supposed to have a blood test done two months ago for thyroid monitoring but did not get it done due to her losing the paper, but when asked if she had the paper, was able to present it.    REVIEW OF SYSTEMS  Pertinent positives include right wrist and hand pain. Pertinent negatives include no left wrist pain.   See HPI for further details.     PAST MEDICAL HISTORY  Past Medical History:   Diagnosis Date    ASTHMA     Bipolar disorder (HCC)     Chronic back pain     Psychiatric disorder     bipolar    Psychiatric disorder     depression    Seizure disorder (HCC)        FAMILY HISTORY  History reviewed. No pertinent family history.    SOCIAL HISTORY  Social History     Tobacco Use    Smoking status: Never    Smokeless tobacco: Never   Vaping Use    Vaping Use: Never used   Substance Use Topics    Alcohol use: No    Drug use: No      Social History     Substance and Sexual Activity   Drug Use No       SURGICAL HISTORY  Past Surgical History:   Procedure Laterality Date    REYES BY LAPAROSCOPY  9/22/2009    Performed by JUNE WAGGONER at  "SURGERY [facility] ORS    ERCP IN OR  9/19/2009    Performed by LOU WEBB at SURGERY [facility] ORS    OTHER ABDOMINAL SURGERY      OTHER NEUROLOGICAL SURG      OTHER ORTHOPEDIC SURGERY      2 R knee surgeries       CURRENT MEDICATIONS    Current Facility-Administered Medications:     ketorolac (TORADOL) injection 30 mg, 30 mg, Intramuscular, Once, Jossy Cheney D.O.    Current Outpatient Medications:     ibuprofen (MOTRIN) 800 MG Tab, Take 1 Tablet by mouth every 8 hours as needed for Moderate Pain. Take with food, Disp: 30 Tablet, Rfl: 0    cholecalciferol (VITAMIN D3) 400 UNIT Tab, Take 400 Units by mouth every day., Disp: , Rfl:     cyclobenzaprine (FLEXERIL) 10 MG Tab, Take 5 mg by mouth at bedtime as needed for Muscle Spasms., Disp: , Rfl:     ALLERGIES  Allergies   Allergen Reactions    Bactrim Unspecified     \"I get the shakes\"     Iron Unspecified     \"see red dots\" vision, not rash     Penicillins Unspecified     \"makes me dizzy with my medicine\"     Phenobarbital Unspecified     \"makes me dizzy\"     Lemon Oil Itching     Skin itching per pt report to Dietary.    Onion Itching and Swelling     Throat swelling and itching per pt report to Dietary.    Pe[employer] Allergy Itching and Swelling     Throat swelling and itching per pt report to Dietary.       PHYSICAL EXAM  VITAL SIGNS: /73   Pulse 79   Temp 35.9 °C (96.6 °F) (Temporal)   Resp 17   Ht 1.651 m (5' 5\")   Wt 116 kg (255 lb)   LMP 11/01/2022 (Approximate)   SpO2 99%   BMI 42.43 kg/m²   Constitutional: Patient is well developed, well nourished. Non-toxic appearing. No acute distress.   Skin: Warm, Dry, No erythema, No rashes.   Extremities: Tenderness along radial aspect of right wrist following tendon sheath. Limited ROM secondary to pain. No bony deformity. NVI  Neurologic: Awake, alert, and oriented x 3. No motor or sensory deficits  Psychiatric: Affect odd.    DIAGNOSTICS/PROCEDURES    LABS  Results for orders placed " or performed during the hospital encounter of 11/28/22   TSH WITH REFLEX TO FT4   Result Value Ref Range    TSH 2.580 0.380 - 5.330 uIU/mL       Labs reviewed by me    COURSE & MEDICAL DECISION MAKING  Pertinent Labs & Imaging studies reviewed. (See chart for details)    7:26 PM - Patient seen and evaluated at bedside. Patient will be treated with Toradol 30 mg for her symptoms. Differential diagnoses include, but are not limited to, tendonitis. Will perform TSH blood test as she did not have it done prior. Patient will now be discharged at this time. Discussed return precautions and plan for at home care. Patient verbalizes understanding and agreement to this plan of care.    Patient's TSH was within normal limits at 2.58.  She was placed in a Velcro wrist splint and instructed to apply moist heat or soak in warm Epsom salts.  She is to follow-up with [facility] for further treatment.  She is stable upon discharge.      DISPOSITION:  Patient will be discharged home in stable condition.    FOLLOW UP:  LAUREN BassAJose Rafael-RADHA  [address] [place] [id number] [phone]    Schedule an appointment as soon as possible for a visit in 1 week  For evaluation of your wrist and your blood tests.      OUTPATIENT MEDICATIONS:  New Prescriptions    IBUPROFEN (MOTRIN) 800 MG TAB    Take 1 Tablet by mouth every 8 hours as needed for Moderate Pain. Take with food     FINAL IMPRESSION  1. Right wrist tendinitis    2. Routine lab draw      Slava BELTRAN (Scribe), am scribing for, and in the presence of, Jossy Cheney D.O..    Electronically signed by: Slava Garay (Linda), 11/28/2022    Jossy BELTRAN D.O. personally performed the services described in this documentation, as scribed by Slava Garay in my presence, and it is both accurate and complete.    The note accurately reflects work and decisions made by me.  Jossy Cheney D.O.  11/28/2022  11:16 PM

## 2022-11-29 NOTE — ED NOTES
Discharge education provided. Discharge paperwork signed by pt. All questions answered. All belongings with pt. Pt ambulated to lobby unassisted with steady gait with her personal walker.

## 2022-11-29 NOTE — ED TRIAGE NOTES
"  .  Chief Complaint   Patient presents with    Wrist Pain     Two years ago, patient had a fall on her right wrist. She reports that she is more sore today.        43 yo ambulatory to triage with walker for above complaint. Pt is alert and oriented, speaking in full sentences, follows commands and responds appropriately to questions. +2 radial pulse. No obvious deformity.      Patient placed back in lobby and educated on triage process. Asked to inform RN of any changes.    /73   Pulse 79   Temp 35.9 °C (96.6 °F) (Temporal)   Resp 17   Ht 1.651 m (5' 5\")   Wt 116 kg (255 lb)   LMP 11/01/2022 (Approximate)   SpO2 99%   BMI 42.43 kg/m²     "

## 2022-11-29 NOTE — ED NOTES
Right Wrist brace applied to patient. Education given on how to apply. Patient acknowledged education.

## 2022-11-29 NOTE — DISCHARGE INSTRUCTIONS
Apply warm moist compresses to your wrist 3-4 times daily or soak in hot water with Epsom salts.  Wear the splint for the next week or so until the pain goes away.  You can remove it for bathing.  Keep it on at night while you are sleeping as well as during the day.  Follow-up with your primary care provider at [facility] for your blood tests and recheck of your wrist.

## 2023-03-09 ENCOUNTER — APPOINTMENT (OUTPATIENT)
Dept: RADIOLOGY | Facility: MEDICAL CENTER | Age: 43
End: 2023-03-09
Attending: EMERGENCY MEDICINE
Payer: MEDICARE

## 2023-03-09 ENCOUNTER — HOSPITAL ENCOUNTER (EMERGENCY)
Facility: MEDICAL CENTER | Age: 43
End: 2023-03-09
Attending: EMERGENCY MEDICINE
Payer: MEDICARE

## 2023-03-09 VITALS
WEIGHT: 271.83 LBS | HEART RATE: 92 BPM | SYSTOLIC BLOOD PRESSURE: 134 MMHG | DIASTOLIC BLOOD PRESSURE: 89 MMHG | OXYGEN SATURATION: 97 % | BODY MASS INDEX: 43.69 KG/M2 | RESPIRATION RATE: 16 BRPM | HEIGHT: 66 IN | TEMPERATURE: 97.6 F

## 2023-03-09 DIAGNOSIS — S90.32XA CONTUSION OF LEFT FOOT, INITIAL ENCOUNTER: ICD-10-CM

## 2023-03-09 DIAGNOSIS — Z76.0 MEDICATION REFILL: ICD-10-CM

## 2023-03-09 PROCEDURE — 73630 X-RAY EXAM OF FOOT: CPT | Mod: LT

## 2023-03-09 PROCEDURE — 99283 EMERGENCY DEPT VISIT LOW MDM: CPT

## 2023-03-09 RX ORDER — CARBAMAZEPINE 200 MG/1
200 TABLET ORAL 3 TIMES DAILY
Qty: 90 TABLET | Refills: 0 | Status: SHIPPED | OUTPATIENT
Start: 2023-03-09

## 2023-03-09 RX ORDER — NAPROXEN 500 MG/1
500 TABLET ORAL 2 TIMES DAILY WITH MEALS
Qty: 60 TABLET | Refills: 0 | Status: SHIPPED | OUTPATIENT
Start: 2023-03-09 | End: 2023-11-18

## 2023-03-09 RX ORDER — POTASSIUM CHLORIDE 750 MG/1
10 TABLET, FILM COATED, EXTENDED RELEASE ORAL 2 TIMES DAILY
Qty: 60 TABLET | Refills: 3 | Status: SHIPPED | OUTPATIENT
Start: 2023-03-09

## 2023-03-09 RX ORDER — TOPIRAMATE 100 MG/1
100 TABLET, FILM COATED ORAL 2 TIMES DAILY
Qty: 60 TABLET | Refills: 3 | Status: SHIPPED | OUTPATIENT
Start: 2023-03-09 | End: 2023-12-26

## 2023-03-09 RX ORDER — CYCLOBENZAPRINE HCL 5 MG
5-10 TABLET ORAL 3 TIMES DAILY PRN
Qty: 30 TABLET | Refills: 0 | Status: SHIPPED | OUTPATIENT
Start: 2023-03-09

## 2023-03-09 RX ORDER — CALCIUM CARBONATE/VITAMIN D3 500MG-5MCG
1 TABLET ORAL 2 TIMES DAILY
Qty: 60 TABLET | Refills: 0 | Status: SHIPPED | OUTPATIENT
Start: 2023-03-09 | End: 2023-04-08

## 2023-03-09 ASSESSMENT — FIBROSIS 4 INDEX: FIB4 SCORE: 1.97

## 2023-03-10 NOTE — DISCHARGE INSTRUCTIONS
At this point you do not have evidence of a fracture on your x-ray yet you may have an occult fracture that was not seen. For this reason, followup with your primary care physician within one week for reevaluation if you continue to have significant pain or followup sooner for increasing symptoms. Place ice on your painful extremity 10 minutes at a time, 10 times a day for pain. Rest, elevate and use compression as needed.      Bone Bruise, Osteochondral Lesions,   Occult Trabecular Microfracture   A bone bruise is a small hidden fracture of the bone. It typically occurs with bones located close to the surface of the skin.   DIAGNOSIS  It can only be seen on special X-rays known as MRI's. This stands for Magnetic Resonance Imaging. A regular X-ray taken of a bone bruise would appear to be normal. A bone bruise is a common injury in the knee and the heel bone (calcaneus). The problems are similar to those produced by stress fractures, which are bone injuries caused by overuse. A bone bruise may also be a sign of other injuries. For example, bone bruises are commonly found where an anterior cruciate ligament (ACL) in the knee has been pulled away from the bone (ruptured). A ligament is a tough fibrous material that connects bones together to make our joints stable. Bruises of the bone last a lot longer than bruises of the muscle or tissues beneath the skin. Bone bruises can last from days to months and are often more severe and painful than other bruises.  SYMPTOMS  The pain lasts longer than a normal bruise.   The bruised area is difficult to use.   There may be discoloration and/or swelling of the bruised area.   When a bone bruise is found with injury to the anterior cruciate ligament (in the knee) there is often an increased:   Amount of fluid in the knee   Time the fluid in the knee lasts.   Number of days until you are walking normally and regaining the motion you had before the injury.   Number of days with  pain from the injury.   TREATMENTS  Because bone bruises are sudden injuries you cannot often prevent them, other than by being extremely careful. Some things you can do to improve the condition are:  Apply ice to the sore area for 15 to 20 minutes 4 times per day while awake for the first 2 days. Put the ice in a plastic bag, and place a towel between the bag of ice and your skin.   Keep your bruised area raised (elevated) when possible to lessen swelling.   For Activity:   Use crutches when necessary; do not put weight on the injured leg until you are no longer tender.   You may walk on your affected part as the pain allows, or as instructed.   Start weight bearing gradually on the bruised part.   Continue to use crutches or a cane until you can stand without causing pain, or as instructed.   If a plaster splint was applied, wear the splint until you are seen for a follow-up examination. Rest it on nothing harder than a pillow the first 24 hours. Do not put weight on it. Do not get it wet. You may take it off to take a shower or bath.   If an air splint was applied, more air may be blown into or out of the splint as needed for comfort. You may take it off at night and to take a shower or bath.   Wiggle your toes in the splint several times per day if you are able.   You may have been given an elastic bandage to use with the plaster splint or alone. The splint is too tight if you have numbness, tingling or if your foot becomes cold and blue. Adjust the bandage to make it comfortable.   Only take over-the-counter or prescription medicines for pain, discomfort, or fever as directed by your caregiver.   Follow all instructions for follow-up with your caregiver. This includes any orthopedic referrals, physical therapy, and rehabilitation. Any delay in obtaining necessary care could result in a delay or failure of the bones to heal.   SEEK MEDICAL CARE IF:  You have an increase in bruising, swelling or pain.   You  notice coldness of your toes.   You do not get pain relief with medications.   SEEK IMMEDIATE MEDICAL CARE IF:  Your toes are numb or blue.   You have severe pain not controlled with medications.   If any of the problems that caused you to seek care are becoming worse.   Document Released: 01/06/2009 Document Re-Released: 01/09/2011  [email or web address]® Patient Information ©2011 [email or web address], [email or web address].

## 2023-03-10 NOTE — ED TRIAGE NOTES
"Yamilaarmani Moses Kelly  42 y.o. female    Chief Complaint   Patient presents with    Foot Pain     Pt arrives with complaints of L dorsal foot pain that began one week PTA when pt tripped over curb one week prior. Pt has been ambulatory, though with pain. Arrives with ACE wrap in place. Pt also requesting refill on seizure medication. States she believes she might have had \"small seizure\" when she tripped on curb ONE WEEK prior to arrival. Denies recent falls/seizures/trauma.     Vitals:    03/09/23 1918   BP: (!) 139/98   Pulse: 99   Resp: 14   Temp: 36.3 °C (97.4 °F)   SpO2: 96%       Triage process explained to patient, apologized for wait time, and returned to [facility].  Pt informed to notify staff of any change in condition.     "

## 2023-03-10 NOTE — ED PROVIDER NOTES
ED Provider Note    CHIEF COMPLAINT  Chief Complaint   Patient presents with    Foot Pain    Medication Refill       EXTERNAL RECORDS REVIEWED  Inpatient Notes discharge summary completed on 9/6/2022 Dr. Ji for seizure.  The patient underwent EEG that showed no evidence of seizure-like activity the patient was continued on Depakote, Tegretol and Topamax.    HPI/ROS    OUTSIDE HISTORIAN(S):  Significant other stating that the patient fell and he witnessed the fall.  He did not witness any seizure-like activity.    Yamila Mendoza is a 42 y.o. female who presents   stating that she tripped over a curb approxi-1 week ago and hurt her left foot.  She has pain to the dorsum of her left foot increases with movement and decreases with rest.  The patient denies loss of sensation or strength to her left lower extremity.  She also states that she went home and she thinks she might of had a petit mall seizure as she does have a seizure disorder is not sure.  Denies any other recent seizure-like activity.  She states she also needs her seizure medications and is asking for refill but cannot tell me the names of the medication except for Depakote, Tegretol and another when she does not know.    PAST MEDICAL HISTORY   has a past medical history of ASTHMA, Bipolar disorder (HCC), Chronic back pain, Psychiatric disorder, Psychiatric disorder, and Seizure disorder (HCC).    SURGICAL HISTORY   has a past surgical history that includes other neurological surg; other orthopedic surgery; other abdominal surgery; ercp in [place] (9/19/2009); and joy by laparoscopy (9/22/2009).    FAMILY HISTORY  History reviewed. No pertinent family history.    SOCIAL HISTORY  Social History     Tobacco Use    Smoking status: Never    Smokeless tobacco: Never   Vaping Use    Vaping Use: Never used   Substance and Sexual Activity    Alcohol use: No    Drug use: No    Sexual activity: Not on file       CURRENT MEDICATIONS  Home Medications       Reviewed  "by Sandee Dominguez R.N. (Registered Nurse) on 03/09/23 at 1926  Med List Status: Not Addressed     Medication Last Dose Status   cholecalciferol (VITAMIN D3) 400 UNIT Tab  Active   cyclobenzaprine (FLEXERIL) 10 MG Tab  Active   ibuprofen (MOTRIN) 800 MG Tab  Active                    ALLERGIES  Allergies   Allergen Reactions    Bactrim Unspecified     \"I get the shakes\"     Iron Unspecified     \"see red dots\" vision, not rash     Penicillins Unspecified     \"makes me dizzy with my medicine\"     Phenobarbital Unspecified     \"makes me dizzy\"     Lemon Oil Itching     Skin itching per pt report to Dietary.    Onion Itching and Swelling     Throat swelling and itching per pt report to Dietary.    Pepper-Bell Food Allergy Itching and Swelling     Throat swelling and itching per pt report to Dietary.       PHYSICAL EXAM  VITAL SIGNS: /89   Pulse 92   Temp 36.4 °C (97.6 °F) (Temporal)   Resp 16   Ht 1.676 m (5' 6\")   Wt 123 kg (271 lb 13.2 oz)   SpO2 97%   BMI 43.87 kg/m²      Nursing notes and vitals reviewed.  Constitutional: Well developed, Well nourished, No acute distress, Non-toxic appearance.   Eyes: PERRLA, EOMI, Conjunctiva normal, No discharge.    Thorax & Lungs: No respiratory distress, No rales, No rhonchi, No wheezing, No chest tenderness.   Skin: Warm, Dry, No erythema, No rash.   Extremities: No deformity, slight edema and tenderness to the dorsum of the left foot, no ankle tenderness, no calcaneal tenderness, no toe tenderness, distal pulses are brisk  Neurologic: Alert & oriented x 3, strength and sensation intact left lower extremity        DIAGNOSTIC STUDIES / PROCEDURES  EKG  I have independently interpreted this EKG      RADIOLOGY  I have independently interpreted the diagnostic imaging associated with this visit and am waiting the final reading from the radiologist.   My preliminary interpretation is as follows: Negative for fracture  Radiologist interpretation:   DX-FOOT-COMPLETE 3+ LEFT "   Final Result      No evidence of fracture.            COURSE & MEDICAL DECISION MAKING    ED Observation Status? No; Patient does not meet criteria for ED Observation.     INITIAL ASSESSMENT, COURSE AND PLAN  Care Narrative: This is a 42-year-old female presents with left foot contusion.  Concern for possible fracture therefore x-rays completed is negative.  HPI is a strain of the foot.  She is placed in a postop shoe.  In addition, the patient needed medication refills therefore I did refill multiple medications for her including cyclobenzaprine, potassium, Topamax, Tegretol as well as calcium vitamin D.  As for the seizure, she states she might have a seizure at that point but has not had a seizure activity since then.  The patient had her medication refill and will follow with her primary care physician for this as well.    Although at this point the patient does not have a fracture on x-ray there is a possibility the patient has sustained an occult fracture. For this reason, the patient is to followup with their primary care physician or orthopedist on call within one week if they continue to have pain or return sooner for increasing pain.          ADDITIONAL PROBLEM LIST  Psychiatric illness, bipolar  Seizure disorder, refilled her medications  Chronic pain, refill cyclobenzaprine  DISPOSITION AND DISCUSSIONS      Decision tools and prescription drugs considered including, but not limited to:  Patient does not have evidence of fracture now to consider CT scan at this point I believe the patient warrants outpatient evaluation and management. .    FINAL DIAGNOSIS  Left foot contusion  Medication refill       DISPOSITION:  Patient will be discharged home in stable condition.    FOLLOW UP:  [facility], Emergency Dept  [address] [facility] [id number] [phone]    If symptoms worsen    Radha Hinojosa P.A.-C.  [address] [place] [id number] [phone]    Schedule an  appointment as soon as possible for a visit in 1 week  As needed      OUTPATIENT MEDICATIONS:  New Prescriptions    CALCIUM/VITAMIN D 500-5 MG-MCG TAB    Take 1 Tablet by mouth 2 times a day for 30 days.    CARBAMAZEPINE (TEGRETOL) 200 MG TAB    Take 1 Tablet by mouth 3 times a day.    CYCLOBENZAPRINE (FLEXERIL) 5 MG TABLET    Take 1-2 Tablets by mouth 3 times a day as needed for Mild Pain or Moderate Pain.    NAPROXEN (NAPROSYN) 500 MG TAB    Take 1 Tablet by mouth 2 times a day with meals.    POTASSIUM CHLORIDE ER (KLOR-CON) 10 MEQ TABLET    Take 1 Tablet by mouth 2 times a day.    TOPIRAMATE (TOPAMAX) 100 MG TAB    Take 1 Tablet by mouth 2 times a day.       Electronically signed by: Jacques Soliz D.O., 3/9/2023 7:53 PM

## 2023-03-10 NOTE — ED NOTES
Post op shoe splint applied per ERP. CMS intact. Pt educated on purpose and care. Pt verbalizes understanding.

## 2023-05-09 ENCOUNTER — OFFICE VISIT (OUTPATIENT)
Dept: URGENT CARE | Facility: CLINIC | Age: 43
End: 2023-05-09
Payer: MEDICARE

## 2023-05-09 ENCOUNTER — APPOINTMENT (OUTPATIENT)
Dept: RADIOLOGY | Facility: IMAGING CENTER | Age: 43
End: 2023-05-09
Attending: NURSE PRACTITIONER
Payer: MEDICARE

## 2023-05-09 VITALS
BODY MASS INDEX: 47.09 KG/M2 | TEMPERATURE: 97.6 F | OXYGEN SATURATION: 98 % | WEIGHT: 293 LBS | RESPIRATION RATE: 12 BRPM | HEIGHT: 66 IN | SYSTOLIC BLOOD PRESSURE: 100 MMHG | HEART RATE: 91 BPM | DIASTOLIC BLOOD PRESSURE: 80 MMHG

## 2023-05-09 DIAGNOSIS — S99.911A INJURY OF RIGHT ANKLE, INITIAL ENCOUNTER: ICD-10-CM

## 2023-05-09 DIAGNOSIS — J30.1 NON-SEASONAL ALLERGIC RHINITIS DUE TO POLLEN: ICD-10-CM

## 2023-05-09 PROCEDURE — 99202 OFFICE O/P NEW SF 15 MIN: CPT | Performed by: NURSE PRACTITIONER

## 2023-05-09 PROCEDURE — 73610 X-RAY EXAM OF ANKLE: CPT | Mod: TC,RT | Performed by: RADIOLOGY

## 2023-05-09 RX ORDER — HYDROXYZINE HYDROCHLORIDE 25 MG/1
25 TABLET, FILM COATED ORAL 2 TIMES DAILY PRN
COMMUNITY
Start: 2023-03-28 | End: 2023-12-26

## 2023-05-09 RX ORDER — LORATADINE 10 MG/1
10 TABLET ORAL DAILY
COMMUNITY
Start: 2023-03-27

## 2023-05-09 RX ORDER — MULTIVITAMIN
1 TABLET ORAL DAILY
COMMUNITY
Start: 2023-03-24

## 2023-05-09 RX ORDER — AMITRIPTYLINE HYDROCHLORIDE 25 MG/1
TABLET, FILM COATED ORAL
COMMUNITY
Start: 2023-03-24

## 2023-05-09 ASSESSMENT — ENCOUNTER SYMPTOMS
NAUSEA: 0
COUGH: 1
CHILLS: 0
SORE THROAT: 1
DIARRHEA: 0
FEVER: 0
HEADACHES: 0

## 2023-05-09 ASSESSMENT — FIBROSIS 4 INDEX: FIB4 SCORE: 1.97

## 2023-05-10 NOTE — PROGRESS NOTES
Subjective     Yamila Mendoza is a 42 y.o. female who presents with Cough (X 2 weeks, sneezing in morning, coughing and sore throat) and Ankle Injury (X a couple weeks ago-- bilateral ankle injury from fall)            HPI  New problem.  Patient is a 42-year-old female who presents with a cough x2 weeks as well as sneezing in the morning and a mild sore throat.  Additionally she reports bilateral ankle pain after a fall approximately 4 weeks ago.  She reports that she was seen for the left ankle in the emergency department and diagnosed with a contusion of her left ankle however she did not have the right one evaluated.  She apparently had a fall while crossing the street although the exact mechanism of injury is hard to tell if she is not a very good historian for this.  She continues to have pain with ambulation.    Bactrim, Iron, Penicillins, Phenobarbital, Lemon oil, Onion, Pepper-bell food allergy, and Ibuprofen  Current Outpatient Medications on File Prior to Visit   Medication Sig Dispense Refill    carBAMazepine (TEGRETOL) 200 MG Tab Take 1 Tablet by mouth 3 times a day. 90 Tablet 0    topiramate (TOPAMAX) 100 MG Tab Take 1 Tablet by mouth 2 times a day. 60 Tablet 3    potassium chloride ER (KLOR-CON) 10 MEQ tablet Take 1 Tablet by mouth 2 times a day. 60 Tablet 3    cyclobenzaprine (FLEXERIL) 5 mg tablet Take 1-2 Tablets by mouth 3 times a day as needed for Mild Pain or Moderate Pain. 30 Tablet 0    naproxen (NAPROSYN) 500 MG Tab Take 1 Tablet by mouth 2 times a day with meals. 60 Tablet 0    ibuprofen (MOTRIN) 800 MG Tab Take 1 Tablet by mouth every 8 hours as needed for Moderate Pain. Take with food 30 Tablet 0    cholecalciferol (VITAMIN D3) 400 UNIT Tab Take 400 Units by mouth every day.      amitriptyline (ELAVIL) 25 MG Tab TAKE 1 TABLET BY MOUTH DAILY AT BEDTIME      hydrOXYzine HCl (ATARAX) 25 MG Tab Take 25 mg by mouth 2 times a day as needed.      loratadine (CLARITIN) 10 MG Tab Take 10 mg by  "mouth every day.      Multiple Vitamin (MULTIVITAMIN) Tab Take 1 Tablet by mouth every day.       No current facility-administered medications on file prior to visit.     Social History     Socioeconomic History    Marital status: Single     Spouse name: Not on file    Number of children: Not on file    Years of education: Not on file    Highest education level: Not on file   Occupational History    Not on file   Tobacco Use    Smoking status: Never    Smokeless tobacco: Never   Vaping Use    Vaping Use: Never used   Substance and Sexual Activity    Alcohol use: No    Drug use: No    Sexual activity: Not on file   Other Topics Concern    Not on file   Social History Narrative    ** Merged History Encounter **          Social Determinants of Health     Financial Resource Strain: Not on file   Food Insecurity: Not on file   Transportation Needs: Not on file   Physical Activity: Not on file   Stress: Not on file   Social Connections: Not on file   Intimate Partner Violence: Not on file   Housing Stability: Not on file     Breast Cancer-related family history is not on file.    Review of Systems   Constitutional:  Negative for chills and fever.   HENT:  Positive for sore throat.    Respiratory:  Positive for cough.    Gastrointestinal:  Negative for diarrhea and nausea.   Musculoskeletal:  Positive for joint pain.   Neurological:  Negative for headaches.            Objective     /80   Pulse 91   Temp 36.4 °C (97.6 °F) (Temporal)   Resp 12   Ht 1.676 m (5' 6\") Comment: per pt  Wt (!) 135 kg (298 lb) Comment: w shoes and jacket  LMP 11/01/2022 (Approximate)   SpO2 98%   BMI 48.10 kg/m²      Physical Exam  Constitutional:       Appearance: Normal appearance.   HENT:      Head: Normocephalic and atraumatic.      Right Ear: Tympanic membrane normal.      Left Ear: Tympanic membrane normal.      Nose: No congestion or rhinorrhea.      Mouth/Throat:      Pharynx: No posterior oropharyngeal erythema. "   Cardiovascular:      Rate and Rhythm: Normal rate and regular rhythm.   Pulmonary:      Effort: Pulmonary effort is normal.      Breath sounds: Normal breath sounds. No wheezing or rales.   Musculoskeletal:      Right lower leg: Edema present.      Left lower leg: Edema present.      Right ankle: Swelling present. Tenderness present. Decreased range of motion.   Neurological:      General: No focal deficit present.      Mental Status: She is alert and oriented to person, place, and time.                             Assessment & Plan        1. Non-seasonal allergic rhinitis due to pollen        2. Injury of right ankle, initial encounter  DX-ANKLE 3+ VIEWS RIGHT        Patient with no sign of congestion on exam today.  I have advised allergy medications for future symptoms.  Her ankle x-ray is negative today.  She does have some lower extremity swelling which I have advised that I believe this is probably the source of her pain and advised to follow-up with primary care for this issue.  She is to follow-up here as needed.

## 2023-05-12 NOTE — DIETARY
NUTRITION SERVICES: BMI - Pt with BMI >40 (=Body mass index is 42.3 kg/m².), Class III obesity. Weight taken via bed scale and +1.9L fluids per I/O. Weight loss counseling not appropriate in acute care setting. RECOMMEND - If appropriate at DC please refer to outpatient nutrition services for weight management.     
55

## 2023-07-08 ENCOUNTER — APPOINTMENT (OUTPATIENT)
Dept: RADIOLOGY | Facility: MEDICAL CENTER | Age: 43
End: 2023-07-08
Attending: EMERGENCY MEDICINE
Payer: MEDICARE

## 2023-07-08 ENCOUNTER — HOSPITAL ENCOUNTER (EMERGENCY)
Facility: MEDICAL CENTER | Age: 43
End: 2023-07-08
Attending: EMERGENCY MEDICINE
Payer: MEDICARE

## 2023-07-08 VITALS
OXYGEN SATURATION: 97 % | HEIGHT: 66 IN | HEART RATE: 84 BPM | TEMPERATURE: 97.5 F | DIASTOLIC BLOOD PRESSURE: 75 MMHG | WEIGHT: 293 LBS | BODY MASS INDEX: 47.09 KG/M2 | RESPIRATION RATE: 20 BRPM | SYSTOLIC BLOOD PRESSURE: 127 MMHG

## 2023-07-08 DIAGNOSIS — R07.89 CHEST WALL PAIN: ICD-10-CM

## 2023-07-08 DIAGNOSIS — S20.212A CONTUSION OF LEFT CHEST WALL, INITIAL ENCOUNTER: Primary | ICD-10-CM

## 2023-07-08 LAB
ALBUMIN SERPL BCP-MCNC: 3.7 G/DL (ref 3.2–4.9)
ALBUMIN/GLOB SERPL: 1.4 G/DL
ALP SERPL-CCNC: 57 U/L (ref 30–99)
ALT SERPL-CCNC: 7 U/L (ref 2–50)
ANION GAP SERPL CALC-SCNC: 13 MMOL/L (ref 7–16)
AST SERPL-CCNC: 14 U/L (ref 12–45)
BASOPHILS # BLD AUTO: 0.6 % (ref 0–1.8)
BASOPHILS # BLD: 0.04 K/UL (ref 0–0.12)
BILIRUB SERPL-MCNC: 0.2 MG/DL (ref 0.1–1.5)
BUN SERPL-MCNC: 7 MG/DL (ref 8–22)
CALCIUM ALBUM COR SERPL-MCNC: 8.8 MG/DL (ref 8.5–10.5)
CALCIUM SERPL-MCNC: 8.6 MG/DL (ref 8.5–10.5)
CHLORIDE SERPL-SCNC: 100 MMOL/L (ref 96–112)
CO2 SERPL-SCNC: 19 MMOL/L (ref 20–33)
CREAT SERPL-MCNC: 0.66 MG/DL (ref 0.5–1.4)
EKG IMPRESSION: NORMAL
EOSINOPHIL # BLD AUTO: 0.26 K/UL (ref 0–0.51)
EOSINOPHIL NFR BLD: 4 % (ref 0–6.9)
ERYTHROCYTE [DISTWIDTH] IN BLOOD BY AUTOMATED COUNT: 42.9 FL (ref 35.9–50)
GFR SERPLBLD CREATININE-BSD FMLA CKD-EPI: 110 ML/MIN/1.73 M 2
GLOBULIN SER CALC-MCNC: 2.6 G/DL (ref 1.9–3.5)
GLUCOSE SERPL-MCNC: 99 MG/DL (ref 65–99)
HCG SERPL QL: NEGATIVE
HCT VFR BLD AUTO: 37.9 % (ref 37–47)
HGB BLD-MCNC: 12.9 G/DL (ref 12–16)
IMM GRANULOCYTES # BLD AUTO: 0.03 K/UL (ref 0–0.11)
IMM GRANULOCYTES NFR BLD AUTO: 0.5 % (ref 0–0.9)
LYMPHOCYTES # BLD AUTO: 2.87 K/UL (ref 1–4.8)
LYMPHOCYTES NFR BLD: 44.6 % (ref 22–41)
MCH RBC QN AUTO: 30.2 PG (ref 27–33)
MCHC RBC AUTO-ENTMCNC: 34 G/DL (ref 32.2–35.5)
MCV RBC AUTO: 88.8 FL (ref 81.4–97.8)
MONOCYTES # BLD AUTO: 0.54 K/UL (ref 0–0.85)
MONOCYTES NFR BLD AUTO: 8.4 % (ref 0–13.4)
NEUTROPHILS # BLD AUTO: 2.69 K/UL (ref 1.82–7.42)
NEUTROPHILS NFR BLD: 41.9 % (ref 44–72)
NRBC # BLD AUTO: 0 K/UL
NRBC BLD-RTO: 0 /100 WBC (ref 0–0.2)
PLATELET # BLD AUTO: 197 K/UL (ref 164–446)
PMV BLD AUTO: 9.8 FL (ref 9–12.9)
POTASSIUM SERPL-SCNC: 4.6 MMOL/L (ref 3.6–5.5)
PROT SERPL-MCNC: 6.3 G/DL (ref 6–8.2)
RBC # BLD AUTO: 4.27 M/UL (ref 4.2–5.4)
SODIUM SERPL-SCNC: 132 MMOL/L (ref 135–145)
TROPONIN T SERPL-MCNC: <6 NG/L (ref 6–19)
WBC # BLD AUTO: 6.4 K/UL (ref 4.8–10.8)

## 2023-07-08 PROCEDURE — 80053 COMPREHEN METABOLIC PANEL: CPT

## 2023-07-08 PROCEDURE — 71045 X-RAY EXAM CHEST 1 VIEW: CPT

## 2023-07-08 PROCEDURE — 85025 COMPLETE CBC W/AUTO DIFF WBC: CPT

## 2023-07-08 PROCEDURE — 99285 EMERGENCY DEPT VISIT HI MDM: CPT

## 2023-07-08 PROCEDURE — 36415 COLL VENOUS BLD VENIPUNCTURE: CPT

## 2023-07-08 PROCEDURE — 93005 ELECTROCARDIOGRAM TRACING: CPT | Performed by: EMERGENCY MEDICINE

## 2023-07-08 PROCEDURE — 84484 ASSAY OF TROPONIN QUANT: CPT

## 2023-07-08 PROCEDURE — 84703 CHORIONIC GONADOTROPIN ASSAY: CPT

## 2023-07-08 RX ORDER — ACETAMINOPHEN 500 MG
1000 TABLET ORAL EVERY 6 HOURS PRN
Qty: 20 TABLET | Refills: 0 | Status: SHIPPED | OUTPATIENT
Start: 2023-07-08 | End: 2023-07-12

## 2023-07-08 RX ORDER — IBUPROFEN 800 MG/1
800 TABLET ORAL EVERY 8 HOURS PRN
Qty: 9 TABLET | Refills: 0 | Status: SHIPPED | OUTPATIENT
Start: 2023-07-08 | End: 2023-07-11

## 2023-07-08 ASSESSMENT — FIBROSIS 4 INDEX: FIB4 SCORE: 1.97

## 2023-07-08 NOTE — ED TRIAGE NOTES
Chief Complaint   Patient presents with    Chest Wall Pain   Pt fell in October and states she is still having pain on her L lower ribs. Pain on palpation. Pt taking 600mg ibuprofen. Pt denies any SOB or substernal CP. No cardiac hx.     Pt ambulatory to triage for above complaint.      Pt is alert/oriented and follows commands. Pt speaking in full sentences and responds appropriately to questions. No acute distress noted in triage and respirations are even and unlabored.     Pt placed in lobby and educated on triage process. Pt encouraged to alert staff for any changes in condition.

## 2023-07-09 NOTE — ED PROVIDER NOTES
ED Provider Note    Scribed for Britton Mccord by Belia Hathaway. 7/8/2023  7:13 PM    Primary care provider: Radha Hinojosa P.A.-C.  Means of arrival: Walk-In  History obtained from: Patient  History limited by: None    CHIEF COMPLAINT  Chief Complaint   Patient presents with    Chest Wall Pain       EXTERNAL RECORDS REVIEWED  Outpatient Notes Review of records show an outpatient note from May 2023 for allergies.      HPI/ROS  LIMITATION TO HISTORY   Select: : None  OUTSIDE HISTORIAN(S):  Significant other present in the room.     HPI  Yamila Mendoza is a 42 y.o. female who presents to the Emergency Department chest wall tenderness onset October of last year. The patient reports that the pain originally started when she fell in October and had pain to the area. She notes that she might've sprained the area. She notes she did not get it checked originally. She adds every time she breathes she feels pain, which concerns her so she came into today. She adds that she has a cough and sometimes will produce some mucus. She denies any drugs or alcohol. She notes taking medication for epilepsy but has had no changes in her condition. No change in her urinary or bowel movements. She denies any cardiac history. She denies shortness of breath. She notes taking ibuprofen to alleviate her pain. There are no known exacerbating factors.       REVIEW OF SYSTEMS  As above, all other systems reviewed and are negative.   See HPI for further details.     PAST MEDICAL HISTORY   has a past medical history of ASTHMA, Bipolar disorder (HCC), Chronic back pain, Psychiatric disorder, Psychiatric disorder, and Seizure disorder (HCC).  SURGICAL HISTORY   has a past surgical history that includes other neurological surg; other orthopedic surgery; other abdominal surgery; ercp in or (9/19/2009); and joy by laparoscopy (9/22/2009).  SOCIAL HISTORY  Social History     Tobacco Use    Smoking status: Never    Smokeless tobacco: Never  "  Vaping Use    Vaping Use: Never used   Substance Use Topics    Alcohol use: No    Drug use: No      Social History     Substance and Sexual Activity   Drug Use No     FAMILY HISTORY  No family history noted.  CURRENT MEDICATIONS  Home Medications       Reviewed by Brian Guerrero R.N. (Registered Nurse) on 07/08/23 at 1540  Med List Status: Not Addressed     Medication Last Dose Status   amitriptyline (ELAVIL) 25 MG Tab  Active   carBAMazepine (TEGRETOL) 200 MG Tab  Active   cholecalciferol (VITAMIN D3) 400 UNIT Tab  Active   cyclobenzaprine (FLEXERIL) 5 mg tablet  Active   hydrOXYzine HCl (ATARAX) 25 MG Tab  Active   ibuprofen (MOTRIN) 800 MG Tab  Active   loratadine (CLARITIN) 10 MG Tab  Active   Multiple Vitamin (MULTIVITAMIN) Tab  Active   naproxen (NAPROSYN) 500 MG Tab  Active   potassium chloride ER (KLOR-CON) 10 MEQ tablet  Active   topiramate (TOPAMAX) 100 MG Tab  Active                  ALLERGIES  Allergies   Allergen Reactions    Bactrim Unspecified     \"I get the shakes\"     Iron Unspecified     \"see red dots\" vision, not rash     Penicillins Unspecified     \"makes me dizzy with my medicine\"     Phenobarbital Unspecified     \"makes me dizzy\"     Lemon Oil Itching     Skin itching per pt report to Dietary.    Onion Itching and Swelling     Throat swelling and itching per pt report to Dietary.    [employer] Allergy Itching and Swelling     Throat swelling and itching per pt report to Dietary.    Ibuprofen Swelling     Seizures       PHYSICAL EXAM    VITAL SIGNS:   Vitals:    07/08/23 1529 07/08/23 1539 07/08/23 1836 07/08/23 1930   BP: (!) 130/90  125/82 127/76   Pulse: 98  85 79   Resp: 18  16 20   Temp: 36.2 °C (97.1 °F)  36.4 °C (97.5 °F)    TempSrc: Temporal  Temporal    SpO2: 97%  99% 98%   Weight:  (!) 135 kg (296 lb 8.3 oz)     Height:  1.676 m (5' 6\")       Vitals: My interpretation: normotensive, not tachycardic, afebrile, not hypoxic    Reinterpretation of vitals: Unremarkable    Cardiac " Monitor Interpretation: The cardiac monitor revealed normal Sinus Rhythm as interpreted by me. The cardiac monitor was ordered secondary to the patient's history of chest pain and to monitor for dysrhythmia and/or tachycardia.    PE:   Gen: Morbidly Obese. sitting comfortably, speaking clearly, appears in no acute distress   ENT: Mucous membranes moist, posterior pharynx clear, uvula midline, nares patent bilaterally   Neck: Supple, FROM  Pulmonary: Lungs are clear to auscultation bilaterally. No tachypnea.   Thorax: Mild tenderness on the left anterior ribs. No deformity or crepitus appreciated.   CV:  RRR, no murmur appreciated, pulses 2+ in both upper and lower extremities  Abdomen: soft, NT/ND; no rebound/guarding  : no CVA or suprapubic tenderness   Neuro: A&Ox4 (person, place, time, situation), speech fluent, gait steady, no focal deficits appreciated  Skin: No rash or lesions.  No pallor or jaundice.  No cyanosis.  Warm and dry.       DIAGNOSTIC STUDIES / PROCEDURES    LABS  Results for orders placed or performed during the hospital encounter of 07/08/23   CBC with Differential   Result Value Ref Range    WBC 6.4 4.8 - 10.8 K/uL    RBC 4.27 4.20 - 5.40 M/uL    Hemoglobin 12.9 12.0 - 16.0 g/dL    Hematocrit 37.9 37.0 - 47.0 %    MCV 88.8 81.4 - 97.8 fL    MCH 30.2 27.0 - 33.0 pg    MCHC 34.0 32.2 - 35.5 g/dL    RDW 42.9 35.9 - 50.0 fL    Platelet Count 197 164 - 446 K/uL    MPV 9.8 9.0 - 12.9 fL    Neutrophils-Polys 41.90 (L) 44.00 - 72.00 %    Lymphocytes 44.60 (H) 22.00 - 41.00 %    Monocytes 8.40 0.00 - 13.40 %    Eosinophils 4.00 0.00 - 6.90 %    Basophils 0.60 0.00 - 1.80 %    Immature Granulocytes 0.50 0.00 - 0.90 %    Nucleated RBC 0.00 0.00 - 0.20 /100 WBC    Neutrophils (Absolute) 2.69 1.82 - 7.42 K/uL    Lymphs (Absolute) 2.87 1.00 - 4.80 K/uL    Monos (Absolute) 0.54 0.00 - 0.85 K/uL    Eos (Absolute) 0.26 0.00 - 0.51 K/uL    Baso (Absolute) 0.04 0.00 - 0.12 K/uL    Immature Granulocytes (abs)  0.03 0.00 - 0.11 K/uL    NRBC (Absolute) 0.00 K/uL   Complete Metabolic Panel (CMP)   Result Value Ref Range    Sodium 132 (L) 135 - 145 mmol/L    Potassium 4.6 3.6 - 5.5 mmol/L    Chloride 100 96 - 112 mmol/L    Co2 19 (L) 20 - 33 mmol/L    Anion Gap 13.0 7.0 - 16.0    Glucose 99 65 - 99 mg/dL    Bun 7 (L) 8 - 22 mg/dL    Creatinine 0.66 0.50 - 1.40 mg/dL    Calcium 8.6 8.5 - 10.5 mg/dL    AST(SGOT) 14 12 - 45 U/L    ALT(SGPT) 7 2 - 50 U/L    Alkaline Phosphatase 57 30 - 99 U/L    Total Bilirubin 0.2 0.1 - 1.5 mg/dL    Albumin 3.7 3.2 - 4.9 g/dL    Total Protein 6.3 6.0 - 8.2 g/dL    Globulin 2.6 1.9 - 3.5 g/dL    A-G Ratio 1.4 g/dL   Troponins NOW   Result Value Ref Range    Troponin T <6 6 - 19 ng/L   HCG Qual Serum   Result Value Ref Range    Beta-Hcg Qualitative Serum Negative Negative   CORRECTED CALCIUM   Result Value Ref Range    Correct Calcium 8.8 8.5 - 10.5 mg/dL   ESTIMATED GFR   Result Value Ref Range    GFR (CKD-EPI) 110 >60 mL/min/1.73 m 2   EKG   Result Value Ref Range    Report       [facility] Emergency Dept.    Test Date:  2023  Pt Name:    GULSHAN SMITH                 Department: ER  MRN:        [id number]                      Room:  Gender:     Female                       Technician: [id number]  :        1980                   Requested By:ER TRIAGE PROTOCOL  Order #:    [id number]                    Reading MD:    Measurements  Intervals                                Axis  Rate:       83                           P:          41  KS:         135                          QRS:        34  QRSD:       109                          T:          29  QT:         415  QTc:        488    Interpretive Statements  Sinus rhythm  Inferior infarct, old  Compared to ECG 2022 23:09:34  No significant changes        All labs reviewed by me. Labs were compared to prior labs if they were available. Significant for no leukocytosis, no anemia, normal electrolytes, normal glucose,  normal renal function, normal liver enzymes, normal bilirubin, troponin negative, pregnancy test negative.    RADIOLOGY  I have independently interpreted the diagnostic imaging associated with this visit and am waiting the final reading from the radiologist.   My preliminary interpretation is a follows: no cardiomegaly, no focal consolidations  Radiologist interpretation is as follows:  DX-CHEST-PORTABLE (1 VIEW)   Final Result         1. No acute cardiopulmonary abnormalities are identified.        COURSE & MEDICAL DECISION MAKING  Nursing notes, VS, PMSFHx, labs, imaging, EKG reviewed in chart.    Heart Score: Low    ED Observation Status? Yes; I am placing the patient in to an observation status due to a diagnostic uncertainty as well as therapeutic intensity. Patient placed in observation status at 7:14 PM, 7/8/2023.     Observation plan is as follows: Imaging, laboratory studies, EKG, and reassessment.     Upon Reevaluation, the patient's condition has: Improved; and will be discharged.    Patient discharged from ED Observation status at 8:32 PM 7/8/2023     Ddx: Strain, sprain, dislocation, fracture, pneumothroax, PE, MI    MDM: 7:14 PM Yamila Mendoza is a 42 y.o. female who presented with acute on chronic severe chest wall pain.  Patient states she slipped in October and has been having pain in the left side of her chest since then.  It is very unclear why she chose today to come in as she has no changes in symptoms, no nausea, vomiting, shortness of breath, cough etc.  Review of systems is otherwise negative.  Vital signs are unremarkable.  Physical exam shows some very minimal tenderness to palpation of the left anterior ribs with no crepitus, subcutaneous emphysema, deformity or bruising to the skin.  She had already undergone triage work-up for chest pain including chest x-ray which under my independent rotation shows no significant cardiomegaly or focal consolidations.  Her EKG is without ischemic  changes.  CBC, CMP, troponin and pregnant test are all negative.  Likely possible contusion, costochondritis, pleurisy or MSK pain.  No significant risk factors for cardiovascular disease.  At this time patient is stable for follow-up with the outpatient team, Tylenol, Motrin and return precautions which were discussed with her and her partner at bedside.  Primary bedside helps provide collateral information regarding patient's presentation today.  They both verbalized understanding strict precautions outpatient follow plan and are amenable.    ADDITIONAL PROBLEM LIST AND DISPOSITION    I have discussed management of the patient with the following physicians and ERYN's:  None    Discussion of management with other QHP or appropriate source(s): None     Escalation of care considered, and ultimately not performed:acute inpatient care management, however at this time, the patient is most appropriate for outpatient management    Barriers to care at this time, including but not limited to:  None .     Decision tools and prescription drugs considered including, but not limited to: Pain Medications Tylenol Motrin .    FINAL IMPRESSION  1. Contusion of left chest wall, initial encounter Acute   2. Chest wall pain Acute       IBelia (Scribe), am scribing for, and in the presence of, Britton Mccord.    Electronically signed by: Belia Hathaway (Scribe), 7/8/2023    IBritton personally performed the services described in this documentation, as scribed by Belia Hathaway in my presence, and it is both accurate and complete.    The note accurately reflects work and decisions made by me.  Britton Mccord  7/8/2023  8:43 PM

## 2023-07-09 NOTE — DISCHARGE INSTRUCTIONS
Thankfully your labs, chest x-ray and EKG showed no problems with your heart and no broken ribs.  Follow-up with your PCP for further evaluation.  I sent Motrin Tylenol to help you with pain,  from the pharmacy.  Come back if any worsening symptoms.  Thank you for coming today.

## 2023-08-03 ENCOUNTER — HOSPITAL ENCOUNTER (EMERGENCY)
Facility: MEDICAL CENTER | Age: 43
End: 2023-08-03
Attending: EMERGENCY MEDICINE
Payer: MEDICARE

## 2023-08-03 ENCOUNTER — APPOINTMENT (OUTPATIENT)
Dept: RADIOLOGY | Facility: MEDICAL CENTER | Age: 43
End: 2023-08-03
Attending: EMERGENCY MEDICINE
Payer: MEDICARE

## 2023-08-03 VITALS
HEIGHT: 66 IN | BODY MASS INDEX: 47.09 KG/M2 | SYSTOLIC BLOOD PRESSURE: 126 MMHG | WEIGHT: 293 LBS | HEART RATE: 91 BPM | TEMPERATURE: 97.7 F | RESPIRATION RATE: 20 BRPM | OXYGEN SATURATION: 97 % | DIASTOLIC BLOOD PRESSURE: 74 MMHG

## 2023-08-03 DIAGNOSIS — R56.9 SEIZURE-LIKE ACTIVITY (HCC): ICD-10-CM

## 2023-08-03 LAB
ALBUMIN SERPL BCP-MCNC: 4.4 G/DL (ref 3.2–4.9)
ALBUMIN/GLOB SERPL: 1.9 G/DL
ALP SERPL-CCNC: 68 U/L (ref 30–99)
ALT SERPL-CCNC: 8 U/L (ref 2–50)
ANION GAP SERPL CALC-SCNC: 20 MMOL/L (ref 7–16)
AST SERPL-CCNC: 14 U/L (ref 12–45)
BASOPHILS # BLD AUTO: 0.4 % (ref 0–1.8)
BASOPHILS # BLD: 0.03 K/UL (ref 0–0.12)
BILIRUB SERPL-MCNC: <0.2 MG/DL (ref 0.1–1.5)
BUN SERPL-MCNC: 10 MG/DL (ref 8–22)
CALCIUM ALBUM COR SERPL-MCNC: 8.8 MG/DL (ref 8.5–10.5)
CALCIUM SERPL-MCNC: 9.1 MG/DL (ref 8.5–10.5)
CHLORIDE SERPL-SCNC: 103 MMOL/L (ref 96–112)
CO2 SERPL-SCNC: 14 MMOL/L (ref 20–33)
CREAT SERPL-MCNC: 0.78 MG/DL (ref 0.5–1.4)
EOSINOPHIL # BLD AUTO: 0.11 K/UL (ref 0–0.51)
EOSINOPHIL NFR BLD: 1.3 % (ref 0–6.9)
ERYTHROCYTE [DISTWIDTH] IN BLOOD BY AUTOMATED COUNT: 43.6 FL (ref 35.9–50)
ETHANOL BLD-MCNC: <10.1 MG/DL
GFR SERPLBLD CREATININE-BSD FMLA CKD-EPI: 96 ML/MIN/1.73 M 2
GLOBULIN SER CALC-MCNC: 2.3 G/DL (ref 1.9–3.5)
GLUCOSE SERPL-MCNC: 125 MG/DL (ref 65–99)
HCG SERPL QL: NEGATIVE
HCT VFR BLD AUTO: 41 % (ref 37–47)
HGB BLD-MCNC: 13.7 G/DL (ref 12–16)
IMM GRANULOCYTES # BLD AUTO: 0.07 K/UL (ref 0–0.11)
IMM GRANULOCYTES NFR BLD AUTO: 0.8 % (ref 0–0.9)
LYMPHOCYTES # BLD AUTO: 4.23 K/UL (ref 1–4.8)
LYMPHOCYTES NFR BLD: 50.1 % (ref 22–41)
MCH RBC QN AUTO: 29.8 PG (ref 27–33)
MCHC RBC AUTO-ENTMCNC: 33.4 G/DL (ref 32.2–35.5)
MCV RBC AUTO: 89.3 FL (ref 81.4–97.8)
MONOCYTES # BLD AUTO: 0.62 K/UL (ref 0–0.85)
MONOCYTES NFR BLD AUTO: 7.3 % (ref 0–13.4)
NEUTROPHILS # BLD AUTO: 3.39 K/UL (ref 1.82–7.42)
NEUTROPHILS NFR BLD: 40.1 % (ref 44–72)
NRBC # BLD AUTO: 0 K/UL
NRBC BLD-RTO: 0 /100 WBC (ref 0–0.2)
PLATELET # BLD AUTO: 241 K/UL (ref 164–446)
PMV BLD AUTO: 10 FL (ref 9–12.9)
POTASSIUM SERPL-SCNC: 3.3 MMOL/L (ref 3.6–5.5)
PROT SERPL-MCNC: 6.7 G/DL (ref 6–8.2)
RBC # BLD AUTO: 4.59 M/UL (ref 4.2–5.4)
SODIUM SERPL-SCNC: 137 MMOL/L (ref 135–145)
VALPROATE SERPL-MCNC: 42.7 UG/ML (ref 50–100)
WBC # BLD AUTO: 8.5 K/UL (ref 4.8–10.8)

## 2023-08-03 PROCEDURE — 36415 COLL VENOUS BLD VENIPUNCTURE: CPT

## 2023-08-03 PROCEDURE — 700101 HCHG RX REV CODE 250: Mod: UD | Performed by: EMERGENCY MEDICINE

## 2023-08-03 PROCEDURE — 70450 CT HEAD/BRAIN W/O DYE: CPT

## 2023-08-03 PROCEDURE — 80164 ASSAY DIPROPYLACETIC ACD TOT: CPT

## 2023-08-03 PROCEDURE — A9270 NON-COVERED ITEM OR SERVICE: HCPCS | Mod: UD | Performed by: EMERGENCY MEDICINE

## 2023-08-03 PROCEDURE — 84703 CHORIONIC GONADOTROPIN ASSAY: CPT

## 2023-08-03 PROCEDURE — 99285 EMERGENCY DEPT VISIT HI MDM: CPT

## 2023-08-03 PROCEDURE — 93005 ELECTROCARDIOGRAM TRACING: CPT

## 2023-08-03 PROCEDURE — 93005 ELECTROCARDIOGRAM TRACING: CPT | Performed by: EMERGENCY MEDICINE

## 2023-08-03 PROCEDURE — 71045 X-RAY EXAM CHEST 1 VIEW: CPT

## 2023-08-03 PROCEDURE — 700102 HCHG RX REV CODE 250 W/ 637 OVERRIDE(OP): Mod: UD | Performed by: EMERGENCY MEDICINE

## 2023-08-03 PROCEDURE — 80053 COMPREHEN METABOLIC PANEL: CPT

## 2023-08-03 PROCEDURE — 85025 COMPLETE CBC W/AUTO DIFF WBC: CPT

## 2023-08-03 PROCEDURE — 82077 ASSAY SPEC XCP UR&BREATH IA: CPT

## 2023-08-03 RX ORDER — DIVALPROEX SODIUM 500 MG/1
500 TABLET, DELAYED RELEASE ORAL ONCE
Status: COMPLETED | OUTPATIENT
Start: 2023-08-03 | End: 2023-08-03

## 2023-08-03 RX ORDER — TOPIRAMATE 100 MG/1
100 TABLET, FILM COATED ORAL ONCE
Status: COMPLETED | OUTPATIENT
Start: 2023-08-03 | End: 2023-08-03

## 2023-08-03 RX ORDER — CARBAMAZEPINE 100 MG/1
200 TABLET, EXTENDED RELEASE ORAL ONCE
Status: COMPLETED | OUTPATIENT
Start: 2023-08-03 | End: 2023-08-03

## 2023-08-03 RX ADMIN — TOPIRAMATE 100 MG: 100 TABLET, FILM COATED ORAL at 20:27

## 2023-08-03 RX ADMIN — DIVALPROEX SODIUM 500 MG: 500 TABLET, DELAYED RELEASE ORAL at 20:27

## 2023-08-03 RX ADMIN — CARBAMAZEPINE 200 MG: 100 TABLET, EXTENDED RELEASE ORAL at 20:27

## 2023-08-03 ASSESSMENT — FIBROSIS 4 INDEX: FIB4 SCORE: 1.18

## 2023-08-03 NOTE — ED TRIAGE NOTES
Chief Complaint   Patient presents with    Seizure     [place]. Two seizures prior to arrival. One witness en route by EMS. Pt postictal on arrival to ED. Reactive to painful stimulus.   Hx of seizures        5 versed from [facility]

## 2023-08-03 NOTE — ED PROVIDER NOTES
ER Provider Note    Scribed for Mychal Cooper M.D. by Annie Salgado. 8/3/2023   4:17 PM    Primary Care Provider: aRdha Hinojosa P.A.-C.    CHIEF COMPLAINT  Chief Complaint   Patient presents with    Seizure     [place]. Two seizures prior to arrival. One witness en route by EMS. Pt postictal on arrival to ED. Reactive to painful stimulus.   Hx of seizures      EXTERNAL RECORDS REVIEWED  Inpatient Notes The patient was admitted in September last year for seizure like activity. She was intubated to protect her airway. She had a continuous EEG that does not show any seizure like activity.    HPI/ROS  LIMITATION TO HISTORY   Select: Altered mental status / Confusion  OUTSIDE HISTORIAN(S):  None.    Yamila Mendoza is a 42 y.o. female who has a history of seizures presents to the ED via EMS for evaluation after two seizures onset earlier today. EMS [place] Per nursing note, the patient is reactive to painful stimulus. Upon evaluation, the patient only groans when spoken to.     PAST MEDICAL HISTORY  Past Medical History:   Diagnosis Date    ASTHMA     Bipolar disorder (HCC)     Chronic back pain     Psychiatric disorder     bipolar    Psychiatric disorder     depression    Seizure disorder (HCC)      SURGICAL HISTORY  Past Surgical History:   Procedure Laterality Date    REYES BY LAPAROSCOPY  9/22/2009    Performed by JUNE WAGGONER at SURGERY [facility] ORS    ERCP IN OR  9/19/2009    Performed by LOU WEBB at SURGERY [facility] ORS    OTHER ABDOMINAL SURGERY      OTHER NEUROLOGICAL SURG      OTHER ORTHOPEDIC SURGERY      2 R knee surgeries     FAMILY HISTORY  No family history pertinent.    SOCIAL HISTORY   reports that she has never smoked. She has never used smokeless tobacco. She reports that she does not drink alcohol and does not use drugs.    CURRENT MEDICATIONS  Previous Medications    AMITRIPTYLINE  "(ELAVIL) 25 MG TAB    TAKE 1 TABLET BY MOUTH DAILY AT BEDTIME    CARBAMAZEPINE (TEGRETOL) 200 MG TAB    Take 1 Tablet by mouth 3 times a day.    CHOLECALCIFEROL (VITAMIN D3) 400 UNIT TAB    Take 400 Units by mouth every day.    CYCLOBENZAPRINE (FLEXERIL) 5 MG TABLET    Take 1-2 Tablets by mouth 3 times a day as needed for Mild Pain or Moderate Pain.    HYDROXYZINE HCL (ATARAX) 25 MG TAB    Take 25 mg by mouth 2 times a day as needed.    LORATADINE (CLARITIN) 10 MG TAB    Take 10 mg by mouth every day.    MULTIPLE VITAMIN (MULTIVITAMIN) TAB    Take 1 Tablet by mouth every day.    NAPROXEN (NAPROSYN) 500 MG TAB    Take 1 Tablet by mouth 2 times a day with meals.    POTASSIUM CHLORIDE ER (KLOR-CON) 10 MEQ TABLET    Take 1 Tablet by mouth 2 times a day.    TOPIRAMATE (TOPAMAX) 100 MG TAB    Take 1 Tablet by mouth 2 times a day.     ALLERGIES  Allergies   Allergen Reactions    Bactrim Unspecified     \"I get the shakes\"     Iron Unspecified     \"see red dots\" vision, not rash     Penicillins Unspecified     \"makes me dizzy with my medicine\"     Phenobarbital Unspecified     \"makes me dizzy\"     Lemon Oil Itching     Skin itching per pt report to Dietary.    Onion Itching and Swelling     Throat swelling and itching per pt report to Dietary.    [employer] Allergy Itching and Swelling     Throat swelling and itching per pt report to Dietary.    Ibuprofen Swelling     Seizures      PHYSICAL EXAM  BP (!) 153/82   Pulse (!) 127   Temp 37.3 °C (99.2 °F) (Temporal)   Resp (!) 26   Ht 1.676 m (5' 6\")   Wt (!) 134 kg (296 lb)   LMP 11/01/2022 (Approximate)   SpO2 94%   BMI 47.78 kg/m²      Well-developed well-nourished 42-year-old who is in moderate distress  Atraumatic, normocephalic, oropharynx with dry mucous membranes  Neck is supple  Chest, tachycardia, holosystolic murmur  Clear to auscultation, hyperventilating  Abdomen soft nontender  Neuro the patient will moan and open her eyes will not follow commands.  "   Psych unable to assess    DIAGNOSTIC STUDIES    Labs:   Results for orders placed or performed during the hospital encounter of 08/03/23   CBC With Differential   Result Value Ref Range    WBC 8.5 4.8 - 10.8 K/uL    RBC 4.59 4.20 - 5.40 M/uL    Hemoglobin 13.7 12.0 - 16.0 g/dL    Hematocrit 41.0 37.0 - 47.0 %    MCV 89.3 81.4 - 97.8 fL    MCH 29.8 27.0 - 33.0 pg    MCHC 33.4 32.2 - 35.5 g/dL    RDW 43.6 35.9 - 50.0 fL    Platelet Count 241 164 - 446 K/uL    MPV 10.0 9.0 - 12.9 fL    Neutrophils-Polys 40.10 (L) 44.00 - 72.00 %    Lymphocytes 50.10 (H) 22.00 - 41.00 %    Monocytes 7.30 0.00 - 13.40 %    Eosinophils 1.30 0.00 - 6.90 %    Basophils 0.40 0.00 - 1.80 %    Immature Granulocytes 0.80 0.00 - 0.90 %    Nucleated RBC 0.00 0.00 - 0.20 /100 WBC    Neutrophils (Absolute) 3.39 1.82 - 7.42 K/uL    Lymphs (Absolute) 4.23 1.00 - 4.80 K/uL    Monos (Absolute) 0.62 0.00 - 0.85 K/uL    Eos (Absolute) 0.11 0.00 - 0.51 K/uL    Baso (Absolute) 0.03 0.00 - 0.12 K/uL    Immature Granulocytes (abs) 0.07 0.00 - 0.11 K/uL    NRBC (Absolute) 0.00 K/uL   Comp Metabolic Panel   Result Value Ref Range    Sodium 137 135 - 145 mmol/L    Potassium 3.3 (L) 3.6 - 5.5 mmol/L    Chloride 103 96 - 112 mmol/L    Co2 14 (L) 20 - 33 mmol/L    Anion Gap 20.0 (H) 7.0 - 16.0    Glucose 125 (H) 65 - 99 mg/dL    Bun 10 8 - 22 mg/dL    Creatinine 0.78 0.50 - 1.40 mg/dL    Calcium 9.1 8.5 - 10.5 mg/dL    Correct Calcium 8.8 8.5 - 10.5 mg/dL    AST(SGOT) 14 12 - 45 U/L    ALT(SGPT) 8 2 - 50 U/L    Alkaline Phosphatase 68 30 - 99 U/L    Total Bilirubin <0.2 0.1 - 1.5 mg/dL    Albumin 4.4 3.2 - 4.9 g/dL    Total Protein 6.7 6.0 - 8.2 g/dL    Globulin 2.3 1.9 - 3.5 g/dL    A-G Ratio 1.9 g/dL   HCG Qual Serum   Result Value Ref Range    Beta-Hcg Qualitative Serum Negative Negative   Diagnostic Alcohol   Result Value Ref Range    Diagnostic Alcohol <10.1 <10.1 mg/dL   VALPROIC ACID   Result Value Ref Range    Valproic Acid 42.7 (L) 50.0 - 100.0 ug/mL    ESTIMATED GFR   Result Value Ref Range    GFR (CKD-EPI) 96 >60 mL/min/1.73 m 2   EKG   Result Value Ref Range    Report       [facility] Emergency Dept.    Test Date:  2023  Pt Name:    GULSHAN SMITH                 Department: ER  MRN:        [id number]                      Room:        08  Gender:     Female                       Technician: [id number]  :        1980                   Requested By:ARACELI TIERNEY  Order #:    [id number]                    Reading MD:    Measurements  Intervals                                Axis  Rate:       118                          P:          53  MA:         146                          QRS:        56  QRSD:       109                          T:          -62  QT:         320  QTc:        449    Interpretive Statements  Sinus tachycardia  Inferoposterior infarct, recent  Abnrm T, consider ischemia, anterolateral lds  Compared to ECG 2023 20:28:23  Possible ischemia now present  Sinus rhythm no longer present  Myocardial infarct finding still present       EKG:   I have independently interpreted this EKG as detailed above.  Results for orders placed or performed during the hospital encounter of 23   EKG   Result Value Ref Range    Report       [facility] Emergency Dept.    Test Date:  2023  Pt Name:    GULSHAN SMITH                 Department: ER  MRN:        [id number]                      Room:       RD 08  Gender:     Female                       Technician: [id number]  :        1980                   Requested By:ARACELI TIERNEY  Order #:    [id number]                    Reading MD:    Measurements  Intervals                                Axis  Rate:       118                          P:          53  MA:         146                          QRS:        56  QRSD:       109                          T:          -62  QT:         320  QTc:        449    Interpretive Statements  Sinus  tachycardia  Inferoposterior infarct, recent  Abnrm T, consider ischemia, anterolateral lds  Compared to ECG 07/08/2023 20:28:23  Possible ischemia now present  Sinus rhythm no longer present  Myocardial infarct finding still present         Radiology:   This attending emergency physician has independently interpreted the diagnostic imaging associated with this visit and is awaiting the final reading from the radiologist.   Preliminary interpretation is a follows: No intracranial abnormalities  Radiologist interpretation:   CT-HEAD W/O   Final Result      1.  Chronic diffuse calvarial thickening with ossification of the falx.      2.  Otherwise unremarkable noncontrast Head CT.         DX-CHEST-PORTABLE (1 VIEW)   Final Result      Bilateral basilar atelectasis.         COURSE & MEDICAL DECISION MAKING     ED Observation Status? Yes; I am placing the patient in to an observation status due to a diagnostic uncertainty as well as therapeutic intensity. Patient placed in observation status at 4:35 PM, 8/3/2023.     Observation plan is as follows: Will perform an EKG, lab work, and imaging for further evaluation of her symptoms.     Upon Reevaluation, the patient's condition has: Improved; and will be discharged.    Patient discharged from ED Observation status at 8:12 PM (Time) 8/3/2023 (Date).     INITIAL ASSESSMENT, COURSE AND PLAN  Care Narrative: Patient with a history of seizure disorders who is coming in postictal.  Apparently the patient had 2 seizures.  The patient is tachycardic and agitated upon arrival.  Patient had already received some Versed, monitor the patient for multiple hours multiple rechecks until the patient is awake and alert and able to ambulate.  Give the patient her nighttime doses of seizure medicines, she will follow-up with her neurologist, have the patient return with worsening symptoms.    4:24 PM - Patient was evaluated at bedside. Ordered for an EKG, Urine Drug Screen, POCT Urinalysis,  Diagnostic Alcohol, HCG Qual Serum, CMP, CBC w/ Diff., POCT Glucose, and DX-Chest to evaluate. Patient verbalizes understanding and support with my plan of care.     Differential diagnoses include but not limited to: Subtle seizures. Seizures. Bipolar. Sepsis. Electrolyte abnormality.      4:36 PM - Ordered Valproic Acid and CT-Head w/o for further evaluation.     8:07 PM - Patient was reevaluated at bedside. Discussed lab and radiology results with the patient and informed them that they are reassuring. She notes that she needs her medication for tonight. She will be treated with Topamax 100 mg and Depakote 500 mg. Discussed discharge instructions and return precautions with the patient and they were cleared for discharge. Patient was given the opportunity to ask any further questions. She is comfortable with discharge at this time.       DISPOSITION AND DISCUSSIONS    I have discussed management of the patient with the following physicians and ERYN's:  None.    Discussion of management with other hospitals or appropriate source(s): None     Escalation of care considered, and ultimately not performed: acute inpatient care management, however at this time, the patient is most appropriate for outpatient management.    Barriers to care at this time, including but not limited to:  None known .     The patient will return for new or worsening symptoms and is stable at the time of discharge.    The patient is referred to a primary physician for blood pressure management, diabetic screening, and for all other preventative health concerns.    DISPOSITION:  Patient will be discharged home in stable condition.    FOLLOW UP:  [facility], Emergency Dept  [address] [address] [phone]    If symptoms worsen    FINAL DIAGNOSIS  1. Seizure-like activity (HCC)       I, Annie Salgado (Scribarmani), am scribing for, and in the presence of, Mychal Cooper M.D..    Electronically  signed by: Annie Salgado (Scribe), 8/3/2023    I, Mychal Cooper M.D. personally performed the services described in this documentation, as scribed by Annie Salgado in my presence, and it is both accurate and complete.      The note accurately reflects work and decisions made by me.  Mychal Cooper M.D.  8/3/2023  9:45 PM

## 2023-08-04 NOTE — ED NOTES
"Discharge instructions reviewed with patient/family.  Patient verbalizes understanding of follow up care, medication management and reasons to return to the ER. PIV Removed with no complications. /74   Pulse 91   Temp 36.5 °C (97.7 °F)   Resp 20   Ht 1.676 m (5' 6\")   Wt (!) 134 kg (296 lb)   LMP 11/01/2022 (Approximate)   SpO2 97%   BMI 47.78 kg/m²   "

## 2023-08-04 NOTE — ED NOTES
Bedside report to Mazin RN. Patient resting on stretcher in no acute distress. Equal chest rise noted. VS stable on monitor. Bed locked and in low position. Call bell within reach.

## 2023-08-04 NOTE — ED NOTES
Pt incontinent of urine. Patient pants removed, and given clean towels and encouraged to participate in ADLs at this time. Patient is in bed, on vitals monitor VSS

## 2023-08-16 ENCOUNTER — APPOINTMENT (OUTPATIENT)
Dept: RADIOLOGY | Facility: MEDICAL CENTER | Age: 43
End: 2023-08-16
Attending: STUDENT IN AN ORGANIZED HEALTH CARE EDUCATION/TRAINING PROGRAM
Payer: MEDICARE

## 2023-08-16 ENCOUNTER — HOSPITAL ENCOUNTER (EMERGENCY)
Facility: MEDICAL CENTER | Age: 43
End: 2023-08-17
Attending: STUDENT IN AN ORGANIZED HEALTH CARE EDUCATION/TRAINING PROGRAM
Payer: MEDICARE

## 2023-08-16 ENCOUNTER — APPOINTMENT (OUTPATIENT)
Dept: RADIOLOGY | Facility: MEDICAL CENTER | Age: 43
End: 2023-08-16
Payer: MEDICARE

## 2023-08-16 DIAGNOSIS — H60.502 ACUTE OTITIS EXTERNA OF LEFT EAR, UNSPECIFIED TYPE: ICD-10-CM

## 2023-08-16 DIAGNOSIS — U07.1 COVID-19: ICD-10-CM

## 2023-08-16 LAB
ALBUMIN SERPL BCP-MCNC: 3.7 G/DL (ref 3.2–4.9)
ALBUMIN/GLOB SERPL: 1.3 G/DL
ALP SERPL-CCNC: 50 U/L (ref 30–99)
ALT SERPL-CCNC: 7 U/L (ref 2–50)
ANION GAP SERPL CALC-SCNC: 12 MMOL/L (ref 7–16)
AST SERPL-CCNC: 13 U/L (ref 12–45)
BASOPHILS # BLD AUTO: 0.7 % (ref 0–1.8)
BASOPHILS # BLD: 0.03 K/UL (ref 0–0.12)
BILIRUB SERPL-MCNC: <0.2 MG/DL (ref 0.1–1.5)
BUN SERPL-MCNC: 14 MG/DL (ref 8–22)
CALCIUM ALBUM COR SERPL-MCNC: 9.2 MG/DL (ref 8.5–10.5)
CALCIUM SERPL-MCNC: 9 MG/DL (ref 8.5–10.5)
CHLORIDE SERPL-SCNC: 101 MMOL/L (ref 96–112)
CO2 SERPL-SCNC: 20 MMOL/L (ref 20–33)
CREAT SERPL-MCNC: 0.55 MG/DL (ref 0.5–1.4)
EOSINOPHIL # BLD AUTO: 0.1 K/UL (ref 0–0.51)
EOSINOPHIL NFR BLD: 2.4 % (ref 0–6.9)
ERYTHROCYTE [DISTWIDTH] IN BLOOD BY AUTOMATED COUNT: 44.9 FL (ref 35.9–50)
FLUAV RNA SPEC QL NAA+PROBE: NEGATIVE
FLUBV RNA SPEC QL NAA+PROBE: NEGATIVE
GFR SERPLBLD CREATININE-BSD FMLA CKD-EPI: 115 ML/MIN/1.73 M 2
GLOBULIN SER CALC-MCNC: 2.8 G/DL (ref 1.9–3.5)
GLUCOSE SERPL-MCNC: 125 MG/DL (ref 65–99)
HCG SERPL QL: NEGATIVE
HCT VFR BLD AUTO: 40 % (ref 37–47)
HGB BLD-MCNC: 13.4 G/DL (ref 12–16)
IMM GRANULOCYTES # BLD AUTO: 0.03 K/UL (ref 0–0.11)
IMM GRANULOCYTES NFR BLD AUTO: 0.7 % (ref 0–0.9)
LYMPHOCYTES # BLD AUTO: 1.15 K/UL (ref 1–4.8)
LYMPHOCYTES NFR BLD: 27.4 % (ref 22–41)
MCH RBC QN AUTO: 29.8 PG (ref 27–33)
MCHC RBC AUTO-ENTMCNC: 33.5 G/DL (ref 32.2–35.5)
MCV RBC AUTO: 89.1 FL (ref 81.4–97.8)
MONOCYTES # BLD AUTO: 0.7 K/UL (ref 0–0.85)
MONOCYTES NFR BLD AUTO: 16.7 % (ref 0–13.4)
NEUTROPHILS # BLD AUTO: 2.19 K/UL (ref 1.82–7.42)
NEUTROPHILS NFR BLD: 52.1 % (ref 44–72)
NRBC # BLD AUTO: 0 K/UL
NRBC BLD-RTO: 0 /100 WBC (ref 0–0.2)
PLATELET # BLD AUTO: 160 K/UL (ref 164–446)
PMV BLD AUTO: 9.6 FL (ref 9–12.9)
POTASSIUM SERPL-SCNC: 3.7 MMOL/L (ref 3.6–5.5)
PROT SERPL-MCNC: 6.5 G/DL (ref 6–8.2)
RBC # BLD AUTO: 4.49 M/UL (ref 4.2–5.4)
RSV RNA SPEC QL NAA+PROBE: NEGATIVE
SARS-COV-2 RNA RESP QL NAA+PROBE: DETECTED
SODIUM SERPL-SCNC: 133 MMOL/L (ref 135–145)
SPECIMEN SOURCE: ABNORMAL
TROPONIN T SERPL-MCNC: <6 NG/L (ref 6–19)
WBC # BLD AUTO: 4.2 K/UL (ref 4.8–10.8)

## 2023-08-16 PROCEDURE — 0241U HCHG SARS-COV-2 COVID-19 NFCT DS RESP RNA 4 TRGT MIC: CPT

## 2023-08-16 PROCEDURE — 93005 ELECTROCARDIOGRAM TRACING: CPT | Performed by: STUDENT IN AN ORGANIZED HEALTH CARE EDUCATION/TRAINING PROGRAM

## 2023-08-16 PROCEDURE — 93005 ELECTROCARDIOGRAM TRACING: CPT

## 2023-08-16 PROCEDURE — C9803 HOPD COVID-19 SPEC COLLECT: HCPCS | Performed by: STUDENT IN AN ORGANIZED HEALTH CARE EDUCATION/TRAINING PROGRAM

## 2023-08-16 PROCEDURE — 36415 COLL VENOUS BLD VENIPUNCTURE: CPT

## 2023-08-16 PROCEDURE — 85025 COMPLETE CBC W/AUTO DIFF WBC: CPT

## 2023-08-16 PROCEDURE — 71045 X-RAY EXAM CHEST 1 VIEW: CPT

## 2023-08-16 PROCEDURE — 80053 COMPREHEN METABOLIC PANEL: CPT

## 2023-08-16 PROCEDURE — 99284 EMERGENCY DEPT VISIT MOD MDM: CPT

## 2023-08-16 PROCEDURE — 84703 CHORIONIC GONADOTROPIN ASSAY: CPT

## 2023-08-16 PROCEDURE — 84484 ASSAY OF TROPONIN QUANT: CPT

## 2023-08-16 RX ORDER — NEOMYCIN SULFATE, POLYMYXIN B SULFATE AND HYDROCORTISONE 10; 3.5; 1 MG/ML; MG/ML; [USP'U]/ML
4 SUSPENSION/ DROPS AURICULAR (OTIC) 4 TIMES DAILY
Qty: 12 ML | Refills: 0 | Status: ACTIVE | OUTPATIENT
Start: 2023-08-16 | End: 2023-08-23

## 2023-08-16 RX ORDER — NEOMYCIN SULFATE, POLYMYXIN B SULFATE AND HYDROCORTISONE 10; 3.5; 1 MG/ML; MG/ML; [USP'U]/ML
4 SUSPENSION/ DROPS AURICULAR (OTIC) ONCE
Status: COMPLETED | OUTPATIENT
Start: 2023-08-17 | End: 2023-08-17

## 2023-08-16 ASSESSMENT — FIBROSIS 4 INDEX: FIB4 SCORE: 0.9

## 2023-08-17 VITALS
HEART RATE: 84 BPM | HEIGHT: 66 IN | OXYGEN SATURATION: 92 % | BODY MASS INDEX: 46.52 KG/M2 | DIASTOLIC BLOOD PRESSURE: 77 MMHG | SYSTOLIC BLOOD PRESSURE: 159 MMHG | TEMPERATURE: 98 F | RESPIRATION RATE: 20 BRPM | WEIGHT: 289.46 LBS

## 2023-08-17 LAB — EKG IMPRESSION: NORMAL

## 2023-08-17 PROCEDURE — 700101 HCHG RX REV CODE 250: Mod: UD | Performed by: STUDENT IN AN ORGANIZED HEALTH CARE EDUCATION/TRAINING PROGRAM

## 2023-08-17 RX ADMIN — NEOMYCIN SULFATE, POLYMYXIN B SULFATE AND HYDROCORTISONE 4 DROP: 10; 3.5; 1 SUSPENSION/ DROPS AURICULAR (OTIC) at 00:28

## 2023-08-17 NOTE — DISCHARGE INSTRUCTIONS
You were seen in the emergency department for evaluation of left ear drainage as well as runny nose and fever.  You are found to have COVID.  Your chest x-ray shows no signs of pneumonia.  You have an infection of the left ear canal and have been given drops.  Please use the drops 4 times a day for the next 7 days.  Follow-up with your primary care doctor to ensure that you are improving as expected.  Return to the emergency room for any difficulty breathing, crushing chest pain or any other concerns.    In regards to your COVID infection, COVID is a virus and does not respond to antibiotics.  The treatment for this is supportive including rest, making sure you stay well-hydrated and treating with Tylenol for any pain that you may have.  If you are having a hard time breathing, return to the emergency room.

## 2023-08-17 NOTE — ED NOTES
Pt w/c from lobby to room. Pt provided call light. Family at bedside. Chart up for next available ERP.

## 2023-08-17 NOTE — ED PROVIDER NOTES
ED Provider Note    CHIEF COMPLAINT  Chief Complaint   Patient presents with    Ear Pain     Left era pain with yellowish/greenish discharge that started 3 days ago    Flu Like Symptoms     For 3 days  Pt endorses she feels hot     Chest Pain     Left sided chest pain for 2 months       EXTERNAL RECORDS REVIEWED  Outpatient Notes she is seen at urgent care for allergic rhinitis May 2023    HPI/ROS  LIMITATION TO HISTORY   Select: : None  OUTSIDE HISTORIAN(S):  Significant other     Yamila Mendoza is a 42 y.o. female who presents with left ear discharge that has been going on for the past 3 days with associated feelings of subjective fever.  She also has runny nose and sore throat with associated dry cough.  She denies that she took Tylenol earlier today.  She states that she has had pain under the left breast for the past 2 months and feels that she has bronchitis.  She has no abdominal pain, nausea, vomiting, diarrhea, leg swelling, leg pain.  She denies any sick contacts.  She does have a history of asthma.  She did receive 1 COVID vaccination.  She denies being on any hormones.    PAST MEDICAL HISTORY   has a past medical history of ASTHMA, Bipolar disorder (HCC), Chronic back pain, Psychiatric disorder, Psychiatric disorder, and Seizure disorder (HCC).    SURGICAL HISTORY   has a past surgical history that includes other neurological surg; other orthopedic surgery; other abdominal surgery; ercp in or (9/19/2009); and joy by laparoscopy (9/22/2009).    FAMILY HISTORY  History reviewed. No pertinent family history.    SOCIAL HISTORY  Social History     Tobacco Use    Smoking status: Never    Smokeless tobacco: Never   Vaping Use    Vaping Use: Never used   Substance and Sexual Activity    Alcohol use: No    Drug use: No    Sexual activity: Not on file       CURRENT MEDICATIONS  Home Medications       Reviewed by Radha Blunt R.N. (Registered Nurse) on 08/16/23 at 2038  Med List Status: Partial  "    Medication Last Dose Status   amitriptyline (ELAVIL) 25 MG Tab  Active   carBAMazepine (TEGRETOL) 200 MG Tab  Active   cholecalciferol (VITAMIN D3) 400 UNIT Tab  Active   cyclobenzaprine (FLEXERIL) 5 mg tablet  Active   hydrOXYzine HCl (ATARAX) 25 MG Tab  Active   loratadine (CLARITIN) 10 MG Tab  Active   Multiple Vitamin (MULTIVITAMIN) Tab  Active   naproxen (NAPROSYN) 500 MG Tab  Active   potassium chloride ER (KLOR-CON) 10 MEQ tablet  Active   topiramate (TOPAMAX) 100 MG Tab  Active                    ALLERGIES  Allergies   Allergen Reactions    Bactrim Unspecified     \"I get the shakes\"     Iron Unspecified     \"see red dots\" vision, not rash     Penicillins Unspecified     \"makes me dizzy with my medicine\"     Phenobarbital Unspecified     \"makes me dizzy\"     Lemon Oil Itching     Skin itching per pt report to Dietary.    Onion Itching and Swelling     Throat swelling and itching per pt report to Dietary.    [employer] Allergy Itching and Swelling     Throat swelling and itching per pt report to Dietary.    Ibuprofen Swelling     Seizures       PHYSICAL EXAM  VITAL SIGNS: /86   Pulse (!) 101   Temp 36.1 °C (97 °F) (Temporal)   Resp 16   Ht 1.676 m (5' 6\")   Wt (!) 131 kg (289 lb 7.4 oz)   LMP 11/01/2022 (Approximate)   SpO2 94%   BMI 46.72 kg/m²      Constitutional: Well developed, Well nourished, No acute distress, Non-toxic appearance.   HEENT: Normocephalic, Atraumatic,  external ears normal, pharynx pink,  Mucous  Membranes moist, no posterior oropharyngeal erythema or exudates.  No trismus, uvula midline.  Left ear with purulent discharge, unable to visualize TM, pain with manipulation of pinna.  Right external auditory canal and tympanic membranes is normal.  Clear rhinorrhea noted to bilateral naris  Eyes: PERRL, EOMI, Conjunctiva normal, No discharge.   Neck: Normal range of motion, No tenderness, Supple, No stridor.    Cardiovascular: Regular Rate and Rhythm, No murmurs,  rubs, " or gallops.   Thorax & Lungs: Lungs clear to auscultation bilaterally, No respiratory distress, No wheezes, rhales or rhonchi, No chest wall tenderness.   Abdomen: Bowel sounds normal, Soft, non tender, non distended,  No pulsatile masses., no rebound guarding or peritoneal signs.   Skin: Warm, Dry, No erythema, No rash,   Back:  No CVA tenderness,  No spinal tenderness, bony crepitance step offs or instability.   Extremities: Equal, intact distal pulses, No cyanosis, clubbing or edema,  No tenderness.   Musculoskeletal: Good range of motion in all major joints. No tenderness to palpation or major deformities noted.   Neurologic: Alert & oriented x 3, No focal deficits noted.   Psychiatric: Affect normal, Judgment normal, Mood normal. No suicidal or homicidal ideation      DIAGNOSTIC STUDIES / PROCEDURES      LABS  Results for orders placed or performed during the hospital encounter of 08/16/23   CBC with Differential   Result Value Ref Range    WBC 4.2 (L) 4.8 - 10.8 K/uL    RBC 4.49 4.20 - 5.40 M/uL    Hemoglobin 13.4 12.0 - 16.0 g/dL    Hematocrit 40.0 37.0 - 47.0 %    MCV 89.1 81.4 - 97.8 fL    MCH 29.8 27.0 - 33.0 pg    MCHC 33.5 32.2 - 35.5 g/dL    RDW 44.9 35.9 - 50.0 fL    Platelet Count 160 (L) 164 - 446 K/uL    MPV 9.6 9.0 - 12.9 fL    Neutrophils-Polys 52.10 44.00 - 72.00 %    Lymphocytes 27.40 22.00 - 41.00 %    Monocytes 16.70 (H) 0.00 - 13.40 %    Eosinophils 2.40 0.00 - 6.90 %    Basophils 0.70 0.00 - 1.80 %    Immature Granulocytes 0.70 0.00 - 0.90 %    Nucleated RBC 0.00 0.00 - 0.20 /100 WBC    Neutrophils (Absolute) 2.19 1.82 - 7.42 K/uL    Lymphs (Absolute) 1.15 1.00 - 4.80 K/uL    Monos (Absolute) 0.70 0.00 - 0.85 K/uL    Eos (Absolute) 0.10 0.00 - 0.51 K/uL    Baso (Absolute) 0.03 0.00 - 0.12 K/uL    Immature Granulocytes (abs) 0.03 0.00 - 0.11 K/uL    NRBC (Absolute) 0.00 K/uL   Complete Metabolic Panel (CMP)   Result Value Ref Range    Sodium 133 (L) 135 - 145 mmol/L    Potassium 3.7 3.6 - 5.5  mmol/L    Chloride 101 96 - 112 mmol/L    Co2 20 20 - 33 mmol/L    Anion Gap 12.0 7.0 - 16.0    Glucose 125 (H) 65 - 99 mg/dL    Bun 14 8 - 22 mg/dL    Creatinine 0.55 0.50 - 1.40 mg/dL    Calcium 9.0 8.5 - 10.5 mg/dL    Correct Calcium 9.2 8.5 - 10.5 mg/dL    AST(SGOT) 13 12 - 45 U/L    ALT(SGPT) 7 2 - 50 U/L    Alkaline Phosphatase 50 30 - 99 U/L    Total Bilirubin <0.2 0.1 - 1.5 mg/dL    Albumin 3.7 3.2 - 4.9 g/dL    Total Protein 6.5 6.0 - 8.2 g/dL    Globulin 2.8 1.9 - 3.5 g/dL    A-G Ratio 1.3 g/dL   Troponins NOW   Result Value Ref Range    Troponin T <6 6 - 19 ng/L   HCG Qual Serum   Result Value Ref Range    Beta-Hcg Qualitative Serum Negative Negative   CoV-2, Flu A/B, And RSV by PCR (Jascha)    Specimen: Nasopharyngeal; Respirate   Result Value Ref Range    Influenza virus A RNA Negative Negative    Influenza virus B, PCR Negative Negative    RSV, PCR Negative Negative    SARS-CoV-2 by PCR DETECTED (AA)     SARS-CoV-2 Source NP Swab    ESTIMATED GFR   Result Value Ref Range    GFR (CKD-EPI) 115 >60 mL/min/1.73 m 2   EKG   Result Value Ref Range    Report       [facility] Emergency Dept[facility]    Test Date:  2023  Pt Name:    GULSHAN SMITH                 Department: ER  MRN:        [id number]                      Room:       ORTHO  Gender:     Female                       Technician: [id number]  :        [id number]                   Requested By:ER TRIAGE PROTOCOL  Order #:    [id number]                    Reading MD: Antonia Corrigan    Measurements  Intervals                                Axis  Rate:       93                           P:          55  IL:         136                          QRS:        65  QRSD:       105                          T:          60  QT:         381  QTc:        474    Interpretive Statements  Sinus rhythm  Normal axis  BORDERLINE PROLONGED QT INTERVAL  No ST elevation  Compared to ECG 2023 16:15:56  no significant changes  Electronically Signed On  08- 03:35:15 PDT by Antonia Corrigan         I have independently interpreted this EKG    RADIOLOGY  I have independently interpreted the diagnostic imaging associated with this visit and am waiting the final reading from the radiologist.   My preliminary interpretation is as follows: no focal consolidation  Radiologist interpretation:   DX-CHEST-PORTABLE (1 VIEW)   Final Result         1.  No acute cardiopulmonary disease.            COURSE & MEDICAL DECISION MAKING    ED Observation Status? No; Patient does not meet criteria for ED Observation.     INITIAL ASSESSMENT, COURSE AND PLAN  Care Narrative: This is a 42-year-old female who presents for left ear discharge as well as subjective fever, runny nose and sore throat for the past 2 days.  On arrival her vital signs are stable.  She is nontoxic in appearance.  Her lung auscultation is normal without wheezing or signs of respiratory distress.  She was found to have purulent discharge with pain with external manipulation of the left pinna, consistent with acute otitis externa.  She was given Pediotic suspension drops here in the ER for treatment of this and we will have her continue this four times a day for 7 days.     Labs were obtained.  She was noted to have leukopenia with white blood cell count 4200.  She is not anemic.  Thrombocytopenia with platelet count 160,000 was noted but this has been noted previously a year ago.  Otherwise her electrolytes are unremarkable.  Her renal function is normal.  ACS considered however patient has been having chest pain for the past 2 months and EKG has no acute ischemic changes.  Less likely ACS given troponin less than 6.  I also considered PE but I think this is less likely given duration of symptoms and she is not hypoxic.  She is found to have COVID however chest x-ray with no signs of pneumonia.    Results were discussed with patient.  Given her history of asthma she does meet criteria for Paxlovid.  Patient  does not want Paxlovid which I think is reasonable.  I discussed with the patient and her partner that unfortunately treatment for COVID is just supportive and antibiotics are not indicated in this role.  She will be given a prescription for eardrops for treatment of her left otitis externa.  Self-isolation instructions were discussed with the patient.  Strict ER return precautions were discussed as well.  She and her  are agreeable to discharge plan with no further questions.    HTN/IDDM FOLLOW UP:  The patient is referred to a primary physician for blood pressure management, diabetic screening, and for all other preventive health concerns        DISPOSITION AND DISCUSSIONS  Patient discharged home in stable condition with instructions to follow-up with primary care doctor.  Strict ER return precautions were discussed.  Patient and partner agreeable to discharge plan with no further questions.    FINAL DIAGNOSIS  1. Acute otitis externa of left ear, unspecified type    2. COVID-19    3. Chest pain, unspecified     Electronically signed by: Antonia Corrigan M.D., 8/16/2023 8:55 PM

## 2023-08-17 NOTE — ED TRIAGE NOTES
Chief Complaint   Patient presents with    Ear Pain     Left era pain with yellowish/greenish discharge that started 3 days ago    Flu Like Symptoms     For 3 days  Pt endorses she feels hot     Chest Pain     Left sided chest pain for 2 months       Pain: 9/10    Pt came in to triage ambulatory with steady gait for the above complaints.     Pt is alert and oriented x 4, speaking in full sentences, follows commands and responds appropriately to questions.     Respirations are even and unlabored.    Pt placed in lobby. Pt educated on triage process.     Pt encouraged to inform staff for any changes in condition or if needs help while waiting to be room in.    Vitals:    08/16/23 2025   BP: 133/86   Pulse: (!) 101   Resp: 16   Temp: 36.1 °C (97 °F)   SpO2: 94%

## 2023-10-11 ENCOUNTER — HOSPITAL ENCOUNTER (EMERGENCY)
Facility: MEDICAL CENTER | Age: 43
End: 2023-10-11
Attending: STUDENT IN AN ORGANIZED HEALTH CARE EDUCATION/TRAINING PROGRAM
Payer: MEDICARE

## 2023-10-11 VITALS
DIASTOLIC BLOOD PRESSURE: 56 MMHG | HEIGHT: 66 IN | BODY MASS INDEX: 46.45 KG/M2 | OXYGEN SATURATION: 97 % | TEMPERATURE: 97.5 F | HEART RATE: 76 BPM | SYSTOLIC BLOOD PRESSURE: 120 MMHG | WEIGHT: 289 LBS | RESPIRATION RATE: 18 BRPM

## 2023-10-11 DIAGNOSIS — R60.9 PERIPHERAL EDEMA: ICD-10-CM

## 2023-10-11 DIAGNOSIS — B35.3 TINEA PEDIS OF BOTH FEET: ICD-10-CM

## 2023-10-11 LAB
ANION GAP SERPL CALC-SCNC: 11 MMOL/L (ref 7–16)
BUN SERPL-MCNC: 9 MG/DL (ref 8–22)
CALCIUM SERPL-MCNC: 8.4 MG/DL (ref 8.5–10.5)
CHLORIDE SERPL-SCNC: 97 MMOL/L (ref 96–112)
CO2 SERPL-SCNC: 22 MMOL/L (ref 20–33)
CREAT SERPL-MCNC: 0.59 MG/DL (ref 0.5–1.4)
GFR SERPLBLD CREATININE-BSD FMLA CKD-EPI: 113 ML/MIN/1.73 M 2
GLUCOSE SERPL-MCNC: 76 MG/DL (ref 65–99)
POTASSIUM SERPL-SCNC: 3.9 MMOL/L (ref 3.6–5.5)
SODIUM SERPL-SCNC: 130 MMOL/L (ref 135–145)

## 2023-10-11 PROCEDURE — A9270 NON-COVERED ITEM OR SERVICE: HCPCS | Performed by: STUDENT IN AN ORGANIZED HEALTH CARE EDUCATION/TRAINING PROGRAM

## 2023-10-11 PROCEDURE — 306311 ANTI-EMBOLISM STOCKINGS XXLRG LNG: Performed by: STUDENT IN AN ORGANIZED HEALTH CARE EDUCATION/TRAINING PROGRAM

## 2023-10-11 PROCEDURE — 700102 HCHG RX REV CODE 250 W/ 637 OVERRIDE(OP): Performed by: STUDENT IN AN ORGANIZED HEALTH CARE EDUCATION/TRAINING PROGRAM

## 2023-10-11 PROCEDURE — 99283 EMERGENCY DEPT VISIT LOW MDM: CPT

## 2023-10-11 PROCEDURE — 36415 COLL VENOUS BLD VENIPUNCTURE: CPT

## 2023-10-11 PROCEDURE — 80048 BASIC METABOLIC PNL TOTAL CA: CPT

## 2023-10-11 RX ORDER — CLOTRIMAZOLE 1 %
CREAM (GRAM) TOPICAL ONCE
Status: COMPLETED | OUTPATIENT
Start: 2023-10-11 | End: 2023-10-11

## 2023-10-11 RX ORDER — CLOTRIMAZOLE 1 %
1 CREAM (GRAM) TOPICAL 2 TIMES DAILY
Qty: 28 G | Refills: 1 | Status: SHIPPED | OUTPATIENT
Start: 2023-10-11

## 2023-10-11 RX ADMIN — CLOTRIMAZOLE: 10 CREAM TOPICAL at 18:44

## 2023-10-11 ASSESSMENT — FIBROSIS 4 INDEX: FIB4 SCORE: 1.35

## 2023-10-11 NOTE — ED PROVIDER NOTES
ED Provider Note    CHIEF COMPLAINT  Chief Complaint   Patient presents with    Ankle Swelling     Bilateral ankle swelling x2 weeks. Pt reports pain and swelling worsened today and can hardly walk.        EXTERNAL RECORDS REVIEWED  Outpatient Notes right ankle injury on 5/9/2023    HPI/ROS  LIMITATION TO HISTORY   Select: : None  OUTSIDE HISTORIAN(S):      Yamila Mendoza is a 42 y.o. female who presents with ankle swelling, x2 weeks.  Patient reports pain and swelling that is worsening today.  Patient reports that she has been on her feet more frequently than normal the past several weeks.  Patient denies weakness or numbness of the affected extremities.  Patient also endorses some dry skin weekly and discomfort on the skin of the feet.  Patient is not washing his feet daily.  Patient without chest pain or shortness of breath.  Patient without trauma or other injury.  Patient denies any history of kidney disease.    PAST MEDICAL HISTORY   has a past medical history of ASTHMA, Bipolar disorder (HCC), Chronic back pain, Psychiatric disorder, Psychiatric disorder, and Seizure disorder (HCC).    SURGICAL HISTORY   has a past surgical history that includes other neurological surg; other orthopedic surgery; other abdominal surgery; ercp in or (9/19/2009); and joy by laparoscopy (9/22/2009).    FAMILY HISTORY  History reviewed. No pertinent family history.    SOCIAL HISTORY  Social History     Tobacco Use    Smoking status: Never    Smokeless tobacco: Never   Vaping Use    Vaping Use: Never used   Substance and Sexual Activity    Alcohol use: No    Drug use: No    Sexual activity: Not on file       CURRENT MEDICATIONS  Home Medications       Reviewed by Cadence Guerrero R.N. (Registered Nurse) on 10/11/23 at 1538  Med List Status: Partial     Medication Last Dose Status   amitriptyline (ELAVIL) 25 MG Tab  Active   carBAMazepine (TEGRETOL) 200 MG Tab  Active   cholecalciferol (VITAMIN D3) 400 UNIT Tab   "Active   cyclobenzaprine (FLEXERIL) 5 mg tablet  Active   hydrOXYzine HCl (ATARAX) 25 MG Tab  Active   loratadine (CLARITIN) 10 MG Tab  Active   Multiple Vitamin (MULTIVITAMIN) Tab  Active   naproxen (NAPROSYN) 500 MG Tab  Active   potassium chloride ER (KLOR-CON) 10 MEQ tablet  Active   topiramate (TOPAMAX) 100 MG Tab  Active                    ALLERGIES  Allergies   Allergen Reactions    Bactrim Unspecified     \"I get the shakes\"     Iron Unspecified     \"see red dots\" vision, not rash     Penicillins Unspecified     \"makes me dizzy with my medicine\"     Phenobarbital Unspecified     \"makes me dizzy\"     Lemon Oil Itching     Skin itching per pt report to Dietary.    Onion Itching and Swelling     Throat swelling and itching per pt report to Dietary.    [employer] Allergy Itching and Swelling     Throat swelling and itching per pt report to Dietary.       PHYSICAL EXAM  VITAL SIGNS: /62   Pulse 80   Temp 36.2 °C (97.1 °F) (Temporal)   Resp 16   Ht 1.676 m (5' 6\")   Wt (!) 131 kg (289 lb)   LMP 11/01/2022 (Approximate)   SpO2 98%   BMI 46.65 kg/m²    Vitals and nursing note reviewed.   Constitutional:       Comments: Patient is lying in bed supine, pleasant, conversant, speaking in complete sentences   HENT:      Head: Normocephalic and atraumatic.   Eyes:      Extraocular Movements: Extraocular movements intact.      Conjunctiva/sclera: Conjunctivae normal.      Pupils: Pupils are equal, round, and reactive to light.   Cardiovascular:      Pulses: Normal pulses.      Comments: HR 80  Pulmonary:      Effort: Pulmonary effort is normal. No respiratory distress.   No wheezes rubs or rhonchi  Musculoskeletal:      Large body habitus  No pitting edema but bilateral lower extremity swelling without erythema or crepitus, nontender, swollen about the soft line.  2+ DP pulses.     General: No swelling. Normal range of motion.      Cervical back: Normal range of motion. No rigidity.   Skin:  Patient has " dry rash with some skin breakdown about the toes.     General: Skin is warm and dry.      Capillary Refill: Capillary refill takes less than 2 seconds.   Neurological:      Mental Status: Alert.       DIAGNOSTIC STUDIES / PROCEDURES    LABS  No acute kidney injury or CKD      COURSE & MEDICAL DECISION MAKING      INITIAL ASSESSMENT, COURSE AND PLAN  Care Narrative: Patient presenting with tinea pedis, dry skin, will be treated with clotrimazole cream.  No evidence of pulmonary edema, volume overload, chronic versus acute heart failure.  No evidence of DVT, no focal swelling, recent immobility.  Patient has been counseled regarding compression stockings.  No evidence of neurovascular compromise.  No evidence of necrotizing soft tissue infection.  No evidence of acute fracture or dislocation or bony tenderness.    Electronically signed by: Rizwan Mckoy M.D., 10/11/2023 4:56 PM    Patient given Lotrimin cream and compression stockings.  Patient counseled to wash her feet daily and apply Lotrimin cream.  Patient counseled to use compression stockings daily.  No evidence of DVT on reevaluation.  No evidence of peripheral edema consistent with CHF.  BMP demonstrates no evidence of chronic kidney disease or acute kidney disease.    Repeat physical exam benign.  I doubt any serious emergency process at this time.  Patient and/or family, friends given strict return precautions for worsening symptoms and care instructions. They have demonstrated understanding of discharge instructions through teach back mechanism. Advised PCP follow-up in 1-2 days.  Patient/family/friend expresses understanding and agrees to plan.    This dictation has been created using voice recognition software. I am continuously working with the software to minimize the number of voice recognition errors and I have made every attempt to manually correct the errors within my dictation. However errors  related to this voice recognition software may  still exist and should be interpreted within the appropriate context.     Electronically signed by: Rizwan Mckoy M.D., 10/11/2023 7:13 PM    DISPOSITION AND DISCUSSIONS      Escalation of care considered, and ultimately not performed:diagnostic imaging      FINAL DIAGNOSIS  1. Peripheral edema    2. Tinea pedis of both feet           Electronically signed by: Rizwan Mckoy M.D., 10/11/2023 4:53 PM

## 2023-10-11 NOTE — ED TRIAGE NOTES
Chief Complaint   Patient presents with    Ankle Swelling     Bilateral ankle swelling x2 weeks. Pt reports pain and swelling worsened today and can hardly walk.       Denies CP or SOB, denies cardiac history. Patient wheeled to triage for above complaint. A&Ox4, speaking in full sentences. Protocol ordered. Pt educated on triage process, placed back in lobby, and instructed to inform staff of any changes.

## 2023-10-12 NOTE — ED NOTES
Patient discharged from [facility] ED to home with family. Discharge teaching completed at bedside and patient signature obtained. All questions and concerns addressed. All pt belongings with pt at time of discharge.

## 2023-10-12 NOTE — ED NOTES
Bedside report given to GIRISH Kumar. Pt prepared for discharge, discharge medications ordered per ERP. Pending discharge order. Pt on room air with no infusions or IV at time of transfer of care. Care relinquished.

## 2023-11-10 LAB — EKG IMPRESSION: NORMAL

## 2023-11-12 ENCOUNTER — APPOINTMENT (OUTPATIENT)
Dept: RADIOLOGY | Facility: MEDICAL CENTER | Age: 43
End: 2023-11-12
Attending: EMERGENCY MEDICINE
Payer: MEDICARE

## 2023-11-12 ENCOUNTER — HOSPITAL ENCOUNTER (EMERGENCY)
Facility: MEDICAL CENTER | Age: 43
End: 2023-11-12
Attending: EMERGENCY MEDICINE
Payer: MEDICARE

## 2023-11-12 VITALS
DIASTOLIC BLOOD PRESSURE: 84 MMHG | HEART RATE: 89 BPM | OXYGEN SATURATION: 98 % | WEIGHT: 293 LBS | BODY MASS INDEX: 49.55 KG/M2 | SYSTOLIC BLOOD PRESSURE: 137 MMHG | RESPIRATION RATE: 14 BRPM | TEMPERATURE: 98.4 F

## 2023-11-12 DIAGNOSIS — S93.492A SPRAIN OF ANTERIOR TALOFIBULAR LIGAMENT OF LEFT ANKLE, INITIAL ENCOUNTER: ICD-10-CM

## 2023-11-12 PROCEDURE — 73610 X-RAY EXAM OF ANKLE: CPT | Mod: LT

## 2023-11-12 PROCEDURE — 99283 EMERGENCY DEPT VISIT LOW MDM: CPT

## 2023-11-12 ASSESSMENT — FIBROSIS 4 INDEX: FIB4 SCORE: 1.35

## 2023-11-12 NOTE — ED TRIAGE NOTES
Yamila Mendoza  42 y.o.    Chief Complaint   Patient presents with    Ankle Injury     Pt reports she twisted harjinder left ankle, pt reports she is now unable to walk without pain.        BP (!) 145/70   Pulse 68   Temp 36.8 °C (98.2 °F) (Temporal)   Resp 20   Wt (!) 139 kg (307 lb)   SpO2 97%   BMI 49.55 kg/m²     Pt placed in the lobby and educated to alert staff with any changes or concerns
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
## 2023-11-18 ENCOUNTER — APPOINTMENT (OUTPATIENT)
Dept: RADIOLOGY | Facility: MEDICAL CENTER | Age: 43
End: 2023-11-18
Attending: STUDENT IN AN ORGANIZED HEALTH CARE EDUCATION/TRAINING PROGRAM
Payer: MEDICARE

## 2023-11-18 ENCOUNTER — HOSPITAL ENCOUNTER (EMERGENCY)
Facility: MEDICAL CENTER | Age: 43
End: 2023-11-18
Attending: STUDENT IN AN ORGANIZED HEALTH CARE EDUCATION/TRAINING PROGRAM
Payer: MEDICARE

## 2023-11-18 VITALS
SYSTOLIC BLOOD PRESSURE: 119 MMHG | BODY MASS INDEX: 47.09 KG/M2 | DIASTOLIC BLOOD PRESSURE: 73 MMHG | WEIGHT: 293 LBS | OXYGEN SATURATION: 96 % | HEIGHT: 66 IN | RESPIRATION RATE: 18 BRPM | TEMPERATURE: 97 F | HEART RATE: 84 BPM

## 2023-11-18 DIAGNOSIS — M25.561 ACUTE PAIN OF RIGHT KNEE: ICD-10-CM

## 2023-11-18 PROCEDURE — 73564 X-RAY EXAM KNEE 4 OR MORE: CPT | Mod: RT

## 2023-11-18 PROCEDURE — 99284 EMERGENCY DEPT VISIT MOD MDM: CPT

## 2023-11-18 PROCEDURE — A9270 NON-COVERED ITEM OR SERVICE: HCPCS | Performed by: STUDENT IN AN ORGANIZED HEALTH CARE EDUCATION/TRAINING PROGRAM

## 2023-11-18 PROCEDURE — 73590 X-RAY EXAM OF LOWER LEG: CPT | Mod: RT

## 2023-11-18 PROCEDURE — 700102 HCHG RX REV CODE 250 W/ 637 OVERRIDE(OP): Performed by: STUDENT IN AN ORGANIZED HEALTH CARE EDUCATION/TRAINING PROGRAM

## 2023-11-18 RX ORDER — NAPROXEN 375 MG/1
375 TABLET ORAL 2 TIMES DAILY WITH MEALS
Qty: 14 TABLET | Refills: 0 | Status: SHIPPED | OUTPATIENT
Start: 2023-11-18 | End: 2023-11-25

## 2023-11-18 RX ORDER — ACETAMINOPHEN 500 MG
1000 TABLET ORAL ONCE
Status: COMPLETED | OUTPATIENT
Start: 2023-11-18 | End: 2023-11-18

## 2023-11-18 RX ADMIN — ACETAMINOPHEN 1000 MG: 500 TABLET, FILM COATED ORAL at 17:15

## 2023-11-18 ASSESSMENT — FIBROSIS 4 INDEX: FIB4 SCORE: 1.38
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
## 2023-11-19 NOTE — ED NOTES
AVS discussed with patient. Given crutches and knee immobilizer. Taken to lobby via wheelchair with partner for taxi pickup. Pt able to independently stand and transfer to wheelchair
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
## 2023-11-22 ENCOUNTER — HOSPITAL ENCOUNTER (EMERGENCY)
Facility: MEDICAL CENTER | Age: 43
End: 2023-11-22
Attending: STUDENT IN AN ORGANIZED HEALTH CARE EDUCATION/TRAINING PROGRAM
Payer: MEDICARE

## 2023-11-22 ENCOUNTER — APPOINTMENT (OUTPATIENT)
Dept: RADIOLOGY | Facility: MEDICAL CENTER | Age: 43
End: 2023-11-22
Payer: MEDICARE

## 2023-11-22 ENCOUNTER — APPOINTMENT (OUTPATIENT)
Dept: RADIOLOGY | Facility: MEDICAL CENTER | Age: 43
End: 2023-11-22
Attending: STUDENT IN AN ORGANIZED HEALTH CARE EDUCATION/TRAINING PROGRAM
Payer: MEDICARE

## 2023-11-22 VITALS
TEMPERATURE: 98.2 F | OXYGEN SATURATION: 94 % | SYSTOLIC BLOOD PRESSURE: 130 MMHG | RESPIRATION RATE: 20 BRPM | HEART RATE: 93 BPM | DIASTOLIC BLOOD PRESSURE: 68 MMHG

## 2023-11-22 DIAGNOSIS — F43.21 SITUATIONAL DEPRESSION: ICD-10-CM

## 2023-11-22 LAB
ALBUMIN SERPL BCP-MCNC: 4 G/DL (ref 3.2–4.9)
ALBUMIN/GLOB SERPL: 1.7 G/DL
ALP SERPL-CCNC: 93 U/L (ref 30–99)
ALT SERPL-CCNC: 11 U/L (ref 2–50)
ANION GAP SERPL CALC-SCNC: 12 MMOL/L (ref 7–16)
AST SERPL-CCNC: 16 U/L (ref 12–45)
BASOPHILS # BLD AUTO: 0.6 % (ref 0–1.8)
BASOPHILS # BLD: 0.05 K/UL (ref 0–0.12)
BILIRUB SERPL-MCNC: <0.2 MG/DL (ref 0.1–1.5)
BUN SERPL-MCNC: 12 MG/DL (ref 8–22)
CALCIUM ALBUM COR SERPL-MCNC: 8.5 MG/DL (ref 8.5–10.5)
CALCIUM SERPL-MCNC: 8.5 MG/DL (ref 8.5–10.5)
CHLORIDE SERPL-SCNC: 98 MMOL/L (ref 96–112)
CO2 SERPL-SCNC: 21 MMOL/L (ref 20–33)
CREAT SERPL-MCNC: 0.46 MG/DL (ref 0.5–1.4)
EKG IMPRESSION: NORMAL
EOSINOPHIL # BLD AUTO: 0.54 K/UL (ref 0–0.51)
EOSINOPHIL NFR BLD: 6.6 % (ref 0–6.9)
ERYTHROCYTE [DISTWIDTH] IN BLOOD BY AUTOMATED COUNT: 40.8 FL (ref 35.9–50)
GFR SERPLBLD CREATININE-BSD FMLA CKD-EPI: 120 ML/MIN/1.73 M 2
GLOBULIN SER CALC-MCNC: 2.3 G/DL (ref 1.9–3.5)
GLUCOSE SERPL-MCNC: 106 MG/DL (ref 65–99)
HCT VFR BLD AUTO: 36 % (ref 37–47)
HGB BLD-MCNC: 12.4 G/DL (ref 12–16)
HIV 1+2 AB+HIV1 P24 AG SERPL QL IA: NORMAL
IMM GRANULOCYTES # BLD AUTO: 0.12 K/UL (ref 0–0.11)
IMM GRANULOCYTES NFR BLD AUTO: 1.5 % (ref 0–0.9)
LYMPHOCYTES # BLD AUTO: 2.66 K/UL (ref 1–4.8)
LYMPHOCYTES NFR BLD: 32.6 % (ref 22–41)
MCH RBC QN AUTO: 29.6 PG (ref 27–33)
MCHC RBC AUTO-ENTMCNC: 34.4 G/DL (ref 32.2–35.5)
MCV RBC AUTO: 85.9 FL (ref 81.4–97.8)
MONOCYTES # BLD AUTO: 0.81 K/UL (ref 0–0.85)
MONOCYTES NFR BLD AUTO: 9.9 % (ref 0–13.4)
NEUTROPHILS # BLD AUTO: 3.98 K/UL (ref 1.82–7.42)
NEUTROPHILS NFR BLD: 48.8 % (ref 44–72)
NRBC # BLD AUTO: 0 K/UL
NRBC BLD-RTO: 0 /100 WBC (ref 0–0.2)
PLATELET # BLD AUTO: 248 K/UL (ref 164–446)
PMV BLD AUTO: 8.9 FL (ref 9–12.9)
POTASSIUM SERPL-SCNC: 4.3 MMOL/L (ref 3.6–5.5)
PROT SERPL-MCNC: 6.3 G/DL (ref 6–8.2)
RBC # BLD AUTO: 4.19 M/UL (ref 4.2–5.4)
SODIUM SERPL-SCNC: 131 MMOL/L (ref 135–145)
T PALLIDUM AB SER QL IA: NORMAL
WBC # BLD AUTO: 8.2 K/UL (ref 4.8–10.8)

## 2023-11-22 PROCEDURE — 93005 ELECTROCARDIOGRAM TRACING: CPT

## 2023-11-22 PROCEDURE — 71045 X-RAY EXAM CHEST 1 VIEW: CPT

## 2023-11-22 PROCEDURE — 99284 EMERGENCY DEPT VISIT MOD MDM: CPT

## 2023-11-22 PROCEDURE — 80053 COMPREHEN METABOLIC PANEL: CPT

## 2023-11-22 PROCEDURE — 85025 COMPLETE CBC W/AUTO DIFF WBC: CPT

## 2023-11-22 PROCEDURE — 36415 COLL VENOUS BLD VENIPUNCTURE: CPT

## 2023-11-22 PROCEDURE — 86780 TREPONEMA PALLIDUM: CPT

## 2023-11-22 PROCEDURE — 87389 HIV-1 AG W/HIV-1&-2 AB AG IA: CPT

## 2023-11-22 PROCEDURE — 93005 ELECTROCARDIOGRAM TRACING: CPT | Performed by: STUDENT IN AN ORGANIZED HEALTH CARE EDUCATION/TRAINING PROGRAM

## 2023-11-23 NOTE — ED PROVIDER NOTES
"ED Provider Note    CHIEF COMPLAINT  Chief Complaint   Patient presents with    Cough     X 2 days, productive cough w/ green color. Nausea. SOB x 1 day. Lightheaded.     Suicidal Ideation     Pt states suicidal thoughts with plan to use knife to kill herself but the patients doesn't intend to carry out this plan currently.        EXTERNAL RECORDS REVIEWED  Outpatient Notes 5/9/2023 seen for rhinitis    HPI/ROS  LIMITATION TO HISTORY   Select: : None  OUTSIDE HISTORIAN(S):      Yamila Mendoza is a 43 y.o. female who presents to the ED initially for SI that was voiced at triage however upon my evaluation she is currently denying any suicidal ideations.  She also endorsed a cough to the triage nurse and to me she also confirms this.  She reports it has been present for the past 2 to 3 days and accompanied with runny nose.    PAST MEDICAL HISTORY   has a past medical history of ASTHMA, Bipolar disorder (HCC), Chronic back pain, Psychiatric disorder, Psychiatric disorder, and Seizure disorder (HCC).    SURGICAL HISTORY   has a past surgical history that includes other neurological surg; other orthopedic surgery; other abdominal surgery; ercp in or (9/19/2009); and joy by laparoscopy (9/22/2009).    FAMILY HISTORY  No family history on file.    SOCIAL HISTORY  Social History     Tobacco Use    Smoking status: Never    Smokeless tobacco: Never   Vaping Use    Vaping Use: Never used   Substance and Sexual Activity    Alcohol use: No    Drug use: No    Sexual activity: Not on file       CURRENT MEDICATIONS  Home Medications    **Home medications have not yet been reviewed for this encounter**         ALLERGIES  Allergies   Allergen Reactions    Bactrim Unspecified     \"I get the shakes\"     Iron Unspecified     \"see red dots\" vision, not rash     Penicillins Unspecified     \"makes me dizzy with my medicine\"     Phenobarbital Unspecified     \"makes me dizzy\"     Lemon Oil Itching     Skin itching per pt report to " Dietary.    Onion Itching and Swelling     Throat swelling and itching per pt report to Dietary.    [employer] Allergy Itching and Swelling     Throat swelling and itching per pt report to Dietary.    Ibuprofen Unspecified     Pt states the medication give her the shakes.        PHYSICAL EXAM  VITAL SIGNS: /68   Pulse 93   Temp 36.8 °C (98.2 °F) (Temporal)   Resp 20   SpO2 94%    Physical Exam  Vitals and nursing note reviewed.   Constitutional:       Appearance: She is well-developed.   HENT:      Head: Normocephalic.   Eyes:      Extraocular Movements: Extraocular movements intact.      Pupils: Pupils are equal, round, and reactive to light.   Cardiovascular:      Rate and Rhythm: Normal rate and regular rhythm.   Pulmonary:      Effort: Pulmonary effort is normal.      Breath sounds: Normal breath sounds.   Abdominal:      Palpations: Abdomen is soft.      Tenderness: There is no abdominal tenderness.   Musculoskeletal:      Cervical back: Normal range of motion.   Neurological:      Mental Status: She is alert and oriented to person, place, and time.   Psychiatric:      Comments: Tangential thought process, poor insight, does not appear to be responding to internal stimuli, good mood with concurrent affect             COURSE & MEDICAL DECISION MAKING    ED Observation Status? No; Patient does not meet criteria for ED Observation.     INITIAL ASSESSMENT, COURSE AND PLAN  Care Narrative: 43-year-old female presents to the ED for cough and initially verbalized SI.  On exam she is well-appearing with normal vital signs she has a tangential thought process she is denying any suicidal ideation to me.  She denies HI she does not appear to be responding to internal stimuli she has good mood with concurrent affect.  She is also endorsing a runny nose that has been ongoing for the past 2 to 3 days I think this is likely viral in nature and do not believe she warrants antibiotic treatment at this time.   Given the patient is denying any SI, is well-appearing and has a good mood I do not feel she warrants further inpatient psychiatric stabilization at this time.  She was provided with outpatient psychiatric resources and advised to follow-up with her therapist.  Return precautions were given for any worsening        ADDITIONAL PROBLEM LIST  Past Medical History:   Diagnosis Date    ASTHMA     Bipolar disorder (HCC)     Chronic back pain     Psychiatric disorder     bipolar    Psychiatric disorder     depression    Seizure disorder (HCC)        DISPOSITION AND DISCUSSIONS  I have discussed management of the patient with the following physicians and ERYN's:      Discussion of management with other QHP or appropriate source(s): None     Escalation of care considered, and ultimately not performed:blood analysis    Barriers to care at this time, including but not limited to: Patient lacks financial resources.     Decision tools and prescription drugs considered including, but not limited to: Antibiotics not indicated for  likely viral etiology of cough .    FINAL DIAGNOSIS  1. Situational depression Acute          Electronically signed by: Joshua Chew M.D., 11/23/2023 1:12 AM

## 2023-12-07 NOTE — ED NOTES
Bedside report from Elías STOUT. Pt resting.   
Bedside report to GIRISH Barragan. Plan of care discussed with patient. Bed locked and in lowest position. Call light available and within reach. Appropriate fall precautions in place. No distress noted. This RN removed from care.     
ERP at bedside.   
PIV on left forearm removed using proper hand hygiene. Proper dressing applied and bleeding controlled. Primary RN aware.  
Per ERP discontinue legal hold.   
Pt called and left message on culture line requesting HIV results. Called pt back and gave her results. No questions from pt.    Lorenza Willingham, PharmD, BCPS  
Pt d/c from ED GCS 15 a/o x 4 ambulatory with personal walker without assistance with steady gait. Pt given d/c instructions and verbalized understanding. Bandage noted to pt L FA which is where pt stated the PIV was removed bu another ED staff member, bleeding controlled to the prior PIV site.   
XRAY at bedside.   
Adult

## 2023-12-26 ENCOUNTER — HOSPITAL ENCOUNTER (EMERGENCY)
Facility: MEDICAL CENTER | Age: 43
End: 2023-12-26
Attending: EMERGENCY MEDICINE
Payer: MEDICARE

## 2023-12-26 VITALS
WEIGHT: 293 LBS | OXYGEN SATURATION: 100 % | DIASTOLIC BLOOD PRESSURE: 80 MMHG | HEIGHT: 66 IN | HEART RATE: 79 BPM | RESPIRATION RATE: 16 BRPM | TEMPERATURE: 96.7 F | SYSTOLIC BLOOD PRESSURE: 140 MMHG | BODY MASS INDEX: 47.09 KG/M2

## 2023-12-26 DIAGNOSIS — R56.9 SEIZURE (HCC): ICD-10-CM

## 2023-12-26 DIAGNOSIS — Z76.0 MEDICATION REFILL: ICD-10-CM

## 2023-12-26 PROCEDURE — 99283 EMERGENCY DEPT VISIT LOW MDM: CPT

## 2023-12-26 PROCEDURE — 700102 HCHG RX REV CODE 250 W/ 637 OVERRIDE(OP): Performed by: EMERGENCY MEDICINE

## 2023-12-26 PROCEDURE — 700101 HCHG RX REV CODE 250: Performed by: EMERGENCY MEDICINE

## 2023-12-26 PROCEDURE — A9270 NON-COVERED ITEM OR SERVICE: HCPCS | Performed by: EMERGENCY MEDICINE

## 2023-12-26 RX ORDER — TOPIRAMATE 100 MG/1
100 TABLET, FILM COATED ORAL 2 TIMES DAILY
Qty: 60 TABLET | Refills: 3 | Status: SHIPPED | OUTPATIENT
Start: 2023-12-26 | End: 2024-02-05

## 2023-12-26 RX ORDER — TOPIRAMATE 100 MG/1
100 TABLET, FILM COATED ORAL ONCE
Status: COMPLETED | OUTPATIENT
Start: 2023-12-26 | End: 2023-12-26

## 2023-12-26 RX ORDER — HYDROXYZINE HYDROCHLORIDE 25 MG/1
25 TABLET, FILM COATED ORAL 2 TIMES DAILY PRN
Qty: 30 TABLET | Refills: 3 | Status: SHIPPED | OUTPATIENT
Start: 2023-12-26

## 2023-12-26 RX ORDER — ACETAMINOPHEN 500 MG
1000 TABLET ORAL ONCE
Status: COMPLETED | OUTPATIENT
Start: 2023-12-26 | End: 2023-12-26

## 2023-12-26 RX ADMIN — TOPIRAMATE 100 MG: 100 TABLET, FILM COATED ORAL at 20:46

## 2023-12-26 RX ADMIN — ACETAMINOPHEN 1000 MG: 500 TABLET ORAL at 20:46

## 2023-12-26 ASSESSMENT — FIBROSIS 4 INDEX: FIB4 SCORE: 0.88

## 2023-12-27 NOTE — ED NOTES
Pt wheeled in wheelchair to [address] with this RN and 1 family member. Pt reports having 2 gran mal seizures that lasted 30 mins. Care assumed, chart up for ERP.

## 2023-12-27 NOTE — ED PROVIDER NOTES
ED Provider Note    CHIEF COMPLAINT  Chief Complaint   Patient presents with    Other     Pt states had x2 grand mal seizures, EMS on scene with no postictal state observed, during episodes pt sitting, no head injury       EXTERNAL RECORDS REVIEWED  Outpatient Notes hx of seizures, supposed to be on topamax and tegretol     HPI/ROS  LIMITATION TO HISTORY   Select: : None  OUTSIDE HISTORIAN(S):  EMS no interventions here for concern for seizure    Yamila Mendoza is a 43 y.o. female who presents to the emerge department stating she had 2 seizures today.  She tells me that she has been out of her Topamax and hydroxyzine for couple of days.  She denies any headache just mild generalized discomfort.  No weakness numbness tingling is otherwise doing well before today. Denies any trauma.    PAST MEDICAL HISTORY   has a past medical history of ASTHMA, Bipolar disorder (HCC), Chronic back pain, Psychiatric disorder, Psychiatric disorder, and Seizure disorder (HCC).    SURGICAL HISTORY   has a past surgical history that includes other neurological surg; other orthopedic surgery; other abdominal surgery; ercp in or (9/19/2009); and joy by laparoscopy (9/22/2009).    FAMILY HISTORY  History reviewed. No pertinent family history.    SOCIAL HISTORY  Social History     Tobacco Use    Smoking status: Never    Smokeless tobacco: Never   Vaping Use    Vaping Use: Never used   Substance and Sexual Activity    Alcohol use: No    Drug use: No    Sexual activity: Not on file       CURRENT MEDICATIONS  Home Medications       Reviewed by Suellen Lemus R.N. (Registered Nurse) on 12/26/23 at 1646  Med List Status: Partial     Medication Last Dose Status   amitriptyline (ELAVIL) 25 MG Tab  Active   carBAMazepine (TEGRETOL) 200 MG Tab  Active   cholecalciferol (VITAMIN D3) 400 UNIT Tab  Active   clotrimazole (LOTRIMIN) 1 % Cream  Active   cyclobenzaprine (FLEXERIL) 5 mg tablet  Active   hydrOXYzine HCl (ATARAX) 25 MG Tab  Active  "  loratadine (CLARITIN) 10 MG Tab  Active   Multiple Vitamin (MULTIVITAMIN) Tab  Active   potassium chloride ER (KLOR-CON) 10 MEQ tablet  Active   topiramate (TOPAMAX) 100 MG Tab  Active                    ALLERGIES  Allergies   Allergen Reactions    Bactrim Unspecified     \"I get the shakes\"     Iron Unspecified     \"see red dots\" vision, not rash     Penicillins Unspecified     \"makes me dizzy with my medicine\"     Phenobarbital Unspecified     \"makes me dizzy\"     Lemon Oil Itching     Skin itching per pt report to Dietary.    Onion Itching and Swelling     Throat swelling and itching per pt report to Dietary.    Pepper-Bell Food Allergy Itching and Swelling     Throat swelling and itching per pt report to Dietary.    Ibuprofen Unspecified     Pt states the medication give her the shakes.        PHYSICAL EXAM  VITAL SIGNS: BP (!) 140/80   Pulse 79   Temp 35.9 °C (96.7 °F) (Temporal)   Resp 16   Ht 1.676 m (5' 6\")   Wt (!) 142 kg (313 lb 0.9 oz)   SpO2 100%   BMI 50.53 kg/m²    Pulse OX: Pulse Oxygen level is normal  Constitutional: Alert in no apparent distress.  HENT: Normocephalic, Atraumatic, MMM  Eyes: PERound. Conjunctiva normal, non-icteric.   Skin: Warm, Dry, No erythema, No rash.   Neurologic: Alert and oriented, Grossly non-focal.       DIAGNOSTIC STUDIES / PROCEDURES      COURSE & MEDICAL DECISION MAKING    ED Observation Status? No    INITIAL ASSESSMENT, COURSE AND PLAN/DISPOSITION AND DISCUSSIONS  Care Narrative:     Patient presents with a history of seizures with seizures without evidence of status epilepticus and unfortunate medication noncompliance due to lack of meds.  She will be given her Topamax as well as some Tylenol for the body aches.  Will refill her Topamax and hydroxyzine for anxiety.  She was given strict return precautions for new or worsening issues and I discussed with her partner seizure precautions when she does begin to have them.  They feel comfortable being " discharged    I have discussed management of the patient with the following physicians and ERYN's:  none    Discussion of management with other [facility] or appropriate source(s): None     Escalation of care considered, and ultimately not performed:diagnostic imaging    Barriers to care at this time, including but not limited to: Patient is homeless.     Decision tools and prescription drugs considered including, but not limited to: Medication modification were represcribed .    The patient will return for new or worsening symptoms and is stable at the time of discharge.    The patient is referred to a primary physician for blood pressure management, diabetic screening, and for all other preventative health concerns.    DISPOSITION:  Patient will be discharged home in stable condition.    FOLLOW UP:  Radha Hinojosa P.A.-C.  [address] [place] [id number] [phone]    Schedule an appointment as soon as possible for a visit       [facility], Emergency Dept  [address] [facility] [address] [phone]    If symptoms worsen      OUTPATIENT MEDICATIONS:  Discharge Medication List as of 12/26/2023  8:57 PM            FINAL DIAGNOSIS  1. Seizure (HCC)    2. Medication refill           Electronically signed by: Henrietta Contreras M.D., 12/26/2023 7:57 PM

## 2023-12-27 NOTE — ED TRIAGE NOTES
"Chief Complaint   Patient presents with    Other     Pt states had x2 grand mal seizures, EMS on scene with no postictal state observed, during episodes pt sitting, no head injury      WC to triage with EMS for above complaint. Pt in ER for multiple visits. States takes Rx for epilepsy. EMS vitals 162/84 HR 78 100% RA gcs 15 glucose 129    Pt educated of triage process and informed to contact staff if situation changes.    BP (!) 146/93   Pulse 81   Temp 35.8 °C (96.5 °F) (Temporal)   Resp 18   Ht 1.676 m (5' 6\")   Wt (!) 142 kg (313 lb 0.9 oz)   SpO2 100%   BMI 50.53 kg/m²      "

## 2023-12-27 NOTE — ED NOTES
Patient discharged from [facility] ED to home with spouse. Discharge teaching completed at bedside and patient signature obtained. Discharge medications reviewed and verbalized by patient and/or family. All questions and concerns addressed. Follow up explained with return instructions. PIV removed. All patient belongings with pt at time of discharge. Patient ambulatory with sgait at time of discharge to [facility]. Mode of transportation: vehicle

## 2024-02-05 ENCOUNTER — APPOINTMENT (OUTPATIENT)
Dept: RADIOLOGY | Facility: MEDICAL CENTER | Age: 44
End: 2024-02-05
Attending: EMERGENCY MEDICINE
Payer: MEDICARE

## 2024-02-05 ENCOUNTER — HOSPITAL ENCOUNTER (EMERGENCY)
Facility: MEDICAL CENTER | Age: 44
End: 2024-02-05
Attending: EMERGENCY MEDICINE
Payer: MEDICARE

## 2024-02-05 VITALS
OXYGEN SATURATION: 98 % | DIASTOLIC BLOOD PRESSURE: 68 MMHG | TEMPERATURE: 98 F | SYSTOLIC BLOOD PRESSURE: 151 MMHG | WEIGHT: 293 LBS | HEART RATE: 76 BPM | BODY MASS INDEX: 50.04 KG/M2 | RESPIRATION RATE: 18 BRPM

## 2024-02-05 DIAGNOSIS — Z76.0 MEDICATION REFILL: ICD-10-CM

## 2024-02-05 DIAGNOSIS — S62.115A CLOSED NONDISPLACED FRACTURE OF TRIQUETRUM OF LEFT WRIST, INITIAL ENCOUNTER: ICD-10-CM

## 2024-02-05 DIAGNOSIS — R56.9 SEIZURE (HCC): ICD-10-CM

## 2024-02-05 LAB
ANION GAP SERPL CALC-SCNC: 12 MMOL/L (ref 7–16)
BASOPHILS # BLD AUTO: 0.4 % (ref 0–1.8)
BASOPHILS # BLD: 0.03 K/UL (ref 0–0.12)
BUN SERPL-MCNC: 7 MG/DL (ref 8–22)
CALCIUM SERPL-MCNC: 8.8 MG/DL (ref 8.5–10.5)
CHLORIDE SERPL-SCNC: 98 MMOL/L (ref 96–112)
CO2 SERPL-SCNC: 23 MMOL/L (ref 20–33)
CREAT SERPL-MCNC: 0.49 MG/DL (ref 0.5–1.4)
EKG IMPRESSION: NORMAL
EOSINOPHIL # BLD AUTO: 0.06 K/UL (ref 0–0.51)
EOSINOPHIL NFR BLD: 0.8 % (ref 0–6.9)
ERYTHROCYTE [DISTWIDTH] IN BLOOD BY AUTOMATED COUNT: 47.1 FL (ref 35.9–50)
GFR SERPLBLD CREATININE-BSD FMLA CKD-EPI: 118 ML/MIN/1.73 M 2
GLUCOSE SERPL-MCNC: 103 MG/DL (ref 65–99)
HCT VFR BLD AUTO: 41.5 % (ref 37–47)
HGB BLD-MCNC: 13.5 G/DL (ref 12–16)
IMM GRANULOCYTES # BLD AUTO: 0.04 K/UL (ref 0–0.11)
IMM GRANULOCYTES NFR BLD AUTO: 0.5 % (ref 0–0.9)
LYMPHOCYTES # BLD AUTO: 2.92 K/UL (ref 1–4.8)
LYMPHOCYTES NFR BLD: 36.9 % (ref 22–41)
MCH RBC QN AUTO: 28.5 PG (ref 27–33)
MCHC RBC AUTO-ENTMCNC: 32.5 G/DL (ref 32.2–35.5)
MCV RBC AUTO: 87.7 FL (ref 81.4–97.8)
MONOCYTES # BLD AUTO: 0.58 K/UL (ref 0–0.85)
MONOCYTES NFR BLD AUTO: 7.3 % (ref 0–13.4)
NEUTROPHILS # BLD AUTO: 4.28 K/UL (ref 1.82–7.42)
NEUTROPHILS NFR BLD: 54.1 % (ref 44–72)
NRBC # BLD AUTO: 0 K/UL
NRBC BLD-RTO: 0 /100 WBC (ref 0–0.2)
PLATELET # BLD AUTO: 242 K/UL (ref 164–446)
PMV BLD AUTO: 9.5 FL (ref 9–12.9)
POTASSIUM SERPL-SCNC: 3.8 MMOL/L (ref 3.6–5.5)
RBC # BLD AUTO: 4.73 M/UL (ref 4.2–5.4)
SODIUM SERPL-SCNC: 133 MMOL/L (ref 135–145)
WBC # BLD AUTO: 7.9 K/UL (ref 4.8–10.8)

## 2024-02-05 PROCEDURE — 73110 X-RAY EXAM OF WRIST: CPT | Mod: LT

## 2024-02-05 PROCEDURE — 73630 X-RAY EXAM OF FOOT: CPT | Mod: LT

## 2024-02-05 PROCEDURE — 73610 X-RAY EXAM OF ANKLE: CPT | Mod: LT

## 2024-02-05 PROCEDURE — 73130 X-RAY EXAM OF HAND: CPT | Mod: LT

## 2024-02-05 PROCEDURE — 99285 EMERGENCY DEPT VISIT HI MDM: CPT

## 2024-02-05 PROCEDURE — 93005 ELECTROCARDIOGRAM TRACING: CPT | Performed by: EMERGENCY MEDICINE

## 2024-02-05 PROCEDURE — 80048 BASIC METABOLIC PNL TOTAL CA: CPT

## 2024-02-05 PROCEDURE — 700101 HCHG RX REV CODE 250: Performed by: EMERGENCY MEDICINE

## 2024-02-05 PROCEDURE — 36415 COLL VENOUS BLD VENIPUNCTURE: CPT

## 2024-02-05 PROCEDURE — A9270 NON-COVERED ITEM OR SERVICE: HCPCS | Performed by: EMERGENCY MEDICINE

## 2024-02-05 PROCEDURE — 700102 HCHG RX REV CODE 250 W/ 637 OVERRIDE(OP): Performed by: EMERGENCY MEDICINE

## 2024-02-05 PROCEDURE — 85025 COMPLETE CBC W/AUTO DIFF WBC: CPT

## 2024-02-05 RX ORDER — TOPIRAMATE 100 MG/1
100 TABLET, FILM COATED ORAL ONCE
Status: COMPLETED | OUTPATIENT
Start: 2024-02-05 | End: 2024-02-05

## 2024-02-05 RX ORDER — TOPIRAMATE 100 MG/1
100 TABLET, FILM COATED ORAL 2 TIMES DAILY
Qty: 60 TABLET | Refills: 0 | Status: SHIPPED | OUTPATIENT
Start: 2024-02-05

## 2024-02-05 RX ORDER — ACETAMINOPHEN 325 MG/1
650 TABLET ORAL ONCE
Status: COMPLETED | OUTPATIENT
Start: 2024-02-05 | End: 2024-02-05

## 2024-02-05 RX ADMIN — TOPIRAMATE 100 MG: 100 TABLET, FILM COATED ORAL at 20:26

## 2024-02-05 RX ADMIN — ACETAMINOPHEN 650 MG: 325 TABLET, FILM COATED ORAL at 20:26

## 2024-02-05 ASSESSMENT — FIBROSIS 4 INDEX: FIB4 SCORE: 0.88

## 2024-02-06 NOTE — ED NOTES
"Pt comes in complaining of a fall last night. Pt now complaining of left ankle pain and left hand pain. Pt also adding on \"I have had 11 seizures yesterday and today\"  "

## 2024-02-06 NOTE — ED PROVIDER NOTES
ER Provider Note    Scribed for Dr. Clyde Quiroz M.D. by Garrick Alexander. 2/5/2024  7:23 PM    Primary Care Provider: Radha Hinojosa P.A.-C.    CHIEF COMPLAINT  Chief Complaint   Patient presents with    GLF    Seizure              EXTERNAL RECORDS REVIEWED  Inpatient Notes Patient was seen in the ED on 12/26/2023 for seizures. No evidence of status epilepticus. Patient did not have her Topamax and was given a refill of her prescription. She was also given a prescription for Hydroxyzine for her Anxiety.     HPI/ROS    Yamila Mendoza is a 43 y.o. female with a history of Seizure disorder who presents to the ED for left hand and foot pain onset last night. The patient states that she was getting out of a cab yesterday and fell forward, landing on her left hand. She denies any head strike or loss of consciousness. Patient's pain has worsened prompting her to come to the ED. She also reports experiencing 11 episodes of seizure yesterday and today. She adds 3 of these episodes have been since her fall. She is taking Depakote, Tegretol, and Topamax. Patient adds that she is currently out of Topamax. Patient has been taking Motrin to mild alleviation of her pain. No exacerbating factors reported. Patient has no known allergies.     PAST MEDICAL HISTORY  Past Medical History:   Diagnosis Date    ASTHMA     Bipolar disorder (HCC)     Chronic back pain     Psychiatric disorder     bipolar    Psychiatric disorder     depression    Seizure disorder (HCC)        SURGICAL HISTORY  Past Surgical History:   Procedure Laterality Date    REYES BY LAPAROSCOPY  9/22/2009    Performed by JUNE WAGGONER at SURGERY [facility] ORS    ERCP IN OR  9/19/2009    Performed by LOU WEBB at SURGERY [facility] ORS    OTHER ABDOMINAL SURGERY      OTHER NEUROLOGICAL SURG      OTHER ORTHOPEDIC SURGERY      2 R knee surgeries       FAMILY HISTORY  No family history noted.    SOCIAL HISTORY   reports that she has never smoked. She  has never used smokeless tobacco. She reports that she does not drink alcohol and does not use drugs.    CURRENT MEDICATIONS  Discharge Medication List as of 2/5/2024  9:22 PM        CONTINUE these medications which have NOT CHANGED    Details   hydrOXYzine HCl (ATARAX) 25 MG Tab Take 1 Tablet by mouth 2 times a day as needed for Anxiety., Disp-30 Tablet, R-3, Normal      clotrimazole (LOTRIMIN) 1 % Cream Apply 1 Application topically 2 times a day., Disp-28 g, R-1, Normal      amitriptyline (ELAVIL) 25 MG Tab TAKE 1 TABLET BY MOUTH DAILY AT BEDTIME, Historical Med      loratadine (CLARITIN) 10 MG Tab Take 10 mg by mouth every day., Historical Med      Multiple Vitamin (MULTIVITAMIN) Tab Take 1 Tablet by mouth every day., Historical Med      carBAMazepine (TEGRETOL) 200 MG Tab Take 1 Tablet by mouth 3 times a day., Disp-90 Tablet, R-0, Normal      potassium chloride ER (KLOR-CON) 10 MEQ tablet Take 1 Tablet by mouth 2 times a day., Disp-60 Tablet, R-3, Normal      cyclobenzaprine (FLEXERIL) 5 mg tablet Take 1-2 Tablets by mouth 3 times a day as needed for Mild Pain or Moderate Pain., Disp-30 Tablet, R-0, Normal      cholecalciferol (VITAMIN D3) 400 UNIT Tab Take 400 Units by mouth every day., Historical Med             ALLERGIES  Bactrim, Iron, Penicillins, Phenobarbital, Lemon oil, Onion, Pepper-bell food allergy, and Ibuprofen    PHYSICAL EXAM  BP (!) 142/102   Pulse 94   Temp (!) 35.7 °C (96.2 °F) (Temporal)   Resp 14   Wt (!) 141 kg (310 lb)   SpO2 97%   BMI 50.04 kg/m²   Constitutional: Alert in no apparent distress.  HENT: No signs of trauma, Bilateral external ears normal, Nose normal.   Eyes: Pupils are equal and reactive, Conjunctiva normal, Non-icteric.   Neck: Normal range of motion, No tenderness, Supple,   Cardiovascular: Regular rate and rhythm, no murmurs.   Thorax & Lungs: Normal breath sounds, No respiratory distress, No wheezing, No chest tenderness.   Abdomen: Bowel sounds normal, Soft, No  tenderness, No masses, No pulsatile masses. No peritoneal signs.  Skin: Warm, Dry, No erythema, No rash.   Back: No bony tenderness, No CVA tenderness.   Extremities: No edema, mild left hand tenderness over snuffbox, decreased ROM of left hand secondary to pain, mild tenderness to the left lateral malleolus, normal ROM of left foot, No cyanosis,  Neurologic: decreased sensation over the ventral aspect of digits 3-5 of right hand. Decreased sensation over dorsal aspect of left foot, otherwise neurovascular intact  Psychiatric: Affect normal    DIAGNOSTIC STUDIES & PROCEDURES    Labs:   Labs Reviewed   BASIC METABOLIC PANEL - Abnormal; Notable for the following components:       Result Value    Sodium 133 (*)     Glucose 103 (*)     Bun 7 (*)     Creatinine 0.49 (*)     All other components within normal limits   CBC WITH DIFFERENTIAL   ESTIMATED GFR   All labs reviewed by me.    EKG Interpretation:  Interpreted by me  12 Lead EKG interpreted by me to show:  Normal sinus rhythm  Rate 77  Axis: Normal  Intervals: Normal  Normal T waves  Normal ST segments  My impression of this EKG: Does not indicate ischemia or arrhythmia at this time.   No ST-T wave changes and no ectopy with no Brugada syndrome or any hypertrophic cardiomyopathy and no preexcitation and normal intervals    Radiology:   The attending Emergency Physician has independently interpreted the diagnostic imaging associated with this visit and is awaiting the final reading from the radiologist, which will be displayed below.    Preliminary interpretation is a follows:   Radiologist interpretation:    DX-ANKLE 3+ VIEWS LEFT   Final Result         1.  No acute traumatic bony injury.         DX-FOOT-COMPLETE 3+ LEFT   Final Result         1.  No acute traumatic bony injury.      DX-WRIST-COMPLETE 3+ LEFT   Final Result         1.  Bony fragment dorsal to the carpus, appearance favoring fracture, likely triquetral fracture.      DX-HAND 3+ LEFT   Final Result          1.  No acute traumatic bony injury.         COURSE & MEDICAL DECISION MAKING    ED Observation Status? No; Patient does not meet criteria for ED Observation.     INITIAL ASSESSMENT AND PLAN  Care Narrative:        7:23 PM - Patient seen and evaluated at bedside. Discussed plan of care, including imaging to evaluate her injuries. Patient agrees to plan of care. Patient will be treated with Tylenol 650 mg and Topamax 100 mg for her symptoms. Ordered DX-ankle left, DX-foot left, dx-hand left, Dx-wrist left, CBC w diff, BMP, and EKG to evaluate.    9:25 PM - Patient was reevaluated at bedside. Discussed lab and radiology results with the patient and informed them of the possible fracture seen on imaging. Patient had the opportunity to ask any questions. The plan for discharge was discussed with them and they were told to return for any new or worsening symptoms. She was also informed of the plans for follow up. Patient is understanding and agreeable to the plan for discharge.    ADDITIONAL PROBLEM LIST AND DISPOSITION  Patient with fall and ultimately a triquetral fracture.  The patient does have seizures and has not been taking her Topamax.  Will give this to her.  She was observed in the ER.  EKG is normal.  There is no significant anemia or electrolyte derangement.  Will place her in a splint.  We have a follow-up primary care physician.               DISPOSITION AND DISCUSSIONS  Escalation of care considered, and ultimately not performed: acute inpatient care management, however at this time, the patient is most appropriate for outpatient management.    Decision tools and prescription drugs considered including, but not limited to: Medication modification refill Topamax and assure that she is taking no other medications for seizure. .    The patient will return for new or worsening symptoms and is stable at the time of discharge.    The patient is referred to a primary physician for blood pressure  management, diabetic screening, and for all other preventative health concerns.    DISPOSITION:  Patient will be discharged home in stable condition.    FOLLOW UP:  Agus Aguilera M.D.  [address] [place] [id number] [phone]    In 1 week      OUTPATIENT MEDICATIONS:  Topamax 100 mg BID    FINAL IMPRESSION   1. Closed nondisplaced fracture of triquetrum of left wrist, initial encounter    2. Seizure (HCC)    3. Medication refill      Garrick BELTRAN (Scribe), am scribing for, and in the presence of, Clyde Quiroz M.D..    Electronically signed by: Garrick Alexander (Scribe), 2/5/2024    Clyde BELTRAN M.D. personally performed the services described in this documentation, as scribed by Garrick Alexander in my presence, and it is both accurate and complete.    The note accurately reflects work and decisions made by me.  Clyde Quiroz M.D.  2/6/2024  3:13 AM

## 2024-02-06 NOTE — ED NOTES
Thumb spica done by ED stephanie Posada. Patient provided discharge instructions. Patient verbalized understanding. Patient leaving ER in stable condition. Patient ambulatory with steady gait with wheeled walker.  IV removed.

## 2024-03-01 ENCOUNTER — HOSPITAL ENCOUNTER (EMERGENCY)
Facility: MEDICAL CENTER | Age: 44
End: 2024-03-01
Attending: STUDENT IN AN ORGANIZED HEALTH CARE EDUCATION/TRAINING PROGRAM
Payer: MEDICARE

## 2024-03-01 ENCOUNTER — APPOINTMENT (OUTPATIENT)
Dept: RADIOLOGY | Facility: MEDICAL CENTER | Age: 44
End: 2024-03-01
Attending: STUDENT IN AN ORGANIZED HEALTH CARE EDUCATION/TRAINING PROGRAM
Payer: MEDICARE

## 2024-03-01 VITALS
BODY MASS INDEX: 47.09 KG/M2 | OXYGEN SATURATION: 97 % | DIASTOLIC BLOOD PRESSURE: 58 MMHG | SYSTOLIC BLOOD PRESSURE: 126 MMHG | HEART RATE: 91 BPM | TEMPERATURE: 99.1 F | WEIGHT: 293 LBS | HEIGHT: 66 IN | RESPIRATION RATE: 16 BRPM

## 2024-03-01 DIAGNOSIS — J06.9 UPPER RESPIRATORY TRACT INFECTION, UNSPECIFIED TYPE: ICD-10-CM

## 2024-03-01 LAB
FLUAV RNA SPEC QL NAA+PROBE: NEGATIVE
FLUBV RNA SPEC QL NAA+PROBE: NEGATIVE
HIV 1+2 AB+HIV1 P24 AG SERPL QL IA: NORMAL
RSV RNA SPEC QL NAA+PROBE: NEGATIVE
SARS-COV-2 RNA RESP QL NAA+PROBE: NOTDETECTED
T PALLIDUM AB SER QL IA: NORMAL

## 2024-03-01 PROCEDURE — 71046 X-RAY EXAM CHEST 2 VIEWS: CPT

## 2024-03-01 PROCEDURE — 99283 EMERGENCY DEPT VISIT LOW MDM: CPT

## 2024-03-01 PROCEDURE — 700102 HCHG RX REV CODE 250 W/ 637 OVERRIDE(OP): Performed by: STUDENT IN AN ORGANIZED HEALTH CARE EDUCATION/TRAINING PROGRAM

## 2024-03-01 PROCEDURE — A9270 NON-COVERED ITEM OR SERVICE: HCPCS | Performed by: STUDENT IN AN ORGANIZED HEALTH CARE EDUCATION/TRAINING PROGRAM

## 2024-03-01 PROCEDURE — 0241U HCHG SARS-COV-2 COVID-19 NFCT DS RESP RNA 4 TRGT ED POC: CPT

## 2024-03-01 PROCEDURE — 87389 HIV-1 AG W/HIV-1&-2 AB AG IA: CPT

## 2024-03-01 PROCEDURE — 71045 X-RAY EXAM CHEST 1 VIEW: CPT

## 2024-03-01 PROCEDURE — 36415 COLL VENOUS BLD VENIPUNCTURE: CPT

## 2024-03-01 PROCEDURE — 86780 TREPONEMA PALLIDUM: CPT

## 2024-03-01 RX ORDER — ACETAMINOPHEN 500 MG
500-1000 TABLET ORAL EVERY 6 HOURS PRN
Qty: 30 TABLET | Refills: 0 | Status: SHIPPED | OUTPATIENT
Start: 2024-03-01

## 2024-03-01 RX ORDER — ACETAMINOPHEN 500 MG
1000 TABLET ORAL ONCE
Status: COMPLETED | OUTPATIENT
Start: 2024-03-01 | End: 2024-03-01

## 2024-03-01 RX ADMIN — ACETAMINOPHEN 1000 MG: 500 TABLET ORAL at 19:48

## 2024-03-01 ASSESSMENT — FIBROSIS 4 INDEX: FIB4 SCORE: 0.9

## 2024-03-02 NOTE — ED NOTES
Bedside report received from off going RN: Lashawn, assumed care of patient.  POC discussed with patient. Call light within reach, all needs addressed at this time.       Fall risk interventions in place: Patient's personal possessions are with in their safe reach, Place fall risk sign on patient's door, and Keep floor surfaces clean and dry (all applicable per Anthony Fall risk assessment)   Continuous monitoring: Pulse Ox or Blood Pressure  IVF/IV medications: Not Applicable   Oxygen: Room Air  Bedside sitter: Not Applicable   Isolation: Not Applicable

## 2024-03-02 NOTE — ED NOTES
Patient assisted back to room via wheelchair. She provided urine sample that was sent to lab. Blood drawn and COVID swab collected in triage with POC running

## 2024-03-02 NOTE — DISCHARGE INSTRUCTIONS
Can take Tylenol as needed for pain, fever, return if you have difficulty breathing other new symptoms you find concerning

## 2024-03-02 NOTE — ED TRIAGE NOTES
Yamila Moses Kelly  43 y.o. female  Chief Complaint   Patient presents with    Flu Like Symptoms     X 4 days, rough cough and runny nose       Pt to triage in wheelchair for above complaint.     Pt is GCS 15, speaking in full sentences, follows commands and responds appropriately to questions. Resp are even and unlabored.     Covid testing and STI testing protocol ordered. Pt placed in draw room. Pt educated on triage process. Pt encouraged to alert staff for any changes.       Vitals:    03/01/24 1804   BP: (!) 119/93   Pulse: 94   Resp: 16   Temp: 36.4 °C (97.6 °F)   SpO2: 97%

## 2024-03-02 NOTE — ED NOTES
"Pt discharged to lobby via wheelchair, pt will call own cab.  Pt alert and oriented times 4 on room air. Pt in possession of belongings.  Pt provided discharge education and information pertaining to medications and follow up appointments.  Pt received copy of discharge instructions and verbalized understanding. Encouraged to follow up with PCP. /58   Pulse 91   Temp 37.3 °C (99.1 °F) (Temporal)   Resp 16   Ht 1.676 m (5' 6\")   Wt (!) 144 kg (317 lb 14.5 oz)   SpO2 97%   BMI 51.31 kg/m²      "

## 2024-03-02 NOTE — ED PROVIDER NOTES
ER Provider Note    Scribed for Edu Vargas M.D. by Franklin Dawson. 3/1/2024   7:05 PM    Primary Care Provider: Radha Hinojosa P.A.-C.    CHIEF COMPLAINT  Chief Complaint   Patient presents with    Flu Like Symptoms     X 4 days, rough cough and runny nose         HPI/ROS  LIMITATION TO HISTORY   Select: : None  OUTSIDE HISTORIAN(S):  None.     Yamila Mendoza is a 43 y.o. female who presents to the ED for evaluation of flu like symptoms onset four ago. Patient reports having associating rhinorrhea, congestion, productive cough, and one episode of post-tussive emesis. Patient has been tolerating PO well and denies any abdominal pain or shortness of breath. She reports concern of cavity or other dental infection at this time. Patient does have a history of asthma and has attempted to use inhalers for relief, but denies any symptomatic relief. She requested STI testing today as she reports she is new sexual partner, denies any recent lesions, any change to be chronic vaginal discharge. Patient denies any known STI contact. She has been taking her seizure medications as prescribed, and has not had a seizure since her last visit on 2/5/2024.     PAST MEDICAL HISTORY  Past Medical History:   Diagnosis Date    ASTHMA     Bipolar disorder (HCC)     Chronic back pain     Psychiatric disorder     bipolar    Psychiatric disorder     depression    Seizure disorder (HCC)        SURGICAL HISTORY  Past Surgical History:   Procedure Laterality Date    REYES BY LAPAROSCOPY  9/22/2009    Performed by JUNE WAGGONER at SURGERY [facility] ORS    ERCP IN OR  9/19/2009    Performed by LOU WEBB at SURGERY [facility] ORS    OTHER ABDOMINAL SURGERY      OTHER NEUROLOGICAL SURG      OTHER ORTHOPEDIC SURGERY      2 R knee surgeries       FAMILY HISTORY  History reviewed. No pertinent family history.    SOCIAL HISTORY   reports that she has never smoked. She has never used smokeless tobacco. She reports that she  "does not drink alcohol and does not use drugs.    CURRENT MEDICATIONS  Discharge Medication List as of 3/1/2024  8:18 PM        CONTINUE these medications which have NOT CHANGED    Details   topiramate (TOPAMAX) 100 MG Tab Take 1 Tablet by mouth 2 times a day., Disp-60 Tablet, R-0, Normal      hydrOXYzine HCl (ATARAX) 25 MG Tab Take 1 Tablet by mouth 2 times a day as needed for Anxiety., Disp-30 Tablet, R-3, Normal      clotrimazole (LOTRIMIN) 1 % Cream Apply 1 Application topically 2 times a day., Disp-28 g, R-1, Normal      amitriptyline (ELAVIL) 25 MG Tab TAKE 1 TABLET BY MOUTH DAILY AT BEDTIME, Historical Med      loratadine (CLARITIN) 10 MG Tab Take 10 mg by mouth every day., Historical Med      Multiple Vitamin (MULTIVITAMIN) Tab Take 1 Tablet by mouth every day., Historical Med      carBAMazepine (TEGRETOL) 200 MG Tab Take 1 Tablet by mouth 3 times a day., Disp-90 Tablet, R-0, Normal      potassium chloride ER (KLOR-CON) 10 MEQ tablet Take 1 Tablet by mouth 2 times a day., Disp-60 Tablet, R-3, Normal      cyclobenzaprine (FLEXERIL) 5 mg tablet Take 1-2 Tablets by mouth 3 times a day as needed for Mild Pain or Moderate Pain., Disp-30 Tablet, R-0, Normal      cholecalciferol (VITAMIN D3) 400 UNIT Tab Take 400 Units by mouth every day., Historical Med             ALLERGIES  Allergies   Allergen Reactions    Bactrim Unspecified     \"I get the shakes\"     Iron Unspecified     \"see red dots\" vision, not rash     Penicillins Unspecified     \"makes me dizzy with my medicine\"     Phenobarbital Unspecified     \"makes me dizzy\"     Lemon Oil Itching     Skin itching per pt report to Dietary.    Onion Itching and Swelling     Throat swelling and itching per pt report to Dietary.    Pepper-Bell Food Allergy Itching and Swelling     Throat swelling and itching per pt report to Dietary.    Ibuprofen Unspecified     Pt states the medication give her the shakes.         PHYSICAL EXAM  BP (!) 181/97   Pulse 98   Temp 36.4 °C " "(97.6 °F) (Temporal)   Resp 16   Ht 1.676 m (5' 6\")   Wt (!) 144 kg (317 lb 14.5 oz)   SpO2 95%   BMI 51.31 kg/m²    General: Patient awake and alert in bed.   HENT: Normocephalic atraumatic, a dentate without oral lesions.   Eyes: Extraocular motion intact  Neck: Supple, no rigidity  Cardiovascular: Regular rate and rhythm no murmurs rubs or gallops  Respiratory: Clear to auscultation bilaterally, equal chest rise and fall, no increased work of breathing, no wheezing.   Abdomen: Soft nontender no guarding  Musculoskeletal: Warm and well perfused, no peripheral edema  Neuro: Alert, no focal deficits  Integumentary: No wounds or rashes     DIAGNOSTIC STUDIES    Labs:   Labs Reviewed   T.PALLIDUM AB GUNJAN (SCREENING)   HIV AG/AB COMBO ASSAY SCREENING   CHLAMYDIA/GC, PCR (URINE)   POCT COV-2, FLU A/B, RSV BY PCR   POC COV-2, FLU A/B, RSV BY PCR       Radiology:   This attending emergency physician has independently interpreted the diagnostic imaging associated with this visit and is awaiting the final reading from the radiologist.   Preliminary interpretation is a follows: No pneumonia    Radiologist interpretation:   DX-CHEST-2 VIEWS   Final Result      No active disease.      DX-CHEST-PORTABLE (1 VIEW)   Final Result      There is questionable density at the left hilum versus artifact from positioning. Recommend PA and lateral views chest exclude abnormality.           INITIAL ASSESSMENT COURSE AND PLAN  Care Narrative 43-year-old history of bipolar seizure disorder presenting with cough congestion for the past several days, 1 episode of posttussive emesis, vital signs are overall reassuring, no tachycardia, no tachypnea no hypoxia.  On exam patient is nontoxic-appearing with clear lungs to auscultation, she has nasal congestion and rhinorrhea.    I suspect a viral respiratory infection, painful patient thankfully is not wheezing, or with other evidence of acute asthma exacerbation, since COVID and flu swabs " obtained, obtain chest x-ray to screen for pneumonia which was reassuring, STI screening ordered in triage, given the patient's phone numbers not updated, I spoke to registration who will update her contact information in case the screening results positive.    7:05 PM - Patient was evaluated at bedside. Patient presents today with flu like symptoms including cough, rhinorrhea, and congestion, no shortness of breath. Patient has no known STD exposure but seeks STD testing today. See HPI for further details. Patient had a normal physical exam, and I informed her of my plan of care today including imaging the chest to rule out pneumonia and evaluate for other abnormalities via lab work and imaging. Patient verbalizes understanding and agreement to this plan of care. Ordered for POC CoV, Flu A/B, and RSV by PCR, HIV AG/AB combo assay screening, T.Pallidum AB GUNJAN, Chlamydia/GC PCR, DX-Chest, and DX-Chest two views to evaluate. The patient will be medicated with Tylenol 1000 mg for her symptoms. Patient verbalizes understanding and support with my plan of care.  I suspect viral illness and unlikely viral pneumonia.     ED Observation Status? No; Patient does not meet criteria for ED Observation.     8:06 PM - I reevaluated the patient at bedside. The patient informs me they feel improved following Tylenol administration. I discussed the patient's diagnostic study results which show no sign of pneumonia or other significant abnormality that requires immediate intervention today. I informed the patient that her current symptoms are likely viral related, and she does not require antibiotics as I do not suspect bacterial infection at this time. I discussed plan for discharge and follow up as outlined below. The patient is stable for discharge at this time and will return for any new or worsening symptoms. Patient verbalizes understanding and support with my plan for discharge.        DISPOSITION AND DISCUSSIONS    I have  discussed management of the patient with the following physicians and ERYN's:  None.    Discussion of management with other [facility] or appropriate source(s): None     Barriers to care at this time, including but not limited to:  None are known at this time.  .     Decision tools and prescription drugs considered including, but not limited to: Antibiotics considered antibiotics however no evidence of acute bacterial infection at this time low suspicion for bacterial sinusitis, given longevity of symptoms, nontoxic appearance .     The patient will return for new or worsening symptoms and is stable at the time of discharge.    DISPOSITION:  Patient will be discharged home in stable condition.    FOLLOW UP:  No follow-up provider specified.    OUTPATIENT MEDICATIONS:  Discharge Medication List as of 3/1/2024  8:18 PM        START taking these medications    Details   acetaminophen (TYLENOL) 500 MG Tab Take 1-2 Tablets by mouth every 6 hours as needed for Mild Pain or Moderate Pain., Disp-30 Tablet, R-0, Normal              FINAL DIAGNOSIS  1. Upper respiratory tract infection, unspecified type         I, Franklin Dawson (Linda), am scribing for, and in the presence of, Jose E aVrgas M.D..    Electronically signed by: Franklin Dawson (Scribe), 3/1/2024    IJose E M.D. personally performed the services described in this documentation, as scribed by Franklin Dawson in my presence, and it is both accurate and complete.      The note accurately reflects work and decisions made by me.  Jose E Vargas M.D.  3/1/2024  10:30 PM

## 2024-05-18 ENCOUNTER — APPOINTMENT (OUTPATIENT)
Dept: RADIOLOGY | Facility: MEDICAL CENTER | Age: 44
End: 2024-05-18
Attending: EMERGENCY MEDICINE
Payer: MEDICARE

## 2024-05-18 ENCOUNTER — HOSPITAL ENCOUNTER (EMERGENCY)
Facility: MEDICAL CENTER | Age: 44
End: 2024-05-18
Attending: EMERGENCY MEDICINE
Payer: MEDICARE

## 2024-05-18 VITALS
WEIGHT: 293 LBS | HEIGHT: 66 IN | RESPIRATION RATE: 21 BRPM | OXYGEN SATURATION: 91 % | HEART RATE: 90 BPM | TEMPERATURE: 97.7 F | SYSTOLIC BLOOD PRESSURE: 156 MMHG | DIASTOLIC BLOOD PRESSURE: 82 MMHG | BODY MASS INDEX: 47.09 KG/M2

## 2024-05-18 DIAGNOSIS — S93.402A SPRAIN OF LEFT ANKLE, UNSPECIFIED LIGAMENT, INITIAL ENCOUNTER: ICD-10-CM

## 2024-05-18 DIAGNOSIS — R56.9 SEIZURE (HCC): ICD-10-CM

## 2024-05-18 DIAGNOSIS — Z76.0 MEDICATION REFILL: ICD-10-CM

## 2024-05-18 RX ORDER — POTASSIUM CHLORIDE 750 MG/1
10 TABLET, FILM COATED, EXTENDED RELEASE ORAL 2 TIMES DAILY
Qty: 14 TABLET | Refills: 0 | Status: SHIPPED | OUTPATIENT
Start: 2024-05-18 | End: 2024-05-25

## 2024-05-18 RX ORDER — CARBAMAZEPINE 200 MG/1
200 TABLET ORAL 3 TIMES DAILY
Qty: 90 TABLET | Refills: 0 | Status: SHIPPED | OUTPATIENT
Start: 2024-05-18

## 2024-05-18 RX ORDER — IBUPROFEN 600 MG/1
600 TABLET ORAL ONCE
Status: COMPLETED | OUTPATIENT
Start: 2024-05-18 | End: 2024-05-18

## 2024-05-18 RX ORDER — DIVALPROEX SODIUM 250 MG/1
250 TABLET, DELAYED RELEASE ORAL SEE ADMIN INSTRUCTIONS
Qty: 150 TABLET | Refills: 0 | Status: SHIPPED | OUTPATIENT
Start: 2024-05-18 | End: 2024-06-02

## 2024-05-18 RX ORDER — TOPIRAMATE 100 MG/1
100 TABLET, FILM COATED ORAL 2 TIMES DAILY
Qty: 60 TABLET | Refills: 0 | Status: SHIPPED | OUTPATIENT
Start: 2024-05-18 | End: 2024-06-17

## 2024-05-18 RX ORDER — CYCLOBENZAPRINE HCL 5 MG
5-10 TABLET ORAL 3 TIMES DAILY PRN
Qty: 30 TABLET | Refills: 0 | Status: SHIPPED | OUTPATIENT
Start: 2024-05-18

## 2024-05-18 RX ORDER — DIVALPROEX SODIUM 500 MG/1
500 TABLET, EXTENDED RELEASE ORAL ONCE
Status: COMPLETED | OUTPATIENT
Start: 2024-05-18 | End: 2024-05-18

## 2024-05-18 RX ORDER — CARBAMAZEPINE 100 MG/1
200 TABLET, EXTENDED RELEASE ORAL ONCE
Status: COMPLETED | OUTPATIENT
Start: 2024-05-18 | End: 2024-05-18

## 2024-05-18 RX ORDER — DIVALPROEX SODIUM 250 MG/1
250 TABLET, DELAYED RELEASE ORAL 3 TIMES DAILY
Qty: 90 TABLET | Refills: 0 | Status: SHIPPED | OUTPATIENT
Start: 2024-05-18 | End: 2024-05-18

## 2024-05-18 RX ADMIN — DIVALPROEX SODIUM 500 MG: 500 TABLET, EXTENDED RELEASE ORAL at 16:33

## 2024-05-18 RX ADMIN — CARBAMAZEPINE 200 MG: 100 TABLET, EXTENDED RELEASE ORAL at 16:33

## 2024-05-18 RX ADMIN — IBUPROFEN 600 MG: 600 TABLET, FILM COATED ORAL at 16:25

## 2024-05-18 ASSESSMENT — FIBROSIS 4 INDEX: FIB4 SCORE: 0.9

## 2024-05-18 ASSESSMENT — PAIN DESCRIPTION - PAIN TYPE: TYPE: ACUTE PAIN

## 2024-05-18 NOTE — ED TRIAGE NOTES
"Chief Complaint   Patient presents with    GLF     Pt reports yesterday she was walking and tripped on a rug. Pt complaining of left ankle, left knee, and right hand pain.   -head strike, -thinners    Seizure     Pt reports seizure earlier this AM (-head strike). Hx of same, on medications, but pt reports she is running low on some of her medications.        44 yo F ambulatory to triage for above complaint.      Pt placed in lobby. Pt educated on triage process. Pt encouraged to alert staff for any changes.     Patient and staff wearing appropriate PPE    BP (!) 179/106   Pulse 96   Temp 36.3 °C (97.3 °F) (Temporal)   Resp 18   Ht 1.676 m (5' 6\")   Wt (!) 144 kg (317 lb 7.4 oz)   SpO2 96%   BMI 51.24 kg/m²     "

## 2024-05-18 NOTE — ED PROVIDER NOTES
ER Provider Note    Scribed for Speedy Huertas MD by Alex Shields. 5/18/2024  3:43 PM    Primary Care Provider: NATY Larsen    CHIEF COMPLAINT   Chief Complaint   Patient presents with    GLF     Pt reports yesterday she was walking and tripped on a rug. Pt complaining of left ankle, left knee, and right hand pain.   -head strike, -thinners    Seizure     Pt reports seizure earlier this AM (-head strike). Hx of same, on medications, but pt reports she is running low on some of her medications.      EXTERNAL RECORDS REVIEWED  Other None    HPI/ROS  LIMITATION TO HISTORY   Select: : None  OUTSIDE HISTORIAN(S):  None    Yamila Mendoza is a 43 y.o. female who presents to the ED complaining of left knee, left ankle, and right hand pain onset one day ago following a fall. The patient reports that she tripped while walking on a rug. She adds that she had a seizure this morning, but is still taking her medication for it as she will usually have 3 seizures a day. Denies wrist pain, head strike during fall, or a change of pregnancy. She adds that her medications are low, and has not taken her afternoon doses yet. No alleviating or exacerbating factors noted.    PAST MEDICAL HISTORY  Past Medical History:   Diagnosis Date    ASTHMA     Bipolar disorder (HCC)     Chronic back pain     Psychiatric disorder     bipolar    Psychiatric disorder     depression    Seizure disorder (HCC)      SURGICAL HISTORY  Past Surgical History:   Procedure Laterality Date    REYES BY LAPAROSCOPY  9/22/2009    Performed by JUNE WAGGONER at SURGERY [facility] ORS    ERCP IN OR  9/19/2009    Performed by LOU WEBB at SURGERY [facility] ORS    OTHER ABDOMINAL SURGERY      OTHER NEUROLOGICAL SURG      OTHER ORTHOPEDIC SURGERY      2 R knee surgeries     FAMILY HISTORY  No family history noted.    SOCIAL HISTORY   reports that she has never smoked. She has never used smokeless tobacco. She reports that she does not drink  "alcohol and does not use drugs.    CURRENT MEDICATIONS  Discharge Medication List as of 5/18/2024  5:33 PM        CONTINUE these medications which have NOT CHANGED    Details   acetaminophen (TYLENOL) 500 MG Tab Take 1-2 Tablets by mouth every 6 hours as needed for Mild Pain or Moderate Pain., Disp-30 Tablet, R-0, Normal      hydrOXYzine HCl (ATARAX) 25 MG Tab Take 1 Tablet by mouth 2 times a day as needed for Anxiety., Disp-30 Tablet, R-3, Normal      clotrimazole (LOTRIMIN) 1 % Cream Apply 1 Application topically 2 times a day., Disp-28 g, R-1, Normal      amitriptyline (ELAVIL) 25 MG Tab TAKE 1 TABLET BY MOUTH DAILY AT BEDTIME, Historical Med      loratadine (CLARITIN) 10 MG Tab Take 10 mg by mouth every day., Historical Med      Multiple Vitamin (MULTIVITAMIN) Tab Take 1 Tablet by mouth every day., Historical Med      cholecalciferol (VITAMIN D3) 400 UNIT Tab Take 400 Units by mouth every day., Historical Med           ALLERGIES  Bactrim, Iron, Penicillins, Phenobarbital, Lemon oil, Onion, Pepper-bell food allergy, and Ibuprofen    PHYSICAL EXAM  BP (!) 179/106   Pulse 96   Temp 36.3 °C (97.3 °F) (Temporal)   Resp 18   Ht 1.676 m (5' 6\")   Wt (!) 144 kg (317 lb 7.4 oz)   SpO2 96%   BMI 51.24 kg/m²   Constitutional: Alert in no apparent distress.  HENT: No signs of trauma, Bilateral external ears normal, Nose normal. Uvula midline.   Eyes: Pupils are equal and reactive, Conjunctiva normal, Non-icteric.   Neck: Normal range of motion, No tenderness, Supple, No stridor.   Lymphatic: No lymphadenopathy noted.   Cardiovascular: Regular rate and rhythm, no murmurs.   Thorax & Lungs: Normal breath sounds, No respiratory distress, No wheezing, No chest tenderness.   Abdomen: Bowel sounds normal, Soft, No tenderness, No peritoneal signs, No masses, No pulsatile masses.   Skin: Warm, Dry, No erythema, No rash.   Back: No bony tenderness, No CVA tenderness.   Extremities: Intact distal pulses, No edema, No " tenderness, No cyanosis.  Musculoskeletal: Good range of motion in all major joints. Tenderness of MCP joint of 3rd and 5th digits of the right hand. Lateral malleolus tenderness on the left. Tenderness on palpation of 5th metatarsal on the left.  Neurologic: Alert , Normal motor function, Normal sensory function, No focal deficits noted. Cranial nerves II through XII intact.  5 out of 5 strength x4.  Sensation intact light touch.  Normal finger-nose-finger.  Normal reflexes bilaterally.  No clonus. EOMI. PERRL.   Psychiatric: Affect normal, Judgment normal, Mood normal.     DIAGNOSTIC STUDIES    RADIOLOGY/PROCEDURES   The attending emergency physician has independently interpreted the diagnostic imaging associated with this visit and am waiting the final reading from the radiologist.   My preliminary interpretation is a follows: no fx    Radiologist interpretation:  DX-KNEE COMPLETE 4+ LEFT   Final Result         1. No acute osseous abnormality.      DX-HAND 3+ RIGHT   Final Result      No acute osseous abnormality.      DX-FOOT-COMPLETE 3+ LEFT   Final Result      No fracture detected.      DX-ANKLE 3+ VIEWS LEFT   Final Result      No radiographic evidence of acute traumatic injury.        COURSE & MEDICAL DECISION MAKING     ASSESSMENT, COURSE AND PLAN  Care Narrative:       4:08 PM - Patient was seen and evaluated at bedside. Patient presents to the ED for evaluation of pain in her left knee, left ankle, and right hand.  After my exam, I discussed with the patient the plan of care, which includes treating the patient with medication for their symptoms, as well as obtaining imaging for further evaluation. Patient understands and verbalizes agreement to plan of care. Patient will be treated with Motrin 600 mg oral, Depakote 500 mg oral, and Tegretol  mg oral. Ordered DX-Foot-Complete 3+ Left, DX-Ankle 3+ Views Left, DX-Knee Complete 4+ Left, DX-Hand-3+ Right to evaluate.   Differential diagnoses include but  not limited to:     Patient with history of seizures reports medication noncompliance and seizure today  Patient reports that she has a history of 3 seizures daily despite medication compliance  Patient with tenderness at knee and hand and foot and ankle  Patient placed in ankle boot for orthopedic follow-up      Patient on numerous different medications   All refills verified with patient including dose and duration and patient encouraged to follow-up with primary care for these medication refills as she is on numerous different medication regimens.    5:17 PM - I reevaluated the patient at bedside. The patient informs me they feel improved following medication administration. I discussed the patient's diagnostic study results which show no fractures. I discussed plan for discharge and follow up as outlined below. The patient is stable for discharge at this time and will return for any new or worsening symptoms. Patient verbalizes understanding and support with my plan for discharge.       DISPOSITION AND DISCUSSIONS  I have discussed management of the patient with the following physicians and ERYN's:  None    Discussion of management with other [facility] or appropriate source(s): None     Barriers to care at this time, including but not limited to:  No known barriers .     The patient will return for new or worsening symptoms and is stable at the time of discharge.    The patient is referred to a primary physician for blood pressure management, diabetic screening, and for all other preventative health concerns.    DISPOSITION:  Patient will be discharged home in stable condition.    FOLLOW UP:  Candice Pat A.P.R.NJose Rafael  [address]  [address]  [facility] [id number] [phone]    In 3 days      [facility], Emergency Dept  [address] [facility] [address] [phone]    If symptoms worsen    Anibal Jean M.D.  [address]  [facility] [id number] [phone]      call to schedule  orthopedic follow    OUTPATIENT MEDICATIONS:  Discharge Medication List as of 5/18/2024  5:33 PM        FINAL DIANGOSIS  1. Medication refill    2. Seizure (HCC)    3. Sprain of left ankle, unspecified ligament, initial encounter       IAlex (Scribe), am scribing for, and in the presence of, Speedy Huertas M.D..    Electronically signed by: Alex Shields (Yanyibe), 5/18/2024    ISpeedy M.D. personally performed the services described in this documentation, as scribed by Alex Shields in my presence, and it is both accurate and complete.     The note accurately reflects work and decisions made by me.  Speedy Huertas M.D.  5/18/2024  10:50 PM

## 2024-05-19 NOTE — ED NOTES
"EDT bedside for air gel ankle splint.  Pt states \"those don't work for me\", inquiring about a boot instead.  ERP aware, ok for boot  "

## 2024-05-19 NOTE — ED NOTES
Pt given d/c instructions, f/u info and aware of RX x 5 for  with verbal understanding.  VSS at discharge.  Pt ambulatory from the ED w/ steady gait using personal 4WW.  All belongings in possession on discharge.  Pt escorted to the lobby by RN.

## 2024-05-19 NOTE — DISCHARGE INSTRUCTIONS
Please discuss the following findings with your regular doctor:  DX-KNEE COMPLETE 4+ LEFT   Final Result         1. No acute osseous abnormality.      DX-HAND 3+ RIGHT   Final Result      No acute osseous abnormality.      DX-FOOT-COMPLETE 3+ LEFT   Final Result      No fracture detected.      DX-ANKLE 3+ VIEWS LEFT   Final Result      No radiographic evidence of acute traumatic injury.        Labs Reviewed - No data to display  b

## 2024-05-19 NOTE — ED NOTES
Boot applied to injured extremity.  CMS checked before and after application. Pt educated on use and care. Pt verbalized understanding.

## 2024-06-29 ENCOUNTER — OFFICE VISIT (OUTPATIENT)
Dept: URGENT CARE | Facility: CLINIC | Age: 44
End: 2024-06-29
Payer: MEDICARE

## 2024-06-29 VITALS
RESPIRATION RATE: 18 BRPM | DIASTOLIC BLOOD PRESSURE: 80 MMHG | SYSTOLIC BLOOD PRESSURE: 126 MMHG | BODY MASS INDEX: 47.09 KG/M2 | HEIGHT: 66 IN | TEMPERATURE: 97.7 F | OXYGEN SATURATION: 95 % | HEART RATE: 99 BPM | WEIGHT: 293 LBS

## 2024-06-29 DIAGNOSIS — L03.115 CELLULITIS OF RIGHT LOWER LEG: ICD-10-CM

## 2024-06-29 RX ORDER — NAPROXEN 500 MG/1
TABLET ORAL
COMMUNITY
Start: 2024-06-10

## 2024-06-29 RX ORDER — POTASSIUM CHLORIDE 750 MG/1
TABLET, FILM COATED, EXTENDED RELEASE ORAL
COMMUNITY
Start: 2024-06-04

## 2024-06-29 RX ORDER — CEPHALEXIN 500 MG/1
500 CAPSULE ORAL 4 TIMES DAILY
Qty: 20 CAPSULE | Refills: 0 | Status: SHIPPED | OUTPATIENT
Start: 2024-06-29 | End: 2024-07-04

## 2024-06-29 RX ORDER — LEVOTHYROXINE SODIUM 0.03 MG/1
TABLET ORAL
COMMUNITY
Start: 2024-04-08

## 2024-06-29 ASSESSMENT — ENCOUNTER SYMPTOMS
WEAKNESS: 0
TINGLING: 0
SENSORY CHANGE: 0
FEVER: 0
CHILLS: 0

## 2024-06-29 ASSESSMENT — FIBROSIS 4 INDEX: FIB4 SCORE: 0.9

## 2024-06-29 NOTE — PROGRESS NOTES
"Subjective     Yamila Mendoza is a 43 y.o. female who presents with Other (X8 days, states that she only wore the boot for 1 day, and it cause a friction burn.)            Yamila is a 43 y.o. female who presents to urgent care with wound of right lower leg.  Patient states she had a left walking boot for prior ankle injury.  Patient tried to wear walking boot on right leg which caused friction and wound on the right lower leg. Patient reports swelling and redness. Pain localized to wounds. No radiation of pain. No numbness/tingling. Patient has been wrapping leg with gauze.        Review of Systems   Constitutional:  Negative for chills and fever.   Musculoskeletal:  Negative for joint pain.   Neurological:  Negative for tingling, sensory change and weakness.   All other systems reviewed and are negative.             Objective     /80 (BP Location: Right arm, Patient Position: Sitting, BP Cuff Size: Adult long) Comment (Patient Position): forearm  Pulse 99   Temp 36.5 °C (97.7 °F)   Resp (!) 24   Ht 1.676 m (5' 6\")   Wt (!) 153 kg (338 lb)   SpO2 95%   BMI 54.55 kg/m²      Physical Exam  Vitals reviewed.   Constitutional:       Appearance: Normal appearance.   HENT:      Head: Normocephalic and atraumatic.      Nose: Nose normal.   Eyes:      Extraocular Movements: Extraocular movements intact.      Conjunctiva/sclera: Conjunctivae normal.      Pupils: Pupils are equal, round, and reactive to light.   Cardiovascular:      Rate and Rhythm: Normal rate.   Pulmonary:      Effort: Pulmonary effort is normal.   Skin:     General: Skin is warm and dry.      Comments: Right lower extremity skin breakdown. Serous drainage. Surrounding erythema, swelling and increased warmth. See clinical picture below.   Neurological:      General: No focal deficit present.      Mental Status: She is alert.                             Assessment & Plan        1. Cellulitis of right lower leg  - Wound cleaned. Sterile " dressing applied with nonadherent gauze and Coban.  Extra wound care supplies provided for patient.  - cephALEXin (KEFLEX) 500 MG Cap; Take 1 Capsule by mouth 4 times a day for 5 days.  Dispense: 20 Capsule; Refill: 0       Differential diagnoses, supportive care measures, wound care instructions and indications for immediate follow-up discussed with patient. Pathogenesis of diagnosis discussed including typical length and natural progression.      Instructed to return to urgent care or nearest emergency department if symptoms fail to improve, for any change in condition, further concerns, or new concerning symptoms.    Patient states understanding and agrees with the plan of care and discharge instructions.

## 2024-10-04 ENCOUNTER — APPOINTMENT (OUTPATIENT)
Dept: RADIOLOGY | Facility: MEDICAL CENTER | Age: 44
End: 2024-10-04
Attending: STUDENT IN AN ORGANIZED HEALTH CARE EDUCATION/TRAINING PROGRAM
Payer: MEDICARE

## 2024-10-04 ENCOUNTER — PHARMACY VISIT (OUTPATIENT)
Dept: PHARMACY | Facility: MEDICAL CENTER | Age: 44
End: 2024-10-04
Payer: COMMERCIAL

## 2024-10-04 ENCOUNTER — HOSPITAL ENCOUNTER (EMERGENCY)
Facility: MEDICAL CENTER | Age: 44
End: 2024-10-04
Attending: STUDENT IN AN ORGANIZED HEALTH CARE EDUCATION/TRAINING PROGRAM
Payer: MEDICARE

## 2024-10-04 VITALS
WEIGHT: 293 LBS | RESPIRATION RATE: 16 BRPM | DIASTOLIC BLOOD PRESSURE: 71 MMHG | BODY MASS INDEX: 47.09 KG/M2 | TEMPERATURE: 97.1 F | OXYGEN SATURATION: 96 % | HEART RATE: 84 BPM | SYSTOLIC BLOOD PRESSURE: 134 MMHG | HEIGHT: 66 IN

## 2024-10-04 DIAGNOSIS — S93.491A SPRAIN OF ANTERIOR TALOFIBULAR LIGAMENT OF RIGHT ANKLE, INITIAL ENCOUNTER: ICD-10-CM

## 2024-10-04 PROCEDURE — A9270 NON-COVERED ITEM OR SERVICE: HCPCS | Performed by: STUDENT IN AN ORGANIZED HEALTH CARE EDUCATION/TRAINING PROGRAM

## 2024-10-04 PROCEDURE — RXMED WILLOW AMBULATORY MEDICATION CHARGE: Performed by: STUDENT IN AN ORGANIZED HEALTH CARE EDUCATION/TRAINING PROGRAM

## 2024-10-04 PROCEDURE — 73610 X-RAY EXAM OF ANKLE: CPT | Mod: RT

## 2024-10-04 PROCEDURE — 700102 HCHG RX REV CODE 250 W/ 637 OVERRIDE(OP): Performed by: STUDENT IN AN ORGANIZED HEALTH CARE EDUCATION/TRAINING PROGRAM

## 2024-10-04 PROCEDURE — 73630 X-RAY EXAM OF FOOT: CPT | Mod: RT

## 2024-10-04 PROCEDURE — 99284 EMERGENCY DEPT VISIT MOD MDM: CPT

## 2024-10-04 RX ORDER — CYCLOBENZAPRINE HCL 10 MG
10 TABLET ORAL 3 TIMES DAILY
Qty: 20 TABLET | Refills: 0 | Status: SHIPPED | OUTPATIENT
Start: 2024-10-04

## 2024-10-04 RX ORDER — ACETAMINOPHEN 500 MG
1000 TABLET ORAL ONCE
Status: COMPLETED | OUTPATIENT
Start: 2024-10-04 | End: 2024-10-04

## 2024-10-04 RX ADMIN — ACETAMINOPHEN 1000 MG: 500 TABLET ORAL at 19:06

## 2024-10-04 ASSESSMENT — FIBROSIS 4 INDEX: FIB4 SCORE: 0.9

## 2024-10-04 ASSESSMENT — PAIN DESCRIPTION - PAIN TYPE: TYPE: ACUTE PAIN

## 2024-11-12 NOTE — ED NOTES
Bedside report done with Marisa STOUT. All lines traced. Patient on vitals monitor. Patient resting at this time in no acute distress. Patient updated on plan of care. Care assumed of patient at this time. Vital signs stable. Call light within reach of patient   PROGRESS NOTE    SUBJECTIVE:   Ashley Campbell is a 89 y.o. female seen for a follow up visit regarding   Chief Complaint   Patient presents with    ED Follow-up     Was seen at ER on 11/8 for fall; left shoulder fracture  Will follow up with Ortho next week  Requesting HH referral      Camille Mary     Would like bilateral ears checked        HPI:  Here today for follow-up, she had a fall last week, tripped over her dog, fractured her proximal humerus.  Nonoperative candidate, treating with a sling.         Reviewed and updated this visit by provider:           Review of Systems   Respiratory: Negative.     Cardiovascular: Negative.           OBJECTIVE:  Vitals:    11/12/24 1515   BP: 116/80   Site: Right Upper Arm   Cuff Size: Small Adult   Pulse: 82   SpO2: 97%   Height: 1.575 m (5' 2.01\")        Physical Exam   General: Alert, frail appearing  Lungs: Good airflow, no rales or rhonchi  CVS: Regular rhythm, normal rate, soft outflow murmur, normal S1, S2, no S3 or S4  Extremities: Left arm in sling, good pulses and distal neurological function  Medical problems and test results were reviewed with the patient today.     No results found for this or any previous visit (from the past 672 hour(s)).    No results found for any visits on 11/12/24.     ASSESSMENT and PLAN    1. Frailty syndrome in geriatric patient  2. Closed fracture of proximal end of left humerus with routine healing, unspecified fracture morphology, subsequent encounter.  Has follow-up with Ortho scheduled  3. Primary hypertension, low here today, encouraged her to maintain proper hydration.     Health care submitted    No follow-ups on file.       Kenneth Barlow MD

## 2024-11-20 ENCOUNTER — HOSPITAL ENCOUNTER (EMERGENCY)
Facility: MEDICAL CENTER | Age: 44
End: 2024-11-20
Attending: EMERGENCY MEDICINE
Payer: MEDICARE

## 2024-11-20 VITALS
DIASTOLIC BLOOD PRESSURE: 84 MMHG | TEMPERATURE: 97 F | HEART RATE: 83 BPM | SYSTOLIC BLOOD PRESSURE: 130 MMHG | OXYGEN SATURATION: 98 % | WEIGHT: 293 LBS | BODY MASS INDEX: 53.27 KG/M2 | RESPIRATION RATE: 16 BRPM

## 2024-11-20 DIAGNOSIS — J40 BRONCHITIS: ICD-10-CM

## 2024-11-20 DIAGNOSIS — R30.0 DYSURIA: ICD-10-CM

## 2024-11-20 LAB
APPEARANCE UR: CLEAR
BACTERIA #/AREA URNS HPF: ABNORMAL /HPF
BILIRUB UR QL STRIP.AUTO: NEGATIVE
CASTS URNS QL MICRO: ABNORMAL /LPF (ref 0–2)
COLOR UR: YELLOW
EPITHELIAL CELLS 1715: ABNORMAL /HPF (ref 0–5)
GLUCOSE UR STRIP.AUTO-MCNC: NEGATIVE MG/DL
KETONES UR STRIP.AUTO-MCNC: ABNORMAL MG/DL
LEUKOCYTE ESTERASE UR QL STRIP.AUTO: NEGATIVE
MICRO URNS: ABNORMAL
NITRITE UR QL STRIP.AUTO: NEGATIVE
PH UR STRIP.AUTO: 5.5 [PH] (ref 5–8)
PROT UR QL STRIP: NEGATIVE MG/DL
RBC # URNS HPF: ABNORMAL /HPF (ref 0–2)
RBC UR QL AUTO: ABNORMAL
SP GR UR STRIP.AUTO: 1.03
UROBILINOGEN UR STRIP.AUTO-MCNC: 1 EU/DL
WBC #/AREA URNS HPF: ABNORMAL /HPF

## 2024-11-20 PROCEDURE — 99284 EMERGENCY DEPT VISIT MOD MDM: CPT

## 2024-11-20 PROCEDURE — 81001 URINALYSIS AUTO W/SCOPE: CPT

## 2024-11-20 ASSESSMENT — FIBROSIS 4 INDEX: FIB4 SCORE: 0.92

## 2024-11-20 NOTE — ED TRIAGE NOTES
Chief Complaint   Patient presents with    Painful Urination    Cough     Pt provided with cup and instructions for clean catch.   BP (!) 143/110   Pulse (!) 105   Temp 36.1 °C (97 °F) (Temporal)   Resp 20   Wt (!) 150 kg (330 lb 0.5 oz)   SpO2 98%   Pt informed of wait times. Educated on triage process.  Asked to return to triage RN for any new or worsening of symptoms. Thanked for patience.

## 2024-11-20 NOTE — ED NOTES
Patient to room from [facility] with steady gait using walker. States she is unable to provide urine sample at this time. Agree with triage note. Charted for ERP. Call light within reach.

## 2024-11-20 NOTE — ED NOTES
Patient gave clear cold watery sample. Discussed with patient, she admitted to getting toilet water sample, will re-attempt.

## 2024-11-20 NOTE — ED PROVIDER NOTES
ED Provider Note    CHIEF COMPLAINT  Chief Complaint   Patient presents with    Painful Urination    Cough       EXTERNAL RECORDS REVIEWED  Office visit completed on 6/20/2024 for cellulitis of right lower extremity    HPI/ROS      Yamila Mendoza is a 44 y.o. female who presents states she has a cough and might have bronchitis.  Denies fever, shakes, chills, abdominal pain, sweats, coming up mucus, chest pain, nausea vomiting.  She also complains of slight dysuria.  She is asking for IV fluids.  She is unsure of her last menstrual cycle.     PAST MEDICAL HISTORY   has a past medical history of ASTHMA, Bipolar disorder (HCC), Chronic back pain, Psychiatric disorder, Psychiatric disorder, and Seizure disorder (HCC).    SURGICAL HISTORY   has a past surgical history that includes other neurological surg; other orthopedic surgery; other abdominal surgery; ercp in or (9/19/2009); and joy by laparoscopy (9/22/2009).    FAMILY HISTORY  No family history on file.    SOCIAL HISTORY  Social History     Tobacco Use    Smoking status: Never    Smokeless tobacco: Never   Vaping Use    Vaping status: Never Used   Substance and Sexual Activity    Alcohol use: No    Drug use: No    Sexual activity: Not on file       CURRENT MEDICATIONS  Home Medications       Reviewed by Collette Brumfield R.N. (Registered Nurse) on 11/20/24 at 1337  Med List Status: Partial     Medication Last Dose Status   acetaminophen (TYLENOL) 500 MG Tab  Active   amitriptyline (ELAVIL) 25 MG Tab  Active   carBAMazepine (TEGRETOL) 200 MG Tab  Active   cholecalciferol (VITAMIN D3) 400 UNIT Tab  Active   clotrimazole (LOTRIMIN) 1 % Cream  Active   cyclobenzaprine (FLEXERIL) 10 mg Tab  Active   hydrOXYzine HCl (ATARAX) 25 MG Tab  Active   levothyroxine (SYNTHROID) 25 MCG Tab  Active   loratadine (CLARITIN) 10 MG Tab  Active   Multiple Vitamin (MULTIVITAMIN) Tab  Active   potassium chloride ER (KLOR-CON) 10 MEQ tablet  Active               "      ALLERGIES  Allergies   Allergen Reactions    Bactrim Unspecified     \"I get the shakes\"     Iron Unspecified     \"see red dots\" vision, not rash     Penicillins Unspecified     \"makes me dizzy with my medicine\"     Phenobarbital Unspecified     \"makes me dizzy\"     Lemon Oil Itching     Skin itching per pt report to Dietary.    Onion Itching and Swelling     Throat swelling and itching per pt report to Dietary.    [employer] Allergy Itching and Swelling     Throat swelling and itching per pt report to Dietary.       PHYSICAL EXAM  VITAL SIGNS: BP (!) 143/110 Comment: forearm  Pulse (!) 105   Temp 36.1 °C (97 °F) (Temporal)   Resp 20   Wt (!) 150 kg (330 lb 0.5 oz)   SpO2 98%   BMI 53.27 kg/m²      Nursing notes and vitals reviewed.  Constitutional: Well developed, Well nourished, No acute distress, Non-toxic appearance.   Cardiovascular: Normal heart rate, Normal rhythm, No murmurs, No rubs, No gallops.   Thorax & Lungs: No respiratory distress, No rales, No rhonchi, No wheezing, No chest tenderness.   Abdomen: Bowel sounds normal, Soft, No tenderness, No guarding, No rebound, No masses, No pulsatile masses protuberant abdomen.   Psychiatric: Anxious affect      EKG/LABS  Results for orders placed or performed during the hospital encounter of 11/20/24   Urinalysis, Culture if Indicated    Collection Time: 11/20/24  3:39 PM    Specimen: Urine, Clean Catch   Result Value Ref Range    Color Yellow     Character Clear     Specific Gravity 1.030 <1.035    Ph 5.5 5.0 - 8.0    Glucose Negative Negative mg/dL    Ketones Trace (A) Negative mg/dL    Protein Negative Negative mg/dL    Bilirubin Negative Negative    Urobilinogen, Urine 1.0 <=1.0 EU/dL    Nitrite Negative Negative    Leukocyte Esterase Negative Negative    Occult Blood Moderate (A) Negative    Micro Urine Req Microscopic    URINE MICROSCOPIC (W/UA)    Collection Time: 11/20/24  3:39 PM   Result Value Ref Range    WBC 0-2 /hpf    RBC 21-50 (A) 0 " - 2 /hpf    Bacteria Few (A) None /hpf    Epithelial Cells 3-5 0 - 5 /hpf    Urine Casts 0-2 0 - 2 /lpf       I have independently interpreted this EKG        COURSE & MEDICAL DECISION MAKING    ASSESSMENT, COURSE AND PLAN  Care Narrative: This is a pleasant 44-year-old female presents with bronchitis.  She has no other hypoxia improvise a viral condition.  I do not recommend treatment except for ibuprofen and Tylenol.  She has no evidence of increased respiratory distress.  The patient also has dysuria but has no evidence of urinary tract infection.  I did ask her to follow-up with her primary care physician for further evaluation and management.  She does not have a significant emergency medicine condition requiring further lab testing or hospitalization here.        DISPOSITION AND DISCUSSIONS      Decision tools and prescription drugs considered including, but not limited to: Consider blood work but here the patient seemingly stable, she does not have an emergency medical condition requiring further evaluation of blood work or imaging.    FINAL DIAGNOSIS  1. Dysuria    2. Bronchitis        DISPOSITION:  Patient will be discharged home in stable condition.    FOLLOW UP:  [facility], Emergency Dept  [address] [address] [phone]    If symptoms worsen    Candice Pat A.P.R.NJose Rafael  [address] [id number] [phone]    Schedule an appointment as soon as possible for a visit              Electronically signed by: Jacques Soliz D.O., 11/20/2024 2:17 PM

## 2024-11-21 NOTE — ED NOTES
Patient discharged per order. Oral and written discharge instructions reviewed. All belongings accounted for and taken with patient. Questions answered, and patient agrees with discharge plan. Encouraged to follow up with PCP.

## 2024-12-01 ENCOUNTER — HOSPITAL ENCOUNTER (EMERGENCY)
Facility: MEDICAL CENTER | Age: 44
End: 2024-12-01
Attending: EMERGENCY MEDICINE
Payer: MEDICARE

## 2024-12-01 VITALS
RESPIRATION RATE: 16 BRPM | WEIGHT: 293 LBS | BODY MASS INDEX: 47.09 KG/M2 | TEMPERATURE: 96.9 F | OXYGEN SATURATION: 98 % | HEIGHT: 66 IN | SYSTOLIC BLOOD PRESSURE: 167 MMHG | DIASTOLIC BLOOD PRESSURE: 74 MMHG | HEART RATE: 83 BPM

## 2024-12-01 DIAGNOSIS — R56.9 SEIZURE-LIKE ACTIVITY (HCC): ICD-10-CM

## 2024-12-01 LAB
ALBUMIN SERPL BCP-MCNC: 3.8 G/DL (ref 3.2–4.9)
ALBUMIN/GLOB SERPL: 1.4 G/DL
ALP SERPL-CCNC: 68 U/L (ref 30–99)
ALT SERPL-CCNC: 9 U/L (ref 2–50)
ANION GAP SERPL CALC-SCNC: 10 MMOL/L (ref 7–16)
AST SERPL-CCNC: 22 U/L (ref 12–45)
BASOPHILS # BLD AUTO: 0 % (ref 0–1.8)
BASOPHILS # BLD: 0 K/UL (ref 0–0.12)
BILIRUB SERPL-MCNC: <0.2 MG/DL (ref 0.1–1.5)
BUN SERPL-MCNC: 12 MG/DL (ref 8–22)
CALCIUM ALBUM COR SERPL-MCNC: 9.1 MG/DL (ref 8.5–10.5)
CALCIUM SERPL-MCNC: 8.9 MG/DL (ref 8.5–10.5)
CHLORIDE SERPL-SCNC: 97 MMOL/L (ref 96–112)
CO2 SERPL-SCNC: 24 MMOL/L (ref 20–33)
CREAT SERPL-MCNC: 0.61 MG/DL (ref 0.5–1.4)
EOSINOPHIL # BLD AUTO: 0 K/UL (ref 0–0.51)
EOSINOPHIL NFR BLD: 0 % (ref 0–6.9)
ERYTHROCYTE [DISTWIDTH] IN BLOOD BY AUTOMATED COUNT: 42.7 FL (ref 35.9–50)
GFR SERPLBLD CREATININE-BSD FMLA CKD-EPI: 112 ML/MIN/1.73 M 2
GLOBULIN SER CALC-MCNC: 2.7 G/DL (ref 1.9–3.5)
GLUCOSE SERPL-MCNC: 118 MG/DL (ref 65–99)
HCG SERPL QL: NEGATIVE
HCT VFR BLD AUTO: 39.6 % (ref 37–47)
HGB BLD-MCNC: 13.2 G/DL (ref 12–16)
LYMPHOCYTES # BLD AUTO: 1.6 K/UL (ref 1–4.8)
LYMPHOCYTES NFR BLD: 19.5 % (ref 22–41)
MANUAL DIFF BLD: NORMAL
MCH RBC QN AUTO: 30.3 PG (ref 27–33)
MCHC RBC AUTO-ENTMCNC: 33.3 G/DL (ref 32.2–35.5)
MCV RBC AUTO: 91 FL (ref 81.4–97.8)
METAMYELOCYTES NFR BLD MANUAL: 0.9 %
MONOCYTES # BLD AUTO: 0.43 K/UL (ref 0–0.85)
MONOCYTES NFR BLD AUTO: 5.3 % (ref 0–13.4)
MORPHOLOGY BLD-IMP: NORMAL
MYELOCYTES NFR BLD MANUAL: 0.9 %
NEUTROPHILS # BLD AUTO: 6.02 K/UL (ref 1.82–7.42)
NEUTROPHILS NFR BLD: 72.5 % (ref 44–72)
NEUTS BAND NFR BLD MANUAL: 0.9 % (ref 0–10)
NRBC # BLD AUTO: 0.02 K/UL
NRBC BLD-RTO: 0.2 /100 WBC (ref 0–0.2)
PLATELET # BLD AUTO: 241 K/UL (ref 164–446)
PLATELET BLD QL SMEAR: NORMAL
PMV BLD AUTO: 9.1 FL (ref 9–12.9)
POTASSIUM SERPL-SCNC: 4.7 MMOL/L (ref 3.6–5.5)
PROT SERPL-MCNC: 6.5 G/DL (ref 6–8.2)
RBC # BLD AUTO: 4.35 M/UL (ref 4.2–5.4)
RBC BLD AUTO: NORMAL
SODIUM SERPL-SCNC: 131 MMOL/L (ref 135–145)
VALPROATE SERPL-MCNC: 50.4 UG/ML (ref 50–100)
WBC # BLD AUTO: 8.2 K/UL (ref 4.8–10.8)

## 2024-12-01 PROCEDURE — 85027 COMPLETE CBC AUTOMATED: CPT

## 2024-12-01 PROCEDURE — 80164 ASSAY DIPROPYLACETIC ACD TOT: CPT

## 2024-12-01 PROCEDURE — 99284 EMERGENCY DEPT VISIT MOD MDM: CPT

## 2024-12-01 PROCEDURE — 85007 BL SMEAR W/DIFF WBC COUNT: CPT

## 2024-12-01 PROCEDURE — 84703 CHORIONIC GONADOTROPIN ASSAY: CPT

## 2024-12-01 PROCEDURE — 36415 COLL VENOUS BLD VENIPUNCTURE: CPT

## 2024-12-01 PROCEDURE — 80053 COMPREHEN METABOLIC PANEL: CPT

## 2024-12-01 RX ORDER — IBUPROFEN 200 MG
400 TABLET ORAL
COMMUNITY

## 2024-12-01 ASSESSMENT — LIFESTYLE VARIABLES
EVER FELT BAD OR GUILTY ABOUT YOUR DRINKING: NO
CONSUMPTION TOTAL: INCOMPLETE
TOTAL SCORE: 0
DO YOU DRINK ALCOHOL: NO
EVER HAD A DRINK FIRST THING IN THE MORNING TO STEADY YOUR NERVES TO GET RID OF A HANGOVER: NO
TOTAL SCORE: 0
HAVE YOU EVER FELT YOU SHOULD CUT DOWN ON YOUR DRINKING: NO
TOTAL SCORE: 0
HAVE PEOPLE ANNOYED YOU BY CRITICIZING YOUR DRINKING: NO

## 2024-12-01 ASSESSMENT — FIBROSIS 4 INDEX: FIB4 SCORE: 0.92

## 2024-12-01 NOTE — ED NOTES
Chief Complaint   Patient presents with    Seizure     Pt bib ems [place] bus stop pt reports having seizure. Pt said that she remembers everything. Denies oral trauma or incontinence.   Fsbs 156 , reports that she is compliant taking her Depakote .

## 2024-12-01 NOTE — ED NOTES
Medication history reviewed with patient at bedside.   Med rec is partially complete  Allergies reviewed.   Patient has not had any outpatient antibiotics in the last 30 days.   Anticoagulants: No    Patient states that she is currently on Depakote TID- unable to find any Depakote dispensed for her recently via dispense records.  Patient states she fills this medication through [facility] [address] [facility] ([phone]) Pharmacy currently closed, re-opens 12/2/24 0800.    Tejas Barrios PhT

## 2024-12-01 NOTE — ED PROVIDER NOTES
ED PHYSICIAN NOTE    CHIEF COMPLAINT  Chief Complaint   Patient presents with    Seizure       EXTERNAL RECORDS REVIEWED  Patient with history of bipolar and seizure like activity.  Has a hospitalization in 2022 in which she was intubated.  EEG did not show abnormality.  Patient was to continue her home Depakote, Tegretol and Topamax at that time.  Medication reconciliation patient is only on Depakote and Tegretol..      HPI/ROS      Yamila Mendoza is a 44 y.o. female who presents to the emergency department because of shaking hands.  She states that at about 10 AM she had an episode of 6 to 7 minutes where both her hands were shaking.  She was awake and alert during this event.  Nothing in particular brought it on mated better or worse.  She had another episode at about 1 PM lasting for just a few minutes.  She is worried that her Depakote dose is too high.  She denies any other symptoms.  Denies headache, focal weakness numbness slurred speech confusion chest pain shortness of breath.  Denies drugs or alcohol.    PAST MEDICAL HISTORY  Past Medical History:   Diagnosis Date    ASTHMA     Bipolar disorder ([facility])     Chronic back pain     Psychiatric disorder     bipolar    Psychiatric disorder     depression    Seizure disorder ([facility])        SOCIAL HISTORY  Social History     Tobacco Use    Smoking status: Never    Smokeless tobacco: Never   Vaping Use    Vaping status: Never Used   Substance Use Topics    Alcohol use: No    Drug use: No       CURRENT MEDICATIONS  Home Medications       Reviewed by Alejandra Self (Pharmacy Tech) on 12/01/24 at 1548  Med List Status: Partial     Medication Last Dose Status   amitriptyline (ELAVIL) 25 MG Tab 11/30/2024 Active   carBAMazepine (TEGRETOL) 200 MG Tab 12/1/2024 Active   CHOLECALCIFEROL PO 12/1/2024 Active   Divalproex Sodium (DEPAKOTE PO) 12/1/2024 Active   hydrOXYzine HCl (ATARAX) 25 MG Tab 12/1/2024 Active   ibuprofen (MOTRIN) 200 MG Tab 11/30/2024 Active  "  levothyroxine (SYNTHROID) 25 MCG Tab 12/1/2024 Active   Multiple Vitamin (MULTIVITAMIN) Tab 11/30/2024 Active   potassium chloride ER (KLOR-CON) 10 MEQ tablet 12/1/2024 Active                    ALLERGIES  Allergies   Allergen Reactions    Bactrim Unspecified     \"I get the shakes\"     Iron Unspecified     \"see red dots\" vision, not rash     Penicillins Unspecified     \"makes me dizzy with my medicine\"     Phenobarbital Unspecified     \"makes me dizzy\"     Lemon Oil Itching     Skin itching per pt report to Dietary.    Onion Itching and Swelling     Throat swelling and itching per pt report to Dietary.    [employer] Allergy Itching and Swelling     Throat swelling and itching per pt report to Dietary.       PHYSICAL EXAM  VITAL SIGNS: BP (!) 167/74   Pulse 83   Temp 36.1 °C (96.9 °F) (Temporal)   Resp 16   Ht 1.676 m (5' 6\")   Wt (!) 148 kg (326 lb)   SpO2 98%   BMI 52.62 kg/m²    Constitutional: Awake and alert  HENT: Normal inspection  Eyes: Normal inspection  Neck: Grossly normal range of motion.  Cardiovascular: Normal heart rate, Normal rhythm.  Symmetric peripheral pulses.   Thorax & Lungs: No respiratory distress, No wheezing, No rales, No rhonchi, No chest tenderness.   Abdomen: Bowel sounds normal, soft, non-distended, nontender, no mass  Skin: No obvious rash.  Extremities: No asymmetric swelling  Neurologic: Awake alert oriented.  Cranial nerves intact.  Symmetric motor and sensory.  Normal gait    DIAGNOSTIC STUDIES / PROCEDURES  LABS/EKG  Results for orders placed or performed during the hospital encounter of 12/01/24   CBC WITH DIFFERENTIAL    Collection Time: 12/01/24  3:21 PM   Result Value Ref Range    WBC 8.2 4.8 - 10.8 K/uL    RBC 4.35 4.20 - 5.40 M/uL    Hemoglobin 13.2 12.0 - 16.0 g/dL    Hematocrit 39.6 37.0 - 47.0 %    MCV 91.0 81.4 - 97.8 fL    MCH 30.3 27.0 - 33.0 pg    MCHC 33.3 32.2 - 35.5 g/dL    RDW 42.7 35.9 - 50.0 fL    Platelet Count 241 164 - 446 K/uL    MPV 9.1 9.0 - " 12.9 fL    Neutrophils-Polys 72.50 (H) 44.00 - 72.00 %    Lymphocytes 19.50 (L) 22.00 - 41.00 %    Monocytes 5.30 0.00 - 13.40 %    Eosinophils 0.00 0.00 - 6.90 %    Basophils 0.00 0.00 - 1.80 %    Nucleated RBC 0.20 0.00 - 0.20 /100 WBC    Neutrophils (Absolute) 6.02 1.82 - 7.42 K/uL    Lymphs (Absolute) 1.60 1.00 - 4.80 K/uL    Monos (Absolute) 0.43 0.00 - 0.85 K/uL    Eos (Absolute) 0.00 0.00 - 0.51 K/uL    Baso (Absolute) 0.00 0.00 - 0.12 K/uL    NRBC (Absolute) 0.02 K/uL   COMP METABOLIC PANEL    Collection Time: 12/01/24  3:21 PM   Result Value Ref Range    Sodium 131 (L) 135 - 145 mmol/L    Potassium 4.7 3.6 - 5.5 mmol/L    Chloride 97 96 - 112 mmol/L    Co2 24 20 - 33 mmol/L    Anion Gap 10.0 7.0 - 16.0    Glucose 118 (H) 65 - 99 mg/dL    Bun 12 8 - 22 mg/dL    Creatinine 0.61 0.50 - 1.40 mg/dL    Calcium 8.9 8.5 - 10.5 mg/dL    Correct Calcium 9.1 8.5 - 10.5 mg/dL    AST(SGOT) 22 12 - 45 U/L    ALT(SGPT) 9 2 - 50 U/L    Alkaline Phosphatase 68 30 - 99 U/L    Total Bilirubin <0.2 0.1 - 1.5 mg/dL    Albumin 3.8 3.2 - 4.9 g/dL    Total Protein 6.5 6.0 - 8.2 g/dL    Globulin 2.7 1.9 - 3.5 g/dL    A-G Ratio 1.4 g/dL   HCG QUAL SERUM    Collection Time: 12/01/24  3:21 PM   Result Value Ref Range    Beta-Hcg Qualitative Serum Negative Negative   VALPROIC ACID    Collection Time: 12/01/24  3:21 PM   Result Value Ref Range    Valproic Acid 50.4 50.0 - 100.0 ug/mL   ESTIMATED GFR    Collection Time: 12/01/24  3:21 PM   Result Value Ref Range    GFR (CKD-EPI) 112 >60 mL/min/1.73 m 2   DIFFERENTIAL MANUAL    Collection Time: 12/01/24  3:21 PM   Result Value Ref Range    Bands-Stabs 0.90 0.00 - 10.00 %    Metamyelocytes 0.90 %    Myelocytes 0.90 %    Manual Diff Status PERFORMED    PERIPHERAL SMEAR REVIEW    Collection Time: 12/01/24  3:21 PM   Result Value Ref Range    Peripheral Smear Review see below    PLATELET ESTIMATE    Collection Time: 12/01/24  3:21 PM   Result Value Ref Range    Plt Estimation Normal     MORPHOLOGY    Collection Time: 12/01/24  3:21 PM   Result Value Ref Range    RBC Morphology Normal           COURSE & MEDICAL DECISION MAKING    INITIAL ASSESSMENT, COURSE AND PLAN  Care Narrative: Patient presents with reports of seizure-like activity.   She had transient bilateral hand shaking. She does not have any ongoing symptoms.  Neurologic exam unremarkable.  She was worried about her Depakote level being too high.  Levels were sent of her seizure medications.  Obtain screening laboratory data.    Data returned reassuring.  Patient's valproic acid level is at the lower limits of therapeutic certainly not being overdosed.  I have encouraged her to continue her Depakote at its current dose.  I would like her to follow-up with her doctor.  Advised her to call today to make an appointment.  She will return to the ER for recurrent events or other concerning symptoms.        DISPOSITION AND DISCUSSIONS    Escalation of care considered, and ultimately not performed:acute inpatient care management, however at this time, the patient is most appropriate for outpatient management      Follow up  NATY Larsen  [address]  [address]  [facility] [address]  [phone]          FINAL IMPRESSION  1.  Seizure-like activity    This dictation was created using voice recognition software. The accuracy of the dictation is limited to the abilities of the software. I expect there may be some errors of grammar and possibly content. The nursing notes were reviewed and certain aspects of this information were incorporated into this note.    Electronically signed by: Arnaud Westbrook M.D., 12/1/2024

## 2024-12-02 NOTE — ED NOTES
Discharge instructions and follow up information reviewed with patient, verbalized understanding.

## 2024-12-11 ENCOUNTER — HOSPITAL ENCOUNTER (EMERGENCY)
Facility: MEDICAL CENTER | Age: 44
End: 2024-12-11
Attending: STUDENT IN AN ORGANIZED HEALTH CARE EDUCATION/TRAINING PROGRAM
Payer: MEDICARE

## 2024-12-11 ENCOUNTER — APPOINTMENT (OUTPATIENT)
Dept: RADIOLOGY | Facility: MEDICAL CENTER | Age: 44
End: 2024-12-11
Attending: STUDENT IN AN ORGANIZED HEALTH CARE EDUCATION/TRAINING PROGRAM
Payer: MEDICARE

## 2024-12-11 VITALS
SYSTOLIC BLOOD PRESSURE: 165 MMHG | DIASTOLIC BLOOD PRESSURE: 70 MMHG | RESPIRATION RATE: 18 BRPM | BODY MASS INDEX: 47.09 KG/M2 | TEMPERATURE: 98.4 F | HEIGHT: 66 IN | OXYGEN SATURATION: 92 % | WEIGHT: 293 LBS | HEART RATE: 89 BPM

## 2024-12-11 DIAGNOSIS — R10.84 GENERALIZED ABDOMINAL PAIN: ICD-10-CM

## 2024-12-11 LAB
ALBUMIN SERPL BCP-MCNC: 3.8 G/DL (ref 3.2–4.9)
ALBUMIN/GLOB SERPL: 1.4 G/DL
ALP SERPL-CCNC: 82 U/L (ref 30–99)
ALT SERPL-CCNC: 10 U/L (ref 2–50)
ANION GAP SERPL CALC-SCNC: 16 MMOL/L (ref 7–16)
APPEARANCE UR: CLEAR
AST SERPL-CCNC: 20 U/L (ref 12–45)
BACTERIA #/AREA URNS HPF: ABNORMAL /HPF
BASOPHILS # BLD AUTO: 0.3 % (ref 0–1.8)
BASOPHILS # BLD: 0.03 K/UL (ref 0–0.12)
BILIRUB SERPL-MCNC: 0.2 MG/DL (ref 0.1–1.5)
BILIRUB UR QL STRIP.AUTO: NEGATIVE
BUN SERPL-MCNC: 15 MG/DL (ref 8–22)
CALCIUM ALBUM COR SERPL-MCNC: 8.5 MG/DL (ref 8.5–10.5)
CALCIUM SERPL-MCNC: 8.3 MG/DL (ref 8.5–10.5)
CASTS URNS QL MICRO: ABNORMAL /LPF (ref 0–2)
CHLORIDE SERPL-SCNC: 100 MMOL/L (ref 96–112)
CO2 SERPL-SCNC: 18 MMOL/L (ref 20–33)
COLOR UR: ABNORMAL
CREAT SERPL-MCNC: 0.42 MG/DL (ref 0.5–1.4)
EOSINOPHIL # BLD AUTO: 0.03 K/UL (ref 0–0.51)
EOSINOPHIL NFR BLD: 0.3 % (ref 0–6.9)
EPITHELIAL CELLS 1715: ABNORMAL /HPF (ref 0–5)
ERYTHROCYTE [DISTWIDTH] IN BLOOD BY AUTOMATED COUNT: 43.9 FL (ref 35.9–50)
GFR SERPLBLD CREATININE-BSD FMLA CKD-EPI: 122 ML/MIN/1.73 M 2
GLOBULIN SER CALC-MCNC: 2.8 G/DL (ref 1.9–3.5)
GLUCOSE SERPL-MCNC: 135 MG/DL (ref 65–99)
GLUCOSE UR STRIP.AUTO-MCNC: NEGATIVE MG/DL
HCG SERPL QL: NEGATIVE
HCT VFR BLD AUTO: 43.6 % (ref 37–47)
HGB BLD-MCNC: 14.4 G/DL (ref 12–16)
IMM GRANULOCYTES # BLD AUTO: 0.05 K/UL (ref 0–0.11)
IMM GRANULOCYTES NFR BLD AUTO: 0.5 % (ref 0–0.9)
KETONES UR STRIP.AUTO-MCNC: ABNORMAL MG/DL
LEUKOCYTE ESTERASE UR QL STRIP.AUTO: ABNORMAL
LIPASE SERPL-CCNC: 25 U/L (ref 11–82)
LYMPHOCYTES # BLD AUTO: 0.47 K/UL (ref 1–4.8)
LYMPHOCYTES NFR BLD: 5.1 % (ref 22–41)
MCH RBC QN AUTO: 30.1 PG (ref 27–33)
MCHC RBC AUTO-ENTMCNC: 33 G/DL (ref 32.2–35.5)
MCV RBC AUTO: 91 FL (ref 81.4–97.8)
MICRO URNS: ABNORMAL
MONOCYTES # BLD AUTO: 0.4 K/UL (ref 0–0.85)
MONOCYTES NFR BLD AUTO: 4.3 % (ref 0–13.4)
MUCOUS THREADS URNS QL MICRO: PRESENT /HPF
NEUTROPHILS # BLD AUTO: 8.27 K/UL (ref 1.82–7.42)
NEUTROPHILS NFR BLD: 89.5 % (ref 44–72)
NITRITE UR QL STRIP.AUTO: NEGATIVE
NRBC # BLD AUTO: 0 K/UL
NRBC BLD-RTO: 0 /100 WBC (ref 0–0.2)
PH UR STRIP.AUTO: 6 [PH] (ref 5–8)
PLATELET # BLD AUTO: 222 K/UL (ref 164–446)
PMV BLD AUTO: 9.4 FL (ref 9–12.9)
POTASSIUM SERPL-SCNC: 4.3 MMOL/L (ref 3.6–5.5)
PROT SERPL-MCNC: 6.6 G/DL (ref 6–8.2)
PROT UR QL STRIP: NEGATIVE MG/DL
RBC # BLD AUTO: 4.79 M/UL (ref 4.2–5.4)
RBC # URNS HPF: ABNORMAL /HPF (ref 0–2)
RBC UR QL AUTO: NEGATIVE
SODIUM SERPL-SCNC: 134 MMOL/L (ref 135–145)
SP GR UR STRIP.AUTO: 1.03
UROBILINOGEN UR STRIP.AUTO-MCNC: 1 EU/DL
WBC # BLD AUTO: 9.3 K/UL (ref 4.8–10.8)
WBC #/AREA URNS HPF: ABNORMAL /HPF

## 2024-12-11 PROCEDURE — 85025 COMPLETE CBC W/AUTO DIFF WBC: CPT

## 2024-12-11 PROCEDURE — 81001 URINALYSIS AUTO W/SCOPE: CPT

## 2024-12-11 PROCEDURE — 700111 HCHG RX REV CODE 636 W/ 250 OVERRIDE (IP): Performed by: STUDENT IN AN ORGANIZED HEALTH CARE EDUCATION/TRAINING PROGRAM

## 2024-12-11 PROCEDURE — A9270 NON-COVERED ITEM OR SERVICE: HCPCS | Performed by: STUDENT IN AN ORGANIZED HEALTH CARE EDUCATION/TRAINING PROGRAM

## 2024-12-11 PROCEDURE — 36415 COLL VENOUS BLD VENIPUNCTURE: CPT

## 2024-12-11 PROCEDURE — 700102 HCHG RX REV CODE 250 W/ 637 OVERRIDE(OP): Performed by: STUDENT IN AN ORGANIZED HEALTH CARE EDUCATION/TRAINING PROGRAM

## 2024-12-11 PROCEDURE — 84703 CHORIONIC GONADOTROPIN ASSAY: CPT

## 2024-12-11 PROCEDURE — 74176 CT ABD & PELVIS W/O CONTRAST: CPT

## 2024-12-11 PROCEDURE — 80053 COMPREHEN METABOLIC PANEL: CPT

## 2024-12-11 PROCEDURE — 99284 EMERGENCY DEPT VISIT MOD MDM: CPT

## 2024-12-11 PROCEDURE — 83690 ASSAY OF LIPASE: CPT

## 2024-12-11 RX ORDER — ONDANSETRON 4 MG/1
4 TABLET, ORALLY DISINTEGRATING ORAL ONCE
Status: COMPLETED | OUTPATIENT
Start: 2024-12-11 | End: 2024-12-11

## 2024-12-11 RX ORDER — FAMOTIDINE 20 MG/1
20 TABLET, FILM COATED ORAL 2 TIMES DAILY
Qty: 60 TABLET | Refills: 0 | Status: SHIPPED | OUTPATIENT
Start: 2024-12-11

## 2024-12-11 RX ORDER — FAMOTIDINE 20 MG/1
20 TABLET, FILM COATED ORAL ONCE
Status: COMPLETED | OUTPATIENT
Start: 2024-12-11 | End: 2024-12-11

## 2024-12-11 RX ADMIN — ONDANSETRON 4 MG: 4 TABLET, ORALLY DISINTEGRATING ORAL at 16:49

## 2024-12-11 RX ADMIN — LIDOCAINE HYDROCHLORIDE 30 ML: 20 SOLUTION ORAL; TOPICAL at 18:57

## 2024-12-11 RX ADMIN — FAMOTIDINE 20 MG: 20 TABLET, FILM COATED ORAL at 18:55

## 2024-12-11 ASSESSMENT — FIBROSIS 4 INDEX: FIB4 SCORE: 1.4

## 2024-12-11 NOTE — ED TRIAGE NOTES
"Chief Complaint   Patient presents with    Abdominal Pain     Since this am around 0400 that is generalized with associated n/v       [facility] . Pt given 4 ODT zofran. Pt states diarrhea as well this am. Pt aox4 gcs 15      /82   Pulse (!) 102   Temp 36.9 °C (98.4 °F) (Temporal)   Resp 18   Ht 1.676 m (5' 6\")   Wt (!) 148 kg (326 lb)   LMP 11/26/2024   SpO2 95%   BMI 52.62 kg/m²     "

## 2024-12-12 NOTE — ED PROVIDER NOTES
ER Provider Note    Scribed for Will Álvarez D.o. by Darlin Ray. 12/11/2024  4:23 PM    Primary Care Provider: NATY Larsen    CHIEF COMPLAINT   Chief Complaint   Patient presents with    Abdominal Pain     Since this am around 0400 that is generalized with associated n/v     EXTERNAL RECORDS REVIEWED  Outpatient Notes Patient was last seen in the ED 12/01/2024 for seizure-like activity.    HPI/ROS  LIMITATION TO HISTORY   Select: : None  OUTSIDE HISTORIAN(S):  None    Yamila Mendoza is a 44 y.o. female who presents to the ED via EMS complaining of abdominal pain onset twelve hours ago. The patient reports she was recently exposed to her sister who is sick. She endorses vomiting, coughing, and bowel movements simultaneously. Confirms nausea. Denies issues with urination. She adds that she has not yet eaten anything and that she is dehydrated. She states she was given Zofran by [facility]. Patient notes she has medical history of asthma and epilepsy, for which she takes medication as prescribed.    PAST MEDICAL HISTORY  Past Medical History:   Diagnosis Date    ASTHMA     Bipolar disorder (HCC)     Chronic back pain     Psychiatric disorder     bipolar    Psychiatric disorder     depression    Seizure disorder (HCC)        SURGICAL HISTORY  Past Surgical History:   Procedure Laterality Date    REYES BY LAPAROSCOPY  9/22/2009    Performed by JUNE WAGGONER at SURGERY [facility]    ERCP IN OR  9/19/2009    Performed by LOU WEBB at SURGERY [facility]    OTHER ABDOMINAL SURGERY      OTHER NEUROLOGICAL SURG      OTHER ORTHOPEDIC SURGERY      2 R knee surgeries       FAMILY HISTORY  No family history noted.    SOCIAL HISTORY   reports that she has never smoked. She has never used smokeless tobacco. She reports that she does not drink alcohol and does not use drugs.    CURRENT MEDICATIONS  Previous Medications    AMITRIPTYLINE (ELAVIL) 25 MG TAB    Take 25 mg by mouth every evening.     "CARBAMAZEPINE (TEGRETOL) 200 MG TAB    Take 1 Tablet by mouth 3 times a day.    CHOLECALCIFEROL PO    Take 1 Tablet by mouth every day.    DIVALPROEX SODIUM (DEPAKOTE PO)    Take 2-3 Capsules by mouth 3 times a day. 2 capsules every morinignand at noon, 3 capsules every evening per patient.    HYDROXYZINE HCL (ATARAX) 25 MG TAB    Take 1 Tablet by mouth 2 times a day as needed for Anxiety.    IBUPROFEN (MOTRIN) 200 MG TAB    Take 400 mg by mouth 1 time a day as needed for Mild Pain.    LEVOTHYROXINE (SYNTHROID) 25 MCG TAB    Take 25 mcg by mouth every morning.    MULTIPLE VITAMIN (MULTIVITAMIN) TAB    Take 1 Tablet by mouth every day.    POTASSIUM CHLORIDE ER (KLOR-CON) 10 MEQ TABLET    Take 10 mEq by mouth every day.       ALLERGIES  Bactrim, Iron, Penicillins, Phenobarbital, Lemon oil, Onion, and Pepper-bell food allergy    PHYSICAL EXAM  /82   Pulse (!) 102   Temp 36.9 °C (98.4 °F) (Temporal)   Resp 18   Ht 1.676 m (5' 6\")   Wt (!) 148 kg (326 lb)   LMP 11/26/2024   SpO2 95%   BMI 52.62 kg/m²   Constitutional: Alert in no apparent distress.  HENT: No signs of trauma, Bilateral external ears normal, Nose normal.   Eyes: Pupils are equal and reactive, Conjunctiva normal, Non-icteric.   Neck: Normal range of motion, No tenderness, Supple, No stridor.   Cardiovascular: Regular rate and rhythm, no murmurs.   Thorax & Lungs: Normal breath sounds, No respiratory distress, No wheezing, No chest tenderness.   Abdomen: Bowel sounds normal, Soft, No tenderness, No masses, No pulsatile masses.   Skin: Warm, Dry, No erythema, No rash.   Back: No bony tenderness, No CVA tenderness.   Extremities: Intact distal pulses, No edema, No tenderness, No cyanosis  Musculoskeletal: Good range of motion in all major joints. No tenderness to palpation or major deformities noted.   Neurologic: Alert , Normal motor function, Normal sensory function, No focal deficits noted.     DIAGNOSTIC STUDIES    LABS  Results for orders " placed or performed during the hospital encounter of 12/11/24   CBC WITH DIFFERENTIAL    Collection Time: 12/11/24  1:40 PM   Result Value Ref Range    WBC 9.3 4.8 - 10.8 K/uL    RBC 4.79 4.20 - 5.40 M/uL    Hemoglobin 14.4 12.0 - 16.0 g/dL    Hematocrit 43.6 37.0 - 47.0 %    MCV 91.0 81.4 - 97.8 fL    MCH 30.1 27.0 - 33.0 pg    MCHC 33.0 32.2 - 35.5 g/dL    RDW 43.9 35.9 - 50.0 fL    Platelet Count 222 164 - 446 K/uL    MPV 9.4 9.0 - 12.9 fL    Neutrophils-Polys 89.50 (H) 44.00 - 72.00 %    Lymphocytes 5.10 (L) 22.00 - 41.00 %    Monocytes 4.30 0.00 - 13.40 %    Eosinophils 0.30 0.00 - 6.90 %    Basophils 0.30 0.00 - 1.80 %    Immature Granulocytes 0.50 0.00 - 0.90 %    Nucleated RBC 0.00 0.00 - 0.20 /100 WBC    Neutrophils (Absolute) 8.27 (H) 1.82 - 7.42 K/uL    Lymphs (Absolute) 0.47 (L) 1.00 - 4.80 K/uL    Monos (Absolute) 0.40 0.00 - 0.85 K/uL    Eos (Absolute) 0.03 0.00 - 0.51 K/uL    Baso (Absolute) 0.03 0.00 - 0.12 K/uL    Immature Granulocytes (abs) 0.05 0.00 - 0.11 K/uL    NRBC (Absolute) 0.00 K/uL   COMP METABOLIC PANEL    Collection Time: 12/11/24  1:40 PM   Result Value Ref Range    Sodium 134 (L) 135 - 145 mmol/L    Potassium 4.3 3.6 - 5.5 mmol/L    Chloride 100 96 - 112 mmol/L    Co2 18 (L) 20 - 33 mmol/L    Anion Gap 16.0 7.0 - 16.0    Glucose 135 (H) 65 - 99 mg/dL    Bun 15 8 - 22 mg/dL    Creatinine 0.42 (L) 0.50 - 1.40 mg/dL    Calcium 8.3 (L) 8.5 - 10.5 mg/dL    Correct Calcium 8.5 8.5 - 10.5 mg/dL    AST(SGOT) 20 12 - 45 U/L    ALT(SGPT) 10 2 - 50 U/L    Alkaline Phosphatase 82 30 - 99 U/L    Total Bilirubin 0.2 0.1 - 1.5 mg/dL    Albumin 3.8 3.2 - 4.9 g/dL    Total Protein 6.6 6.0 - 8.2 g/dL    Globulin 2.8 1.9 - 3.5 g/dL    A-G Ratio 1.4 g/dL   LIPASE    Collection Time: 12/11/24  1:40 PM   Result Value Ref Range    Lipase 25 11 - 82 U/L   HCG QUAL SERUM    Collection Time: 12/11/24  1:40 PM   Result Value Ref Range    Beta-Hcg Qualitative Serum Negative Negative   ESTIMATED GFR    Collection  Time: 12/11/24  1:40 PM   Result Value Ref Range    GFR (CKD-EPI) 122 >60 mL/min/1.73 m 2   URINALYSIS,CULTURE IF INDICATED    Collection Time: 12/11/24  5:19 PM    Specimen: Urine   Result Value Ref Range    Color Dark Yellow     Character Clear     Specific Gravity 1.031 <1.035    Ph 6.0 5.0 - 8.0    Glucose Negative Negative mg/dL    Ketones Trace (A) Negative mg/dL    Protein Negative Negative mg/dL    Bilirubin Negative Negative    Urobilinogen, Urine 1.0 <=1.0 EU/dL    Nitrite Negative Negative    Leukocyte Esterase Trace (A) Negative    Occult Blood Negative Negative    Micro Urine Req Microscopic    URINE MICROSCOPIC (W/UA)    Collection Time: 12/11/24  5:19 PM   Result Value Ref Range    WBC 0-2 /hpf    RBC 0-2 0 - 2 /hpf    Bacteria Rare (A) None /hpf    Epithelial Cells 0-2 0 - 5 /hpf    Mucous Threads Present /hpf    Urine Casts 0-2 0 - 2 /lpf     RADIOLOGY/PROCEDURES   The attending emergency physician has independently interpreted the diagnostic imaging associated with this visit and am waiting the final reading from the radiologist.   My preliminary interpretation is a follows: No free fluid    Radiologist interpretation:  CT-ABDOMEN-PELVIS W/O   Final Result         1.  Left nephrolithiasis without visualized obstructive change          COURSE & MEDICAL DECISION MAKING     ASSESSMENT, COURSE AND PLAN  Care Narrative: Patient presenting with symptoms consistent with viral syndrome intermittent nausea and vomiting.  Obtain blood work to assess for gross hematologic or metabolic derangements.  Initial abdominal exam was benign.  Initial plan for treatment with antiemetics and close reassessment.  Repeat exam patient noted to have generalized tenderness will obtain CT imaging to assess for intra-abdominal pathology including obstruction or appendicitis believe these are unlikely.    6:55 PM - Patient seen and examined at bedside. Discussed plan of care, including obtaining labs and imaging. Patient  agrees to the plan of care.     9:31 PM - I reevaluated the patient at bedside. I discussed the patient's diagnostic study results. I discussed plan for discharge and follow up as outlined below.  Patient's been able to tolerate p.o. without difficulty.  The patient is stable for discharge at this time and will return for any new or worsening symptoms. Patient verbalizes understanding and support with my plan for discharge.        ADDITIONAL PROBLEM LIST      DISPOSITION AND DISCUSSIONS  I have discussed management of the patient with the following physicians and ERYN's:  None    Discussion of management with other QHP or appropriate source(s): None       Barriers to care at this time, including but not limited to:  None .     Decision tools and prescription drugs considered including, but not limited to:  Famotidine .    The patient will return for new or worsening symptoms and is stable at the time of discharge.    The patient is referred to a primary physician for blood pressure management, diabetic screening, and for all other preventative health concerns.    DISPOSITION:  Patient will be discharged home in stable condition.    FOLLOW UP:  POLLY Larsen.RJose RafaelNJose Rafael  [address]  [facility] [id number] [phone]    Schedule an appointment as soon as possible for a visit       [facility], Emergency Dept  [address] [address] [phone]  Go to   If symptoms worsen      OUTPATIENT MEDICATIONS:  New Prescriptions    FAMOTIDINE (PEPCID) 20 MG TAB    Take 1 Tablet by mouth 2 times a day.     FINAL DIAGNOSIS  1. Generalized abdominal pain       Darlin BELTRAN), am scribing for, and in the presence of, VALDEMAR Perry.    Electronically signed by: Darlin Ray (Linda), 12/11/2024    Will BELTRAN D* personally performed the services described in this documentation, as scribed by Darlin Ray in my presence, and it is both accurate and  complete.     The note accurately reflects work and decisions made by me.  Will Álvarez D.O.  12/11/2024  9:50 PM

## 2024-12-12 NOTE — DISCHARGE INSTRUCTIONS
You should follow-up with your primary care provider.  We have given you prescription for reflux medication.

## 2025-02-26 LAB
FLUAV RNA SPEC QL NAA+PROBE: NEGATIVE
FLUBV RNA SPEC QL NAA+PROBE: NEGATIVE
RSV RNA SPEC QL NAA+PROBE: NEGATIVE
SARS-COV-2 RNA RESP QL NAA+PROBE: NOTDETECTED

## 2025-02-26 PROCEDURE — 0241U HCHG SARS-COV-2 COVID-19 NFCT DS RESP RNA 4 TRGT ED POC: CPT

## 2025-02-26 PROCEDURE — 99284 EMERGENCY DEPT VISIT MOD MDM: CPT

## 2025-02-26 ASSESSMENT — FIBROSIS 4 INDEX: FIB4 SCORE: 1.31

## 2025-02-27 ENCOUNTER — PHARMACY VISIT (OUTPATIENT)
Dept: PHARMACY | Facility: MEDICAL CENTER | Age: 45
End: 2025-02-27
Payer: COMMERCIAL

## 2025-02-27 ENCOUNTER — APPOINTMENT (OUTPATIENT)
Dept: RADIOLOGY | Facility: MEDICAL CENTER | Age: 45
End: 2025-02-27
Attending: STUDENT IN AN ORGANIZED HEALTH CARE EDUCATION/TRAINING PROGRAM
Payer: MEDICARE

## 2025-02-27 ENCOUNTER — HOSPITAL ENCOUNTER (EMERGENCY)
Facility: MEDICAL CENTER | Age: 45
End: 2025-02-27
Attending: STUDENT IN AN ORGANIZED HEALTH CARE EDUCATION/TRAINING PROGRAM
Payer: MEDICARE

## 2025-02-27 VITALS
TEMPERATURE: 96.9 F | HEIGHT: 66 IN | RESPIRATION RATE: 20 BRPM | HEART RATE: 86 BPM | WEIGHT: 293 LBS | SYSTOLIC BLOOD PRESSURE: 118 MMHG | BODY MASS INDEX: 47.09 KG/M2 | DIASTOLIC BLOOD PRESSURE: 66 MMHG | OXYGEN SATURATION: 93 %

## 2025-02-27 DIAGNOSIS — J18.9 PNEUMONIA OF BOTH LUNGS DUE TO INFECTIOUS ORGANISM, UNSPECIFIED PART OF LUNG: ICD-10-CM

## 2025-02-27 PROCEDURE — 71045 X-RAY EXAM CHEST 1 VIEW: CPT

## 2025-02-27 PROCEDURE — RXMED WILLOW AMBULATORY MEDICATION CHARGE: Performed by: STUDENT IN AN ORGANIZED HEALTH CARE EDUCATION/TRAINING PROGRAM

## 2025-02-27 RX ORDER — DOXYCYCLINE 100 MG/1
100 CAPSULE ORAL 2 TIMES DAILY
Qty: 10 CAPSULE | Refills: 0 | Status: ACTIVE | OUTPATIENT
Start: 2025-02-27 | End: 2025-03-04

## 2025-02-27 RX ORDER — GUAIFENESIN 600 MG/1
600 TABLET, EXTENDED RELEASE ORAL EVERY 12 HOURS
Qty: 28 TABLET | Refills: 0 | Status: SHIPPED | OUTPATIENT
Start: 2025-02-27

## 2025-02-27 RX ORDER — BENZONATATE 100 MG/1
100 CAPSULE ORAL 3 TIMES DAILY PRN
Qty: 30 CAPSULE | Refills: 0 | Status: SHIPPED | OUTPATIENT
Start: 2025-02-27

## 2025-02-27 RX ORDER — ALBUTEROL SULFATE 0.83 MG/ML
2.5 SOLUTION RESPIRATORY (INHALATION) EVERY 4 HOURS PRN
Qty: 90 ML | Refills: 0 | Status: SHIPPED | OUTPATIENT
Start: 2025-02-27

## 2025-02-27 NOTE — ED PROVIDER NOTES
CHIEF COMPLAINT  Chief Complaint   Patient presents with    Flu Like Symptoms     Pt c/o cough, congestion x1 month. Pt also reports fatigue, hx of asthma. Pt is out of her inhaler.        LIMITATION TO HISTORY   Select: None    HPI    Yamila Mendoza is a 44 y.o. female who presents to the Emergency Department presents for evaluation of a dry nonproductive cough states that  for the past month with associated runny nose and dry nonproductive cough. she has had no fevers, no chest pain, or other complaints.    OUTSIDE HISTORIAN(S):  Select: None    EXTERNAL RECORDS REVIEWED  Select: Other [facility] note patient was seen for evaluation of a cough had a chest x-ray obtained which showed a left lower lobe opacity      PAST MEDICAL HISTORY  Past Medical History:   Diagnosis Date    ASTHMA     Bipolar disorder (HCC)     Chronic back pain     Psychiatric disorder     bipolar    Psychiatric disorder     depression    Seizure disorder (HCC)      .    SURGICAL HISTORY  Past Surgical History:   Procedure Laterality Date    REYES BY LAPAROSCOPY  9/22/2009    Performed by JUNE WAGGONER at SURGERY [facility]    ERCP IN OR  9/19/2009    Performed by LOU WEBB at SURGERY [facility]    OTHER ABDOMINAL SURGERY      OTHER NEUROLOGICAL SURG      OTHER ORTHOPEDIC SURGERY      2 R knee surgeries         FAMILY HISTORY  No family history on file.       SOCIAL HISTORY  Social History     Socioeconomic History    Marital status: Single     Spouse name: Not on file    Number of children: Not on file    Years of education: Not on file    Highest education level: Not on file   Occupational History    Not on file   Tobacco Use    Smoking status: Never    Smokeless tobacco: Never   Vaping Use    Vaping status: Never Used   Substance and Sexual Activity    Alcohol use: No    Drug use: No    Sexual activity: Not on file   Other Topics Concern    Not on file   Social History Narrative    ** Merged History Encounter **     "      Social Drivers of Health     Financial Resource Strain: Not on file   Food Insecurity: Not on file   Transportation Needs: Not on file   Physical Activity: Not on file   Stress: Not on file   Social Connections: Not on file   Intimate Partner Violence: Not on file   Housing Stability: Not on file         CURRENT MEDICATIONS  No current facility-administered medications on file prior to encounter.     Current Outpatient Medications on File Prior to Encounter   Medication Sig Dispense Refill    famotidine (PEPCID) 20 MG Tab Take 1 Tablet by mouth 2 times a day. 60 Tablet 0    ibuprofen (MOTRIN) 200 MG Tab Take 400 mg by mouth 1 time a day as needed for Mild Pain.      Divalproex Sodium (DEPAKOTE PO) Take 2-3 Capsules by mouth 3 times a day. 2 capsules every morinignand at noon, 3 capsules every evening per patient.      levothyroxine (SYNTHROID) 25 MCG Tab Take 25 mcg by mouth every morning.      potassium chloride ER (KLOR-CON) 10 MEQ tablet Take 10 mEq by mouth every day.      carBAMazepine (TEGRETOL) 200 MG Tab Take 1 Tablet by mouth 3 times a day. 90 Tablet 0    hydrOXYzine HCl (ATARAX) 25 MG Tab Take 1 Tablet by mouth 2 times a day as needed for Anxiety. 30 Tablet 3    amitriptyline (ELAVIL) 25 MG Tab Take 25 mg by mouth every evening.      Multiple Vitamin (MULTIVITAMIN) Tab Take 1 Tablet by mouth every day.      CHOLECALCIFEROL PO Take 1 Tablet by mouth every day.             ALLERGIES  Allergies   Allergen Reactions    Bactrim Unspecified     \"I get the shakes\"     Iron Unspecified     \"see red dots\" vision, not rash     Penicillins Unspecified     \"makes me dizzy with my medicine\"     Phenobarbital Unspecified     \"makes me dizzy\"     Lemon Oil Itching     Skin itching per pt report to Dietary.    Onion Itching and Swelling     Throat swelling and itching per pt report to Dietary.    Pepper[employer] Allergy Itching and Swelling     Throat swelling and itching per pt report to Dietary.       PHYSICAL " "EXAM  VITAL SIGNS:BP (!) 154/114   Pulse 81   Temp 36.1 °C (96.9 °F) (Temporal)   Resp 20   Ht 1.676 m (5' 6\")   Wt (!) 148 kg (326 lb)   LMP 01/28/2025   SpO2 97%   BMI 52.62 kg/m²       VITALS - vital signs documented prior to this note have been reviewed and noted,  see EHR  GENERAL - awake and alert, no acute distress  HEENT - normocephalic, atraumatic, moist mucus membranes  CARDIOVASCULAR - regular rate and rhythm  PULMONARY - unlabored, no respiratory distress. No audible wheezing or  stridor.  NEUROLOGIC - mental status normal, speech fluid, cognition normal  MUSCULOSKELETAL -no obvious deformity or swelling  DERMATOLOGIC - warm and dry, no visible rashes  PSYCHIATRIC - normal affect, normal concentration      DIAGNOSTIC STUDIES / PROCEDURES      LABS  Labs Reviewed   POCT COV-2, FLU A/B, RSV BY PCR   POC COV-2, FLU A/B, RSV BY PCR         RADIOLOGY  I have independently interpreted the diagnostic imaging associated with this visit and am waiting the final reading from the radiologist.   My preliminary interpretation is as follows: Bilateral infiltrates      Radiologist interpretation:   DX-CHEST-PORTABLE (1 VIEW)   Final Result      Multiple small nodules are noted bilaterally which may represent granuloma.           COURSE & MEDICAL DECISION MAKING    ED COURSE:    ED Observation Status? no    INTERVENTIONS BY ME:  Medications - No data to display        INITIAL ASSESSMENT, COURSE AND PLAN  Care Narrative: Patient presented for evaluation of a cough which is been ongoing for the past month with associated runny nose, and congestion.  On assessment patient is nontoxic will hydrate well-appearing, saturating 97% on room air.  An x-ray was obtained which showed questionable granulomas, given the patient's persistent cough we will try treating for antibiotics for community-acquired pneumonia instructed to follow-up with her primary care physician return with any other new or worsening symptoms she is " discharged in stable condition.             ADDITIONAL PROBLEM LIST    DISPOSITION AND DISCUSSIONS    Escalation of care considered, and ultimately not performed:acute inpatient care management, however at this time, the patient is most appropriate for outpatient management    Barriers to care at this time, including but not limited to: Patient lacks transportation .     Decision tools and prescription drugs considered including, but not limited to: Antibiotics   .    FINAL DIAGNOSIS  1. Pneumonia of both lungs due to infectious organism, unspecified part of lung             Electronically signed by: Bulmaro Aldana DO ,1:40 AM 02/27/25

## 2025-02-27 NOTE — DISCHARGE INSTRUCTIONS
Take antibiotics until they are gone it is very important follow-up with a primary care physician if you develop any fevers worsening shortness of breath please return to the ER for recheck.

## 2025-02-27 NOTE — DISCHARGE PLANNING
MEGAN provided pt with cab voucher #[id number] to go home to [address] [place] [address], [place] [address].     MEGAN verified via [employer] website that pt does not have MTM benefits.

## 2025-02-27 NOTE — ED NOTES
This RN  walked pt out to lobby to meet cab w/ cab voucher provided. All belongings w/ pt including meds. Pt is d/c in stable condition, A&O x4, ambulatory w/ steady gait, VSS on RA

## 2025-02-27 NOTE — ED TRIAGE NOTES
Chief Complaint   Patient presents with    Flu Like Symptoms     Pt c/o cough, congestion x1 month. Pt also reports fatigue, hx of asthma. Pt is out of her inhaler.        Pt is alert and oriented, speaking in full sentences, follows commands and responds appropriately to questions. Resperations are even and unlabored.      Pt placed in lobby. Pt educated on triage process. Pt encouraged to alert staff for any changes.

## 2025-02-27 NOTE — ED NOTES
Pt signed and given d/c papers. Discussed x4 rx sent to pref pharmacy upstairs with directions. Discussed f/u and importance of completing full course of abx, return for worsening s/s. Pt denies further questions/concerns. VSS on RA. Requesting cab voucher as she took the bus here and does not have medicaid

## 2025-04-19 ENCOUNTER — HOSPITAL ENCOUNTER (EMERGENCY)
Facility: MEDICAL CENTER | Age: 45
End: 2025-04-19
Attending: STUDENT IN AN ORGANIZED HEALTH CARE EDUCATION/TRAINING PROGRAM
Payer: MEDICARE

## 2025-04-19 ENCOUNTER — PHARMACY VISIT (OUTPATIENT)
Dept: PHARMACY | Facility: MEDICAL CENTER | Age: 45
End: 2025-04-19
Payer: COMMERCIAL

## 2025-04-19 VITALS
OXYGEN SATURATION: 95 % | TEMPERATURE: 99.1 F | SYSTOLIC BLOOD PRESSURE: 183 MMHG | DIASTOLIC BLOOD PRESSURE: 92 MMHG | WEIGHT: 293 LBS | BODY MASS INDEX: 47.09 KG/M2 | HEIGHT: 66 IN | HEART RATE: 79 BPM | RESPIRATION RATE: 19 BRPM

## 2025-04-19 DIAGNOSIS — R56.9 SEIZURE (HCC): ICD-10-CM

## 2025-04-19 DIAGNOSIS — R56.9 SEIZURES (HCC): ICD-10-CM

## 2025-04-19 DIAGNOSIS — Z91.148 NONCOMPLIANCE WITH MEDICATIONS: ICD-10-CM

## 2025-04-19 DIAGNOSIS — F32.A DEPRESSION, UNSPECIFIED DEPRESSION TYPE: ICD-10-CM

## 2025-04-19 DIAGNOSIS — I10 ACCELERATED HYPERTENSION: ICD-10-CM

## 2025-04-19 LAB
ALBUMIN SERPL BCP-MCNC: 3.7 G/DL (ref 3.2–4.9)
ALBUMIN/GLOB SERPL: 1.4 G/DL
ALP SERPL-CCNC: 68 U/L (ref 30–99)
ALT SERPL-CCNC: 9 U/L (ref 2–50)
ANION GAP SERPL CALC-SCNC: 11 MMOL/L (ref 7–16)
AST SERPL-CCNC: 19 U/L (ref 12–45)
BASOPHILS # BLD AUTO: 0.5 % (ref 0–1.8)
BASOPHILS # BLD: 0.05 K/UL (ref 0–0.12)
BILIRUB SERPL-MCNC: <0.2 MG/DL (ref 0.1–1.5)
BUN SERPL-MCNC: 6 MG/DL (ref 8–22)
CALCIUM ALBUM COR SERPL-MCNC: 8.2 MG/DL (ref 8.5–10.5)
CALCIUM SERPL-MCNC: 8 MG/DL (ref 8.5–10.5)
CHLORIDE SERPL-SCNC: 99 MMOL/L (ref 96–112)
CO2 SERPL-SCNC: 22 MMOL/L (ref 20–33)
CREAT SERPL-MCNC: 0.48 MG/DL (ref 0.5–1.4)
EKG IMPRESSION: NORMAL
EOSINOPHIL # BLD AUTO: 0.1 K/UL (ref 0–0.51)
EOSINOPHIL NFR BLD: 1 % (ref 0–6.9)
ERYTHROCYTE [DISTWIDTH] IN BLOOD BY AUTOMATED COUNT: 43.7 FL (ref 35.9–50)
GFR SERPLBLD CREATININE-BSD FMLA CKD-EPI: 118 ML/MIN/1.73 M 2
GLOBULIN SER CALC-MCNC: 2.7 G/DL (ref 1.9–3.5)
GLUCOSE SERPL-MCNC: 111 MG/DL (ref 65–99)
HCT VFR BLD AUTO: 37.1 % (ref 37–47)
HGB BLD-MCNC: 11.8 G/DL (ref 12–16)
IMM GRANULOCYTES # BLD AUTO: 0.06 K/UL (ref 0–0.11)
IMM GRANULOCYTES NFR BLD AUTO: 0.6 % (ref 0–0.9)
LACTATE SERPL-SCNC: 1.5 MMOL/L (ref 0.5–2)
LYMPHOCYTES # BLD AUTO: 2.84 K/UL (ref 1–4.8)
LYMPHOCYTES NFR BLD: 29.7 % (ref 22–41)
MCH RBC QN AUTO: 28.2 PG (ref 27–33)
MCHC RBC AUTO-ENTMCNC: 31.8 G/DL (ref 32.2–35.5)
MCV RBC AUTO: 88.5 FL (ref 81.4–97.8)
MONOCYTES # BLD AUTO: 0.94 K/UL (ref 0–0.85)
MONOCYTES NFR BLD AUTO: 9.8 % (ref 0–13.4)
NEUTROPHILS # BLD AUTO: 5.56 K/UL (ref 1.82–7.42)
NEUTROPHILS NFR BLD: 58.4 % (ref 44–72)
NRBC # BLD AUTO: 0 K/UL
NRBC BLD-RTO: 0 /100 WBC (ref 0–0.2)
PLATELET # BLD AUTO: 211 K/UL (ref 164–446)
PMV BLD AUTO: 10 FL (ref 9–12.9)
POTASSIUM SERPL-SCNC: 3.7 MMOL/L (ref 3.6–5.5)
PROT SERPL-MCNC: 6.4 G/DL (ref 6–8.2)
RBC # BLD AUTO: 4.19 M/UL (ref 4.2–5.4)
SODIUM SERPL-SCNC: 132 MMOL/L (ref 135–145)
WBC # BLD AUTO: 9.6 K/UL (ref 4.8–10.8)

## 2025-04-19 PROCEDURE — 80053 COMPREHEN METABOLIC PANEL: CPT

## 2025-04-19 PROCEDURE — 83605 ASSAY OF LACTIC ACID: CPT

## 2025-04-19 PROCEDURE — 36415 COLL VENOUS BLD VENIPUNCTURE: CPT

## 2025-04-19 PROCEDURE — 700101 HCHG RX REV CODE 250: Performed by: STUDENT IN AN ORGANIZED HEALTH CARE EDUCATION/TRAINING PROGRAM

## 2025-04-19 PROCEDURE — 99284 EMERGENCY DEPT VISIT MOD MDM: CPT

## 2025-04-19 PROCEDURE — RXMED WILLOW AMBULATORY MEDICATION CHARGE: Performed by: STUDENT IN AN ORGANIZED HEALTH CARE EDUCATION/TRAINING PROGRAM

## 2025-04-19 PROCEDURE — 85025 COMPLETE CBC W/AUTO DIFF WBC: CPT

## 2025-04-19 PROCEDURE — 93005 ELECTROCARDIOGRAM TRACING: CPT | Mod: TC | Performed by: STUDENT IN AN ORGANIZED HEALTH CARE EDUCATION/TRAINING PROGRAM

## 2025-04-19 RX ORDER — TOPIRAMATE 100 MG/1
100 TABLET, FILM COATED ORAL ONCE
Status: COMPLETED | OUTPATIENT
Start: 2025-04-19 | End: 2025-04-19

## 2025-04-19 RX ORDER — TOPIRAMATE 100 MG/1
100 TABLET, FILM COATED ORAL 2 TIMES DAILY
Qty: 60 TABLET | Refills: 3 | Status: SHIPPED | OUTPATIENT
Start: 2025-04-19

## 2025-04-19 RX ORDER — DIVALPROEX SODIUM 250 MG/1
500 TABLET, DELAYED RELEASE ORAL 2 TIMES DAILY
Qty: 120 TABLET | Refills: 0 | Status: SHIPPED | OUTPATIENT
Start: 2025-04-19

## 2025-04-19 RX ORDER — DIVALPROEX SODIUM 250 MG/1
500 TABLET, DELAYED RELEASE ORAL 2 TIMES DAILY
Qty: 60 TABLET | Refills: 0 | Status: SHIPPED | OUTPATIENT
Start: 2025-04-19

## 2025-04-19 RX ADMIN — TOPIRAMATE 100 MG: 100 TABLET, FILM COATED ORAL at 18:47

## 2025-04-19 ASSESSMENT — FIBROSIS 4 INDEX: FIB4 SCORE: 1.31

## 2025-04-20 NOTE — ED NOTES
Bedside report received by RN Laurel    Oxygen:RA  Fall Risk status: bed in lowest position/locked, call light within reach  Patient Mentation:A+O x 2, pt on bed alarm  Continuous cardiac monitoring: sinus tachycardia

## 2025-04-20 NOTE — ED NOTES
ERP notified pt scores low on triage SI screen scale. Pt refuses need for psychiatric resources at this time and ERP notified.

## 2025-04-20 NOTE — ED TRIAGE NOTES
"Chief Complaint   Patient presents with    Seizure     Pt BIB EMS for c/o seizure-like activity and \"shaking\" for the last hour. Pt has hx of epilepsy. Pt states she is out of her topamax and has not taken it x 1 week. Pt states 5 seizures today with head strike to L side of head against bathroom stall, no trauma noted to L side of head. GCS 14 off for confusion.         Pt BIB EMS for above complaint. FSBG 125. Pt A&O2, states she \"can't remember where she is or what year it is.\" No tremors or convulsions noted at this time.   "

## 2025-04-20 NOTE — ED NOTES
Bedside report to Summer STOUT. Pt in bed locked in lowest position side rail up x 2 call light within reach, pt on cardiac, HR, BP, and oxygen monitor. Pt has bed alarm in place. Pt GCS 14 off for confusion. Pt family bedside.

## 2025-04-20 NOTE — ED PROVIDER NOTES
ER Provider Note    Scribed for Dr. Carlton by Viviana Vallecillo. 4/19/2025  6:11 PM    Primary Care Provider: Bi Harvey  Means of Arrival: EMS  History obtained from: Patient  History limited by: Monica historian    REVIEW OF RECORDS  Patient was most recently seen here in the ED on 2/27/25 for pneumonia of both lungs. Patient was treated with antibiotics and told to follow up for any new or worsening symptoms.     CHIEF COMPLAINT  Chest pain, forgetful    HPI  Yamila Mendoza is a 44 y.o. female with a history of seizures who presents to the ED via EMS for evaluation of reported seizure prior to arrival. EMS report notes a GCS of 14. Patient reports she has been seizing throughout the day. She reports that she has an associated tremor in her hands during and after her seizures. EMS arrived and has been experiencing dizziness and confusion since. Patient reports she has not taken her prescribed Topamax 100 mg for the last week. The patient reports a history of asthma.    PAST MEDICAL HISTORY  Past Medical History:   Diagnosis Date    ASTHMA     Bipolar disorder (HCC)     Chronic back pain     Psychiatric disorder     bipolar    Psychiatric disorder     depression    Seizure disorder (HCC)        SURGICAL HISTORY  Past Surgical History:   Procedure Laterality Date    REYES BY LAPAROSCOPY  9/22/2009    [facility]    ERCP IN OR  9/19/2009    Performed by LOU WEBB at SURGERY [facility]    OTHER ABDOMINAL SURGERY      OTHER NEUROLOGICAL SURG      OTHER ORTHOPEDIC SURGERY      2 R knee surgeries       FAMILY HISTORY  No family history noted.    SOCIAL HISTORY   reports that she has never smoked. She has never used smokeless tobacco. She reports that she does not drink alcohol and does not use drugs.    CURRENT MEDICATIONS  Previous Medications    ALBUTEROL (PROVENTIL) 2.5MG/3ML NEBU SOLN SOLUTION FOR NEBULIZATION    Take 3 mL by nebulization every four hours as  "needed for Shortness of Breath.    AMITRIPTYLINE (ELAVIL) 25 MG TAB    Take 25 mg by mouth every evening.    BENZONATATE (TESSALON) 100 MG CAP    Take 1 Capsule by mouth 3 times a day as needed for Cough.    CARBAMAZEPINE (TEGRETOL) 200 MG TAB    Take 1 Tablet by mouth 3 times a day.    CHOLECALCIFEROL PO    Take 1 Tablet by mouth every day.    DIVALPROEX SODIUM (DEPAKOTE PO)    Take 2-3 Capsules by mouth 3 times a day. 2 capsules every morinignand at noon, 3 capsules every evening per patient.    FAMOTIDINE (PEPCID) 20 MG TAB    Take 1 Tablet by mouth 2 times a day.    GUAIFENESIN ER (MUCINEX) 600 MG TABLET SR 12 HR    Take 1 Tablet by mouth every 12 hours.    HYDROXYZINE HCL (ATARAX) 25 MG TAB    Take 1 Tablet by mouth 2 times a day as needed for Anxiety.    IBUPROFEN (MOTRIN) 200 MG TAB    Take 400 mg by mouth 1 time a day as needed for Mild Pain.    LEVOTHYROXINE (SYNTHROID) 25 MCG TAB    Take 25 mcg by mouth every morning.    MULTIPLE VITAMIN (MULTIVITAMIN) TAB    Take 1 Tablet by mouth every day.    POTASSIUM CHLORIDE ER (KLOR-CON) 10 MEQ TABLET    Take 10 mEq by mouth every day.       ALLERGIES  Bactrim, Iron, Penicillins, Phenobarbital, Lemon oil, Onion, and Pepper-bell food allergy    PHYSICAL EXAM  Physical Exam  VITAL SIGNS:   BP (!) 183/85   Pulse 83   Temp (P) 37.3 °C (99.1 °F) (Oral)   Resp 18   Ht (P) 1.676 m (5' 6\")   Wt (!) (P) 148 kg (326 lb)   SpO2 97%   BMI (P) 52.62 kg/m²   Pulse oximetry interpretation: I interpret the pulse oximetry as normal.  Constitutional: Awake and alert. No acute distress.  Head: NCAT.  No external signs of trauma, no crepitus to the scalp.  No raccoon eyes or arias signs.  HEENT: Normal Conjunctiva.  PERRLA  Neck: Grossly normal range of motion. Airway midline.  Cardiovascular: Normal heart rate, Normal rhythm.  Thorax & Lungs: No respiratory distress. Clear to Auscultation bilaterally.  Abdomen: Normal inspection. Nontender. Nondistended  Skin: No obvious " rash.  Back: No CVA tenderness.   Musculoskeletal: No obvious deformity. Moves all extremities Well.  Neurologic: A&Ox4. No focal deficits.  Psychiatric: Mood and affect are appropriate for situation.    DIAGNOSTIC STUDIES    Pulse oximetry:  97% on room air which is normal as interpreted by me.     Labs:   Results for orders placed or performed during the hospital encounter of 04/19/25   CBC WITH DIFFERENTIAL    Collection Time: 04/19/25  5:45 PM   Result Value Ref Range    WBC 9.6 4.8 - 10.8 K/uL    RBC 4.19 (L) 4.20 - 5.40 M/uL    Hemoglobin 11.8 (L) 12.0 - 16.0 g/dL    Hematocrit 37.1 37.0 - 47.0 %    MCV 88.5 81.4 - 97.8 fL    MCH 28.2 27.0 - 33.0 pg    MCHC 31.8 (L) 32.2 - 35.5 g/dL    RDW 43.7 35.9 - 50.0 fL    Platelet Count 211 164 - 446 K/uL    MPV 10.0 9.0 - 12.9 fL    Neutrophils-Polys 58.40 44.00 - 72.00 %    Lymphocytes 29.70 22.00 - 41.00 %    Monocytes 9.80 0.00 - 13.40 %    Eosinophils 1.00 0.00 - 6.90 %    Basophils 0.50 0.00 - 1.80 %    Immature Granulocytes 0.60 0.00 - 0.90 %    Nucleated RBC 0.00 0.00 - 0.20 /100 WBC    Neutrophils (Absolute) 5.56 1.82 - 7.42 K/uL    Lymphs (Absolute) 2.84 1.00 - 4.80 K/uL    Monos (Absolute) 0.94 (H) 0.00 - 0.85 K/uL    Eos (Absolute) 0.10 0.00 - 0.51 K/uL    Baso (Absolute) 0.05 0.00 - 0.12 K/uL    Immature Granulocytes (abs) 0.06 0.00 - 0.11 K/uL    NRBC (Absolute) 0.00 K/uL   COMP METABOLIC PANEL    Collection Time: 04/19/25  5:45 PM   Result Value Ref Range    Sodium 132 (L) 135 - 145 mmol/L    Potassium 3.7 3.6 - 5.5 mmol/L    Chloride 99 96 - 112 mmol/L    Co2 22 20 - 33 mmol/L    Anion Gap 11.0 7.0 - 16.0    Glucose 111 (H) 65 - 99 mg/dL    Bun 6 (L) 8 - 22 mg/dL    Creatinine 0.48 (L) 0.50 - 1.40 mg/dL    Calcium 8.0 (L) 8.5 - 10.5 mg/dL    Correct Calcium 8.2 (L) 8.5 - 10.5 mg/dL    AST(SGOT) 19 12 - 45 U/L    ALT(SGPT) 9 2 - 50 U/L    Alkaline Phosphatase 68 30 - 99 U/L    Total Bilirubin <0.2 0.1 - 1.5 mg/dL    Albumin 3.7 3.2 - 4.9 g/dL    Total  Protein 6.4 6.0 - 8.2 g/dL    Globulin 2.7 1.9 - 3.5 g/dL    A-G Ratio 1.4 g/dL   ESTIMATED GFR    Collection Time: 25  5:45 PM   Result Value Ref Range    GFR (CKD-EPI) 118 >60 mL/min/1.73 m 2   EKG    Collection Time: 25  6:21 PM   Result Value Ref Range    Report       [facility] Emergency Dept.    Test Date:  2025  Pt Name:    GULSHAN SMITH                 Department: ER  MRN:        [id number]                      Room:        10  Gender:     Female                       Technician: [id number]  :        1980                   Requested By:KAY ROBISON  Order #:    [id number]                    Reading MD:    Measurements  Intervals                                Axis  Rate:       81                           P:          -14  NH:         158                          QRS:        60  QRSD:       104                          T:          51  QT:         408  QTc:        474    Interpretive Statements  Sinus rhythm  Artifact in lead(s) II,III,aVR,aVL,aVF,V1,V2,V3,V4,V5,V6  Compared to ECG 2024 19:44:34  Early repolarization no longer present     LACTIC ACID    Collection Time: 25  6:30 PM   Result Value Ref Range    Lactic Acid 1.5 0.5 - 2.0 mmol/L       All labs reviewed by me.    EK Lead EKG interpreted by me to show sinus rhythm.  81 bpm.  No acute ST-T wave changes.  Normal intervals.    COURSE & MEDICAL DECISION MAKING     Nursing notes, VS, PMSFHx, labs, imaging, EKG reviewed in chart.     6:15 PM  44 y.o. YO female presents for seizure and without seizure medication refills now back to baseline  Afebrile and normal vital signs  Pertinent exam findings include no focal deficits, no signs of head trauma  Differentials considered but not exhaustive list including seizure intracranial hemorrhage, noncompliance,   I evaluated the patient at bedside. I outlined plan of treatment, including providing a dose of Topamax and lab analysis.     Labs with  normocytic anemia, mild hyponatremia, normal lactic acid level.  She is hypertensive here.  On her chart I do not see any antihypertensives noted.  It ultimately improved modestly without intervention here.  Patient did receive her Topamax.  She has not had any further seizure episode here.  Her labs are not suggestive of a recent seizure.  Will refill her seizure medications.  I do not have a recent med list and attempted to clarify with the patient who is unable to provide me with dosages.  I will refill Depakote and Topamax but she is strongly urged to follow-up with primary care doctor, neurologist.  She also screened low risk for suicide.  She denies suicidal ideation to me.  She has no plan, no prior suicide attempts.    discharging the patient with a new prescription. I discussed the patient's diagnostic study results which are unremarkable. I discussed plan for discharge and follow up as outlined below. The patient is stable for discharge at this time and will return for any new or worsening symptoms. Patient verbalizes understanding and support with my plan for discharge.      Hypertension counseling:   I spent a total of 5 minutes counseling patient on blood pressure control and management.  We discussed medication management with PCP.  I recommended keeping a blood pressure journal, taking the blood pressure once in the morning once evening after resting for 5 minutes.  Presented in general to PCP and discussing medication management or adjustment.  Discussed hypertensive emergencies and what signs and symptoms to be monitoring for and to return to the ED for.  Patient verbalized understanding and was receptive of counseling.    DISPOSITION   I have discussed management of the patient with the following physicians and ERYN's:  None    Discussion of management with other QHP or appropriate source(s): None     Escalation of care considered, and ultimately not performed: acute inpatient care management,  however at this time, the patient is most appropriate for outpatient management.    Barriers to care at this time, including but not limited to: None.     Decision tools and prescription drugs considered including, but not limited to: None.    The patient will return for new or worsening symptoms and is stable at the time of discharge.    The patient is referred to a primary physician for blood pressure management, diabetic screening, and for all other preventative health concerns.    DISPOSITION:  Patient will be discharged home in stable condition.    FOLLOW UP:  NATY Larsen  [facility] [facility] [address]  [facility] [address]  [phone]    Schedule an appointment as soon as possible for a visit       OUTPATIENT MEDICATIONS:  Current Discharge Medication List        START taking these medications    Details   topiramate (TOPAMAX) 100 MG Tab Take 1 Tablet by mouth 2 times a day.  Qty: 60 Tablet, Refills: 3    Associated Diagnoses: Seizure (HCC)             CONDITION AT DISPOSITION  Stable     FINAL IMPRESSION   1. Seizure (HCC)    2. Noncompliance with medications    3. Accelerated hypertension    4. Depression, unspecified depression type       I, Viviana Vallecillo (Linda), am scribing for, and in the presence of, Lonny Carlton D.O..    Electronically signed by: Viviana Vallecillo (Yanyibe), 4/19/2025    ILonny D.O. personally performed the services described in this documentation, as scribed by Viviana Vallecillo in my presence, and it is both accurate and complete.       The note accurately reflects work and decisions made by me.  Lonny Carlton D.O.  4/19/2025  8:46 PM

## 2025-05-02 ENCOUNTER — HOSPITAL ENCOUNTER (EMERGENCY)
Facility: MEDICAL CENTER | Age: 45
End: 2025-05-02
Attending: EMERGENCY MEDICINE
Payer: MEDICARE

## 2025-05-02 ENCOUNTER — APPOINTMENT (OUTPATIENT)
Dept: RADIOLOGY | Facility: MEDICAL CENTER | Age: 45
End: 2025-05-02
Attending: EMERGENCY MEDICINE
Payer: MEDICARE

## 2025-05-02 VITALS
BODY MASS INDEX: 45.96 KG/M2 | RESPIRATION RATE: 15 BRPM | HEIGHT: 66 IN | OXYGEN SATURATION: 95 % | DIASTOLIC BLOOD PRESSURE: 84 MMHG | SYSTOLIC BLOOD PRESSURE: 143 MMHG | HEART RATE: 89 BPM | TEMPERATURE: 96.5 F | WEIGHT: 286 LBS

## 2025-05-02 DIAGNOSIS — M25.572 ACUTE LEFT ANKLE PAIN: ICD-10-CM

## 2025-05-02 DIAGNOSIS — M79.605 PAIN OF LEFT LOWER EXTREMITY: ICD-10-CM

## 2025-05-02 PROCEDURE — 73590 X-RAY EXAM OF LOWER LEG: CPT | Mod: LT

## 2025-05-02 PROCEDURE — 700102 HCHG RX REV CODE 250 W/ 637 OVERRIDE(OP): Performed by: EMERGENCY MEDICINE

## 2025-05-02 PROCEDURE — A9270 NON-COVERED ITEM OR SERVICE: HCPCS | Performed by: EMERGENCY MEDICINE

## 2025-05-02 PROCEDURE — 73610 X-RAY EXAM OF ANKLE: CPT | Mod: LT

## 2025-05-02 PROCEDURE — 99283 EMERGENCY DEPT VISIT LOW MDM: CPT

## 2025-05-02 RX ORDER — IBUPROFEN 600 MG/1
600 TABLET, FILM COATED ORAL ONCE
Status: COMPLETED | OUTPATIENT
Start: 2025-05-02 | End: 2025-05-02

## 2025-05-02 RX ADMIN — IBUPROFEN 600 MG: 600 TABLET, FILM COATED ORAL at 21:37

## 2025-05-02 ASSESSMENT — FIBROSIS 4 INDEX: FIB4 SCORE: 1.38

## 2025-05-02 ASSESSMENT — PAIN DESCRIPTION - PAIN TYPE: TYPE: ACUTE PAIN

## 2025-05-03 NOTE — ED TRIAGE NOTES
Chief Complaint   Patient presents with    Ankle Pain     Pt c/o left ankle pain while walking.  Pt reports she twisted her foot last October and has had pain since.        Pt is alert and oriented, speaking in full sentences, follows commands and responds appropriately to questions. Resperations are even and unlabored.      Pt placed in lobby in a wheelchair. Pt educated on triage process. Pt encouraged to alert staff for any changes.

## 2025-05-03 NOTE — ED PROVIDER NOTES
I independently evaluated the patient and repeated the important components of the history and physical. I discussed the management with the resident. I have reviewed and agree with the pertinent clinical information above including history, exam, study findings, and recommendations.     In summary visit this pleasant lady has a history of seizures.  And she cannot remember what happened but comes in with left leg pain after she had a seizure.  Patient has had a history in the past where she had wear a boot secondary to pain here the patient appear to have no significant bony point tenderness however due to the history we were unable to rule out without an x-ray as [place] ankle rules were not unable to be determined.  Therefore x-rays of the ankle and tib-fib were unremarkable she was given a boot and recommended she follow-up with orthopedics if continues there is no clinical signs of compartment syndrome or neurovascular compromise at this time    Electronically signed by: Jose E Nieves M.D., 5/3/2025 12:43 AM       CHIEF COMPLAINT  Chief Complaint   Patient presents with    Ankle Pain     Pt c/o left ankle pain while walking.  Pt reports she twisted her foot last October and has had pain since.        LIMITATION TO HISTORY   None    HPI    Yamila Mendoza is a 44 y.o. female with a history of seizures, for left ankle pain onset six months ago. She states she fell out of a cab when the injury happened. She adds she had imaging and wore a boot for about six week with minor improvement. She states the pain in her left knee as well with pain present on the sides, top and back of her foot and worsen when she ambulates. She adds any weight bearing increases her pain. She states she followed up with her PCP and was told to keep her boot on but she notes she removed it because she felt it was not helping. After the removal of the boot she states her pain worsened. The patient states in October she used  Tylenol and Ibuprofen regularly and states is was not helping much. She adds she is currently not using any alleviation medications. The patient uses a walker to assist with ambulation because she states she has chronic ankle instability and adds she has had increasing falls due to her seizures.       OUTSIDE HISTORIAN(S):  None    EXTERNAL RECORDS REVIEWED          Barriers to care at this time, including but not limited to: Patient does not have established PCP.      Decision tools and prescription drugs considered including, but not limited to: Pain Medications   over-the-counter pain medications are appropriate, narcotics not indicated at this time  .     FINAL DIAGNOSIS  1. Sprain of anterior talofibular ligament of right ankle, initial encounter        REVIEW OF SYSTEMS  Negative    PAST MEDICAL HISTORY  Past Medical History:   Diagnosis Date    ASTHMA     Bipolar disorder (HCC)     Chronic back pain     Psychiatric disorder     bipolar    Psychiatric disorder     depression    Seizure disorder ([facility])        FAMILY HISTORY  History reviewed. No pertinent family history.    SOCIAL HISTORY  Social History     Tobacco Use    Smoking status: Never    Smokeless tobacco: Never   Vaping Use    Vaping status: Never Used   Substance Use Topics    Alcohol use: No    Drug use: No     Social History     Substance and Sexual Activity   Drug Use No       SURGICAL HISTORY  Past Surgical History:   Procedure Laterality Date    REYES BY LAPAROSCOPY  9/22/2009    Performed by JUNE WAGGONER at SURGERY [facility]    ERCP IN OR  9/19/2009    Performed by LOU WEBB at SURGERY [facility]S    OTHER ABDOMINAL SURGERY      OTHER NEUROLOGICAL SURG      OTHER ORTHOPEDIC SURGERY      2 R knee surgeries       CURRENT MEDICATIONS  No current facility-administered medications for this encounter.    Current Outpatient Medications:     topiramate (TOPAMAX) 100 MG Tab, Take 1 Tablet by mouth 2 times a day., Disp: 60 Tablet,  "Rfl: 3    amitriptyline (ELAVIL) 25 MG Tab, Take 1 Tablet by mouth every evening., Disp: 30 Tablet, Rfl: 0    divalproex (DEPAKOTE) 250 MG Tablet Delayed Response, Take 2 Tablets by mouth 2 times a day. 2 capsules every morinignand at noon, 3 capsules every evening per patient., Disp: 60 Tablet, Rfl: 0    divalproex (DEPAKOTE) 250 MG Tablet Delayed Response, Take 2 Tablets by mouth 2 times a day., Disp: 120 Tablet, Rfl: 0    albuterol (PROVENTIL) 2.5mg/3ml Nebu Soln solution for nebulization, Take 3 mL by nebulization every four hours as needed for Shortness of Breath., Disp: 90 mL, Rfl: 0    guaiFENesin ER (MUCINEX) 600 MG TABLET SR 12 HR, Take 1 Tablet by mouth every 12 hours., Disp: 28 Tablet, Rfl: 0    benzonatate (TESSALON) 100 MG Cap, Take 1 Capsule by mouth 3 times a day as needed for Cough., Disp: 30 Capsule, Rfl: 0    famotidine (PEPCID) 20 MG Tab, Take 1 Tablet by mouth 2 times a day., Disp: 60 Tablet, Rfl: 0    ibuprofen (MOTRIN) 200 MG Tab, Take 400 mg by mouth 1 time a day as needed for Mild Pain., Disp: , Rfl:     levothyroxine (SYNTHROID) 25 MCG Tab, Take 25 mcg by mouth every morning., Disp: , Rfl:     potassium chloride ER (KLOR-CON) 10 MEQ tablet, Take 10 mEq by mouth every day., Disp: , Rfl:     carBAMazepine (TEGRETOL) 200 MG Tab, Take 1 Tablet by mouth 3 times a day., Disp: 90 Tablet, Rfl: 0    hydrOXYzine HCl (ATARAX) 25 MG Tab, Take 1 Tablet by mouth 2 times a day as needed for Anxiety., Disp: 30 Tablet, Rfl: 3    Multiple Vitamin (MULTIVITAMIN) Tab, Take 1 Tablet by mouth every day., Disp: , Rfl:     CHOLECALCIFEROL PO, Take 1 Tablet by mouth every day., Disp: , Rfl:     ALLERGIES  Allergies   Allergen Reactions    Bactrim Unspecified     \"I get the shakes\"     Iron Unspecified     \"see red dots\" vision, not rash     Penicillins Unspecified     \"makes me dizzy with my medicine\"     Phenobarbital Unspecified     \"makes me dizzy\"     Lemon Oil Itching     Skin itching per pt report to Dietary. " "   Onion Itching and Swelling     Throat swelling and itching per pt report to Dietary.    Pepper-Bell Food Allergy Itching and Swelling     Throat swelling and itching per pt report to Dietary.       PHYSICAL EXAM  VITAL SIGNS: BP (!) 161/102   Pulse 94   Temp 35.8 °C (96.5 °F) (Temporal)   Resp 16   Ht 1.676 m (5' 6\")   Wt (!) 130 kg (286 lb)   LMP 03/28/2025   SpO2 96%   BMI 46.16 kg/m²   Reviewed and elevated blood pressure noted  Constitutional: Well developed, Well nourished, Obese.  HENT: Normocephalic, atraumatic, bilateral external ears normal, No intraoral erythema, edema, exudate  Eyes: PERRLA, conjunctiva pink, no scleral icterus.   Cardiovascular: Regular rate and rhythm. No murmurs, rubs or gallops.  No dependent edema or calf tenderness  Respiratory: Lungs clear to auscultation bilaterally. No wheezes, rales, or rhonchi.  Abdominal:  Abdomen soft, non-tender, non distended. No rebound, or guarding.    Skin: No erythema, no rash. No wounds or bruising.  Genitourinary: No costovertebral angle tenderness.   Musculoskeletal: no deformities. Left lateral posterior malleolus tenderness, left 5th metatarsal tenderness, Positive squeeze test, minimally weight bearing on the left leg, PT pulses and DP pulses 2+ bilaterally, otherwise normal lower extremity exam.  Neurologic: Alert, no facial droop noted. All extra ocular muscles intact. Moves all extremities with out weakness noted  Psychiatric: Affect normal, Judgment normal, Mood normal.       PROBLEMS EVALUATED THIS VISIT:  Left ankle pain and left knee pain after seizure.  Cannot recall if she was able to stand up at the time.  Here the patient has point tenderness but no ligamental instability and no neurovascular compromise.    MEDICAL DECISION MAKING:  Likely ankle sprain less likely fracture.  No signs of compartment syndrome.  No signs of neurovascular compromise.     PLAN:  9:01 PM - Patient seen and examined at bedside. Discussed plan of " care, including pain management and imaging. Patient agrees to the plan of care. The patient will be  medicated with Motrin 600 mg tablet. Ordered for DX Tibia and Fibula left and DX ankle 3 views left to evaluate her symptoms.         Barriers to care at this time, including but not limited to: Select: None noted.     RESULTS    RADIOLOGY    DX-TIBIA AND FIBULA LEFT   Final Result            No acute osseous abnormality.      DX-ANKLE 3+ VIEWS LEFT   Final Result      No acute osseous abnormality.            ED COURSE:    ED Observation Status? No   No noted need for observation for developing issue    INTERVENTIONS BY ME:  Medications   ibuprofen (Motrin) tablet 600 mg (600 mg Oral Given 5/2/25 2137)     11:01 PM - I reevaluated the patient at bedside. The patient informs me they feel improved following medication administration. I discussed the patient's diagnostic study results which show no abnormalities. I discussed plan for discharge and follow up as outlined below. The patient is stable for discharge at this time and will return for any new or worsening symptoms. Patient verbalizes understanding and support with my plan for discharge. She will be discharged with a boot for support.     FINAL DISPO PLAN     CONDITION: Stable.     FINAL IMPRESSION  1. Acute left ankle pain    2. Pain of left lower extremity         Gayatri BELTRAN (Linda), am scribing for, and in the presence of, Jose E Nieves, *.    Electronically signed by: Gayatri Calle (Linda), 5/2/2025    Jose E BELTRAN, * personally performed the services described in this documentation, as scribed by Gayatri Calle in my presence, and it is both accurate and complete.    The note accurately reflects work and decisions made by me.  Jose E Nieves M.D.  5/3/2025  12:45 AM

## 2025-05-03 NOTE — ED NOTES
Pt provided walking boot. Patient given instructions, v/u instructions.  Patient ambulated out of ER with steady gate, with spouse. All belongings with patient.

## 2025-05-03 NOTE — DISCHARGE INSTRUCTIONS
Try to keep your foot elevated for the next 2 to 3 days to reduce swelling  We have made a referral to the orthopedic doctor.    If you are still having discomfort after 7 to 10 days lets get you reevaluated ideally better by them but if you cannot see them come see us.

## 2025-07-05 ENCOUNTER — HOSPITAL ENCOUNTER (EMERGENCY)
Facility: MEDICAL CENTER | Age: 45
End: 2025-07-05
Attending: STUDENT IN AN ORGANIZED HEALTH CARE EDUCATION/TRAINING PROGRAM
Payer: MEDICARE

## 2025-07-05 ENCOUNTER — APPOINTMENT (OUTPATIENT)
Dept: RADIOLOGY | Facility: MEDICAL CENTER | Age: 45
End: 2025-07-05
Attending: STUDENT IN AN ORGANIZED HEALTH CARE EDUCATION/TRAINING PROGRAM
Payer: MEDICARE

## 2025-07-05 VITALS
RESPIRATION RATE: 16 BRPM | DIASTOLIC BLOOD PRESSURE: 103 MMHG | BODY MASS INDEX: 53.16 KG/M2 | WEIGHT: 293 LBS | TEMPERATURE: 96.7 F | HEART RATE: 88 BPM | OXYGEN SATURATION: 95 % | SYSTOLIC BLOOD PRESSURE: 158 MMHG

## 2025-07-05 DIAGNOSIS — S93.401A SPRAIN OF RIGHT ANKLE, UNSPECIFIED LIGAMENT, INITIAL ENCOUNTER: Primary | ICD-10-CM

## 2025-07-05 PROCEDURE — 99284 EMERGENCY DEPT VISIT MOD MDM: CPT

## 2025-07-05 PROCEDURE — 73600 X-RAY EXAM OF ANKLE: CPT | Mod: RT

## 2025-07-05 ASSESSMENT — FIBROSIS 4 INDEX: FIB4 SCORE: 1.38

## 2025-07-05 NOTE — ED TRIAGE NOTES
Yamila Mendoza  44 y.o. female  Chief Complaint   Patient presents with    Ankle Pain     Pt reports she twisted her right ankle  Reports 9/10 right ankle pain       Pt amb to triage with steady gait for above complaint.   Pt is alert and oriented, speaking in full sentences, follows commands and responds appropriately to questions. Not in any apparent distress. Respirations are even and unlabored.  Pt placed in ED Lobby. Pt educated on triage process. Pt encouraged to alert staff for any changes.

## 2025-07-06 NOTE — ED NOTES
Patient discharged home per ERP.  Discharge teaching and education discussed with patient. POC discussed.   Patient verbalized understanding of discharge teaching and education. No other questions at this time.     Vitals are stable. Patient alert and oriented. Patient arranged ride for self. Able to wheeled off unit safely. Family left with patient.

## 2025-07-06 NOTE — ED PROVIDER NOTES
CHIEF COMPLAINT  Chief Complaint   Patient presents with    Ankle Pain     Pt reports she twisted her right ankle  Reports 9/10 right ankle pain       LIMITATION TO HISTORY   Select: None    HPI    Yamila Mendoza is a 44 y.o. female who presents to the Emergency Department presents for evaluation of ankle pain she was walking when she twisted her right ankle is complaining of pain along the lateral aspect of her right ankle.  No knee pain, no other complaints.    OUTSIDE HISTORIAN(S):  Select: None    EXTERNAL RECORDS REVIEWED  Select: Other last ED visit was seen for left ankle pain in May      PAST MEDICAL HISTORY  Past Medical History[1]  .    SURGICAL HISTORY  Past Surgical History[2]      FAMILY HISTORY  No family history on file.       SOCIAL HISTORY  Social History     Socioeconomic History    Marital status: Single     Spouse name: Not on file    Number of children: Not on file    Years of education: Not on file    Highest education level: Not on file   Occupational History    Not on file   Tobacco Use    Smoking status: Never    Smokeless tobacco: Never   Vaping Use    Vaping status: Never Used   Substance and Sexual Activity    Alcohol use: No    Drug use: No    Sexual activity: Not on file   Other Topics Concern    Not on file   Social History Narrative    ** Merged History Encounter **          Social Drivers of Health     Financial Resource Strain: Not on file   Food Insecurity: Not on file   Transportation Needs: Not on file   Physical Activity: Not on file   Stress: Not on file   Social Connections: Not on file   Intimate Partner Violence: Not on file   Housing Stability: Not on file         CURRENT MEDICATIONS  Medications Ordered Prior to Encounter[3]        ALLERGIES  Allergies[4]    PHYSICAL EXAM  VITAL SIGNS:BP (!) 154/101   Pulse 86   Temp 35.9 °C (96.7 °F) (Temporal)   Resp 16   Wt (!) 149 kg (329 lb 5.9 oz)   SpO2 96%   BMI 53.16 kg/m²       VITALS - vital signs documented prior to  this note have been reviewed and noted,  see EHR  GENERAL - awake and alert, no acute distress  HEENT - normocephalic, atraumatic, moist mucus membranes  CARDIOVASCULAR - regular rate and rhythm  PULMONARY - unlabored, no respiratory distress. No audible wheezing or  stridor.  NEUROLOGIC - mental status normal, speech fluid, cognition normal  MUSCULOSKELETAL -no obvious deformity or swelling she has mild tenderness with palpation of the lateral aspect of her right malleolus.  No significant swelling 2+ DP PT pulses, negative Cooper squeeze test there is no proximal tibia or fibular tenderness.  DERMATOLOGIC - warm and dry, no visible rashes  PSYCHIATRIC - normal affect, normal concentration      DIAGNOSTIC STUDIES / PROCEDURES    RADIOLOGY  I have independently interpreted the diagnostic imaging associated with this visit and am waiting the final reading from the radiologist.   My preliminary interpretation is as follows: Negative for fracture      Radiologist interpretation:   DX-ANKLE 2- VIEWS RIGHT   Final Result      No acute osseous abnormality.           COURSE & MEDICAL DECISION MAKING    ED COURSE:    ED Observation Status? no    INTERVENTIONS BY ME:  Medications - No data to display        INITIAL ASSESSMENT, COURSE AND PLAN  Care Narrative: Patient presented for evaluation of ankle pain after an inversion ankle injury.  Differential include was not limited to sprain strain fracture among other considerations.  On examination she has no proximal tibia or fibular tenderness, there is no evidence of Achilles tendon disruption.  Her lower extremity compartments.  She is neurovascular tact.  X-rays obtained is negative for fracture.  Suspect suspect patient likely has underlying ankle sprain.  She is provided an Ace wrap instructed rest ice elevate the extremity follow-up with orthopedist if pain persist.  Does have a walker encouraged to use this return precautions follow-up discussed she is discharged in  stable condition.             ADDITIONAL PROBLEM LIST    DISPOSITION AND DISCUSSIONS    Decision tools and prescription drugs considered including, but not limited to: Pain Medications discussed with the patient the use of oral opioids though after a discussion they did feel comfortable using over-the-counter Tylenol and ibuprofen thus narcotics will be deferred.    FINAL DIAGNOSIS  1. Sprain of right ankle, unspecified ligament, initial encounter             Electronically signed by: Bulmaro Aldana DO ,6:10 PM 07/05/25            [1]   Past Medical History:  Diagnosis Date    ASTHMA     Bipolar disorder (HCC)     Chronic back pain     Psychiatric disorder     bipolar    Psychiatric disorder     depression    Seizure disorder (HCC)    [2]   Past Surgical History:  Procedure Laterality Date    REYES BY LAPAROSCOPY  9/22/2009    Performed by JUNE WAGGONER at SURGERY [facility]    ERCP IN OR  9/19/2009    Performed by LOU WEBB at SURGERY [facility]    OTHER ABDOMINAL SURGERY      OTHER NEUROLOGICAL SURG      OTHER ORTHOPEDIC SURGERY      2 R knee surgeries   [3]   No current facility-administered medications on file prior to encounter.     Current Outpatient Medications on File Prior to Encounter   Medication Sig Dispense Refill    topiramate (TOPAMAX) 100 MG Tab Take 1 Tablet by mouth 2 times a day. 60 Tablet 3    amitriptyline (ELAVIL) 25 MG Tab Take 1 Tablet by mouth every evening. 30 Tablet 0    divalproex (DEPAKOTE) 250 MG Tablet Delayed Response Take 2 Tablets by mouth 2 times a day. 2 capsules every morinignand at noon, 3 capsules every evening per patient. 60 Tablet 0    divalproex (DEPAKOTE) 250 MG Tablet Delayed Response Take 2 Tablets by mouth 2 times a day. 120 Tablet 0    albuterol (PROVENTIL) 2.5mg/3ml Nebu Soln solution for nebulization Take 3 mL by nebulization every four hours as needed for Shortness of Breath. 90 mL 0    guaiFENesin ER (MUCINEX) 600 MG TABLET SR 12 HR Take 1  "Tablet by mouth every 12 hours. 28 Tablet 0    benzonatate (TESSALON) 100 MG Cap Take 1 Capsule by mouth 3 times a day as needed for Cough. 30 Capsule 0    famotidine (PEPCID) 20 MG Tab Take 1 Tablet by mouth 2 times a day. 60 Tablet 0    ibuprofen (MOTRIN) 200 MG Tab Take 400 mg by mouth 1 time a day as needed for Mild Pain.      levothyroxine (SYNTHROID) 25 MCG Tab Take 25 mcg by mouth every morning.      potassium chloride ER (KLOR-CON) 10 MEQ tablet Take 10 mEq by mouth every day.      carBAMazepine (TEGRETOL) 200 MG Tab Take 1 Tablet by mouth 3 times a day. 90 Tablet 0    hydrOXYzine HCl (ATARAX) 25 MG Tab Take 1 Tablet by mouth 2 times a day as needed for Anxiety. 30 Tablet 3    Multiple Vitamin (MULTIVITAMIN) Tab Take 1 Tablet by mouth every day.      CHOLECALCIFEROL PO Take 1 Tablet by mouth every day.     [4]   Allergies  Allergen Reactions    Bactrim Unspecified     \"I get the shakes\"     Iron Unspecified     \"see red dots\" vision, not rash     Penicillins Unspecified     \"makes me dizzy with my medicine\"     Phenobarbital Unspecified     \"makes me dizzy\"     Lemon Oil Itching     Skin itching per pt report to Dietary.    Lime Oil     Onion Itching and Swelling     Throat swelling and itching per pt report to Dietary.    [employer] Allergy Itching and Swelling     Throat swelling and itching per pt report to Dietary.     "

## 2025-07-15 ENCOUNTER — APPOINTMENT (OUTPATIENT)
Dept: URGENT CARE | Facility: CLINIC | Age: 45
End: 2025-07-15
Payer: MEDICARE

## 2025-07-29 ENCOUNTER — HOSPITAL ENCOUNTER (EMERGENCY)
Facility: MEDICAL CENTER | Age: 45
End: 2025-07-29
Attending: STUDENT IN AN ORGANIZED HEALTH CARE EDUCATION/TRAINING PROGRAM
Payer: MEDICARE

## 2025-07-29 ENCOUNTER — APPOINTMENT (OUTPATIENT)
Dept: RADIOLOGY | Facility: MEDICAL CENTER | Age: 45
End: 2025-07-29
Attending: STUDENT IN AN ORGANIZED HEALTH CARE EDUCATION/TRAINING PROGRAM
Payer: MEDICARE

## 2025-07-29 ASSESSMENT — PAIN DESCRIPTION - PAIN TYPE
TYPE: ACUTE PAIN
TYPE: ACUTE PAIN

## 2025-07-29 ASSESSMENT — FIBROSIS 4 INDEX: FIB4 SCORE: 1.38
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
## 2025-07-30 NOTE — DISCHARGE INSTRUCTIONS
Your x-ray showed a possible toe fracture.  We have placed referral given you contact impression for orthopedic clinic where you can follow-up.  You can bear weight as tolerated.  You can use ibuprofen and Tylenol for pain.
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
## 2025-07-30 NOTE — ED NOTES
Pt  was discharged home in stable condition with all personal belongings, pt verbalized understanding of discharged paperwork and taken to ER lobby by wheelchair

## 2025-08-22 ENCOUNTER — APPOINTMENT (OUTPATIENT)
Dept: RADIOLOGY | Facility: MEDICAL CENTER | Age: 45
End: 2025-08-22
Attending: EMERGENCY MEDICINE
Payer: MEDICARE

## 2025-08-22 ENCOUNTER — PHARMACY VISIT (OUTPATIENT)
Dept: PHARMACY | Facility: MEDICAL CENTER | Age: 45
End: 2025-08-22
Payer: COMMERCIAL

## 2025-08-22 ENCOUNTER — HOSPITAL ENCOUNTER (EMERGENCY)
Facility: MEDICAL CENTER | Age: 45
End: 2025-08-22
Attending: EMERGENCY MEDICINE
Payer: MEDICARE

## 2025-08-22 VITALS
BODY MASS INDEX: 47.09 KG/M2 | HEART RATE: 80 BPM | WEIGHT: 293 LBS | SYSTOLIC BLOOD PRESSURE: 138 MMHG | TEMPERATURE: 98 F | RESPIRATION RATE: 18 BRPM | HEIGHT: 66 IN | DIASTOLIC BLOOD PRESSURE: 84 MMHG | OXYGEN SATURATION: 95 %

## 2025-08-22 DIAGNOSIS — R05.1 ACUTE COUGH: Primary | ICD-10-CM

## 2025-08-22 DIAGNOSIS — M25.561 PAIN IN BOTH KNEES, UNSPECIFIED CHRONICITY: ICD-10-CM

## 2025-08-22 DIAGNOSIS — M25.571 ACUTE BILATERAL ANKLE PAIN: ICD-10-CM

## 2025-08-22 DIAGNOSIS — M25.572 ACUTE BILATERAL ANKLE PAIN: ICD-10-CM

## 2025-08-22 DIAGNOSIS — M15.9 OSTEOARTHRITIS OF MULTIPLE JOINTS, UNSPECIFIED OSTEOARTHRITIS TYPE: ICD-10-CM

## 2025-08-22 DIAGNOSIS — M25.562 PAIN IN BOTH KNEES, UNSPECIFIED CHRONICITY: ICD-10-CM

## 2025-08-22 PROCEDURE — 73560 X-RAY EXAM OF KNEE 1 OR 2: CPT | Mod: RT

## 2025-08-22 PROCEDURE — 73600 X-RAY EXAM OF ANKLE: CPT | Mod: LT

## 2025-08-22 PROCEDURE — 73620 X-RAY EXAM OF FOOT: CPT | Mod: RT

## 2025-08-22 PROCEDURE — 700102 HCHG RX REV CODE 250 W/ 637 OVERRIDE(OP): Performed by: EMERGENCY MEDICINE

## 2025-08-22 PROCEDURE — RXMED WILLOW AMBULATORY MEDICATION CHARGE: Performed by: EMERGENCY MEDICINE

## 2025-08-22 PROCEDURE — 73600 X-RAY EXAM OF ANKLE: CPT | Mod: RT

## 2025-08-22 PROCEDURE — 99283 EMERGENCY DEPT VISIT LOW MDM: CPT

## 2025-08-22 PROCEDURE — 71045 X-RAY EXAM CHEST 1 VIEW: CPT

## 2025-08-22 PROCEDURE — 73560 X-RAY EXAM OF KNEE 1 OR 2: CPT | Mod: LT

## 2025-08-22 PROCEDURE — 73620 X-RAY EXAM OF FOOT: CPT | Mod: LT

## 2025-08-22 PROCEDURE — A9270 NON-COVERED ITEM OR SERVICE: HCPCS | Performed by: EMERGENCY MEDICINE

## 2025-08-22 RX ORDER — ACETAMINOPHEN 325 MG/1
650 TABLET ORAL ONCE
Status: COMPLETED | OUTPATIENT
Start: 2025-08-22 | End: 2025-08-22

## 2025-08-22 RX ORDER — AZITHROMYCIN 250 MG/1
TABLET, FILM COATED ORAL
Qty: 6 TABLET | Refills: 0 | Status: ACTIVE | OUTPATIENT
Start: 2025-08-22 | End: 2025-08-27

## 2025-08-22 RX ADMIN — ACETAMINOPHEN 650 MG: 325 TABLET ORAL at 17:25

## 2025-08-22 ASSESSMENT — FIBROSIS 4 INDEX: FIB4 SCORE: 1.38
